# Patient Record
Sex: FEMALE | Race: WHITE | NOT HISPANIC OR LATINO | Employment: OTHER | URBAN - METROPOLITAN AREA
[De-identification: names, ages, dates, MRNs, and addresses within clinical notes are randomized per-mention and may not be internally consistent; named-entity substitution may affect disease eponyms.]

---

## 2017-01-17 DIAGNOSIS — G43.909 MIGRAINE WITHOUT STATUS MIGRAINOSUS, NOT INTRACTABLE, UNSPECIFIED MIGRAINE TYPE: ICD-10-CM

## 2017-01-17 RX ORDER — RIZATRIPTAN BENZOATE 10 MG/1
TABLET, ORALLY DISINTEGRATING ORAL
Qty: 9 TABLET | Refills: 2 | Status: SHIPPED | OUTPATIENT
Start: 2017-01-17 | End: 2017-01-23

## 2017-01-23 ENCOUNTER — OFFICE VISIT (OUTPATIENT)
Dept: FAMILY MEDICINE CLINIC | Facility: CLINIC | Age: 52
End: 2017-01-23

## 2017-01-23 VITALS
DIASTOLIC BLOOD PRESSURE: 84 MMHG | BODY MASS INDEX: 30.63 KG/M2 | TEMPERATURE: 97.4 F | RESPIRATION RATE: 16 BRPM | SYSTOLIC BLOOD PRESSURE: 122 MMHG | HEART RATE: 74 BPM | HEIGHT: 60 IN | WEIGHT: 156 LBS

## 2017-01-23 DIAGNOSIS — J06.9 ACUTE URI: Primary | ICD-10-CM

## 2017-01-23 DIAGNOSIS — G43.909 MIGRAINE WITHOUT STATUS MIGRAINOSUS, NOT INTRACTABLE, UNSPECIFIED MIGRAINE TYPE: ICD-10-CM

## 2017-01-23 PROCEDURE — 99213 OFFICE O/P EST LOW 20 MIN: CPT | Performed by: FAMILY MEDICINE

## 2017-01-23 RX ORDER — RIZATRIPTAN BENZOATE 10 MG/1
TABLET, ORALLY DISINTEGRATING ORAL
Qty: 9 TABLET | Refills: 5 | Status: SHIPPED | OUTPATIENT
Start: 2017-01-23 | End: 2018-03-30 | Stop reason: SDUPTHER

## 2017-01-23 RX ORDER — IBUPROFEN 800 MG/1
TABLET ORAL
Refills: 4 | COMMUNITY
Start: 2016-12-05 | End: 2017-03-02

## 2017-01-23 NOTE — MR AVS SNAPSHOT
Astrid VEGA Flaco   1/23/2017 4:00 PM   Office Visit    Dept Phone:  [phone]   Encounter #:  [id number]    Provider:  Anson Castellanos MD   Department:  [facility] FAMILY MEDICINE                Your Full Care Plan              Today's Medication Changes          These changes are accurate as of: 1/23/17  4:24 PM.  If you have any questions, ask your nurse or doctor.               New Medication(s)Ordered:     Chlorcyclizine-Pseudoephed 25-60 MG tablet   1/2-1 po q 8 hours PRN   Started by:  Anson Castellanos MD         Medication(s)that have changed:     rizatriptan MLT 10 MG disintegrating tablet   Commonly known as:  MAXALT-MLT   DISSOLVE ONE TABLET IN MOUTH AT ONSET OF HEADACHE, MAY REPEAT EVERY 2 HOURS AS NEEDED *MAXIMUM OF 2 TABLETS IN 24 HOURS*   What changed:  See the new instructions.   Changed by:  Anson Castellanos MD            Where to Get Your Medications      These medications were sent to [facility]/pharmacy #[facility] [place], [place] - [address] [place] [address] AT [place] [address] - [phone]  - [phone] [place] [address]    Hours:  24-hours Phone:  [phone]     Chlorcyclizine-Pseudoephed 25-60 MG tablet    rizatriptan MLT 10 MG disintegrating tablet                  Your Updated Medication List          This list is accurate as of: 1/23/17  4:24 PM.  Always use your most recent med list.                aspirin 81 MG EC tablet       Chlorcyclizine-Pseudoephed 25-60 MG tablet   1/2-1 po q 8 hours PRN       ibuprofen 800 MG tablet   Commonly known as:  ADVIL,MOTRIN       naproxen 500 MG tablet   Commonly known as:  NAPROSYN   Take 1 tablet by mouth 2 (two) times a day with meals.       rizatriptan MLT 10 MG disintegrating tablet   Commonly known as:  MAXALT-MLT   DISSOLVE ONE TABLET IN MOUTH AT ONSET OF HEADACHE, MAY REPEAT EVERY 2 HOURS AS NEEDED *MAXIMUM OF 2 TABLETS IN 24 HOURS*       valACYclovir 1000 MG tablet   Commonly known  "as:  MAURILIO               You Were Diagnosed With        Codes Comments    Acute URI    -  Primary ICD-10-CM: J06.9  ICD-9-CM: 465.9     Migraine without status migrainosus, not intractable, unspecified migraine type     ICD-10-CM: G43.909  ICD-9-CM: 346.90       Instructions     None    Patient Instructions History      Upcoming Appointments     Visit Type Date Time Department    OFFICE VISIT 1/23/2017  4:00 PM [facility]      [employer] Signup     Our records indicate that you have an active [employer] account.    You can view your After Visit Summary by going to [email or web address] and logging in with your [employer] username and password.  If you don't have a [employer] username and password but a parent or guardian has access to your record, the parent or guardian should login with their own [employer] username and password and access your record to view the After Visit Summary.    If you have questions, you can email [email or web address] or call [phone] to talk to our [email or web address] staff.  Remember, [employer] is NOT to be used for urgent needs.  For medical emergencies, dial 911.               Other Info from Your Visit           Allergies     No Known Drug Allergy        Reason for Visit     URI           Vital Signs     Blood Pressure Pulse Temperature Respirations Height Weight    122/84 74 97.4 °F (36.3 °C) 16 60\" (152.4 cm) 156 lb (70.8 kg)    Body Mass Index Smoking Status                30.47 kg/m2 Never Smoker          Problems and Diagnoses Noted     Migraines    Acute upper respiratory infection    -  Primary        "

## 2017-01-23 NOTE — PROGRESS NOTES
Subjective   Astrid Sam is a 51 y.o. female.     URI    Associated symptoms include congestion and coughing.    Ill for the last 24 hour  Cough, congestion, aches, diarrhea  No fever  tried some OTC without help      Review of Systems   HENT: Positive for congestion.    Respiratory: Positive for cough.        Objective   Physical Exam   Constitutional: She appears well-developed and well-nourished. No distress.   HENT:   Head: Normocephalic and atraumatic.   Right Ear: Tympanic membrane, external ear and ear canal normal.   Left Ear: Tympanic membrane, external ear and ear canal normal.   Nose: Nose normal.   Mouth/Throat: Uvula is midline, oropharynx is clear and moist and mucous membranes are normal.   Cardiovascular: Normal rate, regular rhythm and normal heart sounds.    Pulmonary/Chest: Effort normal and breath sounds normal.   Lymphadenopathy:     She has no cervical adenopathy.   Psychiatric: She has a normal mood and affect. Her behavior is normal.   Nursing note and vitals reviewed.      Assessment/Plan   Astrid was seen today for uri.    Diagnoses and all orders for this visit:    Acute URI  -     Chlorcyclizine-Pseudoephed 25-60 MG tablet; 1/2-1 po q 8 hours PRN    Migraine without status migrainosus, not intractable, unspecified migraine type  -     rizatriptan MLT (MAXALT-MLT) 10 MG disintegrating tablet; DISSOLVE ONE TABLET IN MOUTH AT ONSET OF HEADACHE, MAY REPEAT EVERY 2 HOURS AS NEEDED *MAXIMUM OF 2 TABLETS IN 24 HOURS*      Stahist, call back INB, consider Abx INB in 48 hours, pt agrees

## 2017-03-02 ENCOUNTER — OFFICE VISIT (OUTPATIENT)
Dept: FAMILY MEDICINE CLINIC | Facility: CLINIC | Age: 52
End: 2017-03-02

## 2017-03-02 VITALS
SYSTOLIC BLOOD PRESSURE: 120 MMHG | RESPIRATION RATE: 16 BRPM | BODY MASS INDEX: 31.73 KG/M2 | WEIGHT: 161.6 LBS | HEIGHT: 60 IN | HEART RATE: 72 BPM | TEMPERATURE: 97.8 F | DIASTOLIC BLOOD PRESSURE: 85 MMHG

## 2017-03-02 DIAGNOSIS — R17 ELEVATED BILIRUBIN: ICD-10-CM

## 2017-03-02 DIAGNOSIS — R79.89 ELEVATED TSH: ICD-10-CM

## 2017-03-02 DIAGNOSIS — J20.9 ACUTE BRONCHITIS, UNSPECIFIED ORGANISM: Primary | ICD-10-CM

## 2017-03-02 PROCEDURE — 99213 OFFICE O/P EST LOW 20 MIN: CPT | Performed by: FAMILY MEDICINE

## 2017-03-02 RX ORDER — AZITHROMYCIN 250 MG/1
TABLET, FILM COATED ORAL
Qty: 6 TABLET | Refills: 0 | Status: SHIPPED | OUTPATIENT
Start: 2017-03-02 | End: 2017-03-08

## 2017-03-02 RX ORDER — FOLIC ACID 1 MG/1
1 TABLET ORAL DAILY
COMMUNITY
End: 2017-06-29

## 2017-03-02 NOTE — PROGRESS NOTES
Subjective   Astrid Sam is a 51 y.o. female.     History of Present Illness   Upper Respiratory Infection  Patient complains of symptoms of a URI. Symptoms include congestion, no  fever and non productive cough. Onset of symptoms was 2 weeks ago, and has been unchanged since that time. Treatment to date: Elis Washington, old rx of cough syrup. Also, completed prednisone rx today.     Labs completed by her rheumatologist, scanned into her chart already.   Mildly elevated TSH, no history of hypothyroidism  Also, very slightly elevated total bilirubin. No history of liver dysfunction, jaundice.     The following portions of the patient's history were reviewed and updated as appropriate: allergies, current medications, past family history, past medical history, past social history, past surgical history and problem list.    Review of Systems   Constitutional: Negative for chills and fever.   HENT: Negative for congestion, ear pain, postnasal drip, rhinorrhea and sinus pressure.    Respiratory: Positive for cough. Negative for shortness of breath and wheezing.    Cardiovascular: Negative for chest pain.   Gastrointestinal: Negative for nausea and vomiting.   Neurological: Negative for dizziness and headaches.       Objective   Physical Exam   Constitutional: She is oriented to person, place, and time. She appears well-developed and well-nourished.   HENT:   Head: Normocephalic and atraumatic.   Right Ear: Hearing, tympanic membrane, external ear and ear canal normal.   Left Ear: Hearing, tympanic membrane, external ear and ear canal normal.   Nose: Nose normal.   Mouth/Throat: Oropharynx is clear and moist and mucous membranes are normal.   Eyes: Conjunctivae and EOM are normal.   Neck: Normal range of motion. Neck supple.   Cardiovascular: Normal rate, regular rhythm and normal heart sounds.    Pulmonary/Chest: Effort normal and breath sounds normal. No respiratory distress. She has no wheezes.   Musculoskeletal:  She exhibits no deformity.   Lymphadenopathy:     She has no cervical adenopathy.   Neurological: She is alert and oriented to person, place, and time. No cranial nerve deficit.   Skin: Skin is warm and dry.   Psychiatric: She has a normal mood and affect. Her behavior is normal.   Nursing note and vitals reviewed.      Assessment/Plan   Astrid was seen today for chest congestion & cough and review labs from rheum.    Diagnoses and all orders for this visit:    Acute bronchitis, unspecified organism  -     HYDROcodone-homatropine (HYCODAN) 5-1.5 MG/5ML syrup; Take 5 mL by mouth Every 6 (Six) Hours As Needed for cough.  -     azithromycin (ZITHROMAX) 250 MG tablet; Take 2 tablets the first day, then 1 tablet daily for 4 days.    Elevated TSH  -     Thyroid Panel With TSH    Elevated bilirubin  -     Hepatic Function Panel      Treatment bronchitis with Z-Sammy and prn cough syrup  Repeat labs once illness has resolved.    Beckie Murphy, DO

## 2017-03-02 NOTE — PATIENT INSTRUCTIONS
"Go to the nearest ER or return to clinic if symptoms worsen, fever/chill develop      Upper Respiratory Infection, Adult  Most upper respiratory infections (URIs) are caused by a virus. A URI affects the nose, throat, and upper air passages. The most common type of URI is often called \"the common cold.\"  HOME CARE   · Take medicines only as told by your doctor.  · Gargle warm saltwater or take cough drops to comfort your throat as told by your doctor.  · Use a warm mist humidifier or inhale steam from a shower to increase air moisture. This may make it easier to breathe.  · Drink enough fluid to keep your pee (urine) clear or pale yellow.  · Eat soups and other clear broths.  · Have a healthy diet.  · Rest as needed.  · Go back to work when your fever is gone or your doctor says it is okay.  ¨ You may need to stay home longer to avoid giving your URI to others.  ¨ You can also wear a face mask and wash your hands often to prevent spread of the virus.  · Use your inhaler more if you have asthma.  · Do not use any tobacco products, including cigarettes, chewing tobacco, or electronic cigarettes. If you need help quitting, ask your doctor.  GET HELP IF:  · You are getting worse, not better.  · Your symptoms are not helped by medicine.  · You have chills.  · You are getting more short of breath.  · You have brown or red mucus.  · You have yellow or brown discharge from your nose.  · You have pain in your face, especially when you bend forward.  · You have a fever.  · You have puffy (swollen) neck glands.  · You have pain while swallowing.  · You have white areas in the back of your throat.  GET HELP RIGHT AWAY IF:   · You have very bad or constant:    Headache.    Ear pain.    Pain in your forehead, behind your eyes, and over your cheekbones (sinus pain).    Chest pain.  · You have long-lasting (chronic) lung disease and any of the following:    Wheezing.    Long-lasting cough.    Coughing up blood.    A change in your " usual mucus.  · You have a stiff neck.  · You have changes in your:    Vision.    Hearing.    Thinking.    Mood.  MAKE SURE YOU:   · Understand these instructions.  · Will watch your condition.  · Will get help right away if you are not doing well or get worse.     This information is not intended to replace advice given to you by your health care provider. Make sure you discuss any questions you have with your health care provider.     Document Released: 06/05/2009 Document Revised: 05/03/2016 Document Reviewed: 03/25/2015  [employer] Interactive Patient Education ©2016 [employer]

## 2017-03-08 ENCOUNTER — TELEPHONE (OUTPATIENT)
Dept: FAMILY MEDICINE CLINIC | Facility: CLINIC | Age: 52
End: 2017-03-08

## 2017-03-08 RX ORDER — LEVOFLOXACIN 500 MG/1
500 TABLET, FILM COATED ORAL DAILY
Qty: 7 TABLET | Refills: 0 | Status: SHIPPED | OUTPATIENT
Start: 2017-03-08 | End: 2017-03-15

## 2017-03-08 NOTE — TELEPHONE ENCOUNTER
Spoke with patient, states still coughing like crazy and feels like the cough medicine and zpack haven't done anything. Pt is wondering if something else can be called in or does she need to come back in and be seen. [facility] if calling something different in.

## 2017-03-08 NOTE — TELEPHONE ENCOUNTER
----- Message from Sarah Ray sent at 3/8/2017  9:14 AM EST -----  Contact: Katherine  Patient stated she is still coughing a lot even after taking the Zpack. Please call patient back at [phone].

## 2017-03-15 ENCOUNTER — OFFICE VISIT (OUTPATIENT)
Dept: FAMILY MEDICINE CLINIC | Facility: CLINIC | Age: 52
End: 2017-03-15

## 2017-03-15 VITALS
OXYGEN SATURATION: 99 % | DIASTOLIC BLOOD PRESSURE: 68 MMHG | BODY MASS INDEX: 31.8 KG/M2 | WEIGHT: 162 LBS | HEART RATE: 87 BPM | RESPIRATION RATE: 16 BRPM | HEIGHT: 60 IN | TEMPERATURE: 97.6 F | SYSTOLIC BLOOD PRESSURE: 108 MMHG

## 2017-03-15 DIAGNOSIS — R11.2 NON-INTRACTABLE VOMITING WITH NAUSEA, UNSPECIFIED VOMITING TYPE: ICD-10-CM

## 2017-03-15 DIAGNOSIS — R10.11 RUQ ABDOMINAL PAIN: Primary | ICD-10-CM

## 2017-03-15 DIAGNOSIS — R14.2 BELCHING SYMPTOM: ICD-10-CM

## 2017-03-15 LAB
BILIRUB BLD-MCNC: NEGATIVE MG/DL
CLARITY, POC: ABNORMAL
COLOR UR: YELLOW
GLUCOSE UR STRIP-MCNC: NEGATIVE MG/DL
KETONES UR QL: NEGATIVE
LEUKOCYTE EST, POC: ABNORMAL
NITRITE UR-MCNC: NEGATIVE MG/ML
PH UR: 6.5 [PH] (ref 5–8)
PROT UR STRIP-MCNC: NEGATIVE MG/DL
RBC # UR STRIP: NEGATIVE /UL
SP GR UR: 1.02 (ref 1–1.03)
UROBILINOGEN UR QL: NORMAL

## 2017-03-15 PROCEDURE — 99214 OFFICE O/P EST MOD 30 MIN: CPT | Performed by: NURSE PRACTITIONER

## 2017-03-15 PROCEDURE — 81003 URINALYSIS AUTO W/O SCOPE: CPT | Performed by: NURSE PRACTITIONER

## 2017-03-15 RX ORDER — BENZONATATE 100 MG/1
100 CAPSULE ORAL 2 TIMES DAILY PRN
COMMUNITY
End: 2017-06-29

## 2017-03-15 RX ORDER — BROMPHENIRAMINE MALEATE, PSEUDOEPHEDRINE HYDROCHLORIDE, AND DEXTROMETHORPHAN HYDROBROMIDE 2; 30; 10 MG/5ML; MG/5ML; MG/5ML
5-10 SYRUP ORAL 4 TIMES DAILY PRN
Qty: 473 ML | Refills: 0 | Status: SHIPPED | OUTPATIENT
Start: 2017-03-15 | End: 2017-06-29

## 2017-03-15 RX ORDER — OMEPRAZOLE 40 MG/1
40 CAPSULE, DELAYED RELEASE ORAL DAILY
Qty: 30 CAPSULE | Refills: 0 | Status: SHIPPED | OUTPATIENT
Start: 2017-03-15 | End: 2017-10-18

## 2017-03-15 RX ORDER — ONDANSETRON HYDROCHLORIDE 8 MG/1
8 TABLET, FILM COATED ORAL EVERY 8 HOURS PRN
Qty: 30 TABLET | Refills: 0 | Status: SHIPPED | OUTPATIENT
Start: 2017-03-15 | End: 2017-11-07 | Stop reason: SDUPTHER

## 2017-03-15 NOTE — PROGRESS NOTES
"Subjective   Astrid Sam is a 51 y.o. female.     Abdominal Pain   This is a new problem. The current episode started 1 to 4 weeks ago. The problem occurs intermittently. The problem has been waxing and waning. The pain is located in the RUQ and epigastric region. The pain is severe. The quality of the pain is sharp. The abdominal pain does not radiate. Associated symptoms include belching, flatus and nausea (belching and burping). Pertinent negatives include no anorexia, constipation, diarrhea, dysuria, fever, frequency, headaches, hematuria, melena, myalgias, vomiting or weight loss. The pain is aggravated by coughing and eating. The pain is relieved by nothing. She has tried nothing for the symptoms. There is no history of Crohn's disease, gallstones, GERD, irritable bowel syndrome, pancreatitis, PUD or ulcerative colitis.      Right sided abdominal pains described as \"sharp pains\" that come and go    Was seen in ER on Monday for bronchitis and pleurisy and started on steroids  Coughing for the past 31/2 weeks due to Bronchitis; Hycodan cough syrup makes her too sleepy, would like to have different cough syrup so she doesn't get so sleepy  Zpak, Hycodan, Prednisone and Tessalon Perles, went to ER and told she had Pleurisy    As driving to work this morning had sharp pains in left side that was a stabbing sensation  Pain in side hurts worse when she coughs  Associated sx:  nausea with vomiting this morning and at the ER;  belching and burping over the past several weeks that seems to have gotten worse the past several days  Still has gall bladder, no hx of kidney stones, no blood in urine, no change in BM,  No constipation, no hx of GERD  Ate breakfast this morning  No OTC meds to make this feel better, nothing makes it worse       The following portions of the patient's history were reviewed and updated as appropriate: allergies, current medications, past family history, past medical history, past social " history, past surgical history and problem list.    Review of Systems   Constitutional: Negative for chills, fever and weight loss.   HENT: Negative.    Eyes: Negative.    Respiratory: Positive for cough, chest tightness and wheezing.    Cardiovascular: Negative.    Gastrointestinal: Positive for abdominal pain (right upper quadrant and epigastric), flatus and nausea (belching and burping). Negative for anorexia, blood in stool, constipation, diarrhea, melena and vomiting.   Endocrine: Negative.    Genitourinary: Negative.  Negative for difficulty urinating, dysuria, flank pain, frequency and hematuria.   Musculoskeletal: Negative.  Negative for myalgias.   Skin: Negative.    Allergic/Immunologic: Negative.    Neurological: Negative.  Negative for dizziness, light-headedness and headaches.   Hematological: Negative.    Psychiatric/Behavioral: Negative.        Objective   Physical Exam   Constitutional: She is oriented to person, place, and time. Vital signs are normal. She appears well-developed and well-nourished. No distress.   HENT:   Head: Normocephalic.   Right Ear: Tympanic membrane, external ear and ear canal normal.   Left Ear: Tympanic membrane, external ear and ear canal normal.   Nose: Nose normal. No mucosal edema or rhinorrhea. Right sinus exhibits no maxillary sinus tenderness and no frontal sinus tenderness. Left sinus exhibits no maxillary sinus tenderness and no frontal sinus tenderness.   Mouth/Throat: Uvula is midline, oropharynx is clear and moist and mucous membranes are normal.   Eyes: Conjunctivae and lids are normal. Pupils are equal, round, and reactive to light.   Neck: Trachea normal and normal range of motion. Neck supple. Carotid bruit is not present. No thyroid mass present.   Cardiovascular: Normal rate, regular rhythm, S1 normal, S2 normal and normal heart sounds.    Pulmonary/Chest: Breath sounds normal. No respiratory distress. She has no wheezes. She has no rales.   Cough with deep  breaths and through out visit, but no abnormal breath sounds on ascultation of lungs   Abdominal: Soft. Normal appearance and bowel sounds are normal. There is tenderness (epigastric tenderness and RUQ tenderness).   Lymphadenopathy:        Head (right side): No tonsillar, no preauricular, no posterior auricular and no occipital adenopathy present.        Head (left side): No tonsillar, no preauricular, no posterior auricular and no occipital adenopathy present.     She has no cervical adenopathy.   Neurological: She is alert and oriented to person, place, and time.   Skin: Skin is warm, dry and intact. She is not diaphoretic. No cyanosis. Nails show no clubbing.   Psychiatric: She has a normal mood and affect. Her speech is normal and behavior is normal. Judgment and thought content normal. Cognition and memory are normal.   Nursing note and vitals reviewed.      Assessment/Plan   Astrid was seen today for flank pain.    Diagnoses and all orders for this visit:    RUQ abdominal pain  -     US abdomen limited  -     POCT urinalysis dipstick, automated    Non-intractable vomiting with nausea, unspecified vomiting type  -     US abdomen limited    Belching symptom  -     US abdomen limited    Other orders  -     omeprazole (PRILOSEC) 40 MG capsule; Take 1 capsule by mouth Daily.  -     brompheniramine-pseudoephedrine-DM 30-2-10 MG/5ML syrup; Take 5-10 mL by mouth 4 (Four) Times a Day As Needed for Allergies.  -     ondansetron (ZOFRAN) 8 MG tablet; Take 1 tablet by mouth Every 8 (Eight) Hours As Needed for Nausea or Vomiting.      I am not sure if the Prednisone is causing some gastritis or if the pt is having symptoms from gall bladder. Will start pt on Omeprazole since she has epigastric tenderness and belching/burping, and will send pt for US of abdomen to evaluate gallbladder.   She has asked for some cough medication that is not as sedating, will send in some Bromfed DM.   Take Zofran as needed for nausea.   F/U  if abdominal pain worsens or go to ER, pt agrees.

## 2017-03-16 ENCOUNTER — HOSPITAL ENCOUNTER (OUTPATIENT)
Dept: ULTRASOUND IMAGING | Facility: HOSPITAL | Age: 52
Discharge: HOME | End: 2017-03-16
Admitting: NURSE PRACTITIONER

## 2017-03-16 PROCEDURE — 76705 ECHO EXAM OF ABDOMEN: CPT

## 2017-03-17 ENCOUNTER — TELEPHONE (OUTPATIENT)
Dept: FAMILY MEDICINE CLINIC | Facility: CLINIC | Age: 52
End: 2017-03-17

## 2017-03-17 DIAGNOSIS — K80.20 GALL STONES: Primary | ICD-10-CM

## 2017-03-17 DIAGNOSIS — R10.11 RIGHT UPPER QUADRANT ABDOMINAL PAIN: ICD-10-CM

## 2017-03-17 NOTE — TELEPHONE ENCOUNTER
I do not recommend that she have a HIDA scan because she has gall stones or polyps in her GB that showed up on the US. It is not recommended because the contrast could push the stones into the duct causing a blockage, so the referral to surgery would be the next step. Referral placed, does pt have a preference? ajc

## 2017-03-17 NOTE — TELEPHONE ENCOUNTER
----- Message from Stefanie Mendoza sent at 3/17/2017 10:12 AM EDT -----  Contact: NAHEED ROWELL; THANIA   PT WENT BACK TO THE ER LAST NIGHT BECAUSE OF HER LEFT SIDE PAIN. THEY TOLD HER TO CONTACT THE OFFICE AND REQUEST AN ORDER FOR A HIDA SCAN. SHE WOULD ALSO LIKE THE RESULT FROM HER ULTRASOUND FROM THE OTHER DAY WHEN THEY COME IN.     CALL BACK   [phone]

## 2017-03-22 DIAGNOSIS — E03.9 HYPOTHYROIDISM, UNSPECIFIED TYPE: Primary | ICD-10-CM

## 2017-03-22 RX ORDER — LEVOTHYROXINE SODIUM 0.03 MG/1
25 TABLET ORAL DAILY
Qty: 30 TABLET | Refills: 2 | Status: SHIPPED | OUTPATIENT
Start: 2017-03-22 | End: 2017-07-01 | Stop reason: SDUPTHER

## 2017-05-01 DIAGNOSIS — E03.9 HYPOTHYROIDISM, UNSPECIFIED: Primary | ICD-10-CM

## 2017-05-05 ENCOUNTER — TELEPHONE (OUTPATIENT)
Dept: FAMILY MEDICINE CLINIC | Facility: CLINIC | Age: 52
End: 2017-05-05

## 2017-06-29 ENCOUNTER — OFFICE VISIT (OUTPATIENT)
Dept: FAMILY MEDICINE CLINIC | Facility: CLINIC | Age: 52
End: 2017-06-29

## 2017-06-29 VITALS
BODY MASS INDEX: 31.37 KG/M2 | HEIGHT: 60 IN | SYSTOLIC BLOOD PRESSURE: 118 MMHG | DIASTOLIC BLOOD PRESSURE: 78 MMHG | TEMPERATURE: 97.9 F | HEART RATE: 84 BPM | WEIGHT: 159.8 LBS | RESPIRATION RATE: 16 BRPM

## 2017-06-29 DIAGNOSIS — M62.838 MUSCLE SPASM OF LEFT SHOULDER: Primary | ICD-10-CM

## 2017-06-29 PROCEDURE — 96372 THER/PROPH/DIAG INJ SC/IM: CPT | Performed by: FAMILY MEDICINE

## 2017-06-29 PROCEDURE — 99213 OFFICE O/P EST LOW 20 MIN: CPT | Performed by: FAMILY MEDICINE

## 2017-06-29 RX ORDER — LEFLUNOMIDE 20 MG/1
20 TABLET ORAL DAILY
COMMUNITY
End: 2018-07-23

## 2017-06-29 RX ORDER — TIZANIDINE 4 MG/1
4 TABLET ORAL EVERY 8 HOURS PRN
Qty: 60 TABLET | Refills: 0 | Status: SHIPPED | OUTPATIENT
Start: 2017-06-29 | End: 2017-10-18

## 2017-06-29 RX ORDER — KETOROLAC TROMETHAMINE 30 MG/ML
60 INJECTION, SOLUTION INTRAMUSCULAR; INTRAVENOUS ONCE
Status: COMPLETED | OUTPATIENT
Start: 2017-06-29 | End: 2017-06-29

## 2017-06-29 RX ORDER — PREDNISONE 1 MG/1
5 TABLET ORAL DAILY
COMMUNITY
End: 2017-08-23

## 2017-06-29 RX ADMIN — KETOROLAC TROMETHAMINE 60 MG: 30 INJECTION, SOLUTION INTRAMUSCULAR; INTRAVENOUS at 09:26

## 2017-06-29 NOTE — PROGRESS NOTES
Subjective   Astrid Sam is a 51 y.o. female.     History of Present Illness   CC left shoulder pain  Woke up 2 days ago with pain in the posterior left shoulder. No history of prior injury.   She was seen at [facility] ER last night for this condition. She was treated with Flexeril, Norco, and lidocaine patches. She states that nothing has worked so far. She is already treated with prednisone chronically for RA, no additional steroids were given.   She has applied both heat and ice without improvement.   Shoulder feels tight and tender. No numbness, tingling, weakness of upper extremities.     The following portions of the patient's history were reviewed and updated as appropriate: allergies, current medications, past family history, past medical history, past social history, past surgical history and problem list.    Review of Systems   Constitutional: Negative.    Respiratory: Negative.    Cardiovascular: Negative.    Musculoskeletal: Positive for arthralgias and myalgias. Negative for neck pain and neck stiffness.   Neurological: Negative for weakness, numbness and headaches.       Objective   Physical Exam   Constitutional: She is oriented to person, place, and time. She appears well-developed and well-nourished.   HENT:   Head: Normocephalic and atraumatic.   Right Ear: External ear normal.   Left Ear: External ear normal.   Nose: Nose normal.   Eyes: Conjunctivae are normal.   Neck: Normal range of motion. Neck supple.   Cardiovascular: Normal rate and regular rhythm.    Pulmonary/Chest: Effort normal.   Musculoskeletal: She exhibits no edema or deformity.        Arms:  Muscle spasm superior posterior left shoulder    Neurological: She is alert and oriented to person, place, and time. No cranial nerve deficit.   Skin: Skin is warm and dry.   Psychiatric: She has a normal mood and affect. Her behavior is normal.   Nursing note and vitals reviewed.      Assessment/Plan   Astrid was seen today for left shoulder  pain.    Diagnoses and all orders for this visit:    Muscle spasm of left shoulder  -     tiZANidine (ZANAFLEX) 4 MG tablet; Take 1 tablet by mouth Every 8 (Eight) Hours As Needed for Muscle Spasms.  -     ketorolac (TORADOL) injection 60 mg; Inject 60 mg into the shoulder, thigh, or buttocks 1 (One) Time.  -     Capsaicin 0.035 % cream; apply thin layer to the affected area 3 to 4 times per day as needed for pain relief      Stop flexeril, start Zanaflex prn for muscle spasms  Toradol IM provided today  Continue lidocaine patches or topical capsaicin cream. Ok for prn pain medication provided by ER.   Advised at home stretches  If symptoms don't resolve, consider PT.

## 2017-06-29 NOTE — PATIENT INSTRUCTIONS
Go to the nearest ER or return to clinic if symptoms worsen, fever/chill develop    Muscle Cramps and Spasms  Muscle cramps and spasms occur when a muscle or muscles tighten and you have no control over this tightening (involuntary muscle contraction). They are a common problem and can develop in any muscle. The most common place is in the calf muscles of the leg. Both muscle cramps and muscle spasms are involuntary muscle contractions, but they also have differences:   · Muscle cramps are sporadic and painful. They may last a few seconds to a quarter of an hour. Muscle cramps are often more forceful and last longer than muscle spasms.  · Muscle spasms may or may not be painful. They may also last just a few seconds or much longer.  CAUSES   It is uncommon for cramps or spasms to be due to a serious underlying problem. In many cases, the cause of cramps or spasms is unknown. Some common causes are:   · Overexertion.    · Overuse from repetitive motions (doing the same thing over and over).    · Remaining in a certain position for a long period of time.    · Improper preparation, form, or technique while performing a sport or activity.    · Dehydration.    · Injury.    · Side effects of some medicines.    · Abnormally low levels of the salts and ions in your blood (electrolytes), especially potassium and calcium. This could happen if you are taking water pills (diuretics) or you are pregnant.    Some underlying medical problems can make it more likely to develop cramps or spasms. These include, but are not limited to:   · Diabetes.    · Parkinson disease.    · Hormone disorders, such as thyroid problems.    · Alcohol abuse.    · Diseases specific to muscles, joints, and bones.    · Blood vessel disease where not enough blood is getting to the muscles.    HOME CARE INSTRUCTIONS   · Stay well hydrated. Drink enough water and fluids to keep your urine clear or pale yellow.  · It may be helpful to massage, stretch, and  relax the affected muscle.  · For tight or tense muscles, use a warm towel, heating pad, or hot shower water directed to the affected area.  · If you are sore or have pain after a cramp or spasm, applying ice to the affected area may relieve discomfort.    Put ice in a plastic bag.    Place a towel between your skin and the bag.    Leave the ice on for 15-20 minutes, 03-04 times a day.  · Medicines used to treat a known cause of cramps or spasms may help reduce their frequency or severity. Only take over-the-counter or prescription medicines as directed by your caregiver.  SEEK MEDICAL CARE IF:   Your cramps or spasms get more severe, more frequent, or do not improve over time.   MAKE SURE YOU:   · Understand these instructions.  · Will watch your condition.  · Will get help right away if you are not doing well or get worse.     This information is not intended to replace advice given to you by your health care provider. Make sure you discuss any questions you have with your health care provider.     Document Released: 06/09/2003 Document Revised: 04/14/2014 Document Reviewed: 09/20/2016  [employer] Interactive Patient Education ©2017 [employer]

## 2017-07-01 DIAGNOSIS — E03.9 HYPOTHYROIDISM, UNSPECIFIED TYPE: ICD-10-CM

## 2017-07-03 RX ORDER — LEVOTHYROXINE SODIUM 0.03 MG/1
TABLET ORAL
Qty: 30 TABLET | Refills: 2 | Status: SHIPPED | OUTPATIENT
Start: 2017-07-03 | End: 2017-08-24 | Stop reason: SDUPTHER

## 2017-07-05 ENCOUNTER — OFFICE VISIT (OUTPATIENT)
Dept: FAMILY MEDICINE CLINIC | Facility: CLINIC | Age: 52
End: 2017-07-05

## 2017-07-05 VITALS
WEIGHT: 161.6 LBS | DIASTOLIC BLOOD PRESSURE: 78 MMHG | HEART RATE: 88 BPM | RESPIRATION RATE: 20 BRPM | HEIGHT: 60 IN | SYSTOLIC BLOOD PRESSURE: 120 MMHG | TEMPERATURE: 97.9 F | BODY MASS INDEX: 31.73 KG/M2

## 2017-07-05 DIAGNOSIS — M54.12 CERVICAL RADICULAR PAIN: Primary | ICD-10-CM

## 2017-07-05 PROCEDURE — 99213 OFFICE O/P EST LOW 20 MIN: CPT | Performed by: FAMILY MEDICINE

## 2017-07-05 NOTE — PATIENT INSTRUCTIONS
Go to the nearest ER or return to clinic if symptoms worsen, fever/chill develop      Cervical Radiculopathy  Cervical radiculopathy means that a nerve in the neck is pinched or bruised. This can cause pain or loss of feeling (numbness) that runs from your neck to your arm and fingers.  HOME CARE  Managing Pain  · Take over-the-counter and prescription medicines only as told by your doctor.  · If directed, put ice on the injured or painful area.    Put ice in a plastic bag.    Place a towel between your skin and the bag.    Leave the ice on for 20 minutes, 2-3 times per day.  · If ice does not help, you can try using heat. Take a warm shower or warm bath, or use a heat pack as told by your doctor.  · You may try a gentle neck and shoulder massage.  Activity  · Rest as needed. Follow instructions from your doctor about any activities to avoid.  · Do exercises as told by your doctor or physical therapist.  General Instructions    · If you were given a soft collar, wear it as told by your doctor.  · Use a flat pillow when you sleep.  · Keep all follow-up visits as told by your doctor. This is important.  GET HELP IF:  · Your condition does not improve with treatment.  GET HELP RIGHT AWAY IF:   · Your pain gets worse and is not controlled with medicine.  · You lose feeling or feel weak in your hand, arm, face, or leg.  · You have a fever.  · You have a stiff neck.  · You cannot control when you poop or pee (have incontinence).  · You have trouble with walking, balance, or talking.     This information is not intended to replace advice given to you by your health care provider. Make sure you discuss any questions you have with your health care provider.     Document Released: 12/06/2012 Document Revised: 09/07/2016 Document Reviewed: 02/11/2016  [employer] Interactive Patient Education ©2017 [employer]
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
## 2017-07-09 ENCOUNTER — TELEPHONE (OUTPATIENT)
Dept: FAMILY MEDICINE CLINIC | Facility: CLINIC | Age: 52
End: 2017-07-09

## 2017-07-09 NOTE — TELEPHONE ENCOUNTER
Xray of neck reveals loss of normal curvature, suggesting muscle spasm. Degenerative changes in mid and lower cervical spine. I'll send topical diclofenac to pharmacy for her to use for additional pain relief. ELIZABETH

## 2017-07-10 NOTE — TELEPHONE ENCOUNTER
Spoke with pt aware of results. Can she have a work excuse for today her neck was bothering her when she woke up this morning.

## 2017-07-13 ENCOUNTER — OFFICE VISIT (OUTPATIENT)
Dept: FAMILY MEDICINE CLINIC | Facility: CLINIC | Age: 52
End: 2017-07-13

## 2017-07-13 VITALS
DIASTOLIC BLOOD PRESSURE: 80 MMHG | HEIGHT: 60 IN | TEMPERATURE: 97.7 F | WEIGHT: 159 LBS | BODY MASS INDEX: 31.22 KG/M2 | SYSTOLIC BLOOD PRESSURE: 120 MMHG | HEART RATE: 88 BPM | RESPIRATION RATE: 20 BRPM

## 2017-07-13 DIAGNOSIS — M54.2 CERVICALGIA: ICD-10-CM

## 2017-07-13 DIAGNOSIS — M54.12 CERVICAL RADICULAR PAIN: ICD-10-CM

## 2017-07-13 DIAGNOSIS — M79.602 LEFT ARM PAIN: Primary | ICD-10-CM

## 2017-07-13 PROCEDURE — 99213 OFFICE O/P EST LOW 20 MIN: CPT | Performed by: FAMILY MEDICINE

## 2017-07-13 RX ORDER — GABAPENTIN 100 MG/1
100 CAPSULE ORAL 3 TIMES DAILY
Qty: 90 CAPSULE | Refills: 1 | Status: SHIPPED | OUTPATIENT
Start: 2017-07-13 | End: 2017-07-26 | Stop reason: SDUPTHER

## 2017-07-13 NOTE — PATIENT INSTRUCTIONS
Go to the nearest ER or return to clinic if symptoms worsen, fever/chill develop      Cervical Radiculopathy  Cervical radiculopathy means that a nerve in the neck is pinched or bruised. This can cause pain or loss of feeling (numbness) that runs from your neck to your arm and fingers.  HOME CARE  Managing Pain  · Take over-the-counter and prescription medicines only as told by your doctor.  · If directed, put ice on the injured or painful area.    Put ice in a plastic bag.    Place a towel between your skin and the bag.    Leave the ice on for 20 minutes, 2-3 times per day.  · If ice does not help, you can try using heat. Take a warm shower or warm bath, or use a heat pack as told by your doctor.  · You may try a gentle neck and shoulder massage.  Activity  · Rest as needed. Follow instructions from your doctor about any activities to avoid.  · Do exercises as told by your doctor or physical therapist.  General Instructions    · If you were given a soft collar, wear it as told by your doctor.  · Use a flat pillow when you sleep.  · Keep all follow-up visits as told by your doctor. This is important.  GET HELP IF:  · Your condition does not improve with treatment.  GET HELP RIGHT AWAY IF:   · Your pain gets worse and is not controlled with medicine.  · You lose feeling or feel weak in your hand, arm, face, or leg.  · You have a fever.  · You have a stiff neck.  · You cannot control when you poop or pee (have incontinence).  · You have trouble with walking, balance, or talking.     This information is not intended to replace advice given to you by your health care provider. Make sure you discuss any questions you have with your health care provider.     Document Released: 12/06/2012 Document Revised: 09/07/2016 Document Reviewed: 02/11/2016  [employer] Interactive Patient Education ©2017 [employer]
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
## 2017-07-17 ENCOUNTER — TELEPHONE (OUTPATIENT)
Dept: FAMILY MEDICINE CLINIC | Facility: CLINIC | Age: 52
End: 2017-07-17

## 2017-07-17 NOTE — TELEPHONE ENCOUNTER
Increase the current dose to 300mg TID. Continue taking the 100mg tabs until that prescription is complete. ELIZABETH

## 2017-07-17 NOTE — TELEPHONE ENCOUNTER
----- Message from Maribel Calloway sent at 7/17/2017  8:23 AM EDT -----  Contact: MYRNA;PT CALLED  PT WAS TO CALL TODAY TO REPORT ON GABAPENTIN 100MG TID-SHE FEELS IT DOES NEED TO BE INCREASED BECAUSE SHE HAS HAD NO RELIEF     PHARMACY [facility]    [phone]

## 2017-07-20 ENCOUNTER — HOSPITAL ENCOUNTER (OUTPATIENT)
Dept: MRI IMAGING | Facility: HOSPITAL | Age: 52
Discharge: HOME | End: 2017-07-20
Attending: FAMILY MEDICINE | Admitting: FAMILY MEDICINE

## 2017-07-20 PROCEDURE — 72141 MRI NECK SPINE W/O DYE: CPT

## 2017-07-26 ENCOUNTER — TELEPHONE (OUTPATIENT)
Dept: FAMILY MEDICINE CLINIC | Facility: CLINIC | Age: 52
End: 2017-07-26

## 2017-07-26 ENCOUNTER — OFFICE VISIT (OUTPATIENT)
Dept: FAMILY MEDICINE CLINIC | Facility: CLINIC | Age: 52
End: 2017-07-26

## 2017-07-26 VITALS
TEMPERATURE: 97.8 F | DIASTOLIC BLOOD PRESSURE: 94 MMHG | RESPIRATION RATE: 20 BRPM | HEART RATE: 100 BPM | BODY MASS INDEX: 31.1 KG/M2 | SYSTOLIC BLOOD PRESSURE: 132 MMHG | HEIGHT: 60 IN | WEIGHT: 158.4 LBS

## 2017-07-26 DIAGNOSIS — M54.12 CERVICAL RADICULAR PAIN: ICD-10-CM

## 2017-07-26 DIAGNOSIS — M54.2 CERVICALGIA: Primary | ICD-10-CM

## 2017-07-26 DIAGNOSIS — M79.602 LEFT ARM PAIN: ICD-10-CM

## 2017-07-26 PROCEDURE — 99214 OFFICE O/P EST MOD 30 MIN: CPT | Performed by: FAMILY MEDICINE

## 2017-07-26 RX ORDER — GABAPENTIN 400 MG/1
400 CAPSULE ORAL 3 TIMES DAILY
Qty: 90 CAPSULE | Refills: 1 | Status: SHIPPED | OUTPATIENT
Start: 2017-07-26 | End: 2017-10-18 | Stop reason: SDUPTHER

## 2017-07-26 RX ORDER — VALACYCLOVIR HYDROCHLORIDE 1 G/1
2000 TABLET, FILM COATED ORAL 2 TIMES DAILY
Qty: 4 TABLET | Refills: 3 | Status: SHIPPED | OUTPATIENT
Start: 2017-07-26 | End: 2018-01-31 | Stop reason: SDUPTHER

## 2017-07-26 NOTE — PATIENT INSTRUCTIONS
Go to the nearest ER or return to clinic if symptoms worsen, fever/chill develop    Neuropathic Pain  Neuropathic pain is pain caused by damage to the nerves that are responsible for certain sensations in your body (sensory nerves). The pain can be caused by damage to:   · The sensory nerves that send signals to your spinal cord and brain (peripheral nervous system).  · The sensory nerves in your brain or spinal cord (central nervous system).  Neuropathic pain can make you more sensitive to pain. What would be a minor sensation for most people may feel very painful if you have neuropathic pain. This is usually a long-term condition that can be difficult to treat. The type of pain can differ from person to person. It may start suddenly (acute), or it may develop slowly and last for a long time (chronic). Neuropathic pain may come and go as damaged nerves heal or may stay at the same level for years. It often causes emotional distress, loss of sleep, and a lower quality of life.  CAUSES   The most common cause of damage to a sensory nerve is diabetes. Many other diseases and conditions can also cause neuropathic pain. Causes of neuropathic pain can be classified as:  · Toxic. Many drugs and chemicals can cause toxic damage. The most common cause of toxic neuropathic pain is damage from drug treatment for cancer (chemotherapy).  · Metabolic. This type of pain can happen when a disease causes imbalances that damage nerves. Diabetes is the most common of these diseases. Vitamin B deficiency caused by long-term alcohol abuse is another common cause.  · Traumatic. Any injury that cuts, crushes, or stretches a nerve can cause damage and pain. A common example is feeling pain after losing an arm or leg (phantom limb pain).  · Compression-related. If a sensory nerve gets trapped or compressed for a long period of time, the blood supply to the nerve can be cut off.  · Vascular. Many blood vessel diseases can cause neuropathic  pain by decreasing blood supply and oxygen to nerves.  · Autoimmune. This type of pain results from diseases in which the body's defense system mistakenly attacks sensory nerves. Examples of autoimmune diseases that can cause neuropathic pain include lupus and multiple sclerosis.  · Infectious. Many types of viral infections can damage sensory nerves and cause pain. Shingles infection is a common cause of this type of pain.  · Inherited. Neuropathic pain can be a symptom of many diseases that are passed down through families (genetic).  SIGNS AND SYMPTOMS   The main symptom is pain. Neuropathic pain is often described as:  · Burning.  · Shock-like.  · Stinging.  · Hot or cold.  · Itching.  DIAGNOSIS   No single test can diagnose neuropathic pain. Your health care provider will do a physical exam and ask you about your pain. You may use a pain scale to describe how bad your pain is. You may also have tests to see if you have a high sensitivity to pain and to help find the cause and location of any sensory nerve damage. These tests may include:  · Imaging studies, such as:    X-rays.    CT scan.    MRI.  · Nerve conduction studies to test how well nerve signals travel through your sensory nerves (electrodiagnostic testing).  · Stimulating your sensory nerves through electrodes on your skin and measuring the response in your spinal cord and brain (somatosensory evoked potentials).  TREATMENT   Treatment for neuropathic pain may change over time. You may need to try different treatment options or a combination of treatments. Some options include:  · Over-the-counter pain relievers.  · Prescription medicines. Some medicines used to treat other conditions may also help neuropathic pain. These include medicines to:  ¨ Control seizures (anticonvulsants).  ¨ Relieve depression (antidepressants).  · Prescription-strength pain relievers (narcotics). These are usually used when other pain relievers do not  help.  · Transcutaneous nerve stimulation (TENS). This uses electrical currents to block painful nerve signals. The treatment is painless.  · Topical and local anesthetics. These are medicines that numb the nerves. They can be injected as a nerve block or applied to the skin.  · Alternative treatments, such as:  ¨ Acupuncture.  ¨ Meditation.  ¨ Massage.  ¨ Physical therapy.  ¨ Pain management programs.  ¨ Counseling.  HOME CARE INSTRUCTIONS  · Learn as much as you can about your condition.  · Take medicines only as directed by your health care provider.  · Work closely with all your health care providers to find what works best for you.  · Have a good support system at home.  · Consider joining a chronic pain support group.  SEEK MEDICAL CARE IF:  · Your pain treatments are not helping.  · You are having side effects from your medicines.  · You are struggling with fatigue, mood changes, depression, or anxiety.     This information is not intended to replace advice given to you by your health care provider. Make sure you discuss any questions you have with your health care provider.     Document Released: 09/14/2005 Document Revised: 01/08/2016 Document Reviewed: 05/28/2015  [employer] Interactive Patient Education ©2017 [employer]
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
## 2017-08-23 ENCOUNTER — OFFICE VISIT (OUTPATIENT)
Dept: FAMILY MEDICINE CLINIC | Facility: CLINIC | Age: 52
End: 2017-08-23

## 2017-08-23 VITALS
DIASTOLIC BLOOD PRESSURE: 80 MMHG | TEMPERATURE: 97.3 F | SYSTOLIC BLOOD PRESSURE: 116 MMHG | HEART RATE: 88 BPM | BODY MASS INDEX: 31.22 KG/M2 | RESPIRATION RATE: 20 BRPM | HEIGHT: 60 IN | WEIGHT: 159 LBS

## 2017-08-23 DIAGNOSIS — M54.2 CERVICALGIA: Primary | ICD-10-CM
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
## 2017-08-23 DIAGNOSIS — M54.12 CERVICAL RADICULAR PAIN: ICD-10-CM
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
## 2017-08-23 PROCEDURE — 99214 OFFICE O/P EST MOD 30 MIN: CPT | Performed by: FAMILY MEDICINE
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
## 2017-08-23 NOTE — PATIENT INSTRUCTIONS
Go to the nearest ER or return to clinic if symptoms worsen, fever/chill develop      Cervical Radiculopathy  Cervical radiculopathy happens when a nerve in the neck (cervical nerve) is pinched or bruised. This condition can develop because of an injury or as part of the normal aging process. Pressure on the cervical nerves can cause pain or numbness that runs from the neck all the way down into the arm and fingers. Usually, this condition gets better with rest. Treatment may be needed if the condition does not improve.   CAUSES  This condition may be caused by:  · Injury.  · Slipped (herniated) disk.  · Muscle tightness in the neck because of overuse.  · Arthritis.  · Breakdown or degeneration in the bones and joints of the spine (spondylosis) due to aging.  · Bone spurs that may develop near the cervical nerves.  SYMPTOMS  Symptoms of this condition include:  · Pain that runs from the neck to the arm and hand. The pain can be severe or irritating. It may be worse when the neck is moved.  · Numbness or weakness in the affected arm and hand.  DIAGNOSIS  This condition may be diagnosed based on symptoms, medical history, and a physical exam. You may also have tests, including:  · X-rays.  · CT scan.  · MRI.  · Electromyogram (EMG).  · Nerve conduction tests.  TREATMENT  In many cases, treatment is not needed for this condition. With rest, the condition usually gets better over time. If treatment is needed, options may include:  · Wearing a soft neck collar for short periods of time.  · Physical therapy to strengthen your neck muscles.  · Medicines, such as NSAIDs, oral corticosteroids, or spinal injections.  · Surgery. This may be needed if other treatments do not help. Various types of surgery may be done depending on the cause of your problems.  HOME CARE INSTRUCTIONS  Managing Pain  · Take over-the-counter and prescription medicines only as told by your health care provider.  · If directed, apply ice to the  affected area.    Put ice in a plastic bag.    Place a towel between your skin and the bag.    Leave the ice on for 20 minutes, 2-3 times per day.  · If ice does not help, you can try using heat. Take a warm shower or warm bath, or use a heat pack as told by your health care provider.  · Try a gentle neck and shoulder massage to help relieve symptoms.  Activity  · Rest as needed. Follow instructions from your health care provider about any restrictions on activities.  · Do stretching and strengthening exercises as told by your health care provider or physical therapist.  General Instructions  · If you were given a soft collar, wear it as told by your health care provider.  · Use a flat pillow when you sleep.  · Keep all follow-up visits as told by your health care provider. This is important.  SEEK MEDICAL CARE IF:  · Your condition does not improve with treatment.  SEEK IMMEDIATE MEDICAL CARE IF:  · Your pain gets much worse and cannot be controlled with medicines.  · You have weakness or numbness in your hand, arm, face, or leg.  · You have a high fever.  · You have a stiff, rigid neck.  · You lose control of your bowels or your bladder (have incontinence).  · You have trouble with walking, balance, or speaking.     This information is not intended to replace advice given to you by your health care provider. Make sure you discuss any questions you have with your health care provider.     Document Released: 09/12/2002 Document Revised: 09/07/2016 Document Reviewed: 02/11/2016  [employer] Interactive Patient Education ©2017 [employer]

## 2017-08-24 DIAGNOSIS — E03.9 HYPOTHYROIDISM, UNSPECIFIED TYPE: ICD-10-CM

## 2017-08-24 RX ORDER — LEVOTHYROXINE SODIUM 0.03 MG/1
25 TABLET ORAL DAILY
Qty: 30 TABLET | Refills: 2 | Status: SHIPPED | OUTPATIENT
Start: 2017-08-24 | End: 2018-02-14 | Stop reason: SDUPTHER

## 2017-08-30 ENCOUNTER — TELEPHONE (OUTPATIENT)
Dept: FAMILY MEDICINE CLINIC | Facility: CLINIC | Age: 52
End: 2017-08-30

## 2017-08-30 NOTE — TELEPHONE ENCOUNTER
----- Message from Sarah Ray sent at 8/30/2017  9:06 AM EDT -----  Contact: Katherine FRANCO is calling to check on a PA for Diclofenac gel. Please call [phone].

## 2017-08-30 NOTE — TELEPHONE ENCOUNTER
LM for [facility] that this will not be approved, she had no hx of GI bleed or kidney disease. If patient say's, she still needs an anti-inflammatory have her call us and we will call in an oral.

## 2017-09-20 ENCOUNTER — TELEPHONE (OUTPATIENT)
Dept: FAMILY MEDICINE CLINIC | Facility: CLINIC | Age: 52
End: 2017-09-20

## 2017-09-20 RX ORDER — AMOXICILLIN 500 MG/1
2000 CAPSULE ORAL ONCE
Qty: 4 CAPSULE | Refills: 0 | Status: SHIPPED | OUTPATIENT
Start: 2017-09-20 | End: 2017-09-20
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
## 2017-09-25 ENCOUNTER — OFFICE VISIT (OUTPATIENT)
Dept: FAMILY MEDICINE CLINIC | Facility: CLINIC | Age: 52
End: 2017-09-25

## 2017-09-25 VITALS
RESPIRATION RATE: 16 BRPM | WEIGHT: 157 LBS | HEART RATE: 92 BPM | SYSTOLIC BLOOD PRESSURE: 122 MMHG | HEIGHT: 60 IN | TEMPERATURE: 97.6 F | BODY MASS INDEX: 30.82 KG/M2 | DIASTOLIC BLOOD PRESSURE: 82 MMHG

## 2017-09-25 DIAGNOSIS — K52.9 GASTROENTERITIS: Primary | ICD-10-CM

## 2017-09-25 DIAGNOSIS — R19.7 DIARRHEA, UNSPECIFIED TYPE: ICD-10-CM

## 2017-09-25 PROCEDURE — 99213 OFFICE O/P EST LOW 20 MIN: CPT | Performed by: FAMILY MEDICINE

## 2017-09-25 RX ORDER — DIPHENOXYLATE HYDROCHLORIDE AND ATROPINE SULFATE 2.5; .025 MG/1; MG/1
1 TABLET ORAL 4 TIMES DAILY PRN
Qty: 30 TABLET | Refills: 0 | Status: SHIPPED | OUTPATIENT
Start: 2017-09-25 | End: 2017-10-18

## 2017-09-25 NOTE — PROGRESS NOTES
Mily Sam is a 51 y.o. female.     History of Present Illness   She has had diarrhea x 1 week, more than 10 BM per day.   Diarrhea is described as watery, no blood present.   She has tried to treat with Imodium and Pepto-bismol.  Recently took Amoxicillin for a dental procedure, however diarrhea started prior to that.   +chills and fatigue that started yesterday.   She is able to tolerate fluids, however eating results in diarrhea.     The following portions of the patient's history were reviewed and updated as appropriate: allergies, current medications, past family history, past medical history, past social history, past surgical history and problem list.    Review of Systems   Constitutional: Positive for chills and fatigue. Negative for fever.   HENT: Negative for congestion, postnasal drip, sinus pressure and sore throat.    Respiratory: Negative for cough, shortness of breath and wheezing.    Cardiovascular: Negative for chest pain and palpitations.   Gastrointestinal: Positive for abdominal pain, diarrhea and vomiting. Negative for constipation and nausea.   Genitourinary: Negative for dysuria, frequency and hematuria.   Neurological: Positive for headaches. Negative for dizziness.   Psychiatric/Behavioral: Negative for agitation and confusion.       Objective   Physical Exam   Constitutional: She is oriented to person, place, and time. She appears well-developed and well-nourished.   HENT:   Head: Normocephalic and atraumatic.   Right Ear: External ear normal.   Left Ear: External ear normal.   Nose: Nose normal.   Eyes: Conjunctivae and EOM are normal.   Neck: Normal range of motion. Neck supple.   Cardiovascular: Normal rate, regular rhythm and normal heart sounds.    Pulmonary/Chest: Effort normal and breath sounds normal. She has no wheezes.   Abdominal: Soft. She exhibits no distension. Bowel sounds are increased. There is no tenderness.   Musculoskeletal: She exhibits no deformity.    Neurological: She is alert and oriented to person, place, and time. No cranial nerve deficit.   Skin: Skin is warm and dry.   Psychiatric: She has a normal mood and affect. Her behavior is normal.   Nursing note and vitals reviewed.      Assessment/Plan   Doris was seen today for diarrhea and chills & malaise.    Diagnoses and all orders for this visit:    Gastroenteritis  -     diphenoxylate-atropine (LOMOTIL) 2.5-0.025 MG per tablet; Take 1 tablet by mouth 4 (Four) Times a Day As Needed for Diarrhea.  -     CBC & Differential; Future  -     Comprehensive Metabolic Panel; Future  -     Fecal Leukocytes; Future  -     Ova & Parasite Examination; Future  -     Stool Culture With Yersinia; Future  -     Rotavirus Antigen, Stool; Future  -     Clostridium Difficile EIA; Future    Diarrhea, unspecified type  -     CBC & Differential; Future  -     Comprehensive Metabolic Panel; Future  -     Fecal Leukocytes; Future  -     Ova & Parasite Examination; Future  -     Stool Culture With Yersinia; Future  -     Rotavirus Antigen, Stool; Future  -     Clostridium Difficile EIA; Future      Symptomatic relief with Lomotil provided. She has Zofran at home prn nausea.   If symptoms don't resolved within the next 48-72 hours, she will return and complete stool studies and labs.  Increase fluid intake over the next 48-72 hours.

## 2017-09-25 NOTE — PATIENT INSTRUCTIONS
Go to the nearest ER or return to clinic if symptoms worsen, fever/chill develop    Diarrhea, Adult  Diarrhea is when you have loose and water poop (stool) often. Diarrhea can make you feel weak and cause you to get dehydrated. Dehydration can make you tired and thirsty, make you have a dry mouth, and make it so you pee (urinate) less often. Diarrhea often lasts 2-3 days. However, it can last longer if it is a sign of something more serious. It is important to treat your diarrhea as told by your doctor.  HOME CARE  Eating and Drinking  Follow these recommendations as told by your doctor:  · Take an oral rehydration solution (ORS). This is a drink that is sold at pharmacies and stores.  · Drink clear fluids, such as:    Water.    Ice chips.    Diluted fruit juice.    Low-calorie sports drinks.  · Eat bland, easy-to-digest foods in small amounts as you are able. These foods include:    Bananas.    Applesauce.    Rice.    Low-fat (lean) meats.    Toast.    Crackers.  · Avoid drinking fluids that contain a lot of sugar or caffeine in them, such as:    Energy drinks.    Sports drinks.    Soda.  · Avoid alcohol.  · Avoid spicy or fatty foods.  General Instructions  · Drink enough fluid to keep your pee (urine) clear or pale yellow.  · Wash your hands often. If you cannot use soap and water, use hand .  · Make sure that all people in your home wash their hands well and often.  · Take over-the-counter and prescription medicines only as told by your doctor.  · Rest at home while you get better.  · Watch your condition for any changes.  · Take a warm bath to help with any burning or pain from having diarrhea.  · Keep all follow-up visits as told by your doctor. This is important.  GET HELP IF:  · You have a fever.  · Your diarrhea gets worse.  · You have new symptoms.  · You cannot keep fluids down.  · You feel light-headed or dizzy.  · You have a headache.  · You have muscle cramps.  GET HELP RIGHT AWAY IF:  · You  have chest pain.  · You feel very weak or you pass out (faint).  · You have bloody or black poop or poop that look like tar.  · You have very bad pain, cramping, or bloating in your belly (abdomen).  · You have trouble breathing or you are breathing very quickly.  · Your heart is beating very quickly.  · Your skin feels cold and clammy.  · You feel confused.  · You have signs of dehydration, such as:    Dark pee, hardly any pee, or no pee.    Cracked lips.    Dry mouth.    Sunken eyes.    Sleepiness.    Weakness.     This information is not intended to replace advice given to you by your health care provider. Make sure you discuss any questions you have with your health care provider.     Document Released: 06/05/2009 Document Revised: 04/10/2017 Document Reviewed: 08/23/2016  [employer] Interactive Patient Education ©2017 [employer]

## 2017-10-16 DIAGNOSIS — E03.9 HYPOTHYROIDISM, UNSPECIFIED TYPE: ICD-10-CM

## 2017-10-17 RX ORDER — LEVOTHYROXINE SODIUM 0.03 MG/1
TABLET ORAL
Qty: 30 TABLET | Refills: 2 | Status: SHIPPED | OUTPATIENT
Start: 2017-10-17 | End: 2017-11-07

## 2017-10-18 ENCOUNTER — OFFICE VISIT (OUTPATIENT)
Dept: FAMILY MEDICINE CLINIC | Facility: CLINIC | Age: 52
End: 2017-10-18

## 2017-10-18 VITALS
TEMPERATURE: 98.2 F | HEART RATE: 64 BPM | SYSTOLIC BLOOD PRESSURE: 118 MMHG | RESPIRATION RATE: 20 BRPM | DIASTOLIC BLOOD PRESSURE: 84 MMHG

## 2017-10-18 DIAGNOSIS — M54.12 CERVICAL RADICULAR PAIN: Primary | ICD-10-CM

## 2017-10-18 DIAGNOSIS — M54.2 CERVICALGIA: ICD-10-CM
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
## 2017-10-18 PROCEDURE — 99203 OFFICE O/P NEW LOW 30 MIN: CPT | Performed by: FAMILY MEDICINE

## 2017-10-18 RX ORDER — FLUCONAZOLE 150 MG/1
150 TABLET ORAL ONCE
Qty: 2 TABLET | Refills: 0 | Status: SHIPPED | OUTPATIENT
Start: 2017-10-18 | End: 2017-10-18

## 2017-10-18 RX ORDER — GABAPENTIN 400 MG/1
400 CAPSULE ORAL 3 TIMES DAILY
Qty: 90 CAPSULE | Refills: 2 | Status: SHIPPED | OUTPATIENT
Start: 2017-10-18 | End: 2018-02-24 | Stop reason: SDUPTHER
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
## 2017-10-18 NOTE — PATIENT INSTRUCTIONS
Go to the nearest ER or return to clinic if symptoms worsen, fever/chill develop      Cervical Radiculopathy  Cervical radiculopathy means that a nerve in the neck is pinched or bruised. This can cause pain or loss of feeling (numbness) that runs from your neck to your arm and fingers.  HOME CARE  Managing Pain  · Take over-the-counter and prescription medicines only as told by your doctor.  · If directed, put ice on the injured or painful area.    Put ice in a plastic bag.    Place a towel between your skin and the bag.    Leave the ice on for 20 minutes, 2-3 times per day.  · If ice does not help, you can try using heat. Take a warm shower or warm bath, or use a heat pack as told by your doctor.  · You may try a gentle neck and shoulder massage.  Activity  · Rest as needed. Follow instructions from your doctor about any activities to avoid.  · Do exercises as told by your doctor or physical therapist.  General Instructions    · If you were given a soft collar, wear it as told by your doctor.  · Use a flat pillow when you sleep.  · Keep all follow-up visits as told by your doctor. This is important.  GET HELP IF:  · Your condition does not improve with treatment.  GET HELP RIGHT AWAY IF:   · Your pain gets worse and is not controlled with medicine.  · You lose feeling or feel weak in your hand, arm, face, or leg.  · You have a fever.  · You have a stiff neck.  · You cannot control when you poop or pee (have incontinence).  · You have trouble with walking, balance, or talking.     This information is not intended to replace advice given to you by your health care provider. Make sure you discuss any questions you have with your health care provider.     Document Released: 12/06/2012 Document Revised: 09/07/2016 Document Reviewed: 02/11/2016  [employer] Interactive Patient Education ©2017 [employer]

## 2017-11-07 ENCOUNTER — OFFICE VISIT (OUTPATIENT)
Dept: FAMILY MEDICINE CLINIC | Facility: CLINIC | Age: 52
End: 2017-11-07

## 2017-11-07 VITALS
RESPIRATION RATE: 18 BRPM | SYSTOLIC BLOOD PRESSURE: 116 MMHG | WEIGHT: 149.5 LBS | HEIGHT: 60 IN | HEART RATE: 76 BPM | BODY MASS INDEX: 29.35 KG/M2 | DIASTOLIC BLOOD PRESSURE: 72 MMHG | TEMPERATURE: 97.3 F

## 2017-11-07 DIAGNOSIS — R19.7 NAUSEA VOMITING AND DIARRHEA: Primary | ICD-10-CM

## 2017-11-07 DIAGNOSIS — Z12.11 SCREENING FOR COLON CANCER: ICD-10-CM

## 2017-11-07 DIAGNOSIS — R11.2 NAUSEA VOMITING AND DIARRHEA: Primary | ICD-10-CM

## 2017-11-07 DIAGNOSIS — R63.4 WEIGHT LOSS: ICD-10-CM

## 2017-11-07 DIAGNOSIS — R19.8 CHANGE IN BOWEL MOVEMENT: ICD-10-CM

## 2017-11-07 LAB
ALBUMIN SERPL-MCNC: 4.6 G/DL (ref 3.2–4.8)
ALBUMIN/GLOB SERPL: 1.8 G/DL (ref 1.5–2.5)
ALP SERPL-CCNC: 96 U/L (ref 25–100)
ALT SERPL-CCNC: 39 U/L (ref 7–40)
AST SERPL-CCNC: 36 U/L (ref 0–33)
BASOPHILS # BLD AUTO: 0.06 10*3/MM3 (ref 0–0.2)
BASOPHILS NFR BLD AUTO: 1 % (ref 0–1)
BILIRUB SERPL-MCNC: 1 MG/DL (ref 0.3–1.2)
BUN SERPL-MCNC: 15 MG/DL (ref 9–23)
BUN/CREAT SERPL: 16.7 (ref 7–25)
CALCIUM SERPL-MCNC: 10 MG/DL (ref 8.7–10.4)
CHLORIDE SERPL-SCNC: 105 MMOL/L (ref 99–109)
CO2 SERPL-SCNC: 28 MMOL/L (ref 20–31)
CREAT SERPL-MCNC: 0.9 MG/DL (ref 0.6–1.3)
EOSINOPHIL # BLD AUTO: 0.42 10*3/MM3 (ref 0–0.3)
EOSINOPHIL NFR BLD AUTO: 7 % (ref 0–3)
ERYTHROCYTE [DISTWIDTH] IN BLOOD BY AUTOMATED COUNT: 14 % (ref 11.3–14.5)
ERYTHROCYTE [SEDIMENTATION RATE] IN BLOOD BY WESTERGREN METHOD: 14 MM/HR (ref 0–30)
GFR SERPLBLD CREATININE-BSD FMLA CKD-EPI: 65 ML/MIN/1.73
GFR SERPLBLD CREATININE-BSD FMLA CKD-EPI: 79 ML/MIN/1.73
GLOBULIN SER CALC-MCNC: 2.6 GM/DL
GLUCOSE SERPL-MCNC: 103 MG/DL (ref 70–100)
HCT VFR BLD AUTO: 45.6 % (ref 34.5–44)
HGB BLD-MCNC: 14.4 G/DL (ref 11.5–15.5)
IMM GRANULOCYTES # BLD: 0.01 10*3/MM3 (ref 0–0.03)
IMM GRANULOCYTES NFR BLD: 0.2 % (ref 0–0.6)
LYMPHOCYTES # BLD AUTO: 1.73 10*3/MM3 (ref 0.6–4.8)
LYMPHOCYTES NFR BLD AUTO: 28.9 % (ref 24–44)
MCH RBC QN AUTO: 29 PG (ref 27–31)
MCHC RBC AUTO-ENTMCNC: 31.6 G/DL (ref 32–36)
MCV RBC AUTO: 91.8 FL (ref 80–99)
MONOCYTES # BLD AUTO: 0.79 10*3/MM3 (ref 0–1)
MONOCYTES NFR BLD AUTO: 13.2 % (ref 0–12)
NEUTROPHILS # BLD AUTO: 2.97 10*3/MM3 (ref 1.5–8.3)
NEUTROPHILS NFR BLD AUTO: 49.7 % (ref 41–71)
PLATELET # BLD AUTO: 281 10*3/MM3 (ref 150–450)
POTASSIUM SERPL-SCNC: 4.1 MMOL/L (ref 3.5–5.5)
PROT SERPL-MCNC: 7.2 G/DL (ref 5.7–8.2)
RBC # BLD AUTO: 4.97 10*6/MM3 (ref 3.89–5.14)
SODIUM SERPL-SCNC: 143 MMOL/L (ref 132–146)
TSH SERPL DL<=0.005 MIU/L-ACNC: 2.74 MIU/ML (ref 0.35–5.35)
WBC # BLD AUTO: 5.98 10*3/MM3 (ref 3.5–10.8)

## 2017-11-07 PROCEDURE — 99214 OFFICE O/P EST MOD 30 MIN: CPT | Performed by: FAMILY MEDICINE

## 2017-11-07 RX ORDER — ONDANSETRON HYDROCHLORIDE 8 MG/1
8 TABLET, FILM COATED ORAL EVERY 8 HOURS PRN
Qty: 30 TABLET | Refills: 0 | Status: SHIPPED | OUTPATIENT
Start: 2017-11-07 | End: 2019-01-08

## 2017-11-07 NOTE — PROGRESS NOTES
Subjective   Doris Sam is a 52 y.o. female.     History of Present Illness     For the last couple days has not been feeling well  Achey, N/V/D for the last couple days  Using zofran which helped her  No fever, felt hot  Feels warm and then cold  No medicine this AM  Stomach just gurgling constantly    She has noted that she is losing weight and that she has had diarrhea much more than she used to  She saw Dr. Murphy who ordered stool studies but pt never brought this back in  She used to weigh 162 and is now 149 pounds without really trying to lose weight  Has not been dieting nor exercising  No night sweats but the change in her bowel movements is the major issue    Review of Systems   Constitutional: Positive for fatigue and unexpected weight change. Negative for fever.   HENT: Negative.    Eyes: Negative.    Respiratory: Negative.    Cardiovascular: Negative.    Gastrointestinal: Positive for diarrhea, nausea and vomiting. Negative for abdominal pain.   Musculoskeletal: Negative.    Skin: Negative.    Neurological: Negative.    Psychiatric/Behavioral: Negative.    All other systems reviewed and are negative.      Objective   Physical Exam   Constitutional: She is oriented to person, place, and time. She appears well-developed and well-nourished. No distress.   HENT:   Head: Normocephalic and atraumatic.   Right Ear: Tympanic membrane, external ear and ear canal normal.   Left Ear: Tympanic membrane, external ear and ear canal normal.   Nose: Nose normal.   Mouth/Throat: Uvula is midline and oropharynx is clear and moist.   Eyes: Conjunctivae and EOM are normal.   Neck: Normal range of motion. Neck supple. No thyromegaly present.   Cardiovascular: Normal rate, regular rhythm and normal heart sounds.    No murmur heard.  Pulmonary/Chest: Effort normal and breath sounds normal. No respiratory distress.   Abdominal: Soft. Bowel sounds are normal. She exhibits no distension and no mass. There is no tenderness.  There is no rebound and no guarding.   Lymphadenopathy:     She has no cervical adenopathy.   Neurological: She is alert and oriented to person, place, and time.   Skin: Skin is warm and dry.   Psychiatric: She has a normal mood and affect. Her behavior is normal. Judgment and thought content normal.   Nursing note and vitals reviewed.      Assessment/Plan   Doris was seen today for illness.    Diagnoses and all orders for this visit:    Nausea vomiting and diarrhea  -     CBC & Differential  -     Comprehensive Metabolic Panel  -     Sedimentation Rate  -     TSH  -     ondansetron (ZOFRAN) 8 MG tablet; Take 1 tablet by mouth Every 8 (Eight) Hours As Needed for Nausea or Vomiting.    Weight loss  -     CBC & Differential  -     Comprehensive Metabolic Panel  -     Sedimentation Rate  -     TSH    Change in bowel movement    Screening for colon cancer  -     Ambulatory Referral For Screening Colonoscopy    N/V more of an acute thing but the diarrhea and loose bowel movement have been chronic.  Agree with stool studies and pt will consider.  Will order screening colonoscopy as she has never had this.  Plan to check labs.  If normal would want to recheck weight in the next month or two.  Unsure why she is losing weight but no explained reason at this time.  Will plan close follow up

## 2017-11-08 NOTE — PROGRESS NOTES
Spoke with pt and went over results/instructions. Verbalized understanding. Call transferred to the front to schedule f/u appt.

## 2017-11-16 ENCOUNTER — TELEPHONE (OUTPATIENT)
Dept: FAMILY MEDICINE CLINIC | Facility: CLINIC | Age: 52
End: 2017-11-16

## 2017-11-16 NOTE — TELEPHONE ENCOUNTER
----- Message from Eloisa Ram sent at 11/16/2017 11:38 AM EST -----  Contact: dr westfall [employer]  Patient wants to know if she can get a work note for 11/2/17 and 11/9/17? She was seen by you on 11/7/17. Patient needs it faxed to [phone].   [phone]

## 2017-11-25 ENCOUNTER — RESULTS ENCOUNTER (OUTPATIENT)
Dept: FAMILY MEDICINE CLINIC | Facility: CLINIC | Age: 52
End: 2017-11-25

## 2017-11-25 DIAGNOSIS — K52.9 GASTROENTERITIS: ICD-10-CM

## 2017-11-25 DIAGNOSIS — R19.7 DIARRHEA, UNSPECIFIED TYPE: ICD-10-CM

## 2017-11-27 ENCOUNTER — OFFICE VISIT (OUTPATIENT)
Dept: FAMILY MEDICINE CLINIC | Facility: CLINIC | Age: 52
End: 2017-11-27

## 2017-11-27 VITALS
HEART RATE: 76 BPM | RESPIRATION RATE: 18 BRPM | BODY MASS INDEX: 28.27 KG/M2 | HEIGHT: 60 IN | DIASTOLIC BLOOD PRESSURE: 76 MMHG | WEIGHT: 144 LBS | TEMPERATURE: 97.4 F | SYSTOLIC BLOOD PRESSURE: 120 MMHG

## 2017-11-27 DIAGNOSIS — R19.7 DIARRHEA, UNSPECIFIED TYPE: ICD-10-CM

## 2017-11-27 DIAGNOSIS — J06.9 ACUTE URI: Primary | ICD-10-CM

## 2017-11-27 PROCEDURE — 99213 OFFICE O/P EST LOW 20 MIN: CPT | Performed by: FAMILY MEDICINE

## 2017-11-27 RX ORDER — AZITHROMYCIN 250 MG/1
TABLET, FILM COATED ORAL
Qty: 6 TABLET | Refills: 0 | Status: SHIPPED | OUTPATIENT
Start: 2017-11-27 | End: 2018-01-02

## 2017-11-27 RX ORDER — BROMPHENIRAMINE MALEATE, PSEUDOEPHEDRINE HYDROCHLORIDE, AND DEXTROMETHORPHAN HYDROBROMIDE 2; 30; 10 MG/5ML; MG/5ML; MG/5ML
SYRUP ORAL
Qty: 180 ML | Refills: 0 | Status: SHIPPED | OUTPATIENT
Start: 2017-11-27 | End: 2018-01-02

## 2017-11-27 NOTE — PROGRESS NOTES
Subjective   Doris Sam is a 52 y.o. female.     History of Present Illness     She has had cough and congestion for the last 24 hours  HA and pressure   no fevers at home  Had surgery last week and was intubated    She continues to have diarrhea and has lost a few more pounds    Review of Systems   Constitutional: Negative for fever.   HENT: Positive for congestion and sinus pressure.    Respiratory: Positive for cough.    Gastrointestinal: Positive for diarrhea.       Objective   Physical Exam   Constitutional: She appears well-developed and well-nourished.   HENT:   Head: Normocephalic and atraumatic.   Right Ear: Hearing, tympanic membrane, external ear and ear canal normal.   Left Ear: Hearing, tympanic membrane, external ear and ear canal normal.   Nose: Nose normal.   Mouth/Throat: Uvula is midline, oropharynx is clear and moist and mucous membranes are normal.   Eyes: Conjunctivae and EOM are normal.   Neck: Normal range of motion.   Cardiovascular: Normal rate, regular rhythm and normal heart sounds.    Pulmonary/Chest: Effort normal and breath sounds normal.   Lymphadenopathy:     She has no cervical adenopathy.   Psychiatric: She has a normal mood and affect. Her behavior is normal.   Nursing note and vitals reviewed.      Assessment/Plan   Doris was seen today for uri.    Diagnoses and all orders for this visit:    Acute URI    Diarrhea, unspecified type    with her recent intubation I am going to put her on antibiotics with her cough. Pt agrees  She has lost more weight since last time and continues to have diarrhea, colonscopy set up for 12/22

## 2017-11-30 ENCOUNTER — RESULTS ENCOUNTER (OUTPATIENT)
Dept: FAMILY MEDICINE CLINIC | Facility: CLINIC | Age: 52
End: 2017-11-30

## 2017-11-30 DIAGNOSIS — K52.9 GASTROENTERITIS: ICD-10-CM

## 2017-11-30 DIAGNOSIS — R19.7 DIARRHEA, UNSPECIFIED TYPE: ICD-10-CM

## 2018-01-02 ENCOUNTER — OFFICE VISIT (OUTPATIENT)
Dept: FAMILY MEDICINE CLINIC | Facility: CLINIC | Age: 53
End: 2018-01-02

## 2018-01-02 VITALS
BODY MASS INDEX: 28.47 KG/M2 | HEART RATE: 88 BPM | TEMPERATURE: 97.7 F | SYSTOLIC BLOOD PRESSURE: 110 MMHG | RESPIRATION RATE: 20 BRPM | HEIGHT: 60 IN | DIASTOLIC BLOOD PRESSURE: 76 MMHG | WEIGHT: 145 LBS

## 2018-01-02 DIAGNOSIS — L30.9 FACIAL DERMATITIS: Primary | ICD-10-CM

## 2018-01-02 PROCEDURE — 99213 OFFICE O/P EST LOW 20 MIN: CPT | Performed by: PHYSICIAN ASSISTANT

## 2018-01-02 RX ORDER — PREDNISONE 10 MG/1
TABLET ORAL
Qty: 21 TABLET | Refills: 0 | Status: SHIPPED | OUTPATIENT
Start: 2018-01-02 | End: 2018-01-10

## 2018-01-02 NOTE — PROGRESS NOTES
Subjective   Doris Sam is a 52 y.o. female.     History of Present Illness   Pt presents with CC of facial erythema, dry skin, and itchiness. Started yesterday. Has had this in the past. Changed hairspray and took benadryl and resolved after 1-2 weeks. No new changes. Does not wear makeup. No contact with anything that she is aware of. No soap use. No new medications, otc products or foods. No swelling of lips, tongue or throat. Eyes not involved. No blisters or drainage.  Rash no other place on body. No recent illness. N/V/D/F/C, sore throat, ear pain, HA, chest congestion/pain, cough, or dental problems.   States she used hydrocortisone on her face last time this occurred (several months ago) but it did not see to help   No recent stress or anxiety   Warm compresses make feel better but itchiness returns worse     The following portions of the patient's history were reviewed and updated as appropriate: allergies, current medications, past family history, past medical history, past social history, past surgical history and problem list.    Review of Systems   Constitutional: Negative.  Negative for chills, diaphoresis, fatigue and fever.   HENT: Negative.  Negative for congestion, ear discharge, ear pain, hearing loss, nosebleeds, postnasal drip, sinus pressure, sneezing and sore throat.    Eyes: Negative.    Respiratory: Negative.  Negative for cough, chest tightness, shortness of breath and wheezing.    Cardiovascular: Negative.  Negative for chest pain, palpitations and leg swelling.   Gastrointestinal: Negative for abdominal distention, abdominal pain, anal bleeding, blood in stool, constipation, diarrhea, nausea, rectal pain and vomiting.   Endocrine: Negative.  Negative for cold intolerance, heat intolerance, polydipsia, polyphagia and polyuria.   Genitourinary: Negative.  Negative for difficulty urinating, dysuria, flank pain, frequency, hematuria and urgency.   Musculoskeletal: Negative.  Negative for  "arthralgias, back pain, gait problem, joint swelling, myalgias, neck pain and neck stiffness.   Skin: Positive for rash. Negative for color change, pallor and wound.   Allergic/Immunologic: Negative.  Negative for immunocompromised state.   Neurological: Negative for dizziness, syncope, weakness, light-headedness, numbness and headaches.   Hematological: Negative.  Negative for adenopathy. Does not bruise/bleed easily.   Psychiatric/Behavioral: Negative.  Negative for behavioral problems, confusion, self-injury, sleep disturbance and suicidal ideas. The patient is not nervous/anxious.        Objective    Blood pressure 110/76, pulse 88, temperature 97.7 °F (36.5 °C), resp. rate 20, height 152.4 cm (60\"), weight 65.8 kg (145 lb).     Physical Exam   Constitutional: She is oriented to person, place, and time. She appears well-developed and well-nourished.   HENT:   Head: Normocephalic and atraumatic.   Right Ear: External ear normal.   Left Ear: External ear normal.   Nose: Nose normal.   Mouth/Throat: Oropharynx is clear and moist. No oropharyngeal exudate.   Eyes: Conjunctivae and EOM are normal. Pupils are equal, round, and reactive to light.   Neck: Normal range of motion. Neck supple. No tracheal deviation present. No thyromegaly present.   Cardiovascular: Normal rate, regular rhythm, normal heart sounds and intact distal pulses.  Exam reveals no gallop and no friction rub.    No murmur heard.  Pulmonary/Chest: Effort normal and breath sounds normal. No respiratory distress. She has no wheezes. She has no rales. She exhibits no tenderness.   Abdominal: Soft. Bowel sounds are normal. She exhibits no distension and no mass. There is no tenderness. There is no rebound and no guarding. No hernia.   Lymphadenopathy:     She has no cervical adenopathy.   Neurological: She is alert and oriented to person, place, and time.   Skin: Skin is warm and dry.   Diffuse dry, erythematous skin on cheeks, forehead and chin  "   Psychiatric: She has a normal mood and affect. Her behavior is normal. Judgment and thought content normal.   Nursing note and vitals reviewed.      Assessment/Plan   Doris was seen today for rash.    Diagnoses and all orders for this visit:    Facial dermatitis  -     predniSONE (DELTASONE) 10 MG tablet; Take 60 mg po day 1, 50 mg po day 2, 40 mg po day 3, 30 mg po day 4, 20 mg po day 5, 10 po day 6    Steroid and emollient therapy as discussed. Daily antihistamine. F/U if no improvement

## 2018-01-04 ENCOUNTER — TELEPHONE (OUTPATIENT)
Dept: FAMILY MEDICINE CLINIC | Facility: CLINIC | Age: 53
End: 2018-01-04

## 2018-01-04 NOTE — TELEPHONE ENCOUNTER
----- Message from Eloisa Ram sent at 1/4/2018  9:11 AM EST -----  Contact: DR NORRIS MED QUESTION  DR BECKER OFFICE (DENTAL OFFICE) IS CALLING TO SEE IF IT IS OK FOR PATIENT TO TAKE VALIUM AND IF SO WHAT DOSAGE. [phone]

## 2018-01-05 NOTE — TELEPHONE ENCOUNTER
Called and LVM for Dr. Vickers office and patient letting them know okay to take and to call back if it needs to be called in.

## 2018-01-10 ENCOUNTER — OFFICE VISIT (OUTPATIENT)
Dept: FAMILY MEDICINE CLINIC | Facility: CLINIC | Age: 53
End: 2018-01-10

## 2018-01-10 VITALS
HEIGHT: 60 IN | DIASTOLIC BLOOD PRESSURE: 84 MMHG | RESPIRATION RATE: 20 BRPM | WEIGHT: 140.8 LBS | HEART RATE: 88 BPM | SYSTOLIC BLOOD PRESSURE: 118 MMHG | BODY MASS INDEX: 27.64 KG/M2 | TEMPERATURE: 99.5 F

## 2018-01-10 DIAGNOSIS — J10.1 INFLUENZA A: Primary | ICD-10-CM

## 2018-01-10 LAB
EXPIRATION DATE: ABNORMAL
FLUAV AG NPH QL: POSITIVE
FLUBV AG NPH QL: NEGATIVE
INTERNAL CONTROL: ABNORMAL
Lab: ABNORMAL

## 2018-01-10 PROCEDURE — 99213 OFFICE O/P EST LOW 20 MIN: CPT | Performed by: FAMILY MEDICINE

## 2018-01-10 PROCEDURE — 87804 INFLUENZA ASSAY W/OPTIC: CPT | Performed by: FAMILY MEDICINE

## 2018-01-10 RX ORDER — OSELTAMIVIR PHOSPHATE 75 MG/1
75 CAPSULE ORAL 2 TIMES DAILY
Qty: 10 CAPSULE | Refills: 0 | Status: SHIPPED | OUTPATIENT
Start: 2018-01-10 | End: 2018-01-15

## 2018-01-10 NOTE — PATIENT INSTRUCTIONS
"Go to the nearest ER or return to clinic if symptoms worsen, fever/chill develop    Influenza, Adult  Influenza, more commonly known as \"the flu,\" is a viral infection that primarily affects the respiratory tract. The respiratory tract includes organs that help you breathe, such as the lungs, nose, and throat. The flu causes many common cold symptoms, as well as a high fever and body aches.  The flu spreads easily from person to person (is contagious). Getting a flu shot (influenza vaccination) every year is the best way to prevent influenza.  CAUSES  Influenza is caused by a virus. You can catch the virus by:  · Breathing in droplets from an infected person's cough or sneeze.  · Touching something that was recently contaminated with the virus and then touching your mouth, nose, or eyes.  RISK FACTORS  The following factors may make you more likely to get the flu:  · Not cleaning your hands frequently with soap and water or alcohol-based hand .  · Having close contact with many people during cold and flu season.  · Touching your mouth, eyes, or nose without washing or sanitizing your hands first.  · Not drinking enough fluids or not eating a healthy diet.  · Not getting enough sleep or exercise.  · Being under a high amount of stress.  · Not getting a yearly (annual) flu shot.  You may be at a higher risk of complications from the flu, such as a severe lung infection (pneumonia), if you:  · Are over the age of 65.  · Are pregnant.  · Have a weakened disease-fighting system (immune system). You may have a weakened immune system if you:    Have HIV or AIDS.    Are undergoing chemotherapy.    Are taking medicines that reduce the activity of (suppress) the immune system.  · Have a long-term (chronic) illness, such as heart disease, kidney disease, diabetes, or lung disease.  · Have a liver disorder.  · Are obese.  · Have anemia.  SYMPTOMS  Symptoms of this condition typically last 4-10 days and may " include:  · Fever.  · Chills.  · Headache, body aches, or muscle aches.  · Sore throat.  · Cough.  · Runny or congested nose.  · Chest discomfort and cough.  · Poor appetite.  · Weakness or tiredness (fatigue).  · Dizziness.  · Nausea or vomiting.  DIAGNOSIS  This condition may be diagnosed based on your medical history and a physical exam. Your health care provider may do a nose or throat swab test to confirm the diagnosis.  TREATMENT  If influenza is detected early, you can be treated with antiviral medicine that can reduce the length of your illness and the severity of your symptoms. This medicine may be given by mouth (orally) or through an IV tube that is inserted in one of your veins.  The goal of treatment is to relieve symptoms by taking care of yourself at home. This may include taking over-the-counter medicines, drinking plenty of fluids, and adding humidity to the air in your home.  In some cases, influenza goes away on its own. Severe influenza or complications from influenza may be treated in a hospital.  HOME CARE INSTRUCTIONS  · Take over-the-counter and prescription medicines only as told by your health care provider.  · Use a cool mist humidifier to add humidity to the air in your home. This can make breathing easier.  · Rest as needed.  · Drink enough fluid to keep your urine clear or pale yellow.  · Cover your mouth and nose when you cough or sneeze.  · Wash your hands with soap and water often, especially after you cough or sneeze. If soap and water are not available, use hand .  · Stay home from work or school as told by your health care provider. Unless you are visiting your health care provider, try to avoid leaving home until your fever has been gone for 24 hours without the use of medicine.  · Keep all follow-up visits as told by your health care provider. This is important.  PREVENTION  · Getting an annual flu shot is the best way to avoid getting the flu. You may get the flu shot  in late summer, fall, or winter. Ask your health care provider when you should get your flu shot.  · Wash your hands often or use hand  often.  · Avoid contact with people who are sick during cold and flu season.  · Eat a healthy diet, drink plenty of fluids, get enough sleep, and exercise regularly.  SEEK MEDICAL CARE IF:  · You develop new symptoms.  · You have:    Chest pain.    Diarrhea.    A fever.  · Your cough gets worse.  · You produce more mucus.  · You feel nauseous or you vomit.  SEEK IMMEDIATE MEDICAL CARE IF:  · You develop shortness of breath or difficulty breathing.  · Your skin or nails turn a bluish color.  · You have severe pain or stiffness in your neck.  · You develop a sudden headache or sudden pain in your face or ear.  · You cannot stop vomiting.     This information is not intended to replace advice given to you by your health care provider. Make sure you discuss any questions you have with your health care provider.     Document Released: 12/15/2001 Document Revised: 04/10/2017 Document Reviewed: 10/11/2016  [email or web address] Interactive Patient Education ©2017 [email or web address]

## 2018-01-10 NOTE — PROGRESS NOTES
Subjective   Doris Sam is a 52 y.o. female.     Influenza   This is a new problem. The current episode started yesterday. The problem occurs constantly. The problem has been gradually worsening. Associated symptoms include congestion, fatigue, a fever, headaches, myalgias, nausea and vomiting. Pertinent negatives include no abdominal pain, sore throat or vertigo. The symptoms are aggravated by coughing. She has tried NSAIDs for the symptoms. The treatment provided no relief.        The following portions of the patient's history were reviewed and updated as appropriate: allergies, current medications, past family history, past medical history, past social history, past surgical history and problem list.    Review of Systems   Constitutional: Positive for fatigue and fever.   HENT: Positive for congestion. Negative for sore throat.    Gastrointestinal: Positive for nausea and vomiting. Negative for abdominal pain.   Musculoskeletal: Positive for myalgias.   Neurological: Positive for headaches. Negative for vertigo.       Objective   Physical Exam   Constitutional: She is oriented to person, place, and time. She appears well-developed and well-nourished.   Appears ill   HENT:   Head: Normocephalic and atraumatic.   Right Ear: External ear normal.   Left Ear: External ear normal.   Nose: Nose normal.   Eyes: Conjunctivae and EOM are normal.   Neck: Normal range of motion. Neck supple.   Cardiovascular: Normal rate, regular rhythm and normal heart sounds.    No murmur heard.  Pulmonary/Chest: Effort normal and breath sounds normal. She has no wheezes.   Dry cough   Musculoskeletal: She exhibits no edema or deformity.   Neurological: She is alert and oriented to person, place, and time. No cranial nerve deficit.   Skin: Skin is warm and dry.   Psychiatric: She has a normal mood and affect. Her behavior is normal.   Nursing note and vitals reviewed.      Assessment/Plan   Doris was seen today for fever, headache, back  pain, diarrhea, vomiting, generalized body aches and cough.    Diagnoses and all orders for this visit:    Influenza A  -     POCT Influenza A/B  -     oseltamivir (TAMIFLU) 75 MG capsule; Take 1 capsule by mouth 2 (Two) Times a Day for 5 days.      Influenza A positive  Will treat with Tamiflu   She states that she has Zofran at home to relieve nausea. Ok for OTC Imodium if diarrhea persists.  Increase fluids over the next 48-72 hours.

## 2018-01-15 ENCOUNTER — OFFICE VISIT (OUTPATIENT)
Dept: FAMILY MEDICINE CLINIC | Facility: CLINIC | Age: 53
End: 2018-01-15

## 2018-01-15 ENCOUNTER — HOSPITAL ENCOUNTER (OUTPATIENT)
Dept: GENERAL RADIOLOGY | Facility: HOSPITAL | Age: 53
Discharge: HOME | End: 2018-01-15
Admitting: FAMILY MEDICINE

## 2018-01-15 VITALS
SYSTOLIC BLOOD PRESSURE: 120 MMHG | RESPIRATION RATE: 18 BRPM | HEART RATE: 72 BPM | BODY MASS INDEX: 27.29 KG/M2 | DIASTOLIC BLOOD PRESSURE: 82 MMHG | HEIGHT: 60 IN | TEMPERATURE: 97.4 F | WEIGHT: 139 LBS

## 2018-01-15 DIAGNOSIS — R63.4 WEIGHT LOSS: Primary | ICD-10-CM

## 2018-01-15 DIAGNOSIS — N39.0 URINARY TRACT INFECTION WITHOUT HEMATURIA, SITE UNSPECIFIED: Primary | ICD-10-CM
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
## 2018-01-15 LAB
BILIRUB BLD-MCNC: NEGATIVE MG/DL
CLARITY, POC: CLEAR
COLOR UR: YELLOW
GLUCOSE UR STRIP-MCNC: NEGATIVE MG/DL
KETONES UR QL: NEGATIVE
LEUKOCYTE EST, POC: ABNORMAL
NITRITE UR-MCNC: POSITIVE MG/ML
PH UR: 6 [PH] (ref 5–8)
PROT UR STRIP-MCNC: ABNORMAL MG/DL
RBC # UR STRIP: NEGATIVE /UL
SP GR UR: 1.02 (ref 1–1.03)
UROBILINOGEN UR QL: NORMAL

## 2018-01-15 PROCEDURE — 99214 OFFICE O/P EST MOD 30 MIN: CPT | Performed by: FAMILY MEDICINE

## 2018-01-15 PROCEDURE — 81003 URINALYSIS AUTO W/O SCOPE: CPT | Performed by: FAMILY MEDICINE

## 2018-01-15 PROCEDURE — 71046 X-RAY EXAM CHEST 2 VIEWS: CPT

## 2018-01-15 NOTE — PROGRESS NOTES
Subjective   Doris Sam is a 52 y.o. female.     History of Present Illness     She had been losing weight.  Saw her on 11/2017 and her weight was 149  Weight August 2017 was 159  She is not trying to lose weight  No night sweats  No change in BMs  Pt feels like she has been eating great, no change in her appetite but she thinks her weight has gone down still, which it has.    She has had some intermittent diarrhea and saw GI with scope as well as biopsies done.    The following portions of the patient's history were reviewed and updated as appropriate: allergies, current medications, past family history, past medical history, past social history, past surgical history and problem list.    Review of Systems   Constitutional: Positive for unexpected weight change. Negative for appetite change, diaphoresis, fatigue and fever.   HENT: Negative.  Negative for congestion.    Eyes: Positive for visual disturbance.   Respiratory: Negative.  Negative for shortness of breath.    Cardiovascular: Negative.  Negative for chest pain and palpitations.   Gastrointestinal: Negative.  Negative for anal bleeding, blood in stool, constipation and diarrhea.   Endocrine: Negative.  Negative for polydipsia and polyuria.   Genitourinary: Negative.  Negative for dysuria, frequency and urgency.   Musculoskeletal: Negative.  Negative for myalgias.   Skin: Negative.  Negative for rash.   Neurological: Negative.  Negative for weakness.   Hematological: Negative.  Does not bruise/bleed easily.   Psychiatric/Behavioral: Negative.  Negative for sleep disturbance. The patient is not nervous/anxious.    All other systems reviewed and are negative.      Objective   Physical Exam   Constitutional: She is oriented to person, place, and time. She appears well-developed and well-nourished. No distress.   HENT:   Head: Normocephalic and atraumatic.   Right Ear: Tympanic membrane, external ear and ear canal normal.   Left Ear: Tympanic membrane,  external ear and ear canal normal.   Nose: Nose normal.   Mouth/Throat: Uvula is midline and oropharynx is clear and moist.   Eyes: Conjunctivae and EOM are normal.   Neck: Normal range of motion. Neck supple. No thyromegaly present.   Cardiovascular: Normal rate, regular rhythm and normal heart sounds.    No murmur heard.  Pulmonary/Chest: Effort normal and breath sounds normal. No respiratory distress.   Abdominal: Soft. Bowel sounds are normal. She exhibits no distension and no mass. There is no tenderness.   Lymphadenopathy:     She has no cervical adenopathy.   Neurological: She is alert and oriented to person, place, and time.   Skin: Skin is warm and dry.   Psychiatric: She has a normal mood and affect. Her behavior is normal. Judgment and thought content normal.   Nursing note and vitals reviewed.      Assessment/Plan   Doris was seen today for weight check.    Diagnoses and all orders for this visit:    Weight loss  -     CBC & Differential  -     Comprehensive Metabolic Panel  -     HIV-1 / O / 2 Ag / Antibody 4th Generation  -     TSH  -     Vitamin D 25 Hydroxy  -     Vitamin B12 & Folate  -     Iron Profile  -     XR Chest PA & Lateral  -     Sedimentation Rate  -     POCT urinalysis dipstick, automated    I do not know why she is losing weight.  Reviewed labs from last appointment that were normal.  Will check CXR and repeat labs with addition of vitamin levels and HIV.  Plan to recheck weight in 2 months and further decision making depends on results of labs.  Pt agrees.  She did have the flu last week leading to additional weight loss but has had steady decrease that is worrisome.

## 2018-01-16 DIAGNOSIS — Z12.39 SCREENING FOR BREAST CANCER: Primary | ICD-10-CM

## 2018-01-16 LAB
25(OH)D3+25(OH)D2 SERPL-MCNC: 42.2 NG/ML
ALBUMIN SERPL-MCNC: 4.6 G/DL (ref 3.2–4.8)
ALBUMIN/GLOB SERPL: 1.6 G/DL (ref 1.5–2.5)
ALP SERPL-CCNC: 86 U/L (ref 25–100)
ALT SERPL-CCNC: 27 U/L (ref 7–40)
AST SERPL-CCNC: 22 U/L (ref 0–33)
BASOPHILS # BLD AUTO: 0.05 10*3/MM3 (ref 0–0.2)
BASOPHILS NFR BLD AUTO: 0.7 % (ref 0–1)
BILIRUB SERPL-MCNC: 0.8 MG/DL (ref 0.3–1.2)
BUN SERPL-MCNC: 13 MG/DL (ref 9–23)
BUN/CREAT SERPL: 18.6 (ref 7–25)
CALCIUM SERPL-MCNC: 9.4 MG/DL (ref 8.7–10.4)
CHLORIDE SERPL-SCNC: 105 MMOL/L (ref 99–109)
CO2 SERPL-SCNC: 28 MMOL/L (ref 20–31)
CREAT SERPL-MCNC: 0.7 MG/DL (ref 0.6–1.3)
EOSINOPHIL # BLD AUTO: 0.24 10*3/MM3 (ref 0–0.3)
EOSINOPHIL NFR BLD AUTO: 3.2 % (ref 0–3)
ERYTHROCYTE [DISTWIDTH] IN BLOOD BY AUTOMATED COUNT: 13.9 % (ref 11.3–14.5)
ERYTHROCYTE [SEDIMENTATION RATE] IN BLOOD BY WESTERGREN METHOD: 2 MM/HR (ref 0–30)
FOLATE SERPL-MCNC: 13.4 NG/ML (ref 3.2–20)
GLOBULIN SER CALC-MCNC: 2.8 GM/DL
GLUCOSE SERPL-MCNC: 87 MG/DL (ref 70–100)
HCT VFR BLD AUTO: 43.7 % (ref 34.5–44)
HGB BLD-MCNC: 14 G/DL (ref 11.5–15.5)
HIV 1+2 AB+HIV1 P24 AG SERPL QL IA: NON REACTIVE
IMM GRANULOCYTES # BLD: 0.03 10*3/MM3 (ref 0–0.03)
IMM GRANULOCYTES NFR BLD: 0.4 % (ref 0–0.6)
IRON SATN MFR SERPL: 17 % (ref 15–50)
IRON SERPL-MCNC: 58 MCG/DL (ref 50–175)
LYMPHOCYTES # BLD AUTO: 2.68 10*3/MM3 (ref 0.6–4.8)
LYMPHOCYTES NFR BLD AUTO: 35.8 % (ref 24–44)
MCH RBC QN AUTO: 28.7 PG (ref 27–31)
MCHC RBC AUTO-ENTMCNC: 32 G/DL (ref 32–36)
MCV RBC AUTO: 89.7 FL (ref 80–99)
MONOCYTES # BLD AUTO: 0.61 10*3/MM3 (ref 0–1)
MONOCYTES NFR BLD AUTO: 8.1 % (ref 0–12)
NEUTROPHILS # BLD AUTO: 3.88 10*3/MM3 (ref 1.5–8.3)
NEUTROPHILS NFR BLD AUTO: 51.8 % (ref 41–71)
PLATELET # BLD AUTO: 230 10*3/MM3 (ref 150–450)
POTASSIUM SERPL-SCNC: 4 MMOL/L (ref 3.5–5.5)
PROT SERPL-MCNC: 7.4 G/DL (ref 5.7–8.2)
RBC # BLD AUTO: 4.87 10*6/MM3 (ref 3.89–5.14)
SODIUM SERPL-SCNC: 143 MMOL/L (ref 132–146)
TIBC SERPL-MCNC: 346 MCG/DL (ref 250–450)
TSH SERPL DL<=0.005 MIU/L-ACNC: 2.41 MIU/ML (ref 0.35–5.35)
UIBC SERPL-MCNC: 288 MCG/DL
VIT B12 SERPL-MCNC: 929 PG/ML (ref 211–911)
WBC # BLD AUTO: 7.49 10*3/MM3 (ref 3.5–10.8)

## 2018-01-19 ENCOUNTER — OFFICE VISIT (OUTPATIENT)
Dept: FAMILY MEDICINE CLINIC | Facility: CLINIC | Age: 53
End: 2018-01-19

## 2018-01-19 VITALS
WEIGHT: 142 LBS | HEIGHT: 60 IN | SYSTOLIC BLOOD PRESSURE: 112 MMHG | TEMPERATURE: 97.2 F | HEART RATE: 68 BPM | RESPIRATION RATE: 16 BRPM | DIASTOLIC BLOOD PRESSURE: 80 MMHG | BODY MASS INDEX: 27.88 KG/M2
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
## 2018-01-19 DIAGNOSIS — M54.12 CERVICAL RADICULAR PAIN: ICD-10-CM

## 2018-01-19 DIAGNOSIS — N30.00 ACUTE CYSTITIS WITHOUT HEMATURIA: Primary | ICD-10-CM

## 2018-01-19 DIAGNOSIS — M54.2 CERVICALGIA: Primary | ICD-10-CM

## 2018-01-19 LAB
BACTERIA UR CULT: ABNORMAL
BACTERIA UR CULT: ABNORMAL
OTHER ANTIBIOTIC SUSC ISLT: ABNORMAL

## 2018-01-19 PROCEDURE — 99213 OFFICE O/P EST LOW 20 MIN: CPT | Performed by: FAMILY MEDICINE

## 2018-01-19 RX ORDER — VALACYCLOVIR HYDROCHLORIDE 1 G/1
TABLET, FILM COATED ORAL
COMMUNITY
Start: 2018-01-17 | End: 2018-01-31 | Stop reason: SDUPTHER

## 2018-01-19 RX ORDER — SULFAMETHOXAZOLE AND TRIMETHOPRIM 800; 160 MG/1; MG/1
1 TABLET ORAL 2 TIMES DAILY
Qty: 14 TABLET | Refills: 0 | Status: SHIPPED | OUTPATIENT
Start: 2018-01-19 | End: 2018-02-14

## 2018-01-19 NOTE — PATIENT INSTRUCTIONS
Go to the nearest ER or return to clinic if symptoms worsen, fever/chill develop    Neck Exercises  Neck exercises can be important for many reasons:  · They can help you to improve and maintain flexibility in your neck. This can be especially important as you age.  · They can help to make your neck stronger. This can make movement easier.  · They can reduce or prevent neck pain.  · They may help your upper back.  Ask your health care provider which neck exercises would be best for you.  Exercises  Neck Press   Repeat this exercise 10 times. Do it first thing in the morning and right before bed or as told by your health care provider.  1. Lie on your back on a firm bed or on the floor with a pillow under your head.  2. Use your neck muscles to push your head down on the pillow and straighten your spine.  3. Hold the position as well as you can. Keep your head facing up and your chin tucked.  4. Slowly count to 5 while holding this position.  5. Relax for a few seconds. Then repeat.  Isometric Strengthening   Do a full set of these exercises 2 times a day or as told by your health care provider.  1. Sit in a supportive chair and place your hand on your forehead.  2. Push forward with your head and neck while pushing back with your hand. Hold for 10 seconds.  3. Relax. Then repeat the exercise 3 times.  4. Next, do the sequence again, this time putting your hand against the back of your head. Use your head and neck to push backward against the hand pressure.  5. Finally, do the same exercise on either side of your head, pushing sideways against the pressure of your hand.  Prone Head Lifts   Repeat this exercise 5 times. Do this 2 times a day or as told by your health care provider.  1. Lie face-down, resting on your elbows so that your chest and upper back are raised.  2. Start with your head facing downward, near your chest. Position your chin either on or near your chest.  3. Slowly lift your head upward. Lift until  you are looking straight ahead. Then continue lifting your head as far back as you can stretch.  4. Hold your head up for 5 seconds. Then slowly lower it to your starting position.  Supine Head Lifts   Repeat this exercise 8-10 times. Do this 2 times a day or as told by your health care provider.  1. Lie on your back, bending your knees to point to the ceiling and keeping your feet flat on the floor.  2. Lift your head slowly off the floor, raising your chin toward your chest.  3. Hold for 5 seconds.  4. Relax and repeat.  Scapular Retraction   Repeat this exercise 5 times. Do this 2 times a day or as told by your health care provider.  1. Stand with your arms at your sides. Look straight ahead.  2. Slowly pull both shoulders backward and downward until you feel a stretch between your shoulder blades in your upper back.  3. Hold for 10-30 seconds.  4. Relax and repeat.  Contact a health care provider if:  · Your neck pain or discomfort gets much worse when you do an exercise.  · Your neck pain or discomfort does not improve within 2 hours after you exercise.  If you have any of these problems, stop exercising right away. Do not do the exercises again unless your health care provider says that you can.  Get help right away if:  · You develop sudden, severe neck pain. If this happens, stop exercising right away. Do not do the exercises again unless your health care provider says that you can.  Exercises  Neck Stretch   Repeat this exercise 3-5 times.  1. Do this exercise while standing or while sitting in a chair.  2. Place your feet flat on the floor, shoulder-width apart.  3. Slowly turn your head to the right. Turn it all the way to the right so you can look over your right shoulder. Do not tilt or tip your head.  4. Hold this position for 10-30 seconds.  5. Slowly turn your head to the left, to look over your left shoulder.  6. Hold this position for 10-30 seconds.  Neck Retraction   Repeat this exercise 8-10  times. Do this 3-4 times a day or as told by your health care provider.  1. Do this exercise while standing or while sitting in a sturdy chair.  2. Look straight ahead. Do not bend your neck.  3. Use your fingers to push your chin backward. Do not bend your neck for this movement. Continue to face straight ahead. If you are doing the exercise properly, you will feel a slight sensation in your throat and a stretch at the back of your neck.  4. Hold the stretch for 1-2 seconds. Relax and repeat.  This information is not intended to replace advice given to you by your health care provider. Make sure you discuss any questions you have with your health care provider.  Document Released: 11/28/2016 Document Revised: 05/25/2017 Document Reviewed: 06/27/2016  [employer] Interactive Patient Education © 2017 [employer]
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
## 2018-01-31 RX ORDER — VALACYCLOVIR HYDROCHLORIDE 1 G/1
TABLET, FILM COATED ORAL
Qty: 12 TABLET | Refills: 3 | Status: SHIPPED | OUTPATIENT
Start: 2018-01-31 | End: 2019-01-08 | Stop reason: SDUPTHER

## 2018-02-14 ENCOUNTER — OFFICE VISIT (OUTPATIENT)
Dept: FAMILY MEDICINE CLINIC | Facility: CLINIC | Age: 53
End: 2018-02-14

## 2018-02-14 ENCOUNTER — TELEPHONE (OUTPATIENT)
Dept: FAMILY MEDICINE CLINIC | Facility: CLINIC | Age: 53
End: 2018-02-14

## 2018-02-14 VITALS
WEIGHT: 140 LBS | BODY MASS INDEX: 26.43 KG/M2 | HEIGHT: 61 IN | DIASTOLIC BLOOD PRESSURE: 76 MMHG | HEART RATE: 81 BPM | SYSTOLIC BLOOD PRESSURE: 110 MMHG | TEMPERATURE: 97.2 F | OXYGEN SATURATION: 99 %

## 2018-02-14 DIAGNOSIS — J06.9 ACUTE URI: Primary | ICD-10-CM

## 2018-02-14 DIAGNOSIS — E03.9 HYPOTHYROIDISM, UNSPECIFIED TYPE: ICD-10-CM

## 2018-02-14 PROCEDURE — 99213 OFFICE O/P EST LOW 20 MIN: CPT | Performed by: FAMILY MEDICINE

## 2018-02-14 RX ORDER — LEVOTHYROXINE SODIUM 0.03 MG/1
25 TABLET ORAL DAILY
Qty: 30 TABLET | Refills: 5 | Status: SHIPPED | OUTPATIENT
Start: 2018-02-14 | End: 2018-07-23 | Stop reason: SDUPTHER

## 2018-02-14 RX ORDER — RANITIDINE 150 MG/1
150 TABLET ORAL 2 TIMES DAILY
Qty: 60 TABLET | Refills: 5 | Status: SHIPPED | OUTPATIENT
Start: 2018-02-14 | End: 2019-01-08

## 2018-02-14 RX ORDER — AMOXICILLIN AND CLAVULANATE POTASSIUM 875; 125 MG/1; MG/1
1 TABLET, FILM COATED ORAL 2 TIMES DAILY
Qty: 20 TABLET | Refills: 0 | Status: SHIPPED | OUTPATIENT
Start: 2018-02-14 | End: 2018-03-30

## 2018-02-14 RX ORDER — FLUCONAZOLE 150 MG/1
150 TABLET ORAL ONCE
Qty: 1 TABLET | Refills: 0 | Status: SHIPPED | OUTPATIENT
Start: 2018-02-14 | End: 2018-02-14

## 2018-02-14 NOTE — PATIENT INSTRUCTIONS
"Go to the nearest ER or return to clinic if symptoms worsen, fever/chill develop    Upper Respiratory Infection, Adult  Most upper respiratory infections (URIs) are caused by a virus. A URI affects the nose, throat, and upper air passages. The most common type of URI is often called \"the common cold.\"  Follow these instructions at home:  · Take medicines only as told by your doctor.  · Gargle warm saltwater or take cough drops to comfort your throat as told by your doctor.  · Use a warm mist humidifier or inhale steam from a shower to increase air moisture. This may make it easier to breathe.  · Drink enough fluid to keep your pee (urine) clear or pale yellow.  · Eat soups and other clear broths.  · Have a healthy diet.  · Rest as needed.  · Go back to work when your fever is gone or your doctor says it is okay.  ¨ You may need to stay home longer to avoid giving your URI to others.  ¨ You can also wear a face mask and wash your hands often to prevent spread of the virus.  · Use your inhaler more if you have asthma.  · Do not use any tobacco products, including cigarettes, chewing tobacco, or electronic cigarettes. If you need help quitting, ask your doctor.  Contact a doctor if:  · You are getting worse, not better.  · Your symptoms are not helped by medicine.  · You have chills.  · You are getting more short of breath.  · You have brown or red mucus.  · You have yellow or brown discharge from your nose.  · You have pain in your face, especially when you bend forward.  · You have a fever.  · You have puffy (swollen) neck glands.  · You have pain while swallowing.  · You have white areas in the back of your throat.  Get help right away if:  · You have very bad or constant:  ¨ Headache.  ¨ Ear pain.  ¨ Pain in your forehead, behind your eyes, and over your cheekbones (sinus pain).  ¨ Chest pain.  · You have long-lasting (chronic) lung disease and any of the following:  ¨ Wheezing.  ¨ Long-lasting cough.  ¨ Coughing " up blood.  ¨ A change in your usual mucus.  · You have a stiff neck.  · You have changes in your:  ¨ Vision.  ¨ Hearing.  ¨ Thinking.  ¨ Mood.  This information is not intended to replace advice given to you by your health care provider. Make sure you discuss any questions you have with your health care provider.  Document Released: 06/05/2009 Document Revised: 08/20/2017 Document Reviewed: 03/25/2015  Elsevier Interactive Patient Education © 2017 [employer]

## 2018-02-14 NOTE — TELEPHONE ENCOUNTER
Zantac sent to pharmacy to improve GERD. Avoid caffeine, acidic food, spicy food. Stop eating 4 hours prior to bed time to prevent reflux symptoms.

## 2018-02-14 NOTE — TELEPHONE ENCOUNTER
----- Message from Sarah Ray sent at 2/14/2018  8:40 AM EST -----  Contact: Katherine  Patient states she feels like she has indigestion all the time and belches a lot. She would like to speak with Dr. Murphy about this.

## 2018-02-14 NOTE — PROGRESS NOTES
Subjective   Doris Sam is a 52 y.o. female.     URI    This is a new problem. The current episode started in the past 7 days (5 days). The problem has been unchanged. There has been no fever. Associated symptoms include congestion, coughing (productive), headaches, a plugged ear sensation, rhinorrhea and sinus pain. Pertinent negatives include no abdominal pain, chest pain, diarrhea, ear pain, nausea, sore throat, vomiting or wheezing. Treatments tried: Tylenol sinus, Mucinex. The treatment provided mild relief.      She is also requesting a refill of levothyroxine. TSH was last checked Jan 2018, WNL.     The following portions of the patient's history were reviewed and updated as appropriate: allergies, current medications, past family history, past medical history, past social history, past surgical history and problem list.    Review of Systems   Constitutional: Negative for chills and fever.   HENT: Positive for congestion, rhinorrhea, sinus pain and voice change. Negative for ear pain and sore throat.    Respiratory: Positive for cough (productive). Negative for shortness of breath and wheezing.    Cardiovascular: Negative for chest pain.   Gastrointestinal: Negative for abdominal pain, diarrhea, nausea and vomiting.   Musculoskeletal: Negative for myalgias.   Neurological: Positive for headaches.       Objective   Physical Exam   Constitutional: She is oriented to person, place, and time. She appears well-developed and well-nourished.   HENT:   Head: Normocephalic and atraumatic.   Right Ear: Hearing and external ear normal.   Left Ear: Hearing and external ear normal.   Nose: Mucosal edema present. Right sinus exhibits maxillary sinus tenderness and frontal sinus tenderness. Left sinus exhibits maxillary sinus tenderness and frontal sinus tenderness.   Mouth/Throat: Uvula is midline, oropharynx is clear and moist and mucous membranes are normal.   Excessive cerumen b/l   Eyes: Conjunctivae and EOM are  normal.   Neck: Normal range of motion. Neck supple.   Cardiovascular: Normal rate, regular rhythm and normal heart sounds.    Pulmonary/Chest: Effort normal and breath sounds normal. She has no wheezes.   Dry cough   Musculoskeletal: She exhibits no deformity.   Lymphadenopathy:     She has cervical adenopathy.   Neurological: She is alert and oriented to person, place, and time.   Skin: Skin is warm and dry.   Psychiatric: She has a normal mood and affect. Her behavior is normal.   Nursing note and vitals reviewed.      Assessment/Plan   Doris was seen today for nasal congestion.    Diagnoses and all orders for this visit:    Acute URI  -     amoxicillin-clavulanate (AUGMENTIN) 875-125 MG per tablet; Take 1 tablet by mouth 2 (Two) Times a Day.  -     Chlorcyclizine-Pseudoephed 25-60 MG tablet; Take 1/2 - 1 tab po every 8 hours prn congestion    Hypothyroidism, unspecified type  -     levothyroxine (SYNTHROID, LEVOTHROID) 25 MCG tablet; Take 1 tablet by mouth Daily.    Other orders  -     fluconazole (DIFLUCAN) 150 MG tablet; Take 1 tablet by mouth 1 (One) Time for 1 dose.      Augmentin and stahist to treat current URI. Follow up if no improvement.   Ok for diflucan if yeast infection develops with antibiotic use.   Refilled levothyroxine per patient request.

## 2018-02-24 DIAGNOSIS — M54.2 CERVICALGIA: ICD-10-CM

## 2018-02-24 DIAGNOSIS — M54.12 CERVICAL RADICULAR PAIN: ICD-10-CM

## 2018-02-26 RX ORDER — GABAPENTIN 400 MG/1
CAPSULE ORAL
Qty: 90 CAPSULE | Refills: 5 | OUTPATIENT
Start: 2018-02-26 | End: 2018-02-27 | Stop reason: DRUGHIGH

## 2018-02-27 ENCOUNTER — TELEPHONE (OUTPATIENT)
Dept: FAMILY MEDICINE CLINIC | Facility: CLINIC | Age: 53
End: 2018-02-27

## 2018-02-27 DIAGNOSIS — M54.12 CERVICAL RADICULAR PAIN: ICD-10-CM

## 2018-02-27 DIAGNOSIS — M54.2 CERVICALGIA: ICD-10-CM

## 2018-02-27 RX ORDER — GABAPENTIN 600 MG/1
600 TABLET ORAL 3 TIMES DAILY
Qty: 90 TABLET | Refills: 5 | OUTPATIENT
Start: 2018-02-27 | End: 2018-09-06 | Stop reason: SDUPTHER

## 2018-02-27 NOTE — TELEPHONE ENCOUNTER
----- Message from Matheus Theodore sent at 2/27/2018  9:24 AM EST -----  Contact: [facility] / PT CALL  PT CALLED AND STATING THAT SHE IS STILL IN A LOT OF PAIN WITH HER LEFT ARM NERVE PAIN.  SHE IS ASKING FOR AN INCREASE ON THE FOLLOWING    gabapentin (NEURONTIN) 400 MG capsule [[id number]]   Order Details   Dose, Route, Frequency: As Directed   Note to Pharmacy:  Not to exceed 3 additional fills before 04/19/2018    Dispense Quantity:  90 capsule Refills:  5 Fills Remaining:  --         Sig: TAKE ONE CAPSULE BY MOUTH 3 TIMES A DAY    PT CALL [phone]

## 2018-02-28 NOTE — TELEPHONE ENCOUNTER
This was just approved for refill on 2/26/18. Please cancel the 400mg TID. Will change to 600mg TID, please call in.

## 2018-03-12 DIAGNOSIS — G43.909 MIGRAINE WITHOUT STATUS MIGRAINOSUS, NOT INTRACTABLE, UNSPECIFIED MIGRAINE TYPE: ICD-10-CM

## 2018-03-12 RX ORDER — RIZATRIPTAN BENZOATE 10 MG/1
TABLET, ORALLY DISINTEGRATING ORAL
Qty: 9 TABLET | Refills: 0 | Status: SHIPPED | OUTPATIENT
Start: 2018-03-12 | End: 2018-03-30

## 2018-03-30 ENCOUNTER — OFFICE VISIT (OUTPATIENT)
Dept: FAMILY MEDICINE CLINIC | Facility: CLINIC | Age: 53
End: 2018-03-30

## 2018-03-30 VITALS
BODY MASS INDEX: 28.13 KG/M2 | SYSTOLIC BLOOD PRESSURE: 132 MMHG | TEMPERATURE: 97.6 F | RESPIRATION RATE: 18 BRPM | DIASTOLIC BLOOD PRESSURE: 80 MMHG | WEIGHT: 149 LBS | HEART RATE: 74 BPM | HEIGHT: 61 IN

## 2018-03-30 DIAGNOSIS — R19.7 DIARRHEA, UNSPECIFIED TYPE: Primary | ICD-10-CM

## 2018-03-30 DIAGNOSIS — G43.909 MIGRAINE WITHOUT STATUS MIGRAINOSUS, NOT INTRACTABLE, UNSPECIFIED MIGRAINE TYPE: ICD-10-CM

## 2018-03-30 DIAGNOSIS — R45.4 IRRITABILITY: ICD-10-CM

## 2018-03-30 DIAGNOSIS — R63.4 WEIGHT LOSS: ICD-10-CM

## 2018-03-30 PROCEDURE — 99214 OFFICE O/P EST MOD 30 MIN: CPT | Performed by: FAMILY MEDICINE

## 2018-03-30 RX ORDER — ESCITALOPRAM OXALATE 10 MG/1
10 TABLET ORAL DAILY
Qty: 30 TABLET | Refills: 2 | Status: SHIPPED | OUTPATIENT
Start: 2018-03-30 | End: 2018-06-27 | Stop reason: SDUPTHER

## 2018-03-30 RX ORDER — RIZATRIPTAN BENZOATE 10 MG/1
TABLET, ORALLY DISINTEGRATING ORAL
Qty: 9 TABLET | Refills: 5 | Status: SHIPPED | OUTPATIENT
Start: 2018-03-30 | End: 2018-08-10 | Stop reason: ALTCHOICE

## 2018-03-30 NOTE — PROGRESS NOTES
Subjective   Doris Sam is a 52 y.o. female.     History of Present Illness     She has still had some intermittent diarrhea  No longer losing weight, up to 149  she Is unsure what triggers this or what makes it go away  Lasts about 1 week when it returns and then will be gone for 1-3 weeks and then returns  Here to recheck her weight and the diarrhea  Had colonoscopy done 12/2017 which we reviewed together    Right lateral ankle has been swollen  No injury  Does not hurt to walk  Gets more sore at the end of the day    She also asks about refills of her maxalt  This works really well for her migraines    She gets frustrated from time to time  Just more irritable al the time  Does not feel depressed nor worried but always on edge    The following portions of the patient's history were reviewed and updated as appropriate: allergies, current medications, past family history, past medical history, past social history, past surgical history and problem list.    Review of Systems   Constitutional: Negative.  Negative for unexpected weight change.   HENT: Negative.    Eyes: Negative.    Respiratory: Negative.    Cardiovascular: Negative.  Negative for chest pain.   Gastrointestinal: Positive for diarrhea.   Musculoskeletal: Positive for joint swelling (right ankle).   Skin: Negative.    Neurological: Negative.    Psychiatric/Behavioral: Negative.    All other systems reviewed and are negative.      Objective   Physical Exam   Constitutional: She is oriented to person, place, and time. She appears well-developed and well-nourished. No distress.   HENT:   Head: Normocephalic and atraumatic.   Right Ear: Tympanic membrane, external ear and ear canal normal.   Left Ear: Tympanic membrane, external ear and ear canal normal.   Nose: Nose normal.   Mouth/Throat: Uvula is midline and oropharynx is clear and moist.   Eyes: Conjunctivae and EOM are normal.   Neck: Normal range of motion. Neck supple. No thyromegaly present.    Cardiovascular: Normal rate, regular rhythm and normal heart sounds.    No murmur heard.  Pulmonary/Chest: Effort normal and breath sounds normal. No respiratory distress.   Abdominal: Soft. Bowel sounds are normal. She exhibits no distension and no mass. There is no tenderness.        Lymphadenopathy:     She has no cervical adenopathy.   Neurological: She is alert and oriented to person, place, and time.   Skin: Skin is warm and dry.   Psychiatric: She has a normal mood and affect. Her behavior is normal. Judgment and thought content normal.   Nursing note and vitals reviewed.      Assessment/Plan   Doris was seen today for follow-up.    Diagnoses and all orders for this visit:    Diarrhea, unspecified type    Weight loss    Migraine without status migrainosus, not intractable, unspecified migraine type  -     rizatriptan MLT (MAXALT-MLT) 10 MG disintegrating tablet; DISSOLVE ONE TABLET IN MOUTH AT ONSET OF HEADACHE, MAY REPEAT EVERY 2 HOURS AS NEEDED *MAXIMUM OF 2 TABLETS IN 24 HOURS*    Irritability  -     escitalopram (LEXAPRO) 10 MG tablet; Take 1 tablet by mouth Daily.    will use OTC lomotil for diarrhea.  Reviewed colonoscopy report with her.  Consider formal GI eval but this is better and she wants to try OTC meds  Weight actually went up., no longer losing weight  Ok maxalt for HA  Home PT for the right ankle soreness and swelling.  No indication for XR  lexapro will be tried for her mood and irritability, recheck in 3 months

## 2018-04-03 ENCOUNTER — TELEPHONE (OUTPATIENT)
Dept: FAMILY MEDICINE CLINIC | Facility: CLINIC | Age: 53
End: 2018-04-03

## 2018-04-03 DIAGNOSIS — R92.8 ABNORMAL MAMMOGRAM OF LEFT BREAST: Primary | ICD-10-CM

## 2018-04-03 NOTE — TELEPHONE ENCOUNTER
----- Message from Stefanie Mendoza sent at 4/3/2018 12:27 PM EDT -----  Contact: SUSANNAH; ORDER NEEDED   NEW ORDER NEEDED FOR DIAGNOSTIC SCREENING FOR MAMMOGRAM LEFT BREAST.       FAX ORDER TO   [phone] [facility] .

## 2018-06-27 ENCOUNTER — OFFICE VISIT (OUTPATIENT)
Dept: FAMILY MEDICINE CLINIC | Facility: CLINIC | Age: 53
End: 2018-06-27

## 2018-06-27 VITALS
HEIGHT: 61 IN | RESPIRATION RATE: 18 BRPM | DIASTOLIC BLOOD PRESSURE: 72 MMHG | SYSTOLIC BLOOD PRESSURE: 122 MMHG | HEART RATE: 70 BPM | TEMPERATURE: 98.2 F | WEIGHT: 151 LBS | BODY MASS INDEX: 28.51 KG/M2

## 2018-06-27 DIAGNOSIS — R19.7 DIARRHEA, UNSPECIFIED TYPE: ICD-10-CM

## 2018-06-27 DIAGNOSIS — M25.552 LEFT HIP PAIN: ICD-10-CM

## 2018-06-27 DIAGNOSIS — E03.9 ACQUIRED HYPOTHYROIDISM: ICD-10-CM

## 2018-06-27 DIAGNOSIS — R63.4 WEIGHT LOSS: ICD-10-CM

## 2018-06-27 DIAGNOSIS — R45.4 IRRITABILITY: Primary | ICD-10-CM

## 2018-06-27 PROCEDURE — 99214 OFFICE O/P EST MOD 30 MIN: CPT | Performed by: FAMILY MEDICINE

## 2018-06-27 RX ORDER — ESCITALOPRAM OXALATE 20 MG/1
20 TABLET ORAL DAILY
Qty: 30 TABLET | Refills: 5 | Status: SHIPPED | OUTPATIENT
Start: 2018-06-27 | End: 2018-12-20 | Stop reason: SDUPTHER

## 2018-06-27 NOTE — PROGRESS NOTES
Subjective   Doris Sam is a 52 y.o. female.     History of Present Illness     Her mood is better on the lexapro and she feels it has helped  She would like the dose increased though as she feels there is still room for improvement    Still taking her synthroid 25 mcg  No complaints and no issues with this but she is due for labs    She has not lost any more weight since last visit and her weight has actually gone up!  This is good news    Still with some diarrhea that comes and goes  Lomotil does work when used  Can still have normal bowel movements    On occasion her left hip can cause pain.  It was replaced in 2000  This pain can keep her up at night some times it is so bad  Better with pressure on the hip    The following portions of the patient's history were reviewed and updated as appropriate: allergies, current medications, past family history, past medical history, past social history, past surgical history and problem list.    Review of Systems   Constitutional: Negative.  Negative for unexpected weight change.   HENT: Negative.    Eyes: Negative.    Respiratory: Negative.  Negative for shortness of breath.    Cardiovascular: Negative.  Negative for chest pain.   Gastrointestinal: Positive for diarrhea. Negative for abdominal pain and blood in stool.   Musculoskeletal:        Left hip pain   Skin: Negative.  Negative for rash.   Neurological: Negative.    Psychiatric/Behavioral: Negative.    All other systems reviewed and are negative.      Objective   Physical Exam   Constitutional: She is oriented to person, place, and time. She appears well-developed and well-nourished. No distress.   HENT:   Head: Normocephalic and atraumatic.   Cardiovascular: Normal rate, regular rhythm and normal heart sounds.    Pulmonary/Chest: Effort normal and breath sounds normal.   Abdominal: Soft. Bowel sounds are normal. She exhibits no distension. There is no tenderness.   Neurological: She is alert and oriented to  person, place, and time.   Psychiatric: She has a normal mood and affect. Her behavior is normal. Judgment and thought content normal.   Nursing note and vitals reviewed.      Assessment/Plan   Doris was seen today for follow-up.    Diagnoses and all orders for this visit:    Irritability  -     escitalopram (LEXAPRO) 20 MG tablet; Take 1 tablet by mouth Daily.    Diarrhea, unspecified type    Weight loss    Acquired hypothyroidism  -     TSH+Free T4    Left hip pain    her mood is doing better but will increase the lexapro to 20 mg and she will call with any issues  Offered GI eval for her diarrhea but she declines at this time  Weight is stable  Recheck her thyroid today  Left hip pain after surgery is concerning to me.  I strongly recommended she see ortho for evaluation.  Ortho did give her tramadol.

## 2018-06-28 DIAGNOSIS — R45.4 IRRITABILITY: ICD-10-CM

## 2018-06-28 RX ORDER — ESCITALOPRAM OXALATE 10 MG/1
TABLET ORAL
Qty: 30 TABLET | Refills: 5 | Status: SHIPPED | OUTPATIENT
Start: 2018-06-28 | End: 2018-07-23

## 2018-07-13 ENCOUNTER — TELEPHONE (OUTPATIENT)
Dept: FAMILY MEDICINE CLINIC | Facility: CLINIC | Age: 53
End: 2018-07-13

## 2018-07-23 ENCOUNTER — OFFICE VISIT (OUTPATIENT)
Dept: FAMILY MEDICINE CLINIC | Facility: CLINIC | Age: 53
End: 2018-07-23

## 2018-07-23 VITALS
BODY MASS INDEX: 28.89 KG/M2 | OXYGEN SATURATION: 96 % | HEIGHT: 61 IN | SYSTOLIC BLOOD PRESSURE: 108 MMHG | WEIGHT: 153 LBS | TEMPERATURE: 98.6 F | RESPIRATION RATE: 14 BRPM | DIASTOLIC BLOOD PRESSURE: 74 MMHG | HEART RATE: 81 BPM

## 2018-07-23 DIAGNOSIS — E03.9 HYPOTHYROIDISM, UNSPECIFIED TYPE: ICD-10-CM

## 2018-07-23 DIAGNOSIS — Z01.818 PREOP EXAMINATION: ICD-10-CM

## 2018-07-23 DIAGNOSIS — M17.11 PRIMARY OSTEOARTHRITIS OF RIGHT KNEE: Primary | ICD-10-CM

## 2018-07-23 PROCEDURE — 99214 OFFICE O/P EST MOD 30 MIN: CPT | Performed by: FAMILY MEDICINE

## 2018-07-23 RX ORDER — LEVOTHYROXINE SODIUM 0.03 MG/1
25 TABLET ORAL DAILY
Qty: 30 TABLET | Refills: 5 | Status: SHIPPED | OUTPATIENT
Start: 2018-07-23 | End: 2019-01-08 | Stop reason: SDUPTHER

## 2018-07-23 NOTE — PATIENT INSTRUCTIONS
"Go to the nearest ER or return to clinic if symptoms worsen, fever/chill develop      Osteoarthritis  Osteoarthritis is a type of arthritis that affects tissue that covers the ends of bones in joints (cartilage). Cartilage acts as a cushion between the bones and helps them move smoothly. Osteoarthritis results when cartilage in the joints gets worn down. Osteoarthritis is sometimes called \"wear and tear\" arthritis.  Osteoarthritis is the most common form of arthritis. It often occurs in older people. It is a condition that gets worse over time (a progressive condition). Joints that are most often affected by this condition are in:  · Fingers.  · Toes.  · Hips.  · Knees.  · Spine, including neck and lower back.    What are the causes?  This condition is caused by age-related wearing down of cartilage that covers the ends of bones.  What increases the risk?  The following factors may make you more likely to develop this condition:  · Older age.  · Being overweight or obese.  · Overuse of joints, such as in athletes.  · Past injury of a joint.  · Past surgery on a joint.  · Family history of osteoarthritis.    What are the signs or symptoms?  The main symptoms of this condition are pain, swelling, and stiffness in the joint. The joint may lose its shape over time. Small pieces of bone or cartilage may break off and float inside of the joint, which may cause more pain and damage to the joint. Small deposits of bone (osteophytes) may grow on the edges of the joint. Other symptoms may include:  · A grating or scraping feeling inside the joint when you move it.  · Popping or creaking sounds when you move.    Symptoms may affect one or more joints. Osteoarthritis in a major joint, such as your knee or hip, can make it painful to walk or exercise. If you have osteoarthritis in your hands, you might not be able to  items, twist your hand, or control small movements of your hands and fingers (fine motor skills).  How is " this diagnosed?  This condition may be diagnosed based on:  · Your medical history.  · A physical exam.  · Your symptoms.  · X-rays of the affected joint(s).  · Blood tests to rule out other types of arthritis.    How is this treated?  There is no cure for this condition, but treatment can help to control pain and improve joint function. Treatment plans may include:  · A prescribed exercise program that allows for rest and joint relief. You may work with a physical therapist.  · A weight control plan.  · Pain relief techniques, such as:  ? Applying heat and cold to the joint.  ? Electric pulses delivered to nerve endings under the skin (transcutaneous electrical nerve stimulation, or TENS).  ? Massage.  ? Certain nutritional supplements.  · NSAIDs or prescription medicines to help relieve pain.  · Medicine to help relieve pain and inflammation (corticosteroids). This can be given by mouth (orally) or as an injection.  · Assistive devices, such as a brace, wrap, splint, specialized glove, or cane.  · Surgery, such as:  ? An osteotomy. This is done to reposition the bones and relieve pain or to remove loose pieces of bone and cartilage.  ? Joint replacement surgery. You may need this surgery if you have very bad (advanced) osteoarthritis.    Follow these instructions at home:  Activity  · Rest your affected joints as directed by your health care provider.  · Do not drive or use heavy machinery while taking prescription pain medicine.  · Exercise as directed. Your health care provider or physical therapist may recommend specific types of exercise, such as:  ? Strengthening exercises. These are done to strengthen the muscles that support joints that are affected by arthritis. They can be performed with weights or with exercise bands to add resistance.  ? Aerobic activities. These are exercises, such as brisk walking or water aerobics, that get your heart pumping.  ? Range-of-motion activities. These keep your joints  easy to move.  ? Balance and agility exercises.  Managing pain, stiffness, and swelling  · If directed, apply heat to the affected area as often as told by your health care provider. Use the heat source that your health care provider recommends, such as a moist heat pack or a heating pad.  ? If you have a removable assistive device, remove it as told by your health care provider.  ? Place a towel between your skin and the heat source. If your health care provider tells you to keep the assistive device on while you apply heat, place a towel between the assistive device and the heat source.  ? Leave the heat on for 20-30 minutes.  ? Remove the heat if your skin turns bright red. This is especially important if you are unable to feel pain, heat, or cold. You may have a greater risk of getting burned.  · If directed, put ice on the affected joint:  ? If you have a removable assistive device, remove it as told by your health care provider.  ? Put ice in a plastic bag.  ? Place a towel between your skin and the bag. If your health care provider tells you to keep the assistive device on during icing, place a towel between the assistive device and the bag.  ? Leave the ice on for 20 minutes, 2-3 times a day.  General instructions  · Take over-the-counter and prescription medicines only as told by your health care provider.  · Maintain a healthy weight. Follow instructions from your health care provider for weight control. These may include dietary restrictions.  · Do not use any products that contain nicotine or tobacco, such as cigarettes and e-cigarettes. These can delay bone healing. If you need help quitting, ask your health care provider.  · Use assistive devices as directed by your health care provider.  · Keep all follow-up visits as told by your health care provider. This is important.  Where to find more information:  · National [place] of Arthritis and Musculoskeletal and Skin Diseases:  www.niams.nih.gov  · National [place] on Aging: www.juvenal.nih.gov  · American College of Rheumatology: www.rheumatology.org  Contact a health care provider if:  · Your skin turns red.  · You develop a rash.  · You have pain that gets worse.  · You have a fever along with joint or muscle aches.  Get help right away if:  · You lose a lot of weight.  · You suddenly lose your appetite.  · You have night sweats.  Summary  · Osteoarthritis is a type of arthritis that affects tissue covering the ends of bones in joints (cartilage).  · This condition is caused by age-related wearing down of cartilage that covers the ends of bones.  · The main symptom of this condition is pain, swelling, and stiffness in the joint.  · There is no cure for this condition, but treatment can help to control pain and improve joint function.  This information is not intended to replace advice given to you by your health care provider. Make sure you discuss any questions you have with your health care provider.  Document Released: 12/18/2006 Document Revised: 08/21/2017 Document Reviewed: 08/21/2017  [employer] Interactive Patient Education © 2018 [employer]

## 2018-07-23 NOTE — PROGRESS NOTES
Subjective   Doris Sam is a 52 y.o. female.   Chief Complaint   Patient presents with   • Pre-op Exam     total right knee replacement     History of Present Illness   Pre-Op Evaluation  Doris Sam is a 52 y.o. female who presents to the office today for a preoperative consultation at the request of surgeon Dr. Florian Miller MD who plans on performing total right knee replacement on August 16. This consultation is requested for the specific conditions prompting preoperative evaluation (i.e. because of potential affect on operative risk): Hypothyroidism, migraines, depression, cervicalgia, cervical neuropathy . Planned anesthesia is general. The patient has the following known anesthesia issues: Nausea.. Patient has a bleeding risk of: no recent abnormal bleeding. Patient does not have objections to receiving blood products if needed.    The following portions of the patient's history were reviewed and updated as appropriate: allergies, current medications, past family history, past medical history, past social history, past surgical history and problem list.    Review of Systems   Constitutional: Negative for chills, fatigue and fever.   HENT: Negative for congestion, hearing loss and trouble swallowing.    Eyes: Negative for pain and visual disturbance.   Respiratory: Negative for cough, chest tightness, shortness of breath and wheezing.    Cardiovascular: Negative for chest pain, palpitations and leg swelling.   Gastrointestinal: Negative for abdominal pain, blood in stool, nausea and vomiting.   Endocrine: Negative for cold intolerance and heat intolerance.   Genitourinary: Negative for dysuria and hematuria.   Musculoskeletal: Negative for gait problem.   Skin: Negative for rash.   Neurological: Negative for weakness and confusion.   Hematological: Negative for adenopathy. Does not bruise/bleed easily.   Psychiatric/Behavioral: Negative for agitation, self-injury, suicidal ideas and depressed mood. The  patient is not nervous/anxious.        Objective   Physical Exam   Constitutional: She is oriented to person, place, and time. She appears well-developed and well-nourished.   HENT:   Head: Normocephalic and atraumatic.   Right Ear: External ear normal.   Left Ear: External ear normal.   Nose: Nose normal.   Eyes: Conjunctivae are normal.   Neck: Normal range of motion. Neck supple.   Cardiovascular: Normal rate, regular rhythm and normal heart sounds.    No murmur heard.  Pulmonary/Chest: Effort normal and breath sounds normal. She has no wheezes.   Musculoskeletal: She exhibits no edema or deformity.   Neurological: She is alert and oriented to person, place, and time.   Skin: Skin is warm and dry.   Psychiatric: She has a normal mood and affect. Her behavior is normal.   Nursing note and vitals reviewed.        Assessment/Plan   Doris was seen today for pre-op exam.    Diagnoses and all orders for this visit:    Primary osteoarthritis of right knee    Preop examination      Pre-Op Evaluation Assessment  52 y.o. female with planned surgery total right knee replacement.    Known risk factors for perioperative complications: None.    Difficulty with intubation is not anticipated.    Cardiac Risk Estimation: per the Revised Cardiac Risk Index (Circ. 100:1043, 1999), the patient's risk factors for cardiac complications include none, putting her in: RCI RISK CLASS I (0 risk factors, risk of major cardiac compl. appr. 0.5%).    Current medications which may produce withdrawal symptoms if withheld perioperatively: none.    Pre-Op Evaluation Plan  1. Preoperative workup as follows chest x-ray, ECG, hemoglobin, hematocrit, electrolytes, creatinine, glucose, liver function studies, urinalysis.  2. Change in medication regimen before surgery: discontinue ASA 14 days before surgery.  3. Prophylaxis for cardiac events with perioperative beta-blockers: not indicated.  4. Invasive hemodynamic monitoring perioperatively: at the  discretion of anesthesiologist.  5. Deep vein thrombosis prophylaxis postoperatively:regimen to be chosen by surgical team.  6. Surveillance for postoperative MI with ECG immediately postoperatively and on postoperative days 1 and 2 AND troponin levels 24 hours postoperatively and on day 4 or hospital discharge (whichever comes first): at the discretion of anesthesiologist.    Labs, CXR, EKG, UA reviewed, she completed prior to appointment. Potassium is mildly low, will send replacement to pharmacy. Also, INR is elevated by 0.05, likely not significant.

## 2018-07-29 ENCOUNTER — TELEPHONE (OUTPATIENT)
Dept: FAMILY MEDICINE CLINIC | Facility: CLINIC | Age: 53
End: 2018-07-29

## 2018-07-29 RX ORDER — POTASSIUM CHLORIDE 20 MEQ/1
20 TABLET, EXTENDED RELEASE ORAL DAILY
Qty: 7 TABLET | Refills: 0 | Status: SHIPPED | OUTPATIENT
Start: 2018-07-29 | End: 2018-08-05

## 2018-08-09 ENCOUNTER — OFFICE VISIT (OUTPATIENT)
Dept: FAMILY MEDICINE CLINIC | Facility: CLINIC | Age: 53
End: 2018-08-09

## 2018-08-09 VITALS
HEART RATE: 100 BPM | WEIGHT: 152.4 LBS | TEMPERATURE: 97.8 F | DIASTOLIC BLOOD PRESSURE: 80 MMHG | RESPIRATION RATE: 20 BRPM | BODY MASS INDEX: 28.77 KG/M2 | SYSTOLIC BLOOD PRESSURE: 134 MMHG | HEIGHT: 61 IN

## 2018-08-09 DIAGNOSIS — G43.119 INTRACTABLE MIGRAINE WITH AURA WITHOUT STATUS MIGRAINOSUS: Primary | ICD-10-CM

## 2018-08-09 PROCEDURE — 99213 OFFICE O/P EST LOW 20 MIN: CPT | Performed by: FAMILY MEDICINE

## 2018-08-09 RX ORDER — BUTALBITAL, ACETAMINOPHEN AND CAFFEINE 50; 325; 40 MG/1; MG/1; MG/1
1 TABLET ORAL EVERY 6 HOURS PRN
Qty: 10 TABLET | Refills: 0 | Status: SHIPPED | OUTPATIENT
Start: 2018-08-09 | End: 2019-05-22

## 2018-08-09 NOTE — PATIENT INSTRUCTIONS
Go to the nearest ER or return to clinic if symptoms worsen, fever/chill develop      Migraine Headache  A migraine headache is a very strong throbbing pain on one side or both sides of your head. Migraines can also cause other symptoms. Talk with your doctor about what things may bring on (trigger) your migraine headaches.  Follow these instructions at home:  Medicines  · Take over-the-counter and prescription medicines only as told by your doctor.  · Do not drive or use heavy machinery while taking prescription pain medicine.  · To prevent or treat constipation while you are taking prescription pain medicine, your doctor may recommend that you:  ? Drink enough fluid to keep your pee (urine) clear or pale yellow.  ? Take over-the-counter or prescription medicines.  ? Eat foods that are high in fiber. These include fresh fruits and vegetables, whole grains, and beans.  ? Limit foods that are high in fat and processed sugars. These include fried and sweet foods.  Lifestyle  · Avoid alcohol.  · Do not use any products that contain nicotine or tobacco, such as cigarettes and e-cigarettes. If you need help quitting, ask your doctor.  · Get at least 8 hours of sleep every night.  · Limit your stress.  General instructions    · Keep a journal to find out what may bring on your migraines. For example, write down:  ? What you eat and drink.  ? How much sleep you get.  ? Any change in what you eat or drink.  ? Any change in your medicines.  · If you have a migraine:  ? Avoid things that make your symptoms worse, such as bright lights.  ? It may help to lie down in a dark, quiet room.  ? Do not drive or use heavy machinery.  ? Ask your doctor what activities are safe for you.  · Keep all follow-up visits as told by your doctor. This is important.  Contact a doctor if:  · You get a migraine that is different or worse than your usual migraines.  Get help right away if:  · Your migraine gets very bad.  · You have a fever.  · You  have a stiff neck.  · You have trouble seeing.  · Your muscles feel weak or like you cannot control them.  · You start to lose your balance a lot.  · You start to have trouble walking.  · You pass out (faint).  This information is not intended to replace advice given to you by your health care provider. Make sure you discuss any questions you have with your health care provider.  Document Released: 09/26/2009 Document Revised: 07/07/2017 Document Reviewed: 06/05/2017  Elsevier Interactive Patient Education © 2018 [employer] Inc.

## 2018-08-09 NOTE — PROGRESS NOTES
Subjective   Doris Sam is a 52 y.o. female.   Chief Complaint   Patient presents with   • Headache     since Thursday     History of Present Illness   She has had a constant headache x 1 week.   She has a history of migraines, treating with Maxalt but no improvement.   Headache located in frontal and occipital region.   +nausea, vomiting, dizziness   She is schedule for knee surgery next week.     The following portions of the patient's history were reviewed and updated as appropriate: allergies, current medications, past family history, past medical history, past social history, past surgical history and problem list.    Review of Systems   Constitutional: Negative for chills and fever.   HENT: Negative.    Respiratory: Negative.    Cardiovascular: Negative.    Gastrointestinal: Positive for nausea and vomiting.   Neurological: Positive for dizziness and headache. Negative for confusion.       Objective   Physical Exam   Constitutional: She is oriented to person, place, and time. She appears well-developed and well-nourished.   HENT:   Head: Normocephalic and atraumatic.   Nose: Nose normal.   Eyes: Conjunctivae are normal.   Cardiovascular: Normal rate, regular rhythm and normal heart sounds.    Pulmonary/Chest: Effort normal and breath sounds normal.   Neurological: She is alert and oriented to person, place, and time.   Skin: Skin is dry.   Psychiatric: Her behavior is normal.   Nursing note and vitals reviewed.        Assessment/Plan   Doris was seen today for headache.    Diagnoses and all orders for this visit:    Intractable migraine with aura without status migrainosus  -     butalbital-acetaminophen-caffeine (ESGIC) -40 MG per tablet; Take 1 tablet by mouth Every 6 (Six) Hours As Needed for Migraine.      Temporary supply of Esgic provided to improve migraine.   She has zofran at home to resolve nausea.

## 2018-08-10 ENCOUNTER — TELEPHONE (OUTPATIENT)
Dept: FAMILY MEDICINE CLINIC | Facility: CLINIC | Age: 53
End: 2018-08-10

## 2018-08-10 RX ORDER — PROMETHAZINE HYDROCHLORIDE 25 MG/1
25 TABLET ORAL EVERY 6 HOURS PRN
Qty: 20 TABLET | Refills: 0 | Status: SHIPPED | OUTPATIENT
Start: 2018-08-10 | End: 2019-01-08

## 2018-08-10 RX ORDER — SUMATRIPTAN 100 MG/1
100 TABLET, FILM COATED ORAL ONCE AS NEEDED
Qty: 9 TABLET | Refills: 1 | Status: SHIPPED | OUTPATIENT
Start: 2018-08-10 | End: 2019-01-08

## 2018-08-10 NOTE — TELEPHONE ENCOUNTER
Sumatriptan and phenergan sent to pharmacy, sometimes the phenergan with it helps break a migraine. Don't combine sumatriptan with Maxalt.

## 2018-08-10 NOTE — TELEPHONE ENCOUNTER
----- Message from Eloisa Ram sent at 8/10/2018 10:55 AM EDT -----  Contact: DR NORRIS  PATIENT HAS TAKEN THE MIGRAINE MEDICATION THAT YOU PRESCRIBED YESTERDAY AND IT IS NOT HELPING. PATIENT HAS TAKEN 4 PILLS AND THE MIGRAINE GOES AWAY FOR LIKE 45 MINUTES AND THEN COMES RIGHT BACK JUST AS BAD. IS THERE SOMETHING ELSE SHE CAN TRY?  [phone]

## 2018-08-13 ENCOUNTER — TELEPHONE (OUTPATIENT)
Dept: FAMILY MEDICINE CLINIC | Facility: CLINIC | Age: 53
End: 2018-08-13

## 2018-08-13 RX ORDER — CYCLOBENZAPRINE HCL 10 MG
10 TABLET ORAL 3 TIMES DAILY PRN
Qty: 30 TABLET | Refills: 0 | Status: SHIPPED | OUTPATIENT
Start: 2018-08-13 | End: 2019-01-08

## 2018-08-13 RX ORDER — HYDROCODONE BITARTRATE AND ACETAMINOPHEN 5; 325 MG/1; MG/1
1 TABLET ORAL EVERY 6 HOURS PRN
Qty: 10 TABLET | Refills: 0 | Status: SHIPPED | OUTPATIENT
Start: 2018-08-13 | End: 2019-01-08

## 2018-08-13 NOTE — TELEPHONE ENCOUNTER
If she is still having headache without any relief from what has already been provided, I would recommend ER evaluation. They might do CT of her head, but could at least provide different treatment options that can help resolve it.

## 2018-08-13 NOTE — TELEPHONE ENCOUNTER
I have printed off prescriptions for muscle relaxer, which would help if muscle tension is contributing to headache. Also, temporary supply of pain medication to relieve symptoms. If this doesn't help, yes, advise that she follow up in office.     Please request ER record.

## 2018-08-13 NOTE — TELEPHONE ENCOUNTER
LM informing pt she could stop by and  the printed Rx's.  Cannot call for ER report this evening, they are already gone for the day.    Did you see the message at the bottom too? About pre op clearance?

## 2018-08-13 NOTE — TELEPHONE ENCOUNTER
That should be all that was requested for the pre-op. I think that's the 2nd time the stuff has been faxed.

## 2018-08-13 NOTE — TELEPHONE ENCOUNTER
----- Message from Maribel Campos sent at 8/13/2018 12:26 PM EDT -----  Contact: MYRNA  PATIENT ASKING IF THERE IS SOMETHING ELSE YOU CAN  IN FOR HER MIGRAINE.   IS THERE ANYTHING ELSE SHE CAN TAKE, HEADACHE IS STILL HURTING, THE TOP OF HER HEAD, AND THE BACK STILL THROBBING.     [facility] [place]    IF YOU WANT HER TO COME IN, WHATEVER YOU WANT.

## 2018-08-13 NOTE — TELEPHONE ENCOUNTER
----- Message from Maribel Calloway sent at 8/13/2018 10:26 AM EDT -----  Contact: ROSCOE FROM [facility] CALLING FOR PRE OP IN July-SURGERY SCHEDULED FOR  THURSDAY  I HAVE FAXED OV AND LAB/EKG/CHEST FROM [facility] ON 7/20/18    PLEASE REVIEW IF ANYTHING ELSE NEEDS TO BE FAXED    ECJCT-[phone]      PFA-[phone]

## 2018-08-13 NOTE — TELEPHONE ENCOUNTER
When I informed pt what you recommended she said, she did go to [facility] ER on Friday night and they gave her two meds while she was there and that was it. No CT etc. . .  Does she need to make an appt with you now?

## 2018-09-06 ENCOUNTER — TELEPHONE (OUTPATIENT)
Dept: FAMILY MEDICINE CLINIC | Facility: CLINIC | Age: 53
End: 2018-09-06

## 2018-09-06 RX ORDER — GABAPENTIN 600 MG/1
TABLET ORAL
Qty: 90 TABLET | Refills: 2 | Status: SHIPPED | OUTPATIENT
Start: 2018-09-06 | End: 2018-12-13 | Stop reason: SDUPTHER

## 2018-09-06 NOTE — TELEPHONE ENCOUNTER
Spoke with Abdulaziz at [facility] and let him know Dr. Odonnell recommendations. He stated that he would monitior and report if any changes.     Abdulaziz #  [phone]

## 2018-09-06 NOTE — TELEPHONE ENCOUNTER
Abdulaziz from [facility] called and stated that her resting heart rate is at 106. No other symptoms of SOB or chest pain. She has had a TKA on August 16th and he just wanted you to be aware of this as per their policy.

## 2018-09-06 NOTE — TELEPHONE ENCOUNTER
----- Message from Maribel Campos sent at 9/6/2018  1:37 PM EDT -----  Contact: NAV LOZANO FROM [facility] RETURNED YOUR CALL.  HE SAID THE PATIENT WAS ON A 325 MG ASPRIN A DAY FOR A BLOOD THINNER AND THAT'S ALL. AND SHE IS A SYMPTOMATIC.

## 2018-09-06 NOTE — TELEPHONE ENCOUNTER
If she is asymptomatic, just monitor for now.   There is concern for tachycardia, if dyspnea, cough, chest pain develop, could possibly be a PE. If they develop, advise to go to ER for treatment.

## 2018-09-06 NOTE — TELEPHONE ENCOUNTER
Did her orthopedic surgeon place her on any anticoagulation during her recovery period?   If shortness of breath, chest pain, or fever develop, recommend that she seek medical attention.

## 2018-12-12 ENCOUNTER — TELEPHONE (OUTPATIENT)
Dept: FAMILY MEDICINE CLINIC | Facility: CLINIC | Age: 53
End: 2018-12-12

## 2018-12-12 DIAGNOSIS — R92.8 ABNORMAL MAMMOGRAM OF BOTH BREASTS: Primary | ICD-10-CM

## 2018-12-13 ENCOUNTER — OFFICE VISIT (OUTPATIENT)
Dept: FAMILY MEDICINE CLINIC | Facility: CLINIC | Age: 53
End: 2018-12-13

## 2018-12-13 VITALS
SYSTOLIC BLOOD PRESSURE: 112 MMHG | TEMPERATURE: 97.1 F | WEIGHT: 156 LBS | HEART RATE: 84 BPM | DIASTOLIC BLOOD PRESSURE: 82 MMHG | RESPIRATION RATE: 18 BRPM | BODY MASS INDEX: 29.76 KG/M2

## 2018-12-13 DIAGNOSIS — N30.00 ACUTE CYSTITIS WITHOUT HEMATURIA: Primary | ICD-10-CM

## 2018-12-13 DIAGNOSIS — M54.12 CERVICAL RADICULAR PAIN: ICD-10-CM

## 2018-12-13 DIAGNOSIS — R35.0 URINARY FREQUENCY: ICD-10-CM

## 2018-12-13 LAB
BILIRUB BLD-MCNC: NEGATIVE MG/DL
CLARITY, POC: ABNORMAL
COLOR UR: YELLOW
GLUCOSE UR STRIP-MCNC: NEGATIVE MG/DL
KETONES UR QL: NEGATIVE
LEUKOCYTE EST, POC: ABNORMAL
NITRITE UR-MCNC: NEGATIVE MG/ML
PH UR: 5 [PH] (ref 5–8)
PROT UR STRIP-MCNC: NEGATIVE MG/DL
RBC # UR STRIP: NEGATIVE /UL
SP GR UR: 1.02 (ref 1–1.03)
UROBILINOGEN UR QL: NORMAL

## 2018-12-13 PROCEDURE — 99213 OFFICE O/P EST LOW 20 MIN: CPT | Performed by: FAMILY MEDICINE

## 2018-12-13 PROCEDURE — 81002 URINALYSIS NONAUTO W/O SCOPE: CPT | Performed by: FAMILY MEDICINE

## 2018-12-13 RX ORDER — SULFAMETHOXAZOLE AND TRIMETHOPRIM 800; 160 MG/1; MG/1
1 TABLET ORAL 2 TIMES DAILY
Qty: 14 TABLET | Refills: 0 | Status: SHIPPED | OUTPATIENT
Start: 2018-12-13 | End: 2019-01-08

## 2018-12-13 RX ORDER — GABAPENTIN 600 MG/1
TABLET ORAL
Qty: 90 TABLET | Refills: 2 | OUTPATIENT
Start: 2018-12-13

## 2018-12-13 RX ORDER — GABAPENTIN 600 MG/1
600 TABLET ORAL 3 TIMES DAILY
Qty: 90 TABLET | Refills: 5 | Status: SHIPPED | OUTPATIENT
Start: 2018-12-13 | End: 2019-06-20 | Stop reason: SDUPTHER

## 2018-12-13 NOTE — PATIENT INSTRUCTIONS
Go to the nearest ER or return to clinic if symptoms worsen, fever/chill develop      Urinary Tract Infection, Adult  A urinary tract infection (UTI) is an infection of any part of the urinary tract. The urinary tract includes the:  · Kidneys.  · Ureters.  · Bladder.  · Urethra.    These organs make, store, and get rid of pee (urine) in the body.  Follow these instructions at home:  · Take over-the-counter and prescription medicines only as told by your doctor.  · If you were prescribed an antibiotic medicine, take it as told by your doctor. Do not stop taking the antibiotic even if you start to feel better.  · Avoid the following drinks:  ? Alcohol.  ? Caffeine.  ? Tea.  ? Carbonated drinks.  · Drink enough fluid to keep your pee clear or pale yellow.  · Keep all follow-up visits as told by your doctor. This is important.  · Make sure to:  ? Empty your bladder often and completely. Do not to hold pee for long periods of time.  ? Empty your bladder before and after sex.  ? Wipe from front to back after a bowel movement if you are female. Use each tissue one time when you wipe.  Contact a doctor if:  · You have back pain.  · You have a fever.  · You feel sick to your stomach (nauseous).  · You throw up (vomit).  · Your symptoms do not get better after 3 days.  · Your symptoms go away and then come back.  Get help right away if:  · You have very bad back pain.  · You have very bad lower belly (abdominal) pain.  · You are throwing up and cannot keep down any medicines or water.  This information is not intended to replace advice given to you by your health care provider. Make sure you discuss any questions you have with your health care provider.  Document Released: 06/05/2009 Document Revised: 05/25/2017 Document Reviewed: 11/07/2016  [employer] Interactive Patient Education © 2018 [employer]vier Inc.

## 2018-12-13 NOTE — PROGRESS NOTES
Subjective   Doris Sam is a 53 y.o. female.   Chief Complaint   Patient presents with   • Urinary Frequency     x weeks     Urinary Tract Infection    This is a new problem. The current episode started 1 to 4 weeks ago. The problem occurs every urination. The problem has been unchanged. The quality of the pain is described as burning. There has been no fever. Associated symptoms include frequency and urgency. Pertinent negatives include no chills, nausea or vomiting. She has tried nothing for the symptoms. The treatment provided no relief.      She is also requesting a refill of gabapentin.     The following portions of the patient's history were reviewed and updated as appropriate: allergies, current medications, past family history, past medical history, past social history, past surgical history and problem list.    Review of Systems   Constitutional: Negative for chills, fatigue and fever.   Respiratory: Negative for cough.    Cardiovascular: Negative for chest pain.   Gastrointestinal: Negative for nausea and vomiting.   Genitourinary: Positive for dysuria, frequency and urgency.   Musculoskeletal: Positive for neck stiffness.       Objective   Physical Exam   Constitutional: She appears well-developed and well-nourished.   HENT:   Head: Normocephalic and atraumatic.   Right Ear: External ear normal.   Left Ear: External ear normal.   Nose: Nose normal.   Eyes: Conjunctivae are normal.   Cardiovascular: Normal rate, regular rhythm and normal heart sounds.   Pulmonary/Chest: Effort normal and breath sounds normal.   Abdominal: Soft. There is tenderness in the suprapubic area. There is no CVA tenderness.   Neurological: She is alert.   Skin: Skin is warm and dry.   Psychiatric: Her behavior is normal.   Nursing note and vitals reviewed.        Assessment/Plan   Doris was seen today for urinary frequency.    Diagnoses and all orders for this visit:    Acute cystitis without hematuria  -     Urine Culture -  Urine, Urine, Clean Catch  -     sulfamethoxazole-trimethoprim (BACTRIM DS,SEPTRA DS) 800-160 MG per tablet; Take 1 tablet by mouth 2 (Two) Times a Day.    Urinary frequency  -     POC Urinalysis Dipstick  -     Urine Culture - Urine, Urine, Clean Catch    Cervical radicular pain  -     gabapentin (NEURONTIN) 600 MG tablet; Take 1 tablet by mouth 3 (Three) Times a Day.    UA and urine culture ordered.  Treat acute cystitis with Bactrim, adjust based on urine culture.   Gabapentin refilled. Symptoms stable with treatment.

## 2018-12-15 LAB
BACTERIA UR CULT: NORMAL
BACTERIA UR CULT: NORMAL

## 2018-12-20 DIAGNOSIS — R45.4 IRRITABILITY: ICD-10-CM

## 2018-12-20 RX ORDER — ESCITALOPRAM OXALATE 20 MG/1
TABLET ORAL
Qty: 30 TABLET | Refills: 2 | Status: SHIPPED | OUTPATIENT
Start: 2018-12-20 | End: 2019-01-08 | Stop reason: SDUPTHER

## 2019-01-08 ENCOUNTER — OFFICE VISIT (OUTPATIENT)
Dept: FAMILY MEDICINE CLINIC | Facility: CLINIC | Age: 54
End: 2019-01-08

## 2019-01-08 VITALS
DIASTOLIC BLOOD PRESSURE: 82 MMHG | HEIGHT: 61 IN | SYSTOLIC BLOOD PRESSURE: 122 MMHG | BODY MASS INDEX: 29.64 KG/M2 | TEMPERATURE: 97.4 F | WEIGHT: 157 LBS | HEART RATE: 76 BPM | RESPIRATION RATE: 16 BRPM

## 2019-01-08 DIAGNOSIS — R45.4 IRRITABILITY: ICD-10-CM

## 2019-01-08 DIAGNOSIS — E03.9 ACQUIRED HYPOTHYROIDISM: Primary | ICD-10-CM

## 2019-01-08 DIAGNOSIS — B00.1 FEVER BLISTER: ICD-10-CM

## 2019-01-08 PROBLEM — R63.4 WEIGHT LOSS: Status: RESOLVED | Noted: 2018-01-15 | Resolved: 2019-01-08

## 2019-01-08 LAB
ALBUMIN SERPL-MCNC: 4.65 G/DL (ref 3.2–4.8)
ALBUMIN/GLOB SERPL: 2.2 G/DL (ref 1.5–2.5)
ALP SERPL-CCNC: 92 U/L (ref 25–100)
ALT SERPL-CCNC: 23 U/L (ref 7–40)
AST SERPL-CCNC: 22 U/L (ref 0–33)
BASOPHILS # BLD AUTO: 0.03 10*3/MM3 (ref 0–0.2)
BASOPHILS NFR BLD AUTO: 0.4 % (ref 0–1)
BILIRUB SERPL-MCNC: 0.7 MG/DL (ref 0.3–1.2)
BUN SERPL-MCNC: 17 MG/DL (ref 9–23)
BUN/CREAT SERPL: 21.3 (ref 7–25)
CALCIUM SERPL-MCNC: 9.7 MG/DL (ref 8.7–10.4)
CHLORIDE SERPL-SCNC: 106 MMOL/L (ref 99–109)
CO2 SERPL-SCNC: 26 MMOL/L (ref 20–31)
CREAT SERPL-MCNC: 0.8 MG/DL (ref 0.6–1.3)
EOSINOPHIL # BLD AUTO: 0.33 10*3/MM3 (ref 0–0.3)
EOSINOPHIL NFR BLD AUTO: 4.8 % (ref 0–3)
ERYTHROCYTE [DISTWIDTH] IN BLOOD BY AUTOMATED COUNT: 15.7 % (ref 11.3–14.5)
GLOBULIN SER CALC-MCNC: 2.2 GM/DL
GLUCOSE SERPL-MCNC: 80 MG/DL (ref 70–100)
HCT VFR BLD AUTO: 41.3 % (ref 34.5–44)
HGB BLD-MCNC: 13.1 G/DL (ref 11.5–15.5)
IMM GRANULOCYTES # BLD AUTO: 0.03 10*3/MM3 (ref 0–0.03)
IMM GRANULOCYTES NFR BLD AUTO: 0.4 % (ref 0–0.6)
LYMPHOCYTES # BLD AUTO: 2.16 10*3/MM3 (ref 0.6–4.8)
LYMPHOCYTES NFR BLD AUTO: 31.2 % (ref 24–44)
MCH RBC QN AUTO: 27.9 PG (ref 27–31)
MCHC RBC AUTO-ENTMCNC: 31.7 G/DL (ref 32–36)
MCV RBC AUTO: 88.1 FL (ref 80–99)
MONOCYTES # BLD AUTO: 0.43 10*3/MM3 (ref 0–1)
MONOCYTES NFR BLD AUTO: 6.2 % (ref 0–12)
NEUTROPHILS # BLD AUTO: 3.95 10*3/MM3 (ref 1.5–8.3)
NEUTROPHILS NFR BLD AUTO: 57 % (ref 41–71)
PLATELET # BLD AUTO: 310 10*3/MM3 (ref 150–450)
POTASSIUM SERPL-SCNC: 4.3 MMOL/L (ref 3.5–5.5)
PROT SERPL-MCNC: 6.8 G/DL (ref 5.7–8.2)
RBC # BLD AUTO: 4.69 10*6/MM3 (ref 3.89–5.14)
SODIUM SERPL-SCNC: 141 MMOL/L (ref 132–146)
T4 FREE SERPL-MCNC: 0.86 NG/DL (ref 0.89–1.76)
TSH SERPL DL<=0.005 MIU/L-ACNC: 5.52 MIU/ML (ref 0.35–5.35)
WBC # BLD AUTO: 6.93 10*3/MM3 (ref 3.5–10.8)

## 2019-01-08 PROCEDURE — 99214 OFFICE O/P EST MOD 30 MIN: CPT | Performed by: FAMILY MEDICINE

## 2019-01-08 RX ORDER — VALACYCLOVIR HYDROCHLORIDE 1 G/1
2000 TABLET, FILM COATED ORAL 2 TIMES DAILY
Qty: 12 TABLET | Refills: 3 | Status: ON HOLD | OUTPATIENT
Start: 2019-01-08 | End: 2020-08-25

## 2019-01-08 RX ORDER — CYCLOBENZAPRINE HCL 10 MG
10 TABLET ORAL 3 TIMES DAILY PRN
Qty: 30 TABLET | Refills: 0 | Status: SHIPPED | OUTPATIENT
Start: 2019-01-08 | End: 2019-05-22

## 2019-01-08 RX ORDER — PREDNISONE 1 MG/1
TABLET ORAL
COMMUNITY
Start: 2018-12-18 | End: 2019-05-22 | Stop reason: SDUPTHER

## 2019-01-08 RX ORDER — LEVOTHYROXINE SODIUM 0.03 MG/1
25 TABLET ORAL DAILY
Qty: 30 TABLET | Refills: 5 | Status: SHIPPED | OUTPATIENT
Start: 2019-01-08 | End: 2019-07-17 | Stop reason: SDUPTHER

## 2019-01-08 RX ORDER — ESCITALOPRAM OXALATE 20 MG/1
20 TABLET ORAL DAILY
Qty: 30 TABLET | Refills: 5 | Status: SHIPPED | OUTPATIENT
Start: 2019-01-08 | End: 2019-07-16 | Stop reason: SDUPTHER

## 2019-01-08 RX ORDER — TRAMADOL HYDROCHLORIDE 50 MG/1
TABLET ORAL
COMMUNITY
Start: 2018-12-11 | End: 2019-05-22

## 2019-01-08 NOTE — PROGRESS NOTES
Subjective   Doris Sam is a 53 y.o. female.     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 female who presents for evaluation of thyroid function.  Pt is doing well, and is compliant with medications and does not have any issues with the medication.   The problem has been unchanged.  The hypothyroidism is due to hypothyroidism.  Pt is due for labs.    She still occasionally gets fever blisters  Would like to have medicine available in case she needs it      The following portions of the patient's history were reviewed and updated as appropriate: allergies, current medications, past family history, past medical history, past social history, past surgical history and problem list.    Review of Systems   Constitutional: Negative.    HENT: Negative.    Eyes: Negative.    Respiratory: Negative.  Negative for shortness of breath.    Cardiovascular: Negative.  Negative for chest pain.   Gastrointestinal: Negative.    Musculoskeletal:        Right knee in splint   Skin: Negative.    Neurological: Negative.    Psychiatric/Behavioral: Negative.    All other systems reviewed and are negative.      Objective   Physical Exam   Constitutional: She is oriented to person, place, and time. She appears well-developed and well-nourished. No distress.   Cardiovascular: Normal rate, regular rhythm and normal heart sounds.   Pulmonary/Chest: Effort normal and breath sounds normal.   Musculoskeletal:   Right knee in velcro straight splint   Neurological: She is alert and oriented to person, place, and time.   Psychiatric: She has a normal mood and affect. Her behavior is normal. Judgment and thought content normal.   Nursing note and  vitals reviewed.      Assessment/Plan   Doris was seen today for follow-up.    Diagnoses and all orders for this visit:    Acquired hypothyroidism  -     levothyroxine (SYNTHROID, LEVOTHROID) 25 MCG tablet; Take 1 tablet by mouth Daily.  -     CBC & Differential  -     Comprehensive Metabolic Panel  -     TSH+Free T4    Irritability  -     escitalopram (LEXAPRO) 20 MG tablet; Take 1 tablet by mouth Daily.    Fever blister  -     valACYclovir (VALTREX) 1000 MG tablet; Take 2 tablets by mouth 2 (Two) Times a Day.    will recheck her thyroid and continue medicine.  only on 25 mcg daily  Lexapro continues to work well for her mood, will refill this  Ok PRN valtrex for cold sores  Ok PRN flexeril for her pain in her right trap.  This is likely related to need of her cane

## 2019-05-22 ENCOUNTER — OFFICE VISIT (OUTPATIENT)
Dept: FAMILY MEDICINE CLINIC | Facility: CLINIC | Age: 54
End: 2019-05-22

## 2019-05-22 ENCOUNTER — HOSPITAL ENCOUNTER (OUTPATIENT)
Dept: GENERAL RADIOLOGY | Facility: HOSPITAL | Age: 54
Discharge: HOME | End: 2019-05-22
Admitting: FAMILY MEDICINE

## 2019-05-22 VITALS
SYSTOLIC BLOOD PRESSURE: 106 MMHG | HEART RATE: 84 BPM | BODY MASS INDEX: 30.96 KG/M2 | DIASTOLIC BLOOD PRESSURE: 76 MMHG | OXYGEN SATURATION: 99 % | HEIGHT: 61 IN | RESPIRATION RATE: 18 BRPM | WEIGHT: 164 LBS | TEMPERATURE: 97.9 F

## 2019-05-22 DIAGNOSIS — M89.319 CLAVICLE ENLARGEMENT: Primary | ICD-10-CM

## 2019-05-22 PROCEDURE — 73030 X-RAY EXAM OF SHOULDER: CPT

## 2019-05-22 PROCEDURE — 99213 OFFICE O/P EST LOW 20 MIN: CPT | Performed by: FAMILY MEDICINE

## 2019-05-22 RX ORDER — FOLIC ACID 1 MG/1
TABLET ORAL
COMMUNITY
Start: 2019-03-14 | End: 2019-12-10 | Stop reason: SDDI

## 2019-05-22 RX ORDER — RIZATRIPTAN BENZOATE 10 MG/1
TABLET, ORALLY DISINTEGRATING ORAL
COMMUNITY
Start: 2019-03-26 | End: 2019-10-28 | Stop reason: SDUPTHER

## 2019-05-22 RX ORDER — IBUPROFEN 800 MG/1
TABLET ORAL
COMMUNITY
Start: 2019-05-20 | End: 2019-06-24

## 2019-05-22 RX ORDER — PREDNISONE 10 MG/1
TABLET ORAL
Qty: 21 TABLET | Refills: 0 | Status: SHIPPED | OUTPATIENT
Start: 2019-05-22 | End: 2019-07-16

## 2019-05-22 NOTE — PATIENT INSTRUCTIONS
Go to the nearest ER or return to clinic if symptoms worsen, fever/chill develop    Shoulder Exercises  Ask your health care provider which exercises are safe for you. Do exercises exactly as told by your health care provider and adjust them as directed. It is normal to feel mild stretching, pulling, tightness, or discomfort as you do these exercises, but you should stop right away if you feel sudden pain or your pain gets worse. Do not begin these exercises until told by your health care provider.  RANGE OF MOTION EXERCISES  These exercises warm up your muscles and joints and improve the movement and flexibility of your shoulder. These exercises also help to relieve pain, numbness, and tingling. These exercises involve stretching your injured shoulder directly.  Exercise A: Pendulum    1. Stand near a wall or a surface that you can hold onto for balance.  2. Bend at the waist and let your left / right arm hang straight down. Use your other arm to support you. Keep your back straight and do not lock your knees.  3. Relax your left / right arm and shoulder muscles, and move your hips and your trunk so your left / right arm swings freely. Your arm should swing because of the motion of your body, not because you are using your arm or shoulder muscles.  4. Keep moving your body so your arm swings in the following directions, as told by your health care provider:  ? Side to side.  ? Forward and backward.  ? In clockwise and counterclockwise circles.  5. Continue each motion for __________ seconds, or for as long as told by your health care provider.  6. Slowly return to the starting position.  Repeat __________ times. Complete this exercise __________ times a day.  Exercise B: Flexion, Standing    1. Stand and hold a broomstick, a cane, or a similar object. Place your hands a little more than shoulder-width apart on the object. Your left / right hand should be palm-up, and your other hand should be palm-down.  2. Keep  your elbow straight and keep your shoulder muscles relaxed. Push the stick down with your healthy arm to raise your left / right arm in front of your body, and then over your head until you feel a stretch in your shoulder.  ? Avoid shrugging your shoulder while you raise your arm. Keep your shoulder blade tucked down toward the middle of your back.  3. Hold for __________ seconds.  4. Slowly return to the starting position.  Repeat __________ times. Complete this exercise __________ times a day.  Exercise C: Abduction, Standing  1. Stand and hold a broomstick, a cane, or a similar object. Place your hands a little more than shoulder-width apart on the object. Your left / right hand should be palm-up, and your other hand should be palm-down.  2. While keeping your elbow straight and your shoulder muscles relaxed, push the stick across your body toward your left / right side. Raise your left / right arm to the side of your body and then over your head until you feel a stretch in your shoulder.  ? Do not raise your arm above shoulder height, unless your health care provider tells you to do that.  ? Avoid shrugging your shoulder while you raise your arm. Keep your shoulder blade tucked down toward the middle of your back.  3. Hold for __________ seconds.  4. Slowly return to the starting position.  Repeat __________ times. Complete this exercise __________ times a day.  Exercise D: Internal Rotation    1. Place your left / right hand behind your back, palm-up.  2. Use your other hand to dangle an exercise band, a towel, or a similar object over your shoulder. Grasp the band with your left / right hand so you are holding onto both ends.  3. Gently pull up on the band until you feel a stretch in the front of your left / right shoulder.  ? Avoid shrugging your shoulder while you raise your arm. Keep your shoulder blade tucked down toward the middle of your back.  4. Hold for __________ seconds.  5. Release the stretch by  letting go of the band and lowering your hands.  Repeat __________ times. Complete this exercise __________ times a day.  STRETCHING EXERCISES  These exercises warm up your muscles and joints and improve the movement and flexibility of your shoulder. These exercises also help to relieve pain, numbness, and tingling. These exercises are done using your healthy shoulder to help stretch the muscles of your injured shoulder.  Exercise E: Corner Stretch (External Rotation and Abduction)    1.  a doorway with one of your feet slightly in front of the other. This is called a staggered stance. If you cannot reach your forearms to the door frame, stand facing a corner of a room.  2. Choose one of the following positions as told by your health care provider:  ? Place your hands and forearms on the door frame above your head.  ? Place your hands and forearms on the door frame at the height of your head.  ? Place your hands on the door frame at the height of your elbows.  3. Slowly move your weight onto your front foot until you feel a stretch across your chest and in the front of your shoulders. Keep your head and chest upright and keep your abdominal muscles tight.  4. Hold for __________ seconds.  5. To release the stretch, shift your weight to your back foot.  Repeat __________ times. Complete this stretch __________ times a day.  Exercise F: Extension, Standing  1. Stand and hold a broomstick, a cane, or a similar object behind your back.  ? Your hands should be a little wider than shoulder-width apart.  ? Your palms should face away from your back.  2. Keeping your elbows straight and keeping your shoulder muscles relaxed, move the stick away from your body until you feel a stretch in your shoulder.  ? Avoid shrugging your shoulders while you move the stick. Keep your shoulder blade tucked down toward the middle of your back.  3. Hold for __________ seconds.  4. Slowly return to the starting position.  Repeat  __________ times. Complete this exercise __________ times a day.  STRENGTHENING EXERCISES  These exercises build strength and endurance in your shoulder. Endurance is the ability to use your muscles for a long time, even after they get tired.  Exercise G: External Rotation    1. Sit in a stable chair without armrests.  2. Secure an exercise band at elbow height on your left / right side.  3. Place a soft object, such as a folded towel or a small pillow, between your left / right upper arm and your body to move your elbow a few inches away (about 10 cm) from your side.  4. Hold the end of the band so it is tight and there is no slack.  5. Keeping your elbow pressed against the soft object, move your left / right forearm out, away from your abdomen. Keep your body steady so only your forearm moves.  6. Hold for __________ seconds.  7. Slowly return to the starting position.  Repeat __________ times. Complete this exercise __________ times a day.  Exercise H: Shoulder Abduction    1. Sit in a stable chair without armrests, or stand.  2. Hold a __________ weight in your left / right hand, or hold an exercise band with both hands.  3. Start with your arms straight down and your left / right palm facing in, toward your body.  4. Slowly lift your left / right hand out to your side. Do not lift your hand above shoulder height unless your health care provider tells you that this is safe.  ? Keep your arms straight.  ? Avoid shrugging your shoulder while you do this movement. Keep your shoulder blade tucked down toward the middle of your back.  5. Hold for __________ seconds.  6. Slowly lower your arm, and return to the starting position.  Repeat __________ times. Complete this exercise __________ times a day.  Exercise I: Shoulder Extension  1. Sit in a stable chair without armrests, or stand.  2. Secure an exercise band to a stable object in front of you where it is at shoulder height.  3. Hold one end of the exercise band  "in each hand. Your palms should face each other.  4. Straighten your elbows and lift your hands up to shoulder height.  5. Step back, away from the secured end of the exercise band, until the band is tight and there is no slack.  6. Squeeze your shoulder blades together as you pull your hands down to the sides of your thighs. Stop when your hands are straight down by your sides. Do not let your hands go behind your body.  7. Hold for __________ seconds.  8. Slowly return to the starting position.  Repeat __________ times. Complete this exercise __________ times a day.  Exercise J: Standing Shoulder Row  1. Sit in a stable chair without armrests, or stand.  2. Secure an exercise band to a stable object in front of you so it is at waist height.  3. Hold one end of the exercise band in each hand. Your palms should be in a thumbs-up position.  4. Bend each of your elbows to an \"L\" shape (about 90 degrees) and keep your upper arms at your sides.  5. Step back until the band is tight and there is no slack.  6. Slowly pull your elbows back behind you.  7. Hold for __________ seconds.  8. Slowly return to the starting position.  Repeat __________ times. Complete this exercise __________ times a day.  Exercise K: Shoulder Press-Ups    1. Sit in a stable chair that has armrests. Sit upright, with your feet flat on the floor.  2. Put your hands on the armrests so your elbows are bent and your fingers are pointing forward. Your hands should be about even with the sides of your body.  3. Push down on the armrests and use your arms to lift yourself off of the chair. Straighten your elbows and lift yourself up as much as you comfortably can.  ? Move your shoulder blades down, and avoid letting your shoulders move up toward your ears.  ? Keep your feet on the ground. As you get stronger, your feet should support less of your body weight as you lift yourself up.  4. Hold for __________ seconds.  5. Slowly lower yourself back into " the chair.  Repeat __________ times. Complete this exercise __________ times a day.  Exercise L: Wall Push-Ups    1. Stand so you are facing a stable wall. Your feet should be about one arm-length away from the wall.  2. Lean forward and place your palms on the wall at shoulder height.  3. Keep your feet flat on the floor as you bend your elbows and lean forward toward the wall.  4. Hold for __________ seconds.  5. Straighten your elbows to push yourself back to the starting position.  Repeat __________ times. Complete this exercise __________ times a day.  This information is not intended to replace advice given to you by your health care provider. Make sure you discuss any questions you have with your health care provider.  Document Released: 11/01/2006 Document Revised: 09/11/2017 Document Reviewed: 08/28/2016  Elsevier Interactive Patient Education © 2019 [employer]

## 2019-05-22 NOTE — PROGRESS NOTES
Subjective   Doris Sam is a 53 y.o. female.   Chief Complaint   Patient presents with   • Mass     R side of clavical first noticed x2 months ago.     History of Present Illness   She noticed a lump at proximal right clavicle 2 months ago.   The area is tender, enlarging.   She has to use her upper extremities to help her get up from seated position, due to right knee pain. Her right leg is in an immobilizer. Ambulating with a cane.   To treat, she has applied OTC muscle rubs. Taking ibuprofen 800 mg and gabapentin, no relief of symptoms.     The following portions of the patient's history were reviewed and updated as appropriate: allergies, current medications, past family history, past medical history, past social history, past surgical history and problem list.    Review of Systems   Constitutional: Negative for fever.   Respiratory: Negative.    Cardiovascular: Negative.    Musculoskeletal: Positive for arthralgias and gait problem.       Objective   Physical Exam   Constitutional: She appears well-developed and well-nourished.   HENT:   Head: Normocephalic and atraumatic.   Right Ear: External ear normal.   Left Ear: External ear normal.   Nose: Nose normal.   Eyes: Conjunctivae are normal.   Neck: Neck supple.   Cardiovascular: Normal rate, regular rhythm and normal heart sounds.   Pulmonary/Chest: Effort normal and breath sounds normal. She exhibits bony tenderness (SC joint) and edema (SC joint).       Musculoskeletal:   RLE is in immobilizer, she ambulates with cane   Neurological: She is alert.   Skin: Skin is warm.   Psychiatric: Her behavior is normal.   Nursing note and vitals reviewed.        Assessment/Plan   Doris was seen today for mass.    Diagnoses and all orders for this visit:    Clavicle enlargement  -     predniSONE (DELTASONE) 10 MG tablet; Take 60 mg po day 1, 50 mg day 2, 40 mg day 3, 30 mg day 4, 20 mg day 5, 10 mg day 6  -     XR Shoulder 2+ View Right      X-ray to evaluate the edema  and tenderness of the clavicle.  She does have a history of rheumatoid arthritis, could be affecting the SC joint.  Steroid taper to improve symptoms.  Depending on response to the steroid taper and x-ray, consider either ultrasound or a sooner appointment with her rheumatologist.

## 2019-05-23 ENCOUNTER — TELEPHONE (OUTPATIENT)
Dept: FAMILY MEDICINE CLINIC | Facility: CLINIC | Age: 54
End: 2019-05-23

## 2019-05-23 NOTE — TELEPHONE ENCOUNTER
Degenerative changes in the right shoulder. No fracture. No mention of abnormality in the area where she is seeing the enlargement. Complete the steroid taper. If still present, will proceed with ultrasound of that area.

## 2019-05-23 NOTE — TELEPHONE ENCOUNTER
----- Message from Matheus Theodore sent at 5/23/2019  2:09 PM EDT -----  Contact: MYRNA / PT CALL   PT CALLED CHECKING ON XRAY RESULTS      PT CALL BACK  [phone]

## 2019-05-28 ENCOUNTER — TELEPHONE (OUTPATIENT)
Dept: FAMILY MEDICINE CLINIC | Facility: CLINIC | Age: 54
End: 2019-05-28

## 2019-05-28 DIAGNOSIS — M89.319 CLAVICLE ENLARGEMENT: Primary | ICD-10-CM

## 2019-05-28 NOTE — TELEPHONE ENCOUNTER
----- Message from Matheus Malhotra Rep sent at 5/28/2019 12:43 PM EDT -----  Contact: PT; MYRNA  PATIENT IS CALLING TO LET DR NORRIS KNOW THE LEANNE ON HER COLLARBONE IS NOT ANY BETTER.  THE PREDNISONE HAS NOT HELPED IT AT ALL.  SHE WANTS TO KNOW CAN WE PROCEED WITH AN ULTRASOUND?    PHONE: [phone]

## 2019-05-30 ENCOUNTER — APPOINTMENT (OUTPATIENT)
Dept: ULTRASOUND IMAGING | Facility: HOSPITAL | Age: 54
End: 2019-05-30

## 2019-06-04 ENCOUNTER — HOSPITAL ENCOUNTER (OUTPATIENT)
Dept: ULTRASOUND IMAGING | Facility: HOSPITAL | Age: 54
Discharge: HOME | End: 2019-06-04
Admitting: FAMILY MEDICINE

## 2019-06-04 PROCEDURE — 76536 US EXAM OF HEAD AND NECK: CPT

## 2019-06-09 DIAGNOSIS — M25.811 ENLARGEMENT OF RIGHT STERNOCLAVICULAR JOINT: Primary | ICD-10-CM

## 2019-06-12 ENCOUNTER — HOSPITAL ENCOUNTER (OUTPATIENT)
Dept: CT IMAGING | Facility: HOSPITAL | Age: 54
Discharge: HOME | End: 2019-06-12
Admitting: FAMILY MEDICINE

## 2019-06-12 PROCEDURE — 73200 CT UPPER EXTREMITY W/O DYE: CPT

## 2019-06-14 ENCOUNTER — TELEPHONE (OUTPATIENT)
Dept: FAMILY MEDICINE CLINIC | Facility: CLINIC | Age: 54
End: 2019-06-14

## 2019-06-17 DIAGNOSIS — E04.1 THYROID NODULE: Primary | ICD-10-CM

## 2019-06-17 DIAGNOSIS — M25.811 ENLARGEMENT OF RIGHT STERNOCLAVICULAR JOINT: ICD-10-CM

## 2019-06-20 DIAGNOSIS — M54.12 CERVICAL RADICULAR PAIN: ICD-10-CM

## 2019-06-21 ENCOUNTER — HOSPITAL ENCOUNTER (OUTPATIENT)
Dept: ULTRASOUND IMAGING | Facility: HOSPITAL | Age: 54
Discharge: HOME | End: 2019-06-21
Admitting: FAMILY MEDICINE

## 2019-06-21 PROCEDURE — 76536 US EXAM OF HEAD AND NECK: CPT

## 2019-06-21 RX ORDER — GABAPENTIN 600 MG/1
TABLET ORAL
Qty: 90 TABLET | Refills: 3 | Status: SHIPPED | OUTPATIENT
Start: 2019-06-21 | End: 2019-10-29 | Stop reason: SDUPTHER

## 2019-06-23 DIAGNOSIS — E03.9 HYPOTHYROIDISM, UNSPECIFIED TYPE: Primary | ICD-10-CM

## 2019-06-24 ENCOUNTER — OFFICE VISIT (OUTPATIENT)
Dept: ORTHOPEDIC SURGERY | Facility: CLINIC | Age: 54
End: 2019-06-24

## 2019-06-24 VITALS — HEART RATE: 92 BPM | OXYGEN SATURATION: 97 % | WEIGHT: 164.02 LBS | HEIGHT: 61 IN | BODY MASS INDEX: 30.97 KG/M2

## 2019-06-24 DIAGNOSIS — M25.511 PAIN IN STERNOCLAVICULAR JOINT, RIGHT: Primary | ICD-10-CM

## 2019-06-24 DIAGNOSIS — M19.011 OSTEOARTHRITIS OF RIGHT STERNOCLAVICULAR JOINT: ICD-10-CM

## 2019-06-24 PROCEDURE — 99203 OFFICE O/P NEW LOW 30 MIN: CPT | Performed by: PHYSICIAN ASSISTANT

## 2019-06-24 RX ORDER — MELOXICAM 15 MG/1
TABLET ORAL
Qty: 90 TABLET | Refills: 0 | Status: SHIPPED | OUTPATIENT
Start: 2019-06-24 | End: 2019-09-16 | Stop reason: SDUPTHER

## 2019-06-24 NOTE — PROGRESS NOTES
[facility] Note    Subjective     Pain of the Right Clavicle (Enlargement of Right Sternoclavicular Joint)      HPI    Astrid Sam is a 53 y.o. female.  Right-hand-dominant.  Patient presents orthopedic clinic for evaluation of her right sternoclavicular joint.  She denies any history of injury or trauma.  She reports pain x4 months.  States it started at the right SCJ and radiates to the lateral aspect of the clavicle and into the neck.  She has been treated with anti-inflammatories and oral steroids without any significant improvement.  At this time she endorses a pain scale 6/10.  Severity the pain moderate.  Quality pain aching, throbbing.  Associated symptoms swelling, popping, grinding, stiffness.      Patient is currently using a walker to assist with ambulation following right TKA and subsequent procedures performed by by Dr. Miller.      Past Medical History:   Diagnosis Date   • Allergic       Past Surgical History:   Procedure Laterality Date   •  SECTION     • FRACTURE SURGERY      left leg   • KNEE ARTHROSCOPY Right     done twice   • PATELLA SURGERY Left     removed left knee cap   • REPLACEMENT TOTAL KNEE Right 2018   • TOTAL HIP ARTHROPLASTY Left       Family History   Problem Relation Age of Onset   • Hypertension Mother    • Migraines Mother    • Hypertension Father    • COPD Father      Social History     Socioeconomic History   • Marital status:      Spouse name: Not on file   • Number of children: Not on file   • Years of education: Not on file   • Highest education level: Not on file   Occupational History   • 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     • Calcium-Magnesium-Vitamin D (CALCIUM 500 PO) Take  by mouth.     • escitalopram (LEXAPRO) 20 MG tablet Take 1 tablet by mouth Daily. 30 tablet 5   • folic acid (FOLVITE) 1 MG tablet      • gabapentin (NEURONTIN) 600 MG tablet TAKE 1 TABLET BY MOUTH THREE TIMES A DAY 90 tablet 3   • levothyroxine (SYNTHROID, LEVOTHROID) 25 MCG tablet Take 1 tablet by mouth Daily. 30 tablet 5   • methotrexate 2.5 MG tablet      • predniSONE (DELTASONE) 10 MG tablet Take 60 mg po day 1, 50 mg day 2, 40 mg day 3, 30 mg day 4, 20 mg day 5, 10 mg day 6 21 tablet 0   • rizatriptan MLT (MAXALT-MLT) 10 MG disintegrating tablet      • valACYclovir (VALTREX) 1000 MG tablet Take 2 tablets by mouth 2 (Two) Times a Day. 12 tablet 3   • [DISCONTINUED] ibuprofen (ADVIL,MOTRIN) 800 MG tablet        No current facility-administered medications on file prior to visit.       Allergies   Allergen Reactions   • No Known Drug Allergy         The following portions of the patient's history were reviewed and updated as appropriate: allergies, current medications, past family history, past medical history, past social history, past surgical history and problem list.    Review of Systems   Constitutional: Negative.    HENT: Negative.    Eyes: Negative.    Respiratory: Negative.    Cardiovascular: Negative.    Gastrointestinal: Negative.    Endocrine: Negative.    Genitourinary: Negative.    Musculoskeletal: Positive for arthralgias, joint swelling, neck pain and neck stiffness.        Enlargement of right sternoclavicular joint   Skin: Negative.    Allergic/Immunologic: Negative.    Neurological: Negative.    Hematological: Negative.    Psychiatric/Behavioral: Negative.         Objective      Physical Exam  Pulse 92   Ht 154.2 cm (60.71\")   Wt 74.4 kg (164 lb 0.4 oz)   SpO2 97%   Breastfeeding? No   BMI 31.29 kg/m²     Body mass index is 31.29 " kg/m².    General  Mental Status - alert  General Appearance - cooperative, well groomed, not in acute distress  Orientation - Oriented X3  Build & Nutrition - well developed and well nourished  Posture - normal posture  Gait -currently using a walker and a knee immobilizer on the right side     Integumentary  Global Assessment  Examination of related systems reveals - no lymphadenopathy  Ears:  No abnormality  Nose:  No mucous drainage  General Characteristics  Overall examination of the patient's skin reveals - no rashes, no evidence of scars, no suspicious lesions and no bruises.  Color - normal coloration of skin.  Vascular: Brisk capillary refill in all extremities    Ortho Exam  Musculoskeletal   Upper Extremity   Right Shoulder     Inspection and Palpation:     Tenderness - positive tenderness and soft tissue swelling/prominence noted to the SCJ.  No palpable tenderness to the shoulder complex itself.    Crepitus - none    Sensation is normal    Examination reveals no ecchymosis.        Strength and Tone:    Supraspinatus - 5/5    External Rotators- 5/5    Infraspinatus - 5/5    Subscapularis - 5/5    Deltoid - 5/5     Range of Motion   Left shoulder:    Internal Rotation: ROM - T12    External Rotation: AROM - 80 degrees    Elevation through flexion: AROM - 180 degrees    Right Shoulder:    Internal Rotation: ROM - T12    External Rotation: AROM - 80 degrees    Elevation through flexion: AROM - 180 degrees      Instability   Right shoulder    Sulcus sign negative    Apprehension test negative    Matt relocation test negative    Jerk test negative     Impingement   Right shoulder    Park-Marcell impingement test negative    Neer impingement test negative     Functional Testing   Right shoulder    AC crossover adduction test negative    Abdominal compression test negative    Lift-off sign negative    Speed's test negative    Heath's test negative    Hornblower's sign negative     Patient able to achieve  full range of motion of the shoulder however with forward flexion and abduction there is increased pain noted to the SCJ.      Imaging/Studies    Imaging Results (last 24 hours)     ** No results found for the last 24 hours. **      Dr. Pikcett and I reviewed plain film imaging of the right shoulder as well as CT scan.  EXAMINATION: XR SHOULDER 2+ VW, RIGHT-05/22/2019:      INDICATION: Medial clavicle edema and tenderness at SC joint;  M89.319-Hypertrophy of bone, unspecified shoulder.      COMPARISON: NONE.     FINDINGS: Two views of the right shoulder reveal minimal degenerative  changes seen of the acromioclavicular joint. The cortex is intact. Joint  spaces are preserved. There is calcification seen at the supraspinatus  tendon in which calcific tendinitis cannot be excluded.         IMPRESSION:  Degenerative changes seen within the right shoulder with no  evidence of fracture or dislocation. Findings to suggest possible  calcific tendinitis.     D:  05/22/2019  E:  05/22/2019     This report was finalized on 5/23/2019 11:58 AM by Dr. Monica Mcdermott MD.    EXAMINATION: CT SCAN OF THE RIGHT AC JOINT WITHOUT CONTRAST WITH 2-D  RECONSTRUCTIONS-     HISTORY: Prominence over the SC joint. Slight elevation of the clavicle,  but no obvious edema or collection.     TECHNICAL: Exam consists of spiral acquisition 2 mm axial images through  the lower neck and upper chest, with particular attention to the right  AC joint.     Axial bone window images show moderately advanced AC joint DJD, with  prominent anterior and dorsal marginal osteophytes. A few small  subarticular degenerative bone cysts are seen but no acute appearing  erosion is identified. Skin marker placed by the patient in the area of  palpable abnormality overlies the superior margin of the right  clavicular head. No AC joint separation is seen. Soft tissue window  images show no obvious inflammatory change or mass.     Coronal reconstructions show narrowing  of the right AC joint space, and  relative medial migration of the right clavicular head along the  manubrium compared to the contralateral side. Joint space is well  maintained. Again, no bony erosive change is seen.     Elsewhere at the level of this scan, remaining bony structures appear to  be intact. No soft tissue mass or inflammatory focus is identified.  Incidental note is made of a rounded low-attenuation area in the  inferior left thyroid lobe, 2 cm in diameter which may represent a  thyroid nodule or cyst. No adenopathy is seen. Included upper lungs  appear grossly clear.     IMPRESSION:  Area indicated by the patient corresponds to the right  clavicular head which shows moderately advanced DJD as described above.  No acute bony or soft tissue abnormality is identified.     D:  06/13/2019  E:  06/13/2019     This report was finalized on 6/13/2019 9:58 PM by DR. Bryce Herring MD.    Assessment:  1. Pain in sternoclavicular joint, right    2. Osteoarthritis of right sternoclavicular joint        Plan:  1. Pain to the right sternoclavicular joint with osteoarthritis noted.  2. Unfortunately for arthritis noted to the SCJ there are not many treatment options other than use of anti-inflammatories and PT.  3. Her pain could be attributed to the fact that she is been using a walker secondary to her knee issues.  Hopefully once she can transition off the walker that will help improve her right SCJ symptoms.  4. Patient was prescribed meloxicam to take daily for the next 3 or 4 weeks to help assist with inflammation/pain control.  At that time she has had improvement of her symptoms she can transition to as needed.  5. Offered to send the patient to formal PT but she declined stating that she believes she will be starting formal PT with her knee and she wants to make sure that she goes to the same place for both issues.  She will contact me when she is cleared to begin PT with her knee so we can a referral for  it.  6. Follow-up as needed.  7. Questions and concerns answered.    Patient was examined by Dr. Pickett and he agrees with the above assessment and plan.      Medical Decision Making  Management Options : prescription/IM medicine  Data/Risk: radiology tests       Davina Carpenter PA-C  06/24/19  3:43 PM

## 2019-07-16 ENCOUNTER — OFFICE VISIT (OUTPATIENT)
Dept: FAMILY MEDICINE CLINIC | Facility: CLINIC | Age: 54
End: 2019-07-16

## 2019-07-16 VITALS
RESPIRATION RATE: 18 BRPM | SYSTOLIC BLOOD PRESSURE: 122 MMHG | BODY MASS INDEX: 30.58 KG/M2 | HEART RATE: 80 BPM | TEMPERATURE: 98 F | DIASTOLIC BLOOD PRESSURE: 82 MMHG | HEIGHT: 61 IN | WEIGHT: 162 LBS

## 2019-07-16 DIAGNOSIS — R45.4 IRRITABILITY: ICD-10-CM

## 2019-07-16 DIAGNOSIS — E03.9 ACQUIRED HYPOTHYROIDISM: Primary | ICD-10-CM

## 2019-07-16 DIAGNOSIS — M25.511 STERNOCLAVICULAR JOINT PAIN, RIGHT: ICD-10-CM

## 2019-07-16 PROCEDURE — G8417 CALC BMI ABV UP PARAM F/U: HCPCS | Performed by: FAMILY MEDICINE

## 2019-07-16 PROCEDURE — 99214 OFFICE O/P EST MOD 30 MIN: CPT | Performed by: FAMILY MEDICINE

## 2019-07-16 PROCEDURE — 3008F BODY MASS INDEX DOCD: CPT | Performed by: FAMILY MEDICINE

## 2019-07-16 RX ORDER — ESCITALOPRAM OXALATE 20 MG/1
20 TABLET ORAL DAILY
Qty: 30 TABLET | Refills: 5 | Status: SHIPPED | OUTPATIENT
Start: 2019-07-16 | End: 2020-01-16 | Stop reason: SDUPTHER

## 2019-07-16 NOTE — PROGRESS NOTES
Subjective   Astrid Sam is a 53 y.o. female.     History of Present Illness     Hypothyroidism  Astrid Sam is a 53 y.o. female who presents for evaluation of thyroid function.  Pt is doing well, and is compliant with medications and does not have any issues with the medication.  The problem has been unchanged.  The hypothyroidism is due to hypothyroidism.  Pt is due for labs.    Mood doing well with the lexapro  Overall feels like this medicine has really helped her  Needs to stay on it and is due for efills    She had another surgery with [facility] on her right knee in Feb  recovery is slow    She still has pain at right sternocalvicular joint  The pain is slowly getting better, mobic has been helping  She saw ortho in late June about this for follow up  Pain is slowly improvement    The following portions of the patient's history were reviewed and updated as appropriate: allergies, current medications, past family history, past medical history, past social history, past surgical history and problem list.    Review of Systems   Constitutional: Negative.    HENT: Negative.    Eyes: Negative.    Respiratory: Negative.  Negative for shortness of breath.    Cardiovascular: Negative.  Negative for chest pain.   Gastrointestinal: Negative.    Musculoskeletal: Positive for gait problem.   Skin: Negative.    Psychiatric/Behavioral: Negative.    All other systems reviewed and are negative.      Objective   Physical Exam   Constitutional: She appears well-developed and well-nourished. No distress.   Cardiovascular: Normal rate, regular rhythm and normal heart sounds.   Pulmonary/Chest: Effort normal and breath sounds normal.   Psychiatric: She has a normal mood and affect. Her behavior is normal.   Nursing note and vitals reviewed.      Assessment/Plan   Astrid was seen today for hypothyroidism.    Diagnoses and all orders for this visit:    Acquired hypothyroidism  -     TSH+Free T4  -     Thyroid Peroxidase  Antibody    Irritability  -     escitalopram (LEXAPRO) 20 MG tablet; Take 1 tablet by mouth Daily.    Sternoclavicular joint pain, right    recheck thyroid labs and will refill medicine pending outcome of labs, pt has been on synthroid 25  Refilled lexapro for mood  Sternoclavicular pain is slowly getting better, no change in regimen

## 2019-07-17 DIAGNOSIS — E03.9 ACQUIRED HYPOTHYROIDISM: ICD-10-CM

## 2019-07-17 LAB
T4 FREE SERPL-MCNC: 0.92 NG/DL (ref 0.82–1.77)
THYROPEROXIDASE AB SERPL-ACNC: 360 IU/ML (ref 0–34)
TSH SERPL DL<=0.005 MIU/L-ACNC: 2.91 UIU/ML (ref 0.45–4.5)

## 2019-07-17 RX ORDER — LEVOTHYROXINE SODIUM 0.03 MG/1
25 TABLET ORAL DAILY
Qty: 30 TABLET | Refills: 5 | Status: SHIPPED | OUTPATIENT
Start: 2019-07-17 | End: 2020-01-16

## 2019-09-16 RX ORDER — MELOXICAM 15 MG/1
TABLET ORAL
Qty: 90 TABLET | Refills: 0 | Status: SHIPPED | OUTPATIENT
Start: 2019-09-16 | End: 2019-11-01 | Stop reason: ALTCHOICE

## 2019-10-01 RX ORDER — TRAZODONE HYDROCHLORIDE 100 MG/1
50-100 TABLET ORAL NIGHTLY
Qty: 30 TABLET | Refills: 1 | Status: SHIPPED | OUTPATIENT
Start: 2019-10-01 | End: 2019-11-22 | Stop reason: SDUPTHER

## 2019-10-28 RX ORDER — RIZATRIPTAN BENZOATE 10 MG/1
10 TABLET, ORALLY DISINTEGRATING ORAL ONCE AS NEEDED
Qty: 9 TABLET | Refills: 5 | Status: SHIPPED | OUTPATIENT
Start: 2019-10-28 | End: 2020-05-13 | Stop reason: SDUPTHER

## 2019-10-29 DIAGNOSIS — M54.12 CERVICAL RADICULAR PAIN: ICD-10-CM

## 2019-10-29 RX ORDER — GABAPENTIN 600 MG/1
600 TABLET ORAL 3 TIMES DAILY
Qty: 90 TABLET | Refills: 2 | Status: SHIPPED | OUTPATIENT
Start: 2019-10-29 | End: 2020-01-16 | Stop reason: SDUPTHER

## 2019-11-01 DIAGNOSIS — M54.12 CERVICAL RADICULAR PAIN: ICD-10-CM

## 2019-11-01 RX ORDER — GABAPENTIN 600 MG/1
600 TABLET ORAL 3 TIMES DAILY
Qty: 90 TABLET | Refills: 2 | OUTPATIENT
Start: 2019-11-01

## 2019-11-01 NOTE — TELEPHONE ENCOUNTER
Regarding: Prescription Question  Contact: [phone]  ----- Message from Lidia Berkowitz MA sent at 11/1/2019  9:40 AM EDT -----       ----- Message from Astrid Sam to Beckie Murphy DO sent at 11/1/2019  9:07 AM -----   Can I get my Gabapentin refilled please. Also can you give  me something good for arthritis . I have  been trying over the counter meds and nothing seems to be helping me at this time. It is my right hip. Thank you and hope you have a blessed day.    Doris Sam  -[phone]

## 2019-11-22 RX ORDER — TRAZODONE HYDROCHLORIDE 100 MG/1
50-100 TABLET ORAL NIGHTLY
Qty: 30 TABLET | Refills: 5 | Status: SHIPPED | OUTPATIENT
Start: 2019-11-22 | End: 2020-05-29 | Stop reason: SDUPTHER

## 2019-12-10 ENCOUNTER — HOSPITAL ENCOUNTER (OUTPATIENT)
Dept: GENERAL RADIOLOGY | Facility: HOSPITAL | Age: 54
Discharge: HOME | End: 2019-12-10
Admitting: ORTHOPAEDIC SURGERY

## 2019-12-10 ENCOUNTER — APPOINTMENT (OUTPATIENT)
Dept: PREADMISSION TESTING | Facility: HOSPITAL | Age: 54
End: 2019-12-10

## 2019-12-10 VITALS — BODY MASS INDEX: 32.68 KG/M2 | WEIGHT: 166.45 LBS | HEIGHT: 60 IN

## 2019-12-10 LAB
25(OH)D3 SERPL-MCNC: 25 NG/ML (ref 30–100)
ALBUMIN SERPL-MCNC: 4.6 G/DL (ref 3.5–5.2)
ALBUMIN/GLOB SERPL: 1.7 G/DL
ALP SERPL-CCNC: 85 U/L (ref 39–117)
ALT SERPL W P-5'-P-CCNC: 16 U/L (ref 1–33)
ANION GAP SERPL CALCULATED.3IONS-SCNC: 12 MMOL/L (ref 5–15)
APTT PPP: 36.3 SECONDS (ref 24–37)
AST SERPL-CCNC: 19 U/L (ref 1–32)
BASOPHILS # BLD AUTO: 0.06 10*3/MM3 (ref 0–0.2)
BASOPHILS NFR BLD AUTO: 0.9 % (ref 0–1.5)
BILIRUB SERPL-MCNC: 0.4 MG/DL (ref 0.2–1.2)
BUN BLD-MCNC: 21 MG/DL (ref 6–20)
BUN/CREAT SERPL: 28 (ref 7–25)
CALCIUM SPEC-SCNC: 9.7 MG/DL (ref 8.6–10.5)
CHLORIDE SERPL-SCNC: 103 MMOL/L (ref 98–107)
CO2 SERPL-SCNC: 26 MMOL/L (ref 22–29)
CREAT BLD-MCNC: 0.75 MG/DL (ref 0.57–1)
DEPRECATED RDW RBC AUTO: 47.1 FL (ref 37–54)
EOSINOPHIL # BLD AUTO: 0.37 10*3/MM3 (ref 0–0.4)
EOSINOPHIL NFR BLD AUTO: 5.4 % (ref 0.3–6.2)
ERYTHROCYTE [DISTWIDTH] IN BLOOD BY AUTOMATED COUNT: 13.9 % (ref 12.3–15.4)
GFR SERPL CREATININE-BSD FRML MDRD: 80 ML/MIN/1.73
GLOBULIN UR ELPH-MCNC: 2.7 GM/DL
GLUCOSE BLD-MCNC: 86 MG/DL (ref 65–99)
HBA1C MFR BLD: 5.1 % (ref 4.8–5.6)
HCT VFR BLD AUTO: 39.1 % (ref 34–46.6)
HGB BLD-MCNC: 12.4 G/DL (ref 12–15.9)
IMM GRANULOCYTES # BLD AUTO: 0.04 10*3/MM3 (ref 0–0.05)
IMM GRANULOCYTES NFR BLD AUTO: 0.6 % (ref 0–0.5)
INR PPP: 1.01 (ref 0.85–1.16)
LYMPHOCYTES # BLD AUTO: 1.97 10*3/MM3 (ref 0.7–3.1)
LYMPHOCYTES NFR BLD AUTO: 29 % (ref 19.6–45.3)
MCH RBC QN AUTO: 29.5 PG (ref 26.6–33)
MCHC RBC AUTO-ENTMCNC: 31.7 G/DL (ref 31.5–35.7)
MCV RBC AUTO: 93.1 FL (ref 79–97)
MONOCYTES # BLD AUTO: 0.46 10*3/MM3 (ref 0.1–0.9)
MONOCYTES NFR BLD AUTO: 6.8 % (ref 5–12)
MRSA DNA SPEC QL NAA+PROBE: NEGATIVE
NEUTROPHILS # BLD AUTO: 3.89 10*3/MM3 (ref 1.7–7)
NEUTROPHILS NFR BLD AUTO: 57.3 % (ref 42.7–76)
NRBC BLD AUTO-RTO: 0 /100 WBC (ref 0–0.2)
PLATELET # BLD AUTO: 269 10*3/MM3 (ref 140–450)
PMV BLD AUTO: 9.5 FL (ref 6–12)
POTASSIUM BLD-SCNC: 4.5 MMOL/L (ref 3.5–5.2)
PROT SERPL-MCNC: 7.3 G/DL (ref 6–8.5)
PROTHROMBIN TIME: 12.8 SECONDS (ref 11.2–14.3)
RBC # BLD AUTO: 4.2 10*6/MM3 (ref 3.77–5.28)
SODIUM BLD-SCNC: 141 MMOL/L (ref 136–145)
WBC NRBC COR # BLD: 6.79 10*3/MM3 (ref 3.4–10.8)

## 2019-12-10 PROCEDURE — 93010 ELECTROCARDIOGRAM REPORT: CPT | Performed by: INTERNAL MEDICINE

## 2019-12-10 PROCEDURE — 85730 THROMBOPLASTIN TIME PARTIAL: CPT | Performed by: ORTHOPAEDIC SURGERY

## 2019-12-10 PROCEDURE — 80053 COMPREHEN METABOLIC PANEL: CPT | Performed by: ORTHOPAEDIC SURGERY

## 2019-12-10 PROCEDURE — 71046 X-RAY EXAM CHEST 2 VIEWS: CPT

## 2019-12-10 PROCEDURE — 85025 COMPLETE CBC W/AUTO DIFF WBC: CPT | Performed by: ORTHOPAEDIC SURGERY

## 2019-12-10 PROCEDURE — G0480 DRUG TEST DEF 1-7 CLASSES: HCPCS | Performed by: ORTHOPAEDIC SURGERY

## 2019-12-10 PROCEDURE — 83036 HEMOGLOBIN GLYCOSYLATED A1C: CPT | Performed by: ORTHOPAEDIC SURGERY

## 2019-12-10 PROCEDURE — 82306 VITAMIN D 25 HYDROXY: CPT | Performed by: ORTHOPAEDIC SURGERY

## 2019-12-10 PROCEDURE — 87641 MR-STAPH DNA AMP PROBE: CPT | Performed by: ORTHOPAEDIC SURGERY

## 2019-12-10 PROCEDURE — 36415 COLL VENOUS BLD VENIPUNCTURE: CPT

## 2019-12-10 PROCEDURE — 93005 ELECTROCARDIOGRAM TRACING: CPT

## 2019-12-10 PROCEDURE — 85610 PROTHROMBIN TIME: CPT | Performed by: ORTHOPAEDIC SURGERY

## 2019-12-10 ASSESSMENT — HOOS JR
HOOS JR SCORE: 0
HOOS JR SCORE: 24

## 2019-12-10 NOTE — PAT
Patient instructed to drink 20 ounces (or until full) of Gatorade and it needs to be completed 1 hour before given arrival time for procedure (NO RED Gatorade)    Patient verbalized understanding.      Patient did not attend joint class during PAT visit for the following reason: went before with last hip replacement- and was told didn't have to go again.

## 2019-12-12 ENCOUNTER — ANESTHESIA EVENT (OUTPATIENT)
Dept: PERIOP | Facility: HOSPITAL | Age: 54
End: 2019-12-12

## 2019-12-12 ENCOUNTER — ANESTHESIA (OUTPATIENT)
Dept: PERIOP | Facility: HOSPITAL | Age: 54
End: 2019-12-12

## 2019-12-12 ENCOUNTER — HOSPITAL ENCOUNTER (INPATIENT)
Facility: HOSPITAL | Age: 54
LOS: 3 days | Discharge: HOME HEALTH | End: 2019-12-15
Attending: ORTHOPAEDIC SURGERY | Admitting: ORTHOPAEDIC SURGERY

## 2019-12-12 ENCOUNTER — APPOINTMENT (OUTPATIENT)
Dept: GENERAL RADIOLOGY | Facility: HOSPITAL | Age: 54
End: 2019-12-12

## 2019-12-12 ENCOUNTER — HOSPITAL ENCOUNTER (OUTPATIENT)
Dept: GENERAL RADIOLOGY | Facility: HOSPITAL | Age: 54
Discharge: HOME | End: 2019-12-12

## 2019-12-12 ENCOUNTER — APPOINTMENT (OUTPATIENT)
Dept: CT IMAGING | Facility: HOSPITAL | Age: 54
End: 2019-12-12

## 2019-12-12 DIAGNOSIS — Z96.641 STATUS POST TOTAL REPLACEMENT OF RIGHT HIP: Primary | ICD-10-CM

## 2019-12-12 DIAGNOSIS — Z00.6 EXAMINATION FOR NORMAL COMPARISON FOR CLINICAL RESEARCH: ICD-10-CM

## 2019-12-12 PROBLEM — M25.551 RIGHT HIP PAIN: Status: ACTIVE | Noted: 2019-12-12

## 2019-12-12 PROBLEM — M16.11 ARTHRITIS OF RIGHT HIP: Status: ACTIVE | Noted: 2019-12-12

## 2019-12-12 LAB
ANION GAP SERPL CALCULATED.3IONS-SCNC: 15 MMOL/L (ref 5–15)
B-HCG UR QL: NEGATIVE
BASOPHILS # BLD AUTO: 0.03 10*3/MM3 (ref 0–0.2)
BASOPHILS NFR BLD AUTO: 0.3 % (ref 0–1.5)
BILIRUB UR QL STRIP: NEGATIVE
BUN BLD-MCNC: 20 MG/DL (ref 6–20)
BUN/CREAT SERPL: 21.5 (ref 7–25)
CALCIUM SPEC-SCNC: 9 MG/DL (ref 8.6–10.5)
CHLORIDE SERPL-SCNC: 105 MMOL/L (ref 98–107)
CLARITY UR: CLEAR
CO2 SERPL-SCNC: 20 MMOL/L (ref 22–29)
COLOR UR: YELLOW
CREAT BLD-MCNC: 0.93 MG/DL (ref 0.57–1)
DEPRECATED RDW RBC AUTO: 48.2 FL (ref 37–54)
EOSINOPHIL # BLD AUTO: 0.01 10*3/MM3 (ref 0–0.4)
EOSINOPHIL NFR BLD AUTO: 0.1 % (ref 0.3–6.2)
ERYTHROCYTE [DISTWIDTH] IN BLOOD BY AUTOMATED COUNT: 13.8 % (ref 12.3–15.4)
GFR SERPL CREATININE-BSD FRML MDRD: 63 ML/MIN/1.73
GLUCOSE BLD-MCNC: 204 MG/DL (ref 65–99)
GLUCOSE BLDC GLUCOMTR-MCNC: 226 MG/DL (ref 70–130)
GLUCOSE UR STRIP-MCNC: NEGATIVE MG/DL
HCT VFR BLD AUTO: 30.3 % (ref 34–46.6)
HGB BLD-MCNC: 9.6 G/DL (ref 12–15.9)
HGB UR QL STRIP.AUTO: NEGATIVE
IMM GRANULOCYTES # BLD AUTO: 0.11 10*3/MM3 (ref 0–0.05)
IMM GRANULOCYTES NFR BLD AUTO: 0.9 % (ref 0–0.5)
INTERNAL NEGATIVE CONTROL: NEGATIVE
INTERNAL POSITIVE CONTROL: POSITIVE
KETONES UR QL STRIP: NEGATIVE
LEUKOCYTE ESTERASE UR QL STRIP.AUTO: NEGATIVE
LYMPHOCYTES # BLD AUTO: 1.06 10*3/MM3 (ref 0.7–3.1)
LYMPHOCYTES NFR BLD AUTO: 9.1 % (ref 19.6–45.3)
Lab: NORMAL
MCH RBC QN AUTO: 30.3 PG (ref 26.6–33)
MCHC RBC AUTO-ENTMCNC: 31.7 G/DL (ref 31.5–35.7)
MCV RBC AUTO: 95.6 FL (ref 79–97)
MONOCYTES # BLD AUTO: 0.34 10*3/MM3 (ref 0.1–0.9)
MONOCYTES NFR BLD AUTO: 2.9 % (ref 5–12)
NEUTROPHILS # BLD AUTO: 10.13 10*3/MM3 (ref 1.7–7)
NEUTROPHILS NFR BLD AUTO: 86.7 % (ref 42.7–76)
NITRITE UR QL STRIP: NEGATIVE
NRBC BLD AUTO-RTO: 0 /100 WBC (ref 0–0.2)
PH UR STRIP.AUTO: 5.5 [PH] (ref 5–8)
PLATELET # BLD AUTO: 262 10*3/MM3 (ref 140–450)
PMV BLD AUTO: 9.1 FL (ref 6–12)
POTASSIUM BLD-SCNC: 4.4 MMOL/L (ref 3.5–5.2)
PROT UR QL STRIP: NEGATIVE
RBC # BLD AUTO: 3.17 10*6/MM3 (ref 3.77–5.28)
SODIUM BLD-SCNC: 140 MMOL/L (ref 136–145)
SP GR UR STRIP: 1.03 (ref 1–1.03)
TROPONIN T SERPL-MCNC: <0.01 NG/ML (ref 0–0.03)
UROBILINOGEN UR QL STRIP: NORMAL
WBC NRBC COR # BLD: 11.68 10*3/MM3 (ref 3.4–10.8)

## 2019-12-12 PROCEDURE — 82962 GLUCOSE BLOOD TEST: CPT

## 2019-12-12 PROCEDURE — 85025 COMPLETE CBC W/AUTO DIFF WBC: CPT | Performed by: INTERNAL MEDICINE

## 2019-12-12 PROCEDURE — 93010 ELECTROCARDIOGRAM REPORT: CPT | Performed by: INTERNAL MEDICINE

## 2019-12-12 PROCEDURE — C1776 JOINT DEVICE (IMPLANTABLE): HCPCS | Performed by: ORTHOPAEDIC SURGERY

## 2019-12-12 PROCEDURE — 84484 ASSAY OF TROPONIN QUANT: CPT | Performed by: INTERNAL MEDICINE

## 2019-12-12 PROCEDURE — 80048 BASIC METABOLIC PNL TOTAL CA: CPT | Performed by: INTERNAL MEDICINE

## 2019-12-12 PROCEDURE — 25010000002 ONDANSETRON PER 1 MG: Performed by: NURSE PRACTITIONER

## 2019-12-12 PROCEDURE — 25010000002 KETOROLAC TROMETHAMINE PER 15 MG: Performed by: ORTHOPAEDIC SURGERY

## 2019-12-12 PROCEDURE — 25010000002 CLONIDINE PER 1 MG: Performed by: ORTHOPAEDIC SURGERY

## 2019-12-12 PROCEDURE — 25010000002 HYDROMORPHONE PER 4 MG: Performed by: NURSE ANESTHETIST, CERTIFIED REGISTERED

## 2019-12-12 PROCEDURE — 76000 FLUOROSCOPY <1 HR PHYS/QHP: CPT

## 2019-12-12 PROCEDURE — 73502 X-RAY EXAM HIP UNI 2-3 VIEWS: CPT

## 2019-12-12 PROCEDURE — 81025 URINE PREGNANCY TEST: CPT | Performed by: ORTHOPAEDIC SURGERY

## 2019-12-12 PROCEDURE — 25010000002 PROPOFOL 10 MG/ML EMULSION: Performed by: NURSE ANESTHETIST, CERTIFIED REGISTERED

## 2019-12-12 PROCEDURE — 0SR904A REPLACEMENT OF RIGHT HIP JOINT WITH CERAMIC ON POLYETHYLENE SYNTHETIC SUBSTITUTE, UNCEMENTED, OPEN APPROACH: ICD-10-PCS | Performed by: ORTHOPAEDIC SURGERY

## 2019-12-12 PROCEDURE — 74176 CT ABD & PELVIS W/O CONTRAST: CPT

## 2019-12-12 PROCEDURE — 93005 ELECTROCARDIOGRAM TRACING: CPT | Performed by: INTERNAL MEDICINE

## 2019-12-12 PROCEDURE — 25010000003 CEFAZOLIN IN DEXTROSE 2-4 GM/100ML-% SOLUTION: Performed by: ORTHOPAEDIC SURGERY

## 2019-12-12 PROCEDURE — 25010000002 FENTANYL CITRATE (PF) 100 MCG/2ML SOLUTION: Performed by: NURSE ANESTHETIST, CERTIFIED REGISTERED

## 2019-12-12 PROCEDURE — 81003 URINALYSIS AUTO W/O SCOPE: CPT | Performed by: INTERNAL MEDICINE

## 2019-12-12 PROCEDURE — C1713 ANCHOR/SCREW BN/BN,TIS/BN: HCPCS | Performed by: ORTHOPAEDIC SURGERY

## 2019-12-12 DEVICE — TOTAL HIP PRIMARY: Type: IMPLANTABLE DEVICE | Site: HIP | Status: FUNCTIONAL

## 2019-12-12 DEVICE — STEM FEM/HIP TAPERLOC COMPL F/P STD OFFST SZ5: Type: IMPLANTABLE DEVICE | Site: HIP | Status: FUNCTIONAL

## 2019-12-12 DEVICE — CAP HIP VE UPCHRG: Type: IMPLANTABLE DEVICE | Site: HIP | Status: FUNCTIONAL

## 2019-12-12 DEVICE — CAP HIP TM UPCHRG: Type: IMPLANTABLE DEVICE | Site: HIP | Status: FUNCTIONAL

## 2019-12-12 DEVICE — SCRW ACET CORT TRILOGY S/TAP 6.5X20: Type: IMPLANTABLE DEVICE | Site: HIP | Status: FUNCTIONAL

## 2019-12-12 DEVICE — IMPLANTABLE DEVICE: Type: IMPLANTABLE DEVICE | Site: HIP | Status: FUNCTIONAL

## 2019-12-12 DEVICE — CAP CERAM HD HIP UPCHRG: Type: IMPLANTABLE DEVICE | Site: HIP | Status: FUNCTIONAL

## 2019-12-12 DEVICE — SUT NONABS MAXBRAID/PE NMBR2 HC5 38IN WHT 900333: Type: IMPLANTABLE DEVICE | Site: HIP | Status: FUNCTIONAL

## 2019-12-12 RX ORDER — SODIUM CHLORIDE 0.9 % (FLUSH) 0.9 %
10 SYRINGE (ML) INJECTION AS NEEDED
Status: DISCONTINUED | OUTPATIENT
Start: 2019-12-12 | End: 2019-12-12 | Stop reason: HOSPADM

## 2019-12-12 RX ORDER — ACETAMINOPHEN 500 MG
1000 TABLET ORAL EVERY 8 HOURS SCHEDULED
Status: DISCONTINUED | OUTPATIENT
Start: 2019-12-12 | End: 2019-12-15 | Stop reason: HOSPADM

## 2019-12-12 RX ORDER — KETOROLAC TROMETHAMINE 15 MG/ML
15 INJECTION, SOLUTION INTRAMUSCULAR; INTRAVENOUS EVERY 6 HOURS PRN
Status: DISPENSED | OUTPATIENT
Start: 2019-12-12 | End: 2019-12-14

## 2019-12-12 RX ORDER — PROMETHAZINE HYDROCHLORIDE 25 MG/1
25 SUPPOSITORY RECTAL ONCE AS NEEDED
Status: DISCONTINUED | OUTPATIENT
Start: 2019-12-12 | End: 2019-12-12 | Stop reason: HOSPADM

## 2019-12-12 RX ORDER — NALOXONE HCL 0.4 MG/ML
0.1 VIAL (ML) INJECTION
Status: DISCONTINUED | OUTPATIENT
Start: 2019-12-12 | End: 2019-12-15 | Stop reason: HOSPADM

## 2019-12-12 RX ORDER — OXYBUTYNIN CHLORIDE 5 MG/1
5 TABLET ORAL 3 TIMES DAILY
Status: DISCONTINUED | OUTPATIENT
Start: 2019-12-12 | End: 2019-12-14

## 2019-12-12 RX ORDER — CEFAZOLIN SODIUM 2 G/100ML
2 INJECTION, SOLUTION INTRAVENOUS ONCE
Status: COMPLETED | OUTPATIENT
Start: 2019-12-12 | End: 2019-12-12

## 2019-12-12 RX ORDER — MAGNESIUM HYDROXIDE 1200 MG/15ML
LIQUID ORAL AS NEEDED
Status: DISCONTINUED | OUTPATIENT
Start: 2019-12-12 | End: 2019-12-12 | Stop reason: HOSPADM

## 2019-12-12 RX ORDER — KETAMINE HCL IN NACL, ISO-OSM 100MG/10ML
15 SYRINGE (ML) INJECTION ONCE
Status: COMPLETED | OUTPATIENT
Start: 2019-12-12 | End: 2019-12-12

## 2019-12-12 RX ORDER — SODIUM CHLORIDE 0.9 % (FLUSH) 0.9 %
3-10 SYRINGE (ML) INJECTION AS NEEDED
Status: DISCONTINUED | OUTPATIENT
Start: 2019-12-12 | End: 2019-12-12 | Stop reason: HOSPADM

## 2019-12-12 RX ORDER — MIDAZOLAM HYDROCHLORIDE 1 MG/ML
1 INJECTION INTRAMUSCULAR; INTRAVENOUS ONCE
Status: DISCONTINUED | OUTPATIENT
Start: 2019-12-12 | End: 2019-12-12 | Stop reason: HOSPADM

## 2019-12-12 RX ORDER — HYDROCODONE BITARTRATE AND ACETAMINOPHEN 5; 325 MG/1; MG/1
1 TABLET ORAL ONCE AS NEEDED
Status: DISCONTINUED | OUTPATIENT
Start: 2019-12-12 | End: 2019-12-12 | Stop reason: HOSPADM

## 2019-12-12 RX ORDER — ASPIRIN 81 MG/1
81 TABLET ORAL EVERY 12 HOURS SCHEDULED
Status: DISCONTINUED | OUTPATIENT
Start: 2019-12-13 | End: 2019-12-15 | Stop reason: HOSPADM

## 2019-12-12 RX ORDER — ATROPA BELLADONNA AND OPIUM 16.2; 6 MG/1; MG/1
60 SUPPOSITORY RECTAL ONCE
Status: COMPLETED | OUTPATIENT
Start: 2019-12-12 | End: 2019-12-12

## 2019-12-12 RX ORDER — SODIUM CHLORIDE, SODIUM LACTATE, POTASSIUM CHLORIDE, CALCIUM CHLORIDE 600; 310; 30; 20 MG/100ML; MG/100ML; MG/100ML; MG/100ML
100 INJECTION, SOLUTION INTRAVENOUS CONTINUOUS
Status: DISCONTINUED | OUTPATIENT
Start: 2019-12-12 | End: 2019-12-15 | Stop reason: HOSPADM

## 2019-12-12 RX ORDER — SODIUM CHLORIDE, SODIUM LACTATE, POTASSIUM CHLORIDE, CALCIUM CHLORIDE 600; 310; 30; 20 MG/100ML; MG/100ML; MG/100ML; MG/100ML
9 INJECTION, SOLUTION INTRAVENOUS CONTINUOUS
Status: DISCONTINUED | OUTPATIENT
Start: 2019-12-12 | End: 2019-12-15 | Stop reason: HOSPADM

## 2019-12-12 RX ORDER — HYDROMORPHONE HYDROCHLORIDE 1 MG/ML
0.5 INJECTION, SOLUTION INTRAMUSCULAR; INTRAVENOUS; SUBCUTANEOUS
Status: DISCONTINUED | OUTPATIENT
Start: 2019-12-12 | End: 2019-12-15 | Stop reason: HOSPADM

## 2019-12-12 RX ORDER — OXYCODONE HYDROCHLORIDE 5 MG/1
5 TABLET ORAL EVERY 4 HOURS PRN
Status: DISCONTINUED | OUTPATIENT
Start: 2019-12-12 | End: 2019-12-15 | Stop reason: HOSPADM

## 2019-12-12 RX ORDER — LEVOTHYROXINE SODIUM 0.03 MG/1
25 TABLET ORAL
Status: DISCONTINUED | OUTPATIENT
Start: 2019-12-13 | End: 2019-12-15 | Stop reason: HOSPADM

## 2019-12-12 RX ORDER — PROMETHAZINE HYDROCHLORIDE 25 MG/1
25 TABLET ORAL ONCE AS NEEDED
Status: DISCONTINUED | OUTPATIENT
Start: 2019-12-12 | End: 2019-12-12 | Stop reason: HOSPADM

## 2019-12-12 RX ORDER — AMOXICILLIN 250 MG
2 CAPSULE ORAL 2 TIMES DAILY PRN
Status: DISCONTINUED | OUTPATIENT
Start: 2019-12-12 | End: 2019-12-15 | Stop reason: HOSPADM

## 2019-12-12 RX ORDER — TRAZODONE HYDROCHLORIDE 50 MG/1
50 TABLET ORAL NIGHTLY
Status: DISCONTINUED | OUTPATIENT
Start: 2019-12-12 | End: 2019-12-15 | Stop reason: HOSPADM

## 2019-12-12 RX ORDER — BISACODYL 10 MG
10 SUPPOSITORY, RECTAL RECTAL DAILY PRN
Status: DISCONTINUED | OUTPATIENT
Start: 2019-12-12 | End: 2019-12-15 | Stop reason: HOSPADM

## 2019-12-12 RX ORDER — IPRATROPIUM BROMIDE AND ALBUTEROL SULFATE 2.5; .5 MG/3ML; MG/3ML
3 SOLUTION RESPIRATORY (INHALATION) ONCE AS NEEDED
Status: DISCONTINUED | OUTPATIENT
Start: 2019-12-12 | End: 2019-12-12 | Stop reason: HOSPADM

## 2019-12-12 RX ORDER — HYDRALAZINE HYDROCHLORIDE 20 MG/ML
5 INJECTION INTRAMUSCULAR; INTRAVENOUS
Status: DISCONTINUED | OUTPATIENT
Start: 2019-12-12 | End: 2019-12-12 | Stop reason: HOSPADM

## 2019-12-12 RX ORDER — MEPERIDINE HYDROCHLORIDE 25 MG/ML
12.5 INJECTION INTRAMUSCULAR; INTRAVENOUS; SUBCUTANEOUS
Status: DISCONTINUED | OUTPATIENT
Start: 2019-12-12 | End: 2019-12-12 | Stop reason: HOSPADM

## 2019-12-12 RX ORDER — BUPIVACAINE HYDROCHLORIDE 5 MG/ML
INJECTION, SOLUTION PERINEURAL
Status: COMPLETED | OUTPATIENT
Start: 2019-12-12 | End: 2019-12-12

## 2019-12-12 RX ORDER — MELOXICAM 7.5 MG/1
15 TABLET ORAL DAILY
Status: DISCONTINUED | OUTPATIENT
Start: 2019-12-13 | End: 2019-12-15 | Stop reason: HOSPADM

## 2019-12-12 RX ORDER — NALOXONE HCL 0.4 MG/ML
0.4 VIAL (ML) INJECTION AS NEEDED
Status: DISCONTINUED | OUTPATIENT
Start: 2019-12-12 | End: 2019-12-12 | Stop reason: HOSPADM

## 2019-12-12 RX ORDER — PROMETHAZINE HYDROCHLORIDE 25 MG/ML
6.25 INJECTION, SOLUTION INTRAMUSCULAR; INTRAVENOUS ONCE AS NEEDED
Status: DISCONTINUED | OUTPATIENT
Start: 2019-12-12 | End: 2019-12-12 | Stop reason: HOSPADM

## 2019-12-12 RX ORDER — ONDANSETRON 2 MG/ML
4 INJECTION INTRAMUSCULAR; INTRAVENOUS EVERY 6 HOURS PRN
Status: DISCONTINUED | OUTPATIENT
Start: 2019-12-12 | End: 2019-12-15 | Stop reason: HOSPADM

## 2019-12-12 RX ORDER — SODIUM CHLORIDE 0.9 % (FLUSH) 0.9 %
3 SYRINGE (ML) INJECTION EVERY 12 HOURS SCHEDULED
Status: DISCONTINUED | OUTPATIENT
Start: 2019-12-12 | End: 2019-12-12 | Stop reason: HOSPADM

## 2019-12-12 RX ORDER — MELOXICAM 15 MG/1
15 TABLET ORAL ONCE
Status: COMPLETED | OUTPATIENT
Start: 2019-12-12 | End: 2019-12-12

## 2019-12-12 RX ORDER — HYDROMORPHONE HYDROCHLORIDE 1 MG/ML
0.5 INJECTION, SOLUTION INTRAMUSCULAR; INTRAVENOUS; SUBCUTANEOUS
Status: DISCONTINUED | OUTPATIENT
Start: 2019-12-12 | End: 2019-12-12 | Stop reason: HOSPADM

## 2019-12-12 RX ORDER — FAMOTIDINE 20 MG/1
20 TABLET, FILM COATED ORAL ONCE
Status: COMPLETED | OUTPATIENT
Start: 2019-12-12 | End: 2019-12-12

## 2019-12-12 RX ORDER — FENTANYL CITRATE 50 UG/ML
50 INJECTION, SOLUTION INTRAMUSCULAR; INTRAVENOUS
Status: DISCONTINUED | OUTPATIENT
Start: 2019-12-12 | End: 2019-12-12 | Stop reason: HOSPADM

## 2019-12-12 RX ORDER — ACETAMINOPHEN 500 MG
1000 TABLET ORAL ONCE
Status: COMPLETED | OUTPATIENT
Start: 2019-12-12 | End: 2019-12-12

## 2019-12-12 RX ORDER — DOCUSATE SODIUM 100 MG/1
100 CAPSULE, LIQUID FILLED ORAL 2 TIMES DAILY PRN
Status: DISCONTINUED | OUTPATIENT
Start: 2019-12-12 | End: 2019-12-15 | Stop reason: HOSPADM

## 2019-12-12 RX ORDER — GABAPENTIN 300 MG/1
600 CAPSULE ORAL 3 TIMES DAILY
Status: DISCONTINUED | OUTPATIENT
Start: 2019-12-12 | End: 2019-12-15 | Stop reason: HOSPADM

## 2019-12-12 RX ORDER — FAMOTIDINE 10 MG/ML
20 INJECTION, SOLUTION INTRAVENOUS ONCE
Status: CANCELLED | OUTPATIENT
Start: 2019-12-12 | End: 2019-12-12

## 2019-12-12 RX ORDER — CEFAZOLIN SODIUM 2 G/100ML
2 INJECTION, SOLUTION INTRAVENOUS EVERY 8 HOURS
Status: COMPLETED | OUTPATIENT
Start: 2019-12-12 | End: 2019-12-13

## 2019-12-12 RX ORDER — BISACODYL 5 MG/1
10 TABLET, DELAYED RELEASE ORAL DAILY PRN
Status: DISCONTINUED | OUTPATIENT
Start: 2019-12-12 | End: 2019-12-15 | Stop reason: HOSPADM

## 2019-12-12 RX ORDER — LIDOCAINE HYDROCHLORIDE 10 MG/ML
0.5 INJECTION, SOLUTION EPIDURAL; INFILTRATION; INTRACAUDAL; PERINEURAL ONCE AS NEEDED
Status: COMPLETED | OUTPATIENT
Start: 2019-12-12 | End: 2019-12-12

## 2019-12-12 RX ORDER — PREGABALIN 75 MG/1
75 CAPSULE ORAL ONCE
Status: COMPLETED | OUTPATIENT
Start: 2019-12-12 | End: 2019-12-12

## 2019-12-12 RX ORDER — ONDANSETRON 2 MG/ML
4 INJECTION INTRAMUSCULAR; INTRAVENOUS ONCE AS NEEDED
Status: DISCONTINUED | OUTPATIENT
Start: 2019-12-12 | End: 2019-12-12 | Stop reason: HOSPADM

## 2019-12-12 RX ORDER — LABETALOL HYDROCHLORIDE 5 MG/ML
5 INJECTION, SOLUTION INTRAVENOUS
Status: DISCONTINUED | OUTPATIENT
Start: 2019-12-12 | End: 2019-12-12 | Stop reason: HOSPADM

## 2019-12-12 RX ORDER — OXYCODONE HYDROCHLORIDE 5 MG/1
10 TABLET ORAL EVERY 4 HOURS PRN
Status: DISCONTINUED | OUTPATIENT
Start: 2019-12-12 | End: 2019-12-15 | Stop reason: HOSPADM

## 2019-12-12 RX ORDER — FENTANYL CITRATE 50 UG/ML
INJECTION, SOLUTION INTRAMUSCULAR; INTRAVENOUS AS NEEDED
Status: DISCONTINUED | OUTPATIENT
Start: 2019-12-12 | End: 2019-12-12 | Stop reason: SURG

## 2019-12-12 RX ORDER — ESCITALOPRAM OXALATE 20 MG/1
20 TABLET ORAL DAILY
Status: DISCONTINUED | OUTPATIENT
Start: 2019-12-13 | End: 2019-12-15 | Stop reason: HOSPADM

## 2019-12-12 RX ORDER — SODIUM CHLORIDE 0.9 % (FLUSH) 0.9 %
10 SYRINGE (ML) INJECTION EVERY 12 HOURS SCHEDULED
Status: DISCONTINUED | OUTPATIENT
Start: 2019-12-12 | End: 2019-12-12 | Stop reason: HOSPADM

## 2019-12-12 RX ORDER — LABETALOL HYDROCHLORIDE 5 MG/ML
10 INJECTION, SOLUTION INTRAVENOUS EVERY 4 HOURS PRN
Status: DISCONTINUED | OUTPATIENT
Start: 2019-12-12 | End: 2019-12-15 | Stop reason: HOSPADM

## 2019-12-12 RX ADMIN — SODIUM CHLORIDE, POTASSIUM CHLORIDE, SODIUM LACTATE AND CALCIUM CHLORIDE 9 ML/HR: 600; 310; 30; 20 INJECTION, SOLUTION INTRAVENOUS at 10:28

## 2019-12-12 RX ADMIN — KETOROLAC TROMETHAMINE 15 MG: 15 INJECTION, SOLUTION INTRAMUSCULAR; INTRAVENOUS at 20:12

## 2019-12-12 RX ADMIN — HYDROMORPHONE HYDROCHLORIDE 0.5 MG: 1 INJECTION, SOLUTION INTRAMUSCULAR; INTRAVENOUS; SUBCUTANEOUS at 15:45

## 2019-12-12 RX ADMIN — Medication 15 MG: at 16:35

## 2019-12-12 RX ADMIN — TRAZODONE HYDROCHLORIDE 50 MG: 50 TABLET ORAL at 20:12

## 2019-12-12 RX ADMIN — ACETAMINOPHEN 1000 MG: 500 TABLET ORAL at 10:14

## 2019-12-12 RX ADMIN — CEFAZOLIN SODIUM 2 G: 2 INJECTION, SOLUTION INTRAVENOUS at 13:00

## 2019-12-12 RX ADMIN — PREGABALIN 75 MG: 75 CAPSULE ORAL at 10:14

## 2019-12-12 RX ADMIN — SODIUM CHLORIDE, POTASSIUM CHLORIDE, SODIUM LACTATE AND CALCIUM CHLORIDE 100 ML/HR: 600; 310; 30; 20 INJECTION, SOLUTION INTRAVENOUS at 15:11

## 2019-12-12 RX ADMIN — OXYBUTYNIN CHLORIDE 5 MG: 5 TABLET ORAL at 20:12

## 2019-12-12 RX ADMIN — ONDANSETRON 4 MG: 2 INJECTION INTRAMUSCULAR; INTRAVENOUS at 20:50

## 2019-12-12 RX ADMIN — FENTANYL CITRATE 25 MCG: 50 INJECTION, SOLUTION INTRAMUSCULAR; INTRAVENOUS at 14:11

## 2019-12-12 RX ADMIN — MUPIROCIN 1 APPLICATION: 20 OINTMENT TOPICAL at 10:13

## 2019-12-12 RX ADMIN — GABAPENTIN 600 MG: 300 CAPSULE ORAL at 20:12

## 2019-12-12 RX ADMIN — TRANEXAMIC ACID 1000 MG: 100 INJECTION, SOLUTION INTRAVENOUS at 13:13

## 2019-12-12 RX ADMIN — HYDROMORPHONE HYDROCHLORIDE 0.5 MG: 1 INJECTION, SOLUTION INTRAMUSCULAR; INTRAVENOUS; SUBCUTANEOUS at 16:10

## 2019-12-12 RX ADMIN — ATROPA BELLADONNA AND OPIUM 60 MG: 16.2; 6 SUPPOSITORY RECTAL at 16:50

## 2019-12-12 RX ADMIN — LIDOCAINE HYDROCHLORIDE 0.3 ML: 10 INJECTION, SOLUTION EPIDURAL; INFILTRATION; INTRACAUDAL; PERINEURAL at 10:14

## 2019-12-12 RX ADMIN — DOCUSATE SODIUM 100 MG: 100 CAPSULE, LIQUID FILLED ORAL at 20:12

## 2019-12-12 RX ADMIN — BUPIVACAINE HYDROCHLORIDE 1.6 ML: 5 INJECTION, SOLUTION PERINEURAL at 13:38

## 2019-12-12 RX ADMIN — MELOXICAM 15 MG: 15 TABLET ORAL at 10:14

## 2019-12-12 RX ADMIN — FAMOTIDINE 20 MG: 20 TABLET ORAL at 10:14

## 2019-12-12 RX ADMIN — OXYCODONE HYDROCHLORIDE 5 MG: 5 TABLET ORAL at 23:45

## 2019-12-12 RX ADMIN — ACETAMINOPHEN 1000 MG: 500 TABLET, FILM COATED ORAL at 20:12

## 2019-12-12 RX ADMIN — CEFAZOLIN SODIUM 2 G: 2 INJECTION, SOLUTION INTRAVENOUS at 20:12

## 2019-12-12 RX ADMIN — PROPOFOL 100 MCG/KG/MIN: 10 INJECTION, EMULSION INTRAVENOUS at 13:03

## 2019-12-12 RX ADMIN — HYDROMORPHONE HYDROCHLORIDE 0.5 MG: 1 INJECTION, SOLUTION INTRAMUSCULAR; INTRAVENOUS; SUBCUTANEOUS at 15:55

## 2019-12-12 NOTE — PLAN OF CARE
"  Problem: Patient Care Overview  Goal: Plan of Care Review  Outcome: Ongoing (interventions implemented as appropriate)  Flowsheets (Taken 12/12/2019 3949)  Progress: no change  Plan of Care Reviewed With: patient; spouse  Note:   Pt. Drowsy but oriented when answering questions but   Eyes closed most of the time but is rocking, restless, and thrashing in the bed stating \"she has to pee\" when she arrived to floor.  Immediately after arriving to floor bladder scan completed with 124ml reading.   Intermittent cath performed in PACU with 75ml out.   Dr. RAMÍREZ present on floor and at bedside with new orders received.  Ram cath placed per order witn 100ml out. Urine specimen sent to lab for UA. Pt. More calm after cath placed but continues to c/o pressure and very restless.   VSS.  No c/o pain to right hip.   Opti-foam dsg to hip CDI.   Bruising to left eye and left hip and knee noted;  spouse states that \"she fell\".   Fall precautions in place.  Pt. Unable to use IS or work with PT.   Will continue to monitor and will notify MD HOLLAND.  Baldev Guerrero RN   Goal: Individualization and Mutuality  Outcome: Ongoing (interventions implemented as appropriate)  Goal: Discharge Needs Assessment  Outcome: Ongoing (interventions implemented as appropriate)     "

## 2019-12-12 NOTE — OP NOTE
TOTAL HIP ARTHROPLASTY ANTERIOR  Procedure Report    Patient Name:  Astrid Sam  YOB: 1965    Date of Surgery:  12/12/2019     Indications:    54 y.o. female who was admitted to [facility] on a progressive management of nonoperative treatment of their hip osteoarthritis. Unfortunately patient progressed with significant pain and difficulty with ambulation and daily function.  The patient was indicated for a total hip arthroplasty. Likely risk benefits of the procedure including but not limited to infection, DVT, pulmonary embolism, leg length discrepancy, recurrent dislocation, possibility of injury to nerves and vessels, and periprosthetic fractures have been discussed with the patient and her daughter in detail. Despite the risks involved the patient elected to proceed with an informed consent was obtained.     Patient Identification:  Patient was seen in the prep, consent was reviewed, operative procedure was identified, surgical site and thigh marked.          Pre-op Diagnosis:   Arthritis of right hip [3241186]       Post-Op Diagnosis Codes:     * Arthritis of right hip [M16.11]    Procedure/CPT® Codes:      Procedure(s):  RIGHT DIRECT TOTAL HIP ARTHROPLASTY ANTERIOR    Staff:  Surgeon(s):  Mele Chatterjee MD    Assistant: Eva Aguiar PA-C    Anesthesia: Spinal    Estimated Blood Loss: 500    Implants:    Implant Name Type Inv. Item Serial No.  Lot No. LRB No. Used   SUT MAXBRAID 2 HC5 38IN T [id number] - [id number] Implant SUT MAXBRAID 2 HC5 38IN T [id number]  [employer]  Right 2   SHLL ACET OSSEOTI G7 3H SZA 44MM - [id number] Implant SHLL ACET OSSEOTI G7 3H SZA 44MM  [employer] [id number] Right 1   SCRW ACET LETTY TRILOGY S/TAP 6.5X20 - [id number] Implant SCRW ACET LETTY TRILOGY S/TAP 6.5X20  [employer] [id number] Right 1   g7 acetabular liner neutral e1...    [employer] [id number] Right 1   HD FEM/HIP BIOLOX DELTA CERAM NK 28MM MIN3 - [id number]  Implant HD FEM/HIP BIOLOX DELTA CERAM NK 28MM MIN3  [employer] [id number] Right 1   STEM FEM/HIP TAPERLOC COMPL F/P STD OFFST SZ5 - [id number] Implant STEM FEM/HIP TAPERLOC COMPL F/P STD OFFST SZ5  [employer] [id number] Right 1       Specimen:          None      Findings: see operative dictation    Complications: none    Description of Procedure: The patient was transferred to [facility] operating room preoperative antibiotics in form of Kefzol 2gm Given IV Prior to Skin Incision As Well As 1 G of Tranexemic Acid. Patient Received Spinalanesthesia and was transferred to the Rombauer Table. All Bony Prominences Were Padded Adequately. The Operative Hip Was Then Prepped and Draped in the Usual Sterile Fashion. Multiple timeouts Were Done Identifying the Correct Patient Surgical Site and Planned Procedure.  A Skin Incision Was Made Overlying the Anterior Lateral Aspect of the Hip for about 10 Cm Just below the Lateral to the Anterior Superior Iliac Spine. Skin and Subcutaneous Tissue and Fascia Was Incised in Line with the Skin Incision Overlying the Tensor Fascia Halle Muscle. The Tensor Muscle Was Then Retracted Laterally and the Interval between Sartorius and Rectus Femoris Medially and the Tensor Fascia Muscle Were Developed. The Lateral Femoral Circumflex Vessels Were Identified They Were Ligated without Difficulty. The deep Fascia Was Incised and the Capsule of the Hip Joint Where Was Identified. We then placed a soft tissue protecting device deep to the rectus and the tensor. Retractors were then Placed Extracapsular the Capsule Was Then Incised in a T-Shaped Manner and the Anterior Posterior Capsules Were Then Tied with maxbraid suture . Following This Retractors Were Placed Intracapsularly. We Did Identify the Obvious signs of severe osteoarthritis upon Incising the Capsule. We Then Made Appropriate Releases Done on the Inferior Medial Neck and along the Saddle of the Femoral Neck Femoral Neck  Remaining Was Identified and Osteotomy Was Done and According to the Preoperative Templating Is an Oscillating Saw. The Femoral Head and Cut Neck with Extracted Using a Corkscrew without Difficulty the Femoral Head Did Have Major Signs of Articular Cartilage Loss Loss or Superior Wear.  The Acetabular Cavity Was Exposed by Placing Retractors and taking Care to Protect the Anterior vascular Structures the Labrum Was Excised the pulvinar was Excised from the Cotyloid Fossa Acetabular Cavity Was Then Progressively Reamed Maintaining the Correct Inclination and Version under Image Intensifier Control. Adequate Medialization Was Obtained. Adequate Fit Was Found to Be Obtained with the Reamer Size of 43. The Biomet G7 ossioiti Cup Size 44 Was Then Impacted into Position. This Found to Seat Satisfactorily with Adequate Inclination and Anteversion. It Was Stable but we did Insert 1 6.5/20 Millimeter Lag Screw This Was Checked under Fluoroscopic Fluoroscopy and Found to Be in Good Position. Following this the cup was cleaned and then a 28 neutralE-Poly impacted Following This the Periarticular Tissues Were Then Infiltrated with Local Anesthetic.  Attention Was Then Directed to the Proximal Femur the Proximal Femur Was Delivered Using a Trochanteric Hook Placed Just Distal to the Vastus Tubercle and Proximal to the gluet Paul Tendon. The leg was then Externally Rotated Gross Traction Released and Hyperextension and Adduction Was Done Using the Tiesha Table. Mechanical Lift on the Table Was Utilized to Support the Femur Appropriate Capsular Releases Were Made to Expose the Medial Linn of the Greater Trochanter and Proximal Femur Was Delivered the Femoral Canal Was Then Entered by Removing Lateral Femoral Neck and with a Box Chisel by Serial Broaching Adequate Fit Was Found to Be Obtained with the Size 5 broach. We then trialed a -3 neck was reduced fluoroscopic imaging was used to assess leg length and offset was found to  be symmetric. The trials were then removed a #5 Taperloc complete press-fit stem was implanted in appropriate anteversion. It sat very similar to our broach trial we chose the -3 neck ceramic. This was then reduced again fluoroscopy remaining images both lateral and AP of the femur showed the stem to be in good position AP pelvis showed symmetric leg length and offset. The hip was then taken through a range of motion and found to be stable. There were no acute fractures there and wound was then thoroughly irrigated saline we did use of more of the injection cocktail. Betadine irrigation was used followed by 3L of saline irrigation. Soft tissue hemostasis was secured and topical TXA was used. Sponge and needle instrument counts were correct maxibraid sutures directly anterior and posterior capsular flaps were tied to each other then closed the fascial layer with a running #2 strata fix followed by 2-0 Vicryl and then Monocryl for the skin and Dermabond with perneo mesh over the top. Once the Dermabond had dried sterile dressing placed over the top. The patient was then transferred to the recovery room in stable condition patient tolerated the procedure well there were no Medications the patient adequate distal pulses and good cap refill.  The patient will be mobilized by physical therapy received antibiotic prophylaxis postoperatively for 2 more doses given the first 24 hours. The patient was started on DVT prophylaxis with 81 mg aspirin twice daily on starting postoperative day #1.  I discussed the satisfactory performance of the procedure with the patient's family and discussed the postoperative management.      Assistant Participation:  Surgeon(s):  Mele Chatterjee MD    Assistant: Eva Aguiar PA-C assisted with proper preoperative positioning, preoperative templating, determingin availability of proper implants, prepping and draping of patient, manipulation placement of instruments, protection of  ligaments and vital soft tissue structures, assistance in maintaining hemostasis and assistance with closure of the wound. Their skills and knowledge of the steps of operation and the desired outcome of each surgical step was crucial, allowing for efficient choreography of surgical procedure, and closure of the wound which lead to reduced surgical time, less blood loss, and less risk of complications for the patient.       Mele Chatterjee MD     Date: 12/12/2019  Time: 2:44 PM

## 2019-12-12 NOTE — ANESTHESIA POSTPROCEDURE EVALUATION
Patient: Astrid Sam    Procedure Summary     Date:  12/12/19 Room / Location:   [facility]    Anesthesia Start:  1300 Anesthesia Stop:  1509    Procedure:  RIGHT DIRECT TOTAL HIP ARTHROPLASTY ANTERIOR (Right Hip) Diagnosis:       Arthritis of right hip      (Arthritis of right hip [[id number]])    Surgeon:  Mele Chatterjee MD Provider:  Vikas Fuentes MD    Anesthesia Type:  spinal ASA Status:  2          Anesthesia Type: spinal    Vitals  Vitals Value Taken Time   BP     Temp     Pulse     Resp     SpO2 91 % 12/12/2019  3:07 PM   Vitals shown include unvalidated device data.        Post Anesthesia Care and Evaluation    Patient location during evaluation: PACU  Patient participation: complete - patient participated  Level of consciousness: awake and alert  Pain score: 0  Pain management: adequate  Airway patency: patent  Anesthetic complications: No anesthetic complications  PONV Status: none  Cardiovascular status: hemodynamically stable and acceptable  Respiratory status: nonlabored ventilation, acceptable and nasal cannula  Hydration status: acceptable  Post Neuraxial Block status: No signs or symptoms of PDPH

## 2019-12-12 NOTE — ANESTHESIA PROCEDURE NOTES
Spinal Block      Patient reassessed immediately prior to procedure    Patient location during procedure: OR  Start Time: 12/12/2019 1:05 PM  Indication:at surgeon's request  Performed By  CRNA: Toro Bonilla CRNA  Preanesthetic Checklist  Completed: patient identified, site marked, surgical consent, pre-op evaluation, timeout performed, IV checked, risks and benefits discussed and monitors and equipment checked  Spinal Block Prep:  Patient Position:sitting  Sterile Tech:cap, gloves, sterile barriers and mask  Prep:Chloraprep  Patient Monitoring:blood pressure monitoring, continuous pulse oximetry and EKG  Spinal Block Procedure  Approach:midline  Guidance:landmark technique and palpation technique  Location:L4-L5  Needle Type:Crissy (Introducer:   no)  Needle Gauge:25 G  Placement of Spinal needle event:cerebrospinal fluid aspirated  Paresthesia: no  Fluid Appearance:clear  Medications: bupivacaine (MARCAINE) 0.5 % injection, 1.6 mL   Post Assessment  Patient Tolerance:patient tolerated the procedure well with no apparent complications  Complications no  Additional Notes  Procedure:  Pt assisted to sitting position, with legs in position of comfort over side of bed.  Pt. instructed in optimal spine presentation, the spine was prepped/ Draped and the skin at insertion site was anesthetized with 1% Lidocaine 2 ml.  The spinal needle was then advanced until CSF flow was obtained and LA was injected:

## 2019-12-12 NOTE — H&P
Pre-Op H&P  Astrid Sam  [id number]  1965    Chief complaint: Right hip pain    HPI:    Patient is a 54 y.o.female who presents with right hip pain and found to have right hip osteoarthritis.  Here today to undergo right anterior total hip arthroplasty.       Review of Systems:  General ROS: negative for chills, fever or skin lesions;  No changes since last office visit  Cardiovascular ROS: no chest pain or dyspnea on exertion.  History of PFO s/p repair with no new CV symptoms  Respiratory ROS: no cough, shortness of breath, or wheezing  Neuro:  History of TIA  prior to PFO with no residual    Allergies:   Allergies   Allergen Reactions   • Morphine Nausea And Vomiting       Home Meds:    No current facility-administered medications on file prior to encounter.      Current Outpatient Medications on File Prior to Encounter   Medication Sig Dispense Refill   • escitalopram (LEXAPRO) 20 MG tablet Take 1 tablet by mouth Daily. 30 tablet 5   • gabapentin (NEURONTIN) 600 MG tablet Take 1 tablet by mouth 3 (Three) Times a Day. 90 tablet 2   • levothyroxine (SYNTHROID, LEVOTHROID) 25 MCG tablet Take 1 tablet by mouth Daily. 30 tablet 5   • traZODone (DESYREL) 100 MG tablet TAKE 0.5-1 TABLETS BY MOUTH EVERY NIGHT. (Patient taking differently: Take  mg by mouth At Night As Needed.) 30 tablet 5   • aspirin 81 MG EC tablet Take 81 mg by mouth Daily. Will continue after surgery     • rizatriptan MLT (MAXALT-MLT) 10 MG disintegrating tablet Take 1 tablet by mouth 1 (One) Time As Needed for Migraine for up to 1 dose. 9 tablet 5   • valACYclovir (VALTREX) 1000 MG tablet Take 2 tablets by mouth 2 (Two) Times a Day. (Patient taking differently: Take 2,000 mg by mouth 2 (Two) Times a Day As Needed.) 12 tablet 3       PMH:   Past Medical History:   Diagnosis Date   • Allergic    • Anemia     after hip replacement    • Arthritis    • Migraine     h/o   • PFO (patent foramen ovale)    • PONV (postoperative nausea and  "vomiting)     preprocedural meds help and pt will need them    • Right shoulder pain    • Wears glasses      PSH:    Past Surgical History:   Procedure Laterality Date   •  SECTION     • COLONOSCOPY     • ENDOSCOPY     • FRACTURE SURGERY  2015    left leg- hardware in place    • KNEE ARTHROSCOPY Right     done twice   • PATELLA SURGERY Left     removed left knee cap   • REPLACEMENT TOTAL KNEE Right 2018    revision 2019   • TOTAL HIP ARTHROPLASTY Left    • TUBAL ABDOMINAL LIGATION       Social History:   Tobacco:   Social History     Tobacco Use   Smoking Status Never Smoker   Smokeless Tobacco Never Used      Alcohol:     Social History     Substance and Sexual Activity   Alcohol Use Yes    Comment: rare       Vitals:           /65 (BP Location: Right arm, Patient Position: Lying)   Pulse 74   Temp 98.6 °F (37 °C) (Temporal)   Resp 18   Ht 152.4 cm (60\")   Wt 75.3 kg (166 lb)   SpO2 97%   BMI 32.42 kg/m²     Physical Exam:  General Appearance:    Alert, cooperative, no distress, appears stated age   Head:    Normocephalic, without obvious abnormality, atraumatic   Lungs:     Clear to auscultation bilaterally, respirations unlabored    Heart:   Regular rate and rhythm, S1 and S2 normal, no murmur, rub    or gallop    Abdomen:    Soft, non-tender.  +bowel sounds   Breast Exam:    deferred   Genitalia:    deferred   Extremities:   Extremities normal, atraumatic, no cyanosis or edema   Skin:   Skin color, texture, turgor normal, no rashes or lesions   Neurologic:   Grossly intact   Results Review  LABS:  Lab Results   Component Value Date    WBC 6.79 12/10/2019    HGB 12.4 12/10/2019    HCT 39.1 12/10/2019    MCV 93.1 12/10/2019     12/10/2019    NEUTROABS 3.89 12/10/2019    GLUCOSE 86 12/10/2019    BUN 21 (H) 12/10/2019    CREATININE 0.75 12/10/2019    EGFRIFNONA 80 12/10/2019    EGFRIFAFRI 91 2019     12/10/2019    K 4.5 12/10/2019     12/10/2019    CO2 26.0 " 12/10/2019    CALCIUM 9.7 12/10/2019    ALBUMIN 4.60 12/10/2019    AST 19 12/10/2019    ALT 16 12/10/2019    BILITOT 0.4 12/10/2019       RADIOLOGY:  Imaging Results (Last 72 Hours)     ** No results found for the last 72 hours. **          I reviewed the patient's new clinical results.    Cancer Staging (if applicable)  Cancer Patient: __ yes _x_no __unknown; If yes, clinical stage T:__ N:__M:__, stage group or __N/A    Impression: Right hip osteoarthritis    Plan: Right anterior total hip arthroplasty     Lisa Gerber, APRN   12/12/2019   10:22 AM

## 2019-12-12 NOTE — H&P
Patient Name: Astrid Sam  MRN: [id number]  : 1965  DOS: 2019    Attending: Mele Chatterjee MD    Primary Care Provider: Beckie Murphy DO      Chief complaint: Right hip pain    Subjective   Patient is a 54 y.o. female presented for scheduled surgery by Dr. Chatterjee.  She anticipates a right total hip replacement today.  She has had severe right hip pain for the last 2 or 3 months.  She uses a cane for ambulation.  She has had recent falls.    She is doing well.  She denies pain or other complaints.  She denies nausea, shortness of breath or chest pain.  No history of DVT or PE.       Allergies:  Allergies   Allergen Reactions   • Morphine Nausea And Vomiting       Meds:  Medications Prior to Admission   Medication Sig Dispense Refill Last Dose   • escitalopram (LEXAPRO) 20 MG tablet Take 1 tablet by mouth Daily. 30 tablet 5 2019 at 0730   • gabapentin (NEURONTIN) 600 MG tablet Take 1 tablet by mouth 3 (Three) Times a Day. 90 tablet 2 2019 at 0730   • levothyroxine (SYNTHROID, LEVOTHROID) 25 MCG tablet Take 1 tablet by mouth Daily. 30 tablet 5 2019 at 0730   • traZODone (DESYREL) 100 MG tablet TAKE 0.5-1 TABLETS BY MOUTH EVERY NIGHT. (Patient taking differently: Take  mg by mouth At Night As Needed.) 30 tablet 5 2019 at 2100   • aspirin 81 MG EC tablet Take 81 mg by mouth Daily. Will continue after surgery   Taking   • rizatriptan MLT (MAXALT-MLT) 10 MG disintegrating tablet Take 1 tablet by mouth 1 (One) Time As Needed for Migraine for up to 1 dose. 9 tablet 5 2019   • valACYclovir (VALTREX) 1000 MG tablet Take 2 tablets by mouth 2 (Two) Times a Day. (Patient taking differently: Take 2,000 mg by mouth 2 (Two) Times a Day As Needed.) 12 tablet 3 More than a month at Unknown time       History:   Past Medical History:   Diagnosis Date   • Allergic    • Anemia     after hip replacement    • Arthritis    • Migraine     h/o   • PFO (patent foramen ovale)   "  • PONV (postoperative nausea and vomiting)     preprocedural meds help and pt will need them    • Right shoulder pain    • Wears glasses      Past Surgical History:   Procedure Laterality Date   •  SECTION     • COLONOSCOPY     • ENDOSCOPY     • FRACTURE SURGERY      left leg- hardware in place    • KNEE ARTHROSCOPY Right     done twice   • PATELLA SURGERY Left     removed left knee cap   • REPLACEMENT TOTAL KNEE Right 2018    revision 2019   • TOTAL HIP ARTHROPLASTY Left    • TUBAL ABDOMINAL LIGATION       Family History   Problem Relation Age of Onset   • Hypertension Mother    • Migraines Mother    • Hypertension Father    • COPD Father      Social History     Tobacco Use   • Smoking status: Never Smoker   • Smokeless tobacco: Never Used   Substance Use Topics   • Alcohol use: Yes     Comment: rare   • Drug use: No   She is  with 2 children.  She is disabled.    Review of Systems  All systems were reviewed and negative except for:  Gastrointestinal: positive for  constipation    Vital Signs  Visit Vitals  /65 (BP Location: Right arm, Patient Position: Lying)   Pulse 74   Temp 98.6 °F (37 °C) (Temporal)   Resp 18   Ht 152.4 cm (60\")   Wt 75.3 kg (166 lb)   SpO2 97%   BMI 32.42 kg/m²       Physical Exam:    General Appearance:    Alert, cooperative, in no acute distress   Head:    Normocephalic, without obvious abnormality, atraumatic   Eyes:            Lids and lashes normal, conjunctivae and sclerae normal, no   icterus, no pallor, corneas clear   Ears:    Ears appear intact with no abnormalities noted   Neck:   No adenopathy, supple, trachea midline, no thyromegaly   Lungs:     Clear to auscultation, respirations regular, even and                   unlabored    Heart:    Regular rhythm and normal rate, normal S1 and S2, no            murmur, no gallop   Abdomen:     Normal bowel sounds, no masses, no organomegaly, soft        non-tender, non-distended, no guarding, no " rebound                 tenderness   Genitalia:    Deferred   Extremities:    no edema, no cyanosis, no              redness   Pulses:   Pulses palpable and equal bilaterally   Skin:   No bleeding, bruising or rash   Neurologic:   Cranial nerves 2 - 12 grossly intact, sensation intact. Flexion and dorsiflexion intact bilateral feet.        I reviewed the patient's new clinical results.       Results from last 7 days   Lab Units 12/10/19  1004   WBC 10*3/mm3 6.79   HEMOGLOBIN g/dL 12.4   HEMATOCRIT % 39.1   PLATELETS 10*3/mm3 269     Results from last 7 days   Lab Units 12/10/19  1004   SODIUM mmol/L 141   POTASSIUM mmol/L 4.5   CHLORIDE mmol/L 103   CO2 mmol/L 26.0   BUN mg/dL 21*   CREATININE mg/dL 0.75   CALCIUM mg/dL 9.7   BILIRUBIN mg/dL 0.4   ALK PHOS U/L 85   ALT (SGPT) U/L 16   AST (SGOT) U/L 19   GLUCOSE mg/dL 86     Lab Results   Component Value Date    HGBA1C 5.10 12/10/2019       Assessment and Plan:     Right hip pain    Acquired hypothyroidism      Plan  1. PT/OT- early ambulation postop  2. Pain control-prns   3. IS-encourage  4. DVT proph- mechs/ASA  5. Bowel regimen  6. Resume home medications as appropriate  7. Monitor post-op labs  8. Discharge planning for home    Hypothyroid  - Continue home Synthroid      Bettina DavalosJOHNATHAN  12/12/19  1:46 PM     Above is noted, agree.  Ms. Sam underwent right total hip arthroplasty, anterior, by Dr. Chatterjee.  Surgery was performed under spinal anesthesia, and was tolerated well.  Postoperatively patient complains were mostly of severe bladder spasms.  Her bladder scan did not show significant amount of urine however she did have in and out cath with about 100 cc of urine reported at the time.  She continued to have spasms and received a B&O suppository in recovery room.  On arrival to the floor this remains to be her main significant complaint holding onto her lower abdomen with severe urge to urinate and inability to do so.  Patient's history is significant  for urinary incontinence, she does wear depends..  She apparently had a surgery for that several years ago though patient and  are not clear as to the name of surgery.  No complaints of hip pain, nausea vomiting or shortness breath.  Temperature 98.1 heart rate 93 respiratory rate 18 blood pressure 100/72  HEENT: Atraumatic cephalic extraocular movements intact.  Neck supple, no lymphadenopathy thyromegaly.  Chest: Equal expansion, symmetrical lungs were clear with no wheezes or rales.  Heart: Regular, S1-S2, no gallops  Abdomen: Soft, nontender, nondistended.  Extremities: Right hip with clean dry and intact anterior dressing.  Distal pulses and cap refill are intact.  Intact flexion dorsiflexion bilateral feet.    Assessment:  Status post right total hip arthroplasty, anterior.  Postoperative suprapubic pain, hesitancy.    Postoperative management plan as outlined above.  With regard to patient's complaints of hesitancy and inability to void, she does not have any significant urinary retention at this point.  She has symptoms consistent with bladder spasms.  BNO suppository has not provided relief.  Discussed with patient and her  temporarily and inserting a Ram catheter and anchoring that, we will check a urinalysis, will add to her regimen Ditropan to see if it helps with her symptoms. Imaging studies to follow if symptoms persist. Possible urology consultation.

## 2019-12-13 ENCOUNTER — APPOINTMENT (OUTPATIENT)
Dept: ULTRASOUND IMAGING | Facility: HOSPITAL | Age: 54
End: 2019-12-13

## 2019-12-13 PROBLEM — D72.829 LEUKOCYTOSIS: Status: ACTIVE | Noted: 2019-12-13

## 2019-12-13 PROBLEM — R33.8 ACUTE URINARY RETENTION: Status: ACTIVE | Noted: 2019-12-13

## 2019-12-13 PROBLEM — D62 ACUTE BLOOD LOSS ANEMIA: Status: ACTIVE | Noted: 2019-12-13

## 2019-12-13 PROBLEM — Z96.641 STATUS POST TOTAL REPLACEMENT OF RIGHT HIP: Status: ACTIVE | Noted: 2019-12-13

## 2019-12-13 PROBLEM — G89.18 ACUTE POSTOPERATIVE PAIN: Status: ACTIVE | Noted: 2019-12-13

## 2019-12-13 LAB
ANION GAP SERPL CALCULATED.3IONS-SCNC: 10 MMOL/L (ref 5–15)
BUN BLD-MCNC: 15 MG/DL (ref 6–20)
BUN/CREAT SERPL: 19.7 (ref 7–25)
CALCIUM SPEC-SCNC: 8.7 MG/DL (ref 8.6–10.5)
CHLORIDE SERPL-SCNC: 109 MMOL/L (ref 98–107)
CO2 SERPL-SCNC: 24 MMOL/L (ref 22–29)
CREAT BLD-MCNC: 0.76 MG/DL (ref 0.57–1)
DEPRECATED RDW RBC AUTO: 47.9 FL (ref 37–54)
ERYTHROCYTE [DISTWIDTH] IN BLOOD BY AUTOMATED COUNT: 13.9 % (ref 12.3–15.4)
GFR SERPL CREATININE-BSD FRML MDRD: 79 ML/MIN/1.73
GLUCOSE BLD-MCNC: 145 MG/DL (ref 65–99)
HCT VFR BLD AUTO: 27.5 % (ref 34–46.6)
HGB BLD-MCNC: 8.8 G/DL (ref 12–15.9)
MCH RBC QN AUTO: 30.2 PG (ref 26.6–33)
MCHC RBC AUTO-ENTMCNC: 32 G/DL (ref 31.5–35.7)
MCV RBC AUTO: 94.5 FL (ref 79–97)
PLATELET # BLD AUTO: 232 10*3/MM3 (ref 140–450)
PMV BLD AUTO: 9.1 FL (ref 6–12)
POTASSIUM BLD-SCNC: 4.2 MMOL/L (ref 3.5–5.2)
RBC # BLD AUTO: 2.91 10*6/MM3 (ref 3.77–5.28)
SODIUM BLD-SCNC: 143 MMOL/L (ref 136–145)
WBC NRBC COR # BLD: 11.04 10*3/MM3 (ref 3.4–10.8)

## 2019-12-13 PROCEDURE — 80048 BASIC METABOLIC PNL TOTAL CA: CPT | Performed by: ORTHOPAEDIC SURGERY

## 2019-12-13 PROCEDURE — 97116 GAIT TRAINING THERAPY: CPT | Performed by: PHYSICAL THERAPIST

## 2019-12-13 PROCEDURE — 25010000002 KETOROLAC TROMETHAMINE PER 15 MG: Performed by: ORTHOPAEDIC SURGERY

## 2019-12-13 PROCEDURE — 76856 US EXAM PELVIC COMPLETE: CPT

## 2019-12-13 PROCEDURE — 85027 COMPLETE CBC AUTOMATED: CPT | Performed by: NURSE PRACTITIONER

## 2019-12-13 PROCEDURE — 94799 UNLISTED PULMONARY SVC/PX: CPT

## 2019-12-13 PROCEDURE — 25010000003 CEFAZOLIN IN DEXTROSE 2-4 GM/100ML-% SOLUTION: Performed by: ORTHOPAEDIC SURGERY

## 2019-12-13 PROCEDURE — 97162 PT EVAL MOD COMPLEX 30 MIN: CPT | Performed by: PHYSICAL THERAPIST

## 2019-12-13 RX ORDER — TAMSULOSIN HYDROCHLORIDE 0.4 MG/1
0.4 CAPSULE ORAL DAILY
Status: DISCONTINUED | OUTPATIENT
Start: 2019-12-13 | End: 2019-12-15 | Stop reason: HOSPADM

## 2019-12-13 RX ORDER — MELOXICAM 15 MG/1
TABLET ORAL
Qty: 90 TABLET | Refills: 0 | OUTPATIENT
Start: 2019-12-13

## 2019-12-13 RX ADMIN — OXYCODONE HYDROCHLORIDE 10 MG: 5 TABLET ORAL at 22:27

## 2019-12-13 RX ADMIN — ACETAMINOPHEN 1000 MG: 500 TABLET, FILM COATED ORAL at 13:56

## 2019-12-13 RX ADMIN — OXYBUTYNIN CHLORIDE 5 MG: 5 TABLET ORAL at 08:12

## 2019-12-13 RX ADMIN — GABAPENTIN 600 MG: 300 CAPSULE ORAL at 08:12

## 2019-12-13 RX ADMIN — OXYBUTYNIN CHLORIDE 5 MG: 5 TABLET ORAL at 15:15

## 2019-12-13 RX ADMIN — ACETAMINOPHEN 1000 MG: 500 TABLET, FILM COATED ORAL at 06:09

## 2019-12-13 RX ADMIN — GABAPENTIN 600 MG: 300 CAPSULE ORAL at 20:33

## 2019-12-13 RX ADMIN — ASPIRIN 81 MG: 81 TABLET, COATED ORAL at 08:12

## 2019-12-13 RX ADMIN — GABAPENTIN 600 MG: 300 CAPSULE ORAL at 15:15

## 2019-12-13 RX ADMIN — ESCITALOPRAM OXALATE 20 MG: 20 TABLET ORAL at 08:12

## 2019-12-13 RX ADMIN — OXYBUTYNIN CHLORIDE 5 MG: 5 TABLET ORAL at 20:33

## 2019-12-13 RX ADMIN — TAMSULOSIN HYDROCHLORIDE 0.4 MG: 0.4 CAPSULE ORAL at 13:56

## 2019-12-13 RX ADMIN — ACETAMINOPHEN 1000 MG: 500 TABLET, FILM COATED ORAL at 20:33

## 2019-12-13 RX ADMIN — CEFAZOLIN SODIUM 2 G: 2 INJECTION, SOLUTION INTRAVENOUS at 04:40

## 2019-12-13 RX ADMIN — MELOXICAM 15 MG: 7.5 TABLET ORAL at 08:12

## 2019-12-13 RX ADMIN — SODIUM CHLORIDE, POTASSIUM CHLORIDE, SODIUM LACTATE AND CALCIUM CHLORIDE 100 ML/HR: 600; 310; 30; 20 INJECTION, SOLUTION INTRAVENOUS at 04:40

## 2019-12-13 RX ADMIN — LEVOTHYROXINE SODIUM 25 MCG: 25 TABLET ORAL at 06:09

## 2019-12-13 RX ADMIN — OXYCODONE HYDROCHLORIDE 5 MG: 5 TABLET ORAL at 15:15

## 2019-12-13 RX ADMIN — TRAZODONE HYDROCHLORIDE 50 MG: 50 TABLET ORAL at 20:33

## 2019-12-13 RX ADMIN — ASPIRIN 81 MG: 81 TABLET, COATED ORAL at 21:53

## 2019-12-13 RX ADMIN — KETOROLAC TROMETHAMINE 15 MG: 15 INJECTION, SOLUTION INTRAMUSCULAR; INTRAVENOUS at 06:09

## 2019-12-13 NOTE — PROGRESS NOTES
Discharge Planning Assessment  [facility]     Patient Name: Astrid Sam  MRN: [id number]  Today's Date: 12/13/2019    Admit Date: 12/12/2019    Discharge Needs Assessment     Row Name 12/13/19 1222       Living Environment    Lives With  spouse    Name(s) of Who Lives With Patient  :  Tejas    Current Living Arrangements  home/apartment/condo    Family Caregiver if Needed  spouse    Quality of Family Relationships  helpful;involved;supportive    Able to Return to Prior Arrangements  yes    Living Arrangement Comments  Ms. Sam lives with her  in a one story home, 3 steps to enter, in [facility].       Resource/Environmental Concerns    Transportation Concerns  car, none       Transition Planning    Patient/Family Anticipates Transition to  home with family    Patient/Family Anticipated Services at Transition  home health care    Transportation Anticipated  family or friend will provide       Discharge Needs Assessment    Equipment Currently Used at Home  walker, rolling;cane, straight;commode;shower chair    Equipment Needed After Discharge  none    Outpatient/Agency/Support Group Needs  homecare agency    Discharge Facility/Level of Care Needs  home with home health    Provided post acute provider list?  Yes    Post Acute Provider Lists  Home Health    Post Acuite Provider List  Delivered    Delivered To  Patient;Support Person    Method of Delivery  In person    Patient's Choice of Community Agency(s)  [facility]        Discharge Plan     Row Name 12/13/19 1227       Plan    Plan  Home with 's assistance and [facility]    Patient/Family in Agreement with Plan  yes    Plan Comments  Met with Ms. Sam and her , Tejas, at the bedside for discharge planning.  Ms. Sam was being taken to CT scan at time of  visit.  Tejas stated that he will be availble to assist his wife as needed when she returns home.  He denied any DME needs.  [facility] has been  arranged by Dr. Chatterjee's office and the patient is agreeable to agency.  Contacted [facility] and spoke to Bettina who confirmed the referral.  Tejas will be transporting his wife home when discharged.    CM will continue to follow.    Final Discharge Disposition Code  06 - home with home health care        Destination      Coordination has not been started for this encounter.      Durable Medical Equipment      Coordination has not been started for this encounter.      Dialysis/Infusion      Coordination has not been started for this encounter.      Home Medical Care - Selection Complete      Service Provider Request Status Selected Services Address Phone Number Fax Number    [facility] Selected Home Health Services [address] [place] [address] [id number] [phone]      Therapy      Coordination has not been started for this encounter.      Community Resources      Coordination has not been started for this encounter.        Expected Discharge Date and Time     Expected Discharge Date Expected Discharge Time    Dec 14, 2019         Demographic Summary     Row Name 12/13/19 1218       General Information    Admission Type  inpatient    Arrived From  home    Reason for Consult  discharge planning    General Information Comments  PCP:  Beckie Murphy       Contact Information    Permission Granted to Share Info With      Contact Information Comments  :  Tejas, ph [phone]        Functional Status     Row Name 12/13/19 1222       Functional Status    Usual Activity Tolerance  moderate    Current Activity Tolerance  -- Following for PT notes.       Functional Status, IADL    Medications  independent    Meal Preparation  independent    Housekeeping  independent    Laundry  independent    Shopping  independent       Mental Status    General Appearance WDL  WDL        Psychosocial    No documentation.       Abuse/Neglect    No documentation.       Legal    No  documentation.       Substance Abuse    No documentation.       Patient Forms    No documentation.           Eve Black RN

## 2019-12-13 NOTE — PLAN OF CARE
Problem: Patient Care Overview  Goal: Plan of Care Review  Outcome: Ongoing (interventions implemented as appropriate)  Flowsheets  Taken 12/13/2019 1745 by Susan Flores RN  Plan of Care Reviewed With: patient;spouse  Taken 12/13/2019 1310 by Davina Chatman PT  Outcome Summary: Pt able to amb 40' with step to gait mechanics with several standing rest breaks due to fatigue. Pt limited with gait by fatigue. Encouraged patient to ambulate later with nursing. Recommend patient home with assist and home health PT. PADD: 9

## 2019-12-13 NOTE — PROGRESS NOTES
"IM progress note      Astrid Sam  [id number]  1965     LOS: 1 day     Attending: Mele Chatterjee MD    Primary Care Provider: Beckie Murphy DO      Chief Complaint/Reason for visit:  Right hip pain    Subjective   Doing ok. Catheter out this morning. Improved pain control. Denies f/c/n/v/sob/cp.  (Doing well when seen by me this afternoon.  Has already voided.  Walked with PT.  Episodes of hypotension overnight responded well to fluid boluses)wy    Objective     Vital Signs  Visit Vitals  BP 90/56 (BP Location: Left arm, Patient Position: Lying)   Pulse 85   Temp 98 °F (36.7 °C) (Oral)   Resp 16   Ht 152.4 cm (60\")   Wt 75.3 kg (166 lb)   SpO2 94%   BMI 32.42 kg/m²     Temp (24hrs), Av °F (36.7 °C), Min:97.6 °F (36.4 °C), Max:98.3 °F (36.8 °C)      Nutrition: PO    Respiratory: RA    Physical Therapy: Pt able to amb 40' with step to gait mechanics with several standing rest breaks due to fatigue. Pt limited with gait by fatigue. Encouraged patient to ambulate later with nursing. Recommend patient home with assist and home health PT. PADD: 9    Physical Exam:     General Appearance:    Alert, cooperative, in no acute distress   Head:    Normocephalic, without obvious abnormality, atraumatic    Lungs:     Normal effort, symmetric chest rise, no crepitus, clear to      auscultation bilaterally             Heart:    Regular rhythm and normal rate, normal S1 and S2   Abdomen:     Normal bowel sounds, no masses, no organomegaly, soft        non-tender, non-distended, no guarding, no rebound                tenderness   Extremities:   No clubbing, cyanosis or edema.  No deformities. Right hip Optifoam CDI    Pulses:   Pulses palpable and equal bilaterally   Skin:   No bleeding, bruising or rash   Neurologic:   Moves all extremities with no obvious focal motor deficit.  Cranial nerves 2 - 12 grossly intact. Flexion and dorsiflexion intact bilateral feet.       Results Review:     I reviewed the " patient's new clinical results.   Results from last 7 days   Lab Units 12/13/19  0427 12/12/19  2058 12/10/19  1004   WBC 10*3/mm3 11.04* 11.68* 6.79   HEMOGLOBIN g/dL 8.8* 9.6* 12.4   HEMATOCRIT % 27.5* 30.3* 39.1   PLATELETS 10*3/mm3 232 262 269     Results from last 7 days   Lab Units 12/13/19  0427 12/12/19  2058 12/10/19  1004   SODIUM mmol/L 143 140 141   POTASSIUM mmol/L 4.2 4.4 4.5   CHLORIDE mmol/L 109* 105 103   CO2 mmol/L 24.0 20.0* 26.0   BUN mg/dL 15 20 21*   CREATININE mg/dL 0.76 0.93 0.75   CALCIUM mg/dL 8.7 9.0 9.7   BILIRUBIN mg/dL  --   --  0.4   ALK PHOS U/L  --   --  85   ALT (SGPT) U/L  --   --  16   AST (SGOT) U/L  --   --  19   GLUCOSE mg/dL 145* 204* 86     Study Result     INDICATION:   Lower abdominal pain after hip surgery. Patient states she feels like she has to urinate but she cannot.     TECHNIQUE:   CT of the abdomen and pelvis without contrast. Coronal and sagittal reconstructions were obtained.  Radiation dose reduction techniques included automated exposure control or exposure modulation based on body size. Radiation audit for number of CT and  nuclear cardiology exams performed in the last year: 1.       COMPARISON:   None available.     FINDINGS:  Lung bases: There is dependent linear airspace disease right greater the left lung bases most consistent with atelectasis.     Abdomen: Lack of intravenous contrast media somewhat limits the study for pathology other than urinary tract calculus disease. The noncontrast liver, spleen, adrenal glands, are unremarkable. There is a small air-filled structure projects over the  pancreatic head. This could be air within the distal common bile duct if the patient had a sphincterotomy relatively this could be air within a duodenal diverticulum. It measures about a centimeter in diameter. Patient is postcholecystectomy and the  common bile duct is distended to about a centimeter in general, likely postoperative.     There is no intrarenal  calculus or hydronephrosis. 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     There is no abdominal aortic aneurysm. There are mild atherosclerotic vascular calcifications.     There is a small fat-containing periumbilical hernia.     There is no evidence for bowel obstruction. Visualized appendix is unremarkable. There is no free intraperitoneal air or percutaneously drainable fluid collection. There is asymmetric atrophy of the left-sided iliopsoas muscle possibly related to prior  hip surgery. Please correlate with history.     Exaggeration of lumbar lordosis     Pelvis: The patient has had bilateral hip arthroplasties and it results in considerable streak artifact to the pelvis images there is a Ram catheter within the bladder which is largely decompressed with a small amount of air. There is a mildly  hyperdense somewhat ovoid structure seen left side anterolateral to the decompressed bladder that measures about 2.9 x 1.4 x 1.4 cm dimension. It is also adjacent to the left anterior uterine fundus. Unfortunately this is in the area of streak artifact.  It could be associated with the bladder or it could be an exophytic uterine fibroid. It does not appear to be associated with the ovaries. It is probably not associated with the bowel loop. Recommend correlation with a follow-up pelvic ultrasound. No  obvious free fluid is seen in the pelvis. There is a small fat-containing right inguinal hernia. There are degenerative changes in the lumbar spine.     IMPRESSION:     1. There is a Ram catheter in the bladder which is largely decompressed. Left-sided anterolateral to the bladder also adjacent to the anterior left uterine fundus is an ovoid mildly hyperdense structure measuring about 2.9 x 1.4 x 1.4 cm dimension.  This is unfortunately largely obscured by streak artifact from the bilateral hip arthroplasties. Recommend correlation with a pelvic ultrasound. See differential possibilities above.  2. Small  fat-containing right inguinal hernia.  3. No evidence for ureter tract calculus disease.  4. Postcholecystectomy with likely postoperative biliary ductal dilatation. Is probably a small amount of air within the distal common bile duct but alternate consideration is a duodenal diverticulum.  5. No bowel obstruction; radiographically normal appendix.  6. Likely atelectasis lung bases right greater than left.     Study Result     EXAMINATION: US PELVIS COMPLETE-     INDICATION: abd pain, bladder spasm generalized abdominal pain and  bladder spasms     TECHNIQUE: Sonographic imaging is obtained of the pelvis and both the  sagittal and transverse planes.     COMPARISON: NONE     FINDINGS: The uterus measures 8.0 x 2.8 x 5.6 cm. Small hypoechoic  structure identified subserosal appearance extending from the leftward  aspect of the uterus likely correlating to the area seen on CT measuring  2 cm suggesting possible fibroid. The endometrial stripe is somewhat  difficult to visualize. The cervix is grossly unremarkable.     The right ovary measures 1.9 x 1.3 x 1.3 cm. Good color flow present. No  underlying mass or lesion. No adnexal mass.     The left ovary measures 1.9 x 1.1 x 1.6 cm with good color flow present.  No underlying mass or lesion identified. The bladder is grossly  unremarkable.        IMPRESSION:  Findings to suggest subserosal 2 cm fibroid likely  correlating to the area of interest seen on the prior CT scan. The  remainder the ultrasound of the pelvis is unremarkable.       I reviewed the patient's new imaging including images and reports.    All medications reviewed.     acetaminophen 1,000 mg Oral Q8H   aspirin 81 mg Oral Q12H   escitalopram 20 mg Oral Daily   gabapentin 600 mg Oral TID   levothyroxine 25 mcg Oral Q AM   meloxicam 15 mg Oral Daily   oxybutynin 5 mg Oral TID   tamsulosin 0.4 mg Oral Daily   traZODone 50 mg Oral Nightly       Assessment/Plan     Status post total replacement of right hip     Arthritis of right hip    Acquired hypothyroidism    Acute postoperative pain    Acute blood loss anemia    Leukocytosis, likely reactive    Acute urinary retention      Plan  1. PT/OT- WBAT RLE  2. Pain control-prns   3. IS-encouraged  4. DVT proph- mechs/ASA  5. Bowel regimen  6. Monitor post-op labs  7. DC planning for home/ likely tomorrow.wy    Urology consult  Added flomax    Hypothyroid  - Continue home Synthroid      JOHNATHAN Burnett  12/13/19  4:12 PM     I have personally performed the evaluation on this patient. My history is consistent  with HPI obtained. My exam findings are listed above. I have personally reviewed and discussed the above formulated treatment plan with Pam.

## 2019-12-13 NOTE — PROGRESS NOTES
Orthopedic Progress Note      Patient: Astrid Sam  YOB: 1965     Date of Admission: 12/12/2019  9:14 AM Medical Record Number: [id number]     Attending Physician: Mele Chatterjee MD    Status Post:  Procedure(s):  RIGHT DIRECT TOTAL HIP ARTHROPLASTY ANTERIOR Post Operative Day Number: 1    Subjective : No new orthopaedic complaints     Pain Relief: some relief with present medication.     Systemic Complaints: Complaints of feeling of needing to urinate have somewhat improved overnight CT scan unremarkable for any major issues recommending ultrasound to follow-up on pelvic mass  Vitals:    12/13/19 0001 12/13/19 0315 12/13/19 0500 12/13/19 0603   BP: 98/60 112/61 91/62 108/89   BP Location: Right arm Left arm     Patient Position: Lying Lying     Pulse: 91 96 83 61   Resp: 18 16     Temp:  98.3 °F (36.8 °C)     TempSrc: Oral Oral     SpO2: 96% 98% 96% 99%   Weight:       Height:           Physical Exam: 54 y.o. female    General Appearance:       Alert, cooperative, in no acute distress                  Extremities:    Dressing Clean, Dry and Intact               No clinical sign of DVT        Able to do good movements of digits    Pulses:   Pulses palpable and equal bilaterally           Diagnostic Tests:     Results from last 7 days   Lab Units 12/13/19  0427 12/12/19  2058 12/10/19  1004   WBC 10*3/mm3 11.04* 11.68* 6.79   HEMOGLOBIN g/dL 8.8* 9.6* 12.4   HEMATOCRIT % 27.5* 30.3* 39.1   PLATELETS 10*3/mm3 232 262 269     Results from last 7 days   Lab Units 12/13/19  0427 12/12/19  2058 12/10/19  1004   SODIUM mmol/L 143 140 141   POTASSIUM mmol/L 4.2 4.4 4.5   CHLORIDE mmol/L 109* 105 103   CO2 mmol/L 24.0 20.0* 26.0   BUN mg/dL 15 20 21*   CREATININE mg/dL 0.76 0.93 0.75   GLUCOSE mg/dL 145* 204* 86   CALCIUM mg/dL 8.7 9.0 9.7     Results from last 7 days   Lab Units 12/10/19  1004   INR  1.01   APTT seconds 36.3     No results found for: URICACID  No results found for:  CRYSTAL  Microbiology Results (last 10 days)     Procedure Component Value - Date/Time    MRSA Screen, PCR - Swab, Nares [[id number]]  (Normal) Collected:  12/10/19 1004    Lab Status:  Final result Specimen:  Swab from Nares Updated:  12/10/19 1309     MRSA PCR Negative    Narrative:       MRSA Negative        Ct Abdomen Pelvis Without Contrast    Result Date: 12/12/2019  1. There is a Ram catheter in the bladder which is largely decompressed. Left-sided anterolateral to the bladder also adjacent to the anterior left uterine fundus is an ovoid mildly hyperdense structure measuring about 2.9 x 1.4 x 1.4 cm dimension. This is unfortunately largely obscured by streak artifact from the bilateral hip arthroplasties. Recommend correlation with a pelvic ultrasound. See differential possibilities above. 2. Small fat-containing right inguinal hernia. 3. No evidence for ureter tract calculus disease. 4. Postcholecystectomy with likely postoperative biliary ductal dilatation. Is probably a small amount of air within the distal common bile duct but alternate consideration is a duodenal diverticulum. 5. No bowel obstruction; radiographically normal appendix. 6. Likely atelectasis lung bases right greater than left. Signer Name: Aminah Baker MD  Signed: 12/12/2019 9:43 PM  Workstation Name: [id number]  [facility]    Xr Chest Pa & Lateral    Result Date: 12/11/2019  No acute cardiothoracic findings. Radiopaque device over the heart likely indicative of prior patent foramen ovale repair Signer Name: Mónica Penaloza MD  Signed: 12/11/2019 1:14 PM  Workstation Name: [email or web address]  [facility]    Xr Hip With Or Without Pelvis 2 - 3 View Right    Result Date: 12/12/2019  Status post right total hip arthroplasty.  DICTATED:   12/12/2019 EDITED/ls :   12/12/2019          Current Medications:  Scheduled Meds:  acetaminophen 1,000 mg Oral Q8H   aspirin 81 mg Oral Q12H   escitalopram 20 mg  Oral Daily   gabapentin 600 mg Oral TID   levothyroxine 25 mcg Oral Q AM   meloxicam 15 mg Oral Daily   oxybutynin 5 mg Oral TID   traZODone 50 mg Oral Nightly     Continuous Infusions:  lactated ringers 9 mL/hr Last Rate: 9 mL/hr (12/12/19 1028)   lactated ringers 100 mL/hr Last Rate: 100 mL/hr (12/13/19 0440)     PRN Meds:.bisacodyl  •  bisacodyl  •  docusate sodium  •  HYDROmorphone **AND** naloxone  •  ketorolac  •  labetalol  •  ondansetron  •  oxyCODONE  •  oxyCODONE  •  sennosides-docusate  •  sodium chloride    Assessment: Status post  RIGHT DIRECT TOTAL HIP ARTHROPLASTY ANTERIOR    Patient Active Problem List   Diagnosis   • Migraine   • Insomnia   • Iron deficiency anemia   • Left shoulder pain   • Cervicalgia   • Cervical radicular pain   • Diarrhea   • Irritability   • Acquired hypothyroidism   • Fever blister   • Right hip pain   • Arthritis of right hip       PLAN:   Continues current post-op course  Anticoagulation: Aspirin started  Mobilize with PT as tolerated per protocol  Ultrasound this morning of pelvis possible urology consult  We will assess patient's progress but potential that needs to spend another day due to medical issues  BP stable this morning but several drops overnight improved with fluid bolus hemoglobin stable close to 9    Weight Bearing: WBAT      Mele Chatterjee MD    Date: 12/13/2019    Time: 6:45 AM

## 2019-12-13 NOTE — PLAN OF CARE
Problem: Patient Care Overview  Goal: Plan of Care Review  Outcome: Ongoing (interventions implemented as appropriate)  Flowsheets (Taken 12/13/2019 0308)  Progress: no change  Plan of Care Reviewed With: patient; spouse  Note:   Post op  abdominal pelvic pain , pt states she feels like she needs to urinate but cant despite fc in place. Ct scan ordered for belly pain, when getting pt up in wheelchair she became pale, nauseated and passed out momentarily. Rapid Response called.  SBP dropped to 70's, pt put back in bed, NS bolus given, taken to CT scan. Discussed CT findings with DR RAMÍREZ, he ordered urology consult and pelvis us, h&h dropped will monitor, BP and pain has gotten better throughout the night.

## 2019-12-13 NOTE — THERAPY EVALUATION
Patient Name: Astrid Sam  : 1965    MRN: [id number]                              Today's Date: 2019       Admit Date: 2019    Visit Dx: No diagnosis found.  Patient Active Problem List   Diagnosis   • Migraine   • Insomnia   • Iron deficiency anemia   • Left shoulder pain   • Cervicalgia   • Cervical radicular pain   • Diarrhea   • Irritability   • Acquired hypothyroidism   • Fever blister   • Right hip pain   • Arthritis of right hip   • Status post total replacement of right hip     Past Medical History:   Diagnosis Date   • Allergic    • Anemia     after hip replacement    • Arthritis    • Migraine     h/o   • PFO (patent foramen ovale)    • PONV (postoperative nausea and vomiting)     preprocedural meds help and pt will need them    • Right shoulder pain    • Wears glasses      Past Surgical History:   Procedure Laterality Date   •  SECTION     • COLONOSCOPY     • ENDOSCOPY     • FRACTURE SURGERY      left leg- hardware in place    • KNEE ARTHROSCOPY Right     done twice   • PATELLA SURGERY Left     removed left knee cap   • REPLACEMENT TOTAL KNEE Right 2018    revision 2019   • TOTAL HIP ARTHROPLASTY Left    • TUBAL ABDOMINAL LIGATION       General Information     Row Name 19 1310          PT Evaluation Time/Intention    Document Type  evaluation  -CS     Mode of Treatment  physical therapy;individual therapy  -CS     Row Name 19 1310          General Information    Patient Profile Reviewed?  yes  -CS     Prior Level of Function  min assist:;dressing;bathing;all household mobility;community mobility;home management;cooking;cleaning increase in R hip pain  -CS     Existing Precautions/Restrictions  fall;right;hip, anterior  -CS     Barriers to Rehab  previous functional deficit Pt used cane prior to surgery  -CS     Row Name 19 131          Relationship/Environment    Lives With  spouse  is able to assist at all times at home  -CS      Row Name 12/13/19 1310          Resource/Environmental Concerns    Current Living Arrangements  home/apartment/condo  -CS     Row Name 12/13/19 1310          Home Main Entrance    Number of Stairs, Main Entrance  three  -CS     Stair Railings, Main Entrance  railing on right side (ascending)  -CS     Row Name 12/13/19 1310          Stairs Within Home, Primary    Stairs, Within Home, Primary  1 story house  -CS     Number of Stairs, Within Home, Primary  none  -CS     Row Name 12/13/19 1310          Cognitive Assessment/Intervention- PT/OT    Orientation Status (Cognition)  oriented x 4  -CS     Row Name 12/13/19 1310          Safety Issues, Functional Mobility    Safety Issues Affecting Function (Mobility)  safety precautions follow-through/compliance;safety precaution awareness;insight into deficits/self awareness  -CS     Impairments Affecting Function (Mobility)  endurance/activity tolerance;pain;range of motion (ROM);strength  -CS       User Key  (r) = Recorded By, (t) = Taken By, (c) = Cosigned By    Initials Name Provider Type    CS Davina Chatman, KELLY Physical Therapist        Mobility     Row Name 12/13/19 1310          Bed Mobility Assessment/Treatment    Bed Mobility Assessment/Treatment  supine-sit  -CS     Supine-Sit [place] (Bed Mobility)  minimum assist (75% patient effort)  -CS     Assistive Device (Bed Mobility)  head of bed elevated;bed rails  -CS     Comment (Bed Mobility)  VC's to move LE's to EOB. Pt needed min assist to move R LE to EOB.   -     Row Name 12/13/19 1310          Transfer Assessment/Treatment    Comment (Transfers)  VC's to push off of bed to stand and to reach back for chair to sit. VC's to step R LE out with sitting for comfort. Pt denied dizziness when sitting up.   -CS     Row Name 12/13/19 1310          Sit-Stand Transfer    Sit-Stand [place] (Transfers)  contact guard;2 person assist  -CS     Assistive Device (Sit-Stand Transfers)  walker, front-wheeled   -     Row Name 12/13/19 1310          Gait/Stairs Assessment/Training    62478 - Gait Training Minutes   15  -CS     Gait/Stairs Assessment/Training  gait/ambulation independence;gait/ambulation assistive device;distance ambulated;gait pattern  -CS     [place] Level (Gait)  contact guard;2 person assist  -CS     Assistive Device (Gait)  walker, front-wheeled  -CS     Distance in Feet (Gait)  40'  -CS     Pattern (Gait)  step-to  -CS     Deviations/Abnormal Patterns (Gait)  bilateral deviations;mee decreased;gait speed decreased;stride length decreased  -CS     Bilateral Gait Deviations  forward flexed posture  -CS     Right Sided Gait Deviations  heel strike decreased;weight shift ability decreased  -CS     [place] Level (Stairs)  not tested  -CS     Comment (Gait/Stairs)  Pt amb 40' with RW CGA x 2 with very slow speed and step to mechanics. VC's to maintain upright posture, decrease WB through UE's, increase stance time on R LE. Pt needed several rest breaks with ambulation due to fatigue. Pt limited by fatigue.   -     Row Name 12/13/19 1310          Mobility Assessment/Intervention    Extremity Weight-bearing Status  right lower extremity  -CS     Right Lower Extremity (Weight-bearing Status)  weight-bearing as tolerated (WBAT)  -       User Key  (r) = Recorded By, (t) = Taken By, (c) = Cosigned By    Initials Name Provider Type    Davina Cerrato, PT Physical Therapist        Obj/Interventions     Row Name 12/13/19 1310          General ROM    GENERAL ROM COMMENTS  R hip ROM limited by 25%  -     Row Name 12/13/19 1310          MMT (Manual Muscle Testing)    General MMT Comments  Pt unable to perform SLR indep on R LE. R LE MMT 3-/5  -     Row Name 12/13/19 1310          Therapeutic Exercise    Lower Extremity (Therapeutic Exercise)  heel slides, right;gluteal sets;quad sets, right;LAQ (long arc quad), right  -CS     Lower Extremity Range of Motion (Therapeutic Exercise)  ankle  dorsiflexion/plantar flexion, bilateral;hip abduction/adduction, right  -CS     Exercise Type (Therapeutic Exercise)  AAROM (active assistive range of motion);isometric contraction, static;isotonic contraction, concentric;AROM (active range of motion)  -CS     Sets/Reps (Therapeutic Exercise)  10 reps each  -CS     Comment (Therapeutic Exercise)  VC's on technique   -CS     Row Name 12/13/19 1310          Sensory Assessment/Intervention    Sensory General Assessment  no sensation deficits identified  -CS       User Key  (r) = Recorded By, (t) = Taken By, (c) = Cosigned By    Initials Name Provider Type    CS Davina Chatman, PT Physical Therapist        Goals/Plan     Row Name 12/13/19 1310          Bed Mobility Goal 1 (PT)    Activity/Assistive Device (Bed Mobility Goal 1, PT)  bed mobility activities, all  -CS     [place] Level/Cues Needed (Bed Mobility Goal 1, PT)  conditional independence  -CS     Time Frame (Bed Mobility Goal 1, PT)  long term goal (LTG);3 days  -CS     Progress/Outcomes (Bed Mobility Goal 1, PT)  goal ongoing  -CS     Row Name 12/13/19 1310          Transfer Goal 1 (PT)    Activity/Assistive Device (Transfer Goal 1, PT)  sit-to-stand/stand-to-sit;bed-to-chair/chair-to-bed  -CS     [place] Level/Cues Needed (Transfer Goal 1, PT)  conditional independence  -CS     Time Frame (Transfer Goal 1, PT)  long term goal (LTG);3 days  -CS     Progress/Outcome (Transfer Goal 1, PT)  goal ongoing  -CS     Row Name 12/13/19 1310          Gait Training Goal 1 (PT)    Activity/Assistive Device (Gait Training Goal 1, PT)  gait (walking locomotion);assistive device use;walker, rolling  -CS     [place] Level (Gait Training Goal 1, PT)  conditional independence  -CS     Distance (Gait Goal 1, PT)  150'  -CS     Time Frame (Gait Training Goal 1, PT)  long term goal (LTG);3 days  -CS     Progress/Outcome (Gait Training Goal 1, PT)  goal ongoing  -CS     Row Name 12/13/19 1310          Stairs Goal  1 (PT)    Activity/Assistive Device (Stairs Goal 1, PT)  stairs, all skills;using handrail, right;cane, straight  -CS     [place] Level/Cues Needed (Stairs Goal 1, PT)  standby assist  -CS     Number of Stairs (Stairs Goal 1, PT)  3  -CS     Time Frame (Stairs Goal 1, PT)  long term goal (LTG);3 days  -CS     Progress/Outcome (Stairs Goal 1, PT)  goal ongoing  -CS       User Key  (r) = Recorded By, (t) = Taken By, (c) = Cosigned By    Initials Name Provider Type    CS Davina Chatman, PT Physical Therapist        Clinical Impression     Row Name 12/13/19 1310          Pain Assessment    Additional Documentation  Pain Scale: Numbers Pre/Post-Treatment (Group)  -CS     Row Name 12/13/19 1310          Pain Scale: Numbers Pre/Post-Treatment    Pain Scale: Numbers, Pretreatment  8/10  -CS     Pain Scale: Numbers, Post-Treatment  8/10  -CS     Pain Location - Side  Right  -CS     Pain Location - Orientation  incisional  -CS     Pain Location  hip  -CS     Pain Intervention(s)  Repositioned;Ambulation/increased activity  -CS     Row Name 12/13/19 1310          Plan of Care Review    Plan of Care Reviewed With  patient  -CS     Progress  improving  -CS     Outcome Summary  Pt able to amb 40' with step to gait mechanics with several standing rest breaks due to fatigue. Pt limited with gait by fatigue. Encouraged patient to ambulate later with nursing. Recommend patient home with assist and home health PT. PADD: 9  -CS     Row Name 12/13/19 1310          Physical Therapy Clinical Impression    Patient/Family Goals Statement (PT Clinical Impression)  To go home  -CS     Criteria for Skilled Interventions Met (PT Clinical Impression)  yes;treatment indicated  -CS     Rehab Potential (PT Clinical Summary)  good, to achieve stated therapy goals  -CS     Predicted Duration of Therapy (PT)  3 days  -CS     Row Name 12/13/19 1310          Positioning and Restraints    Pre-Treatment Position  in bed  -CS     Post Treatment  Position  chair  -CS     In Chair  reclined;call light within reach;encouraged to call for assist;exit alarm on;with family/caregiver;legs elevated  -CS       User Key  (r) = Recorded By, (t) = Taken By, (c) = Cosigned By    Initials Name Provider Type    Davina Cerrato, KELLY Physical Therapist        Outcome Measures     Row Name 12/13/19 1310          How much help from another person do you currently need...    Turning from your back to your side while in flat bed without using bedrails?  3  -CS     Moving from lying on back to sitting on the side of a flat bed without bedrails?  3  -CS     Moving to and from a bed to a chair (including a wheelchair)?  3  -CS     Standing up from a chair using your arms (e.g., wheelchair, bedside chair)?  3  -CS     Climbing 3-5 steps with a railing?  2  -CS     To walk in hospital room?  3  -CS     AM-PAC 6 Clicks Score (PT)  17  -CS     Row Name 12/13/19 1310          Functional Assessment    Outcome Measure Options  AM-PAC 6 Clicks Basic Mobility (PT)  -CS       User Key  (r) = Recorded By, (t) = Taken By, (c) = Cosigned By    Initials Name Provider Type    Davina Cerrato PT Physical Therapist          PT Recommendation and Plan  Planned Therapy Interventions (PT Eval): balance training, bed mobility training, gait training, home exercise program, neuromuscular re-education, patient/family education, stair training, strengthening, ROM (range of motion), transfer training  Outcome Summary/Treatment Plan (PT)  Anticipated Equipment Needs at Discharge (PT): other (see comments)(none)  Anticipated Discharge Disposition (PT): home with assist, home with home health  Plan of Care Reviewed With: patient  Progress: improving  Outcome Summary: Pt able to amb 40' with step to gait mechanics with several standing rest breaks due to fatigue. Pt limited with gait by fatigue. Encouraged patient to ambulate later with nursing. Recommend patient home with assist and home health  PT. PADD: 9     Time Calculation:   PT Charges     Row Name 12/13/19 1310             Time Calculation    Start Time  1310  -CS      PT Received On  12/13/19  -CS      PT Goal Re-Cert Due Date  12/23/19  -CS         Time Calculation- PT    Total Timed Code Minutes- PT  20 minute(s)  -CS         Timed Charges    44502 - PT Therapeutic Exercise Minutes  5  -CS      88007 - Gait Training Minutes   15  -CS        User Key  (r) = Recorded By, (t) = Taken By, (c) = Cosigned By    Initials Name Provider Type    CS Davina Chatman, PT Physical Therapist        Therapy Charges for Today     Code Description Service Date Service Provider Modifiers Qty    80567602672 HC GAIT TRAINING EA 15 MIN 12/13/2019 Davina Chatman, PT GP 1    27280932349 HC PT EVAL MOD COMPLEXITY 3 12/13/2019 Dvaina Chatman, PT GP 1    90659987847 HC PT THER SUPP EA 15 MIN 12/13/2019 Davina Chatman, PT GP 2          PT G-Codes  Outcome Measure Options: AM-PAC 6 Clicks Basic Mobility (PT)  AM-PAC 6 Clicks Score (PT): 17    Davina Chatman, PT  12/13/2019

## 2019-12-14 ENCOUNTER — APPOINTMENT (OUTPATIENT)
Dept: GENERAL RADIOLOGY | Facility: HOSPITAL | Age: 54
End: 2019-12-14

## 2019-12-14 ENCOUNTER — APPOINTMENT (OUTPATIENT)
Dept: CARDIOLOGY | Facility: HOSPITAL | Age: 54
End: 2019-12-14

## 2019-12-14 LAB
ANION GAP SERPL CALCULATED.3IONS-SCNC: 11 MMOL/L (ref 5–15)
ANION GAP SERPL CALCULATED.3IONS-SCNC: 11 MMOL/L (ref 5–15)
BUN BLD-MCNC: 18 MG/DL (ref 6–20)
BUN BLD-MCNC: 19 MG/DL (ref 6–20)
BUN/CREAT SERPL: 26.4 (ref 7–25)
BUN/CREAT SERPL: 27.3 (ref 7–25)
CALCIUM SPEC-SCNC: 8.4 MG/DL (ref 8.6–10.5)
CALCIUM SPEC-SCNC: 8.7 MG/DL (ref 8.6–10.5)
CHLORIDE SERPL-SCNC: 104 MMOL/L (ref 98–107)
CHLORIDE SERPL-SCNC: 110 MMOL/L (ref 98–107)
CO2 SERPL-SCNC: 24 MMOL/L (ref 22–29)
CO2 SERPL-SCNC: 28 MMOL/L (ref 22–29)
CREAT BLD-MCNC: 0.66 MG/DL (ref 0.57–1)
CREAT BLD-MCNC: 0.72 MG/DL (ref 0.57–1)
GFR SERPL CREATININE-BSD FRML MDRD: 84 ML/MIN/1.73
GFR SERPL CREATININE-BSD FRML MDRD: 93 ML/MIN/1.73
GLUCOSE BLD-MCNC: 123 MG/DL (ref 65–99)
GLUCOSE BLD-MCNC: 84 MG/DL (ref 65–99)
HCT VFR BLD AUTO: 25.9 % (ref 34–46.6)
HGB BLD-MCNC: 8 G/DL (ref 12–15.9)
NT-PROBNP SERPL-MCNC: 808.2 PG/ML (ref 5–900)
POTASSIUM BLD-SCNC: 3.7 MMOL/L (ref 3.5–5.2)
POTASSIUM BLD-SCNC: 3.8 MMOL/L (ref 3.5–5.2)
SODIUM BLD-SCNC: 143 MMOL/L (ref 136–145)
SODIUM BLD-SCNC: 145 MMOL/L (ref 136–145)

## 2019-12-14 PROCEDURE — 97116 GAIT TRAINING THERAPY: CPT | Performed by: PHYSICAL THERAPIST

## 2019-12-14 PROCEDURE — 93971 EXTREMITY STUDY: CPT | Performed by: INTERNAL MEDICINE

## 2019-12-14 PROCEDURE — 25010000002 FUROSEMIDE PER 20 MG: Performed by: INTERNAL MEDICINE

## 2019-12-14 PROCEDURE — 97110 THERAPEUTIC EXERCISES: CPT | Performed by: PHYSICAL THERAPIST

## 2019-12-14 PROCEDURE — 71046 X-RAY EXAM CHEST 2 VIEWS: CPT

## 2019-12-14 PROCEDURE — 83880 ASSAY OF NATRIURETIC PEPTIDE: CPT | Performed by: INTERNAL MEDICINE

## 2019-12-14 PROCEDURE — 93971 EXTREMITY STUDY: CPT

## 2019-12-14 PROCEDURE — 94799 UNLISTED PULMONARY SVC/PX: CPT

## 2019-12-14 PROCEDURE — 85018 HEMOGLOBIN: CPT | Performed by: ORTHOPAEDIC SURGERY

## 2019-12-14 PROCEDURE — 80048 BASIC METABOLIC PNL TOTAL CA: CPT | Performed by: INTERNAL MEDICINE

## 2019-12-14 PROCEDURE — 80048 BASIC METABOLIC PNL TOTAL CA: CPT | Performed by: ORTHOPAEDIC SURGERY

## 2019-12-14 PROCEDURE — 85014 HEMATOCRIT: CPT | Performed by: ORTHOPAEDIC SURGERY

## 2019-12-14 RX ORDER — SUMATRIPTAN 50 MG/1
50 TABLET, FILM COATED ORAL
Status: DISCONTINUED | OUTPATIENT
Start: 2019-12-14 | End: 2019-12-15 | Stop reason: HOSPADM

## 2019-12-14 RX ORDER — FUROSEMIDE 10 MG/ML
20 INJECTION INTRAMUSCULAR; INTRAVENOUS ONCE
Status: COMPLETED | OUTPATIENT
Start: 2019-12-14 | End: 2019-12-14

## 2019-12-14 RX ORDER — ALBUTEROL SULFATE 2.5 MG/3ML
2.5 SOLUTION RESPIRATORY (INHALATION) EVERY 6 HOURS PRN
Status: DISCONTINUED | OUTPATIENT
Start: 2019-12-14 | End: 2019-12-15 | Stop reason: HOSPADM

## 2019-12-14 RX ORDER — OXYBUTYNIN CHLORIDE 5 MG/1
15 TABLET, EXTENDED RELEASE ORAL DAILY
Status: DISCONTINUED | OUTPATIENT
Start: 2019-12-14 | End: 2019-12-15 | Stop reason: HOSPADM

## 2019-12-14 RX ADMIN — ACETAMINOPHEN 1000 MG: 500 TABLET, FILM COATED ORAL at 04:42

## 2019-12-14 RX ADMIN — GABAPENTIN 600 MG: 300 CAPSULE ORAL at 22:23

## 2019-12-14 RX ADMIN — ASPIRIN 81 MG: 81 TABLET, COATED ORAL at 22:22

## 2019-12-14 RX ADMIN — OXYCODONE HYDROCHLORIDE 10 MG: 5 TABLET ORAL at 22:31

## 2019-12-14 RX ADMIN — OXYBUTYNIN CHLORIDE 15 MG: 5 TABLET, EXTENDED RELEASE ORAL at 14:01

## 2019-12-14 RX ADMIN — TRAZODONE HYDROCHLORIDE 50 MG: 50 TABLET ORAL at 22:23

## 2019-12-14 RX ADMIN — FUROSEMIDE 20 MG: 10 INJECTION, SOLUTION INTRAMUSCULAR; INTRAVENOUS at 11:40

## 2019-12-14 RX ADMIN — GABAPENTIN 600 MG: 300 CAPSULE ORAL at 08:21

## 2019-12-14 RX ADMIN — ESCITALOPRAM OXALATE 20 MG: 20 TABLET ORAL at 08:15

## 2019-12-14 RX ADMIN — ACETAMINOPHEN 1000 MG: 500 TABLET, FILM COATED ORAL at 14:01

## 2019-12-14 RX ADMIN — MELOXICAM 15 MG: 7.5 TABLET ORAL at 08:15

## 2019-12-14 RX ADMIN — OXYCODONE HYDROCHLORIDE 10 MG: 5 TABLET ORAL at 04:42

## 2019-12-14 RX ADMIN — ASPIRIN 81 MG: 81 TABLET, COATED ORAL at 08:15

## 2019-12-14 RX ADMIN — SODIUM CHLORIDE 500 ML: 9 INJECTION, SOLUTION INTRAVENOUS at 16:07

## 2019-12-14 RX ADMIN — ACETAMINOPHEN 1000 MG: 500 TABLET, FILM COATED ORAL at 22:23

## 2019-12-14 RX ADMIN — TAMSULOSIN HYDROCHLORIDE 0.4 MG: 0.4 CAPSULE ORAL at 08:15

## 2019-12-14 RX ADMIN — LEVOTHYROXINE SODIUM 25 MCG: 25 TABLET ORAL at 04:43

## 2019-12-14 RX ADMIN — OXYBUTYNIN CHLORIDE 5 MG: 5 TABLET ORAL at 08:15

## 2019-12-14 NOTE — PROGRESS NOTES
Orthopedic Progress Note      Patient: Astrid Sam  YOB: 1965     Date of Admission: 12/12/2019  9:14 AM Medical Record Number: [id number]     Attending Physician: Mele Chatterjee MD    Status Post:  Procedure(s):  RIGHT DIRECT TOTAL HIP ARTHROPLASTY ANTERIOR Post Operative Day Number: 2    Subjective : No new orthopaedic complaints     Pain Relief: some relief with present medication.     Systemic Complaints:urinary symptoms improved some low O2 sats,   Vitals:    12/14/19 0409 12/14/19 0738 12/14/19 1057 12/14/19 1130   BP: 97/65 113/66     BP Location: Left arm Left arm     Patient Position: Lying Lying     Pulse: 66 83     Resp: 16 16     Temp: 98 °F (36.7 °C) 97.6 °F (36.4 °C) 99.7 °F (37.6 °C) 98.7 °F (37.1 °C)   TempSrc: Oral Oral Oral Oral   SpO2: 95% 92%     Weight:       Height:           Physical Exam: 54 y.o. female    General Appearance:       Alert, cooperative, in no acute distress                  Extremities:    Dressing Clean, Dry and Intact               No clinical sign of DVT        Able to do good movements of digits    Pulses:   Pulses palpable and equal bilaterally           Diagnostic Tests:     Results from last 7 days   Lab Units 12/14/19  0513 12/13/19  0427 12/12/19  2058 12/10/19  1004   WBC 10*3/mm3  --  11.04* 11.68* 6.79   HEMOGLOBIN g/dL 8.0* 8.8* 9.6* 12.4   HEMATOCRIT % 25.9* 27.5* 30.3* 39.1   PLATELETS 10*3/mm3  --  232 262 269     Results from last 7 days   Lab Units 12/14/19  1312 12/14/19  0513 12/13/19  0427   SODIUM mmol/L 143 145 143   POTASSIUM mmol/L 3.7 3.8 4.2   CHLORIDE mmol/L 104 110* 109*   CO2 mmol/L 28.0 24.0 24.0   BUN mg/dL 19 18 15   CREATININE mg/dL 0.72 0.66 0.76   GLUCOSE mg/dL 123* 84 145*   CALCIUM mg/dL 8.7 8.4* 8.7     Results from last 7 days   Lab Units 12/10/19  1004   INR  1.01   APTT seconds 36.3     No results found for: URICACID  No results found for: CRYSTAL  Microbiology Results (last 10 days)     Procedure Component  Value - Date/Time    MRSA Screen, PCR - Swab, Nares [[id number]]  (Normal) Collected:  12/10/19 1004    Lab Status:  Final result Specimen:  Swab from Nares Updated:  12/10/19 1309     MRSA PCR Negative    Narrative:       MRSA Negative        Ct Abdomen Pelvis Without Contrast    Result Date: 12/12/2019  1. There is a Ram catheter in the bladder which is largely decompressed. Left-sided anterolateral to the bladder also adjacent to the anterior left uterine fundus is an ovoid mildly hyperdense structure measuring about 2.9 x 1.4 x 1.4 cm dimension. This is unfortunately largely obscured by streak artifact from the bilateral hip arthroplasties. Recommend correlation with a pelvic ultrasound. See differential possibilities above. 2. Small fat-containing right inguinal hernia. 3. No evidence for ureter tract calculus disease. 4. Postcholecystectomy with likely postoperative biliary ductal dilatation. Is probably a small amount of air within the distal common bile duct but alternate consideration is a duodenal diverticulum. 5. No bowel obstruction; radiographically normal appendix. 6. Likely atelectasis lung bases right greater than left. Signer Name: Aminah Baker MD  Signed: 12/12/2019 9:43 PM  Workstation Name: [facility]  [facility] Pelvis Complete    Result Date: 12/13/2019  Findings to suggest subserosal 2 cm fibroid likely correlating to the area of interest seen on the prior CT scan. The remainder of the ultrasound of the pelvis is unremarkable.  D:  12/13/2019 E:  12/13/2019    This report was finalized on 12/13/2019 8:01 PM by Dr. Monica Mcdermott MD.      Fl C Arm During Surgery    Result Date: 12/14/2019  The previous study demonstrates patchy groundglass opacity which has resolved in the interval with stability of the nodule at the right lung base.  DICTATED:   12/12/2019 EDITED/ls :   12/14/2019         Xr Chest Pa & Lateral    Result Date: 12/14/2019  Chronic changes in with  the lung fields with mild increased markings identified at the right lung base. Early infiltrate cannot be excluded. The remainder the lung fields are clear       Xr Chest Pa & Lateral    Result Date: 12/11/2019  No acute cardiothoracic findings. Radiopaque device over the heart likely indicative of prior patent foramen ovale repair Signer Name: Mónica Penaloza MD  Signed: 12/11/2019 1:14 PM  [facility]: [facility]    Xr Hip With Or Without Pelvis 2 - 3 View Right    Result Date: 12/13/2019  Status post right total hip arthroplasty.  DICTATED:   12/12/2019 EDITED/ls :   12/12/2019  This report was finalized on 12/13/2019 6:48 AM by Dr. Aníbal Chen.          Current Medications:  Scheduled Meds:    acetaminophen 1,000 mg Oral Q8H   aspirin 81 mg Oral Q12H   escitalopram 20 mg Oral Daily   gabapentin 600 mg Oral TID   levothyroxine 25 mcg Oral Q AM   meloxicam 15 mg Oral Daily   oxybutynin XL 15 mg Oral Daily   tamsulosin 0.4 mg Oral Daily   traZODone 50 mg Oral Nightly     Continuous Infusions:    lactated ringers 9 mL/hr Last Rate: 9 mL/hr (12/12/19 1028)   lactated ringers 100 mL/hr Last Rate: 100 mL/hr (12/13/19 0440)     PRN Meds:.albuterol  •  bisacodyl  •  bisacodyl  •  docusate sodium  •  HYDROmorphone **AND** naloxone  •  ketorolac  •  labetalol  •  ondansetron  •  oxyCODONE  •  oxyCODONE  •  sennosides-docusate  •  sodium chloride  •  SUMAtriptan    Assessment: Status post  RIGHT DIRECT TOTAL HIP ARTHROPLASTY ANTERIOR    Patient Active Problem List   Diagnosis   • Migraine   • Insomnia   • Iron deficiency anemia   • Left shoulder pain   • Cervicalgia   • Cervical radicular pain   • Diarrhea   • Irritability   • Acquired hypothyroidism   • Fever blister   • Right hip pain   • Arthritis of right hip   • Status post total replacement of right hip   • Acute postoperative pain   • Acute blood loss anemia   • Leukocytosis, likely reactive   • Acute urinary retention        PLAN:   Continues current post-op course  Anticoagulation: Aspirin started  Mobilize with PT as tolerated per protocol  Appreciate uro recs  Will see how afternoon therapy goes and O2 sats  Possible d/c home today     Weight Bearing: WBAT      Mlee Chatterjee MD    Date: 12/14/2019    Time: 3:06 PM

## 2019-12-14 NOTE — THERAPY TREATMENT NOTE
Patient Name: Astrid Sam  : 1965    MRN: [id number]                              Today's Date: 2019       Admit Date: 2019    Visit Dx: No diagnosis found.  Patient Active Problem List   Diagnosis   • Migraine   • Insomnia   • Iron deficiency anemia   • Left shoulder pain   • Cervicalgia   • Cervical radicular pain   • Diarrhea   • Irritability   • Acquired hypothyroidism   • Fever blister   • Right hip pain   • Arthritis of right hip   • Status post total replacement of right hip   • Acute postoperative pain   • Acute blood loss anemia   • Leukocytosis, likely reactive   • Acute urinary retention     Past Medical History:   Diagnosis Date   • Allergic    • Anemia     after hip replacement    • Arthritis    • Migraine     h/o   • PFO (patent foramen ovale)    • PONV (postoperative nausea and vomiting)     preprocedural meds help and pt will need them    • Right shoulder pain    • Wears glasses      Past Surgical History:   Procedure Laterality Date   •  SECTION     • COLONOSCOPY     • ENDOSCOPY     • FRACTURE SURGERY      left leg- hardware in place    • KNEE ARTHROSCOPY Right     done twice   • PATELLA SURGERY Left     removed left knee cap   • REPLACEMENT TOTAL KNEE Right 2018    revision 2019   • TOTAL HIP ARTHROPLASTY Left    • TUBAL ABDOMINAL LIGATION       General Information     Row Name 19 1350 19 0830       PT Evaluation Time/Intention    Document Type  therapy note (daily note)  -CS  therapy note (daily note)  -CS    Mode of Treatment  physical therapy;individual therapy  -CS  individual therapy;physical therapy  -CS    Row Name 19 1350 19 0830       General Information    Patient Profile Reviewed?  yes  -CS  yes  -CS    Existing Precautions/Restrictions  fall;right;hip, anterior  -CS  fall;right;hip, anterior  -CS    Row Name 19 1350 19 0830       Cognitive Assessment/Intervention- PT/OT    Orientation Status  (Cognition)  oriented x 4  -CS  oriented x 4  -CS    Row Name 12/14/19 1350 12/14/19 0830       Safety Issues, Functional Mobility    Safety Issues Affecting Function (Mobility)  safety precautions follow-through/compliance;safety precaution awareness  -CS  safety precautions follow-through/compliance;safety precaution awareness;insight into deficits/self awareness  -CS    Impairments Affecting Function (Mobility)  endurance/activity tolerance;pain;range of motion (ROM);strength;shortness of breath  -CS  balance;endurance/activity tolerance;pain;strength;range of motion (ROM)  -CS      User Key  (r) = Recorded By, (t) = Taken By, (c) = Cosigned By    Initials Name Provider Type    CS Davina Chatman, PT Physical Therapist        Mobility     Row Name 12/14/19 1350 12/14/19 0830       Bed Mobility Assessment/Treatment    Bed Mobility Assessment/Treatment  supine-sit  -CS  supine-sit  -CS    Supine-Sit [place] (Bed Mobility)  minimum assist (75% patient effort)  -CS  minimum assist (75% patient effort)  -CS    Assistive Device (Bed Mobility)  head of bed elevated;bed rails  -CS  head of bed elevated;bed rails  -CS    Comment (Bed Mobility)  Pt needs min assist to move R LE to EOB. Issued leg  at end of treatment session to aide with getting in and out of the bed.  -CS  Pt needs min assist to move R LE to EOB. Pt needs min assist to lift trunk off of bed to sit EOB.  -    Row Name 12/14/19 1350 12/14/19 0830       Transfer Assessment/Treatment    Comment (Transfers)  VC's to push off of bed to stand and to reach back for chair to sit. VC's to step R LE out with sitting for comfort.   -CS  VC's to push off of bed to stand and to reach back for chair to sit. VC's to step R LE out with sitting for comfort.   -    Row Name 12/14/19 1350 12/14/19 0830       Bed-Chair Transfer    Bed-Chair [place] (Transfers)  contact guard  -CS  contact guard  -CS    Assistive Device (Bed-Chair Transfers)  bhakti  4-wheeled  -CS  walker, front-wheeled  -CS    Row Name 12/14/19 1350 12/14/19 0830       Sit-Stand Transfer    Sit-Stand [place] (Transfers)  contact guard  -CS  contact guard  -CS    Assistive Device (Sit-Stand Transfers)  walker, front-wheeled  -CS  walker, front-wheeled  -CS    Row Name 12/14/19 1350 12/14/19 0830       Gait/Stairs Assessment/Training    28729 - Gait Training Minutes   45  -CS  45  -CS    Gait/Stairs Assessment/Training  gait/ambulation independence;gait/ambulation assistive device;distance ambulated;gait pattern  -CS  gait/ambulation independence;gait/ambulation assistive device;gait pattern;distance ambulated  -CS    [place] Level (Gait)  contact guard;other (see comments) 1 person with chair follow  -CS  contact guard;other (see comments) 1 person with chair follow  -CS    Assistive Device (Gait)  walker, front-wheeled  -CS  walker, front-wheeled  -CS    Distance in Feet (Gait)  20' + 18'  -CS  200'  -CS    Pattern (Gait)  step-through  -CS  step-to  -CS    Deviations/Abnormal Patterns (Gait)  bilateral deviations;mee decreased;gait speed decreased;stride length decreased  -CS  bilateral deviations;mee decreased;gait speed decreased;stride length decreased  -CS    Bilateral Gait Deviations  forward flexed posture  -CS  forward flexed posture  -CS    Right Sided Gait Deviations  heel strike decreased;weight shift ability decreased  -CS  heel strike decreased;weight shift ability decreased  -CS    Negotiation (Stairs)  stairs independence;stairs assistive device;handrail location;number of steps  -CS  stairs independence;stairs assistive device;handrail location;number of steps  -CS    [place] Level (Stairs)  contact guard;2 person assist  -CS  moderate assist (50% patient effort);2 person assist  -CS    Assistive Device (Stairs)  cane, straight  -CS  cane, straight  -CS    Handrail Location (Stairs)  right side (ascending)  -CS  right side (ascending)  -CS    Number of  Steps (Stairs)  3  -CS  3  -CS    Ascending Technique (Stairs)  step-to-step  -CS  step-to-step  -CS    Descending Technique (Stairs)  step-to-step  -CS  step-to-step  -CS    Comment (Gait/Stairs)  Brought patient to therapy gym in chair to conserve energy. Pt able to ascend/descend 3 steps with R HR and STC in L UE CGA. Pt needs VC's on clearance of R LE on stepping down and continues to be fearful but had improved safety awareness this PM. Pt's O2 dropped to 74% after stairs. Cued patient on deep breathing and O2 recovered to above 90%. Amb 20' and O2 dropped to 76%, sat pt in chair to recover and cued on deep breathing. Pt amb another 18' with cues on deep breathing with ambulation and O2 dropped to 79%. Sat pt back in chair and brought patient back to room and placed on supplemental O2 in room.  -CS  Pt amb 200' with RW CGA with very slow speed and step to mechanics. Pt needed several standing rest breaks due to fatigue. Wore tennis shoes with insert during ambulation due to leg length discrepancy. Pt able to ascend 3 steps with R handrail and STC in L UE CGA. Pt is mod assist x 2 for descend 3 steps with handrail and UE support. Pt is very fearful for descending stairs due to having fallen before going downstairs. Took patient several minutes to attempt going down the stairs.  -    Row Name 12/14/19 1350 12/14/19 0830       Mobility Assessment/Intervention    Extremity Weight-bearing Status  right lower extremity  -CS  right lower extremity  -CS    Right Lower Extremity (Weight-bearing Status)  weight-bearing as tolerated (WBAT)  -CS  (S) weight-bearing as tolerated (WBAT)  -CS      User Key  (r) = Recorded By, (t) = Taken By, (c) = Cosigned By    Initials Name Provider Type    Davina Cerrato PT Physical Therapist        Obj/Interventions     Row Name 12/14/19 1350 12/14/19 0830       Therapeutic Exercise    Lower Extremity (Therapeutic Exercise)  gluteal sets;quad sets, bilateral;heel slides,  right;LAQ (long arc quad), right;SLR (straight leg raise), right  -CS  gluteal sets;quad sets, bilateral;heel slides, right;LAQ (long arc quad), right;SLR (straight leg raise), right  -CS    Lower Extremity Range of Motion (Therapeutic Exercise)  ankle dorsiflexion/plantar flexion, bilateral;hip abduction/adduction, right  -CS  hip abduction/adduction, right;ankle dorsiflexion/plantar flexion, bilateral  -CS    Exercise Type (Therapeutic Exercise)  isometric contraction, static;AAROM (active assistive range of motion);isotonic contraction, concentric;AROM (active range of motion)  -CS  AAROM (active assistive range of motion);isometric contraction, static;isotonic contraction, concentric;AROM (active range of motion)  -CS    Position (Therapeutic Exercise)  seated;supine  -CS  seated;supine  -CS    Sets/Reps (Therapeutic Exercise)  10 reps each   -CS  10 reps each  -CS    Comment (Therapeutic Exercise)  VC's on technique. Max assist with SLR and mod assist with hip abd/add   -CS  VC's on technique. Max assist with R SLR and mod assist with hip abd/add  -CS    Row Name 12/14/19 7347 12/14/19 0830       Sensory Assessment/Intervention    Sensory General Assessment  no sensation deficits identified  -CS  no sensation deficits identified  -CS      User Key  (r) = Recorded By, (t) = Taken By, (c) = Cosigned By    Initials Name Provider Type    CS Davina Chatman, PT Physical Therapist        Goals/Plan     Row Name 12/14/19 1357 12/14/19 0861       Bed Mobility Goal 1 (PT)    Activity/Assistive Device (Bed Mobility Goal 1, PT)  bed mobility activities, all  -CS  bed mobility activities, all  -CS    [place] Level/Cues Needed (Bed Mobility Goal 1, PT)  conditional independence  -CS  conditional independence  -CS    Time Frame (Bed Mobility Goal 1, PT)  long term goal (LTG);3 days  -CS  long term goal (LTG);3 days  -CS    Progress/Outcomes (Bed Mobility Goal 1, PT)  goal ongoing  -CS  goal ongoing  -CS    Row Name  12/14/19 1350 12/14/19 0830       Transfer Goal 1 (PT)    Activity/Assistive Device (Transfer Goal 1, PT)  sit-to-stand/stand-to-sit;bed-to-chair/chair-to-bed  -CS  sit-to-stand/stand-to-sit;bed-to-chair/chair-to-bed  -CS    [place] Level/Cues Needed (Transfer Goal 1, PT)  conditional independence  -CS  conditional independence  -CS    Time Frame (Transfer Goal 1, PT)  long term goal (LTG);3 days  -CS  long term goal (LTG);3 days  -CS    Progress/Outcome (Transfer Goal 1, PT)  goal ongoing  -CS  goal ongoing  -CS    Row Name 12/14/19 1350 12/14/19 0830       Gait Training Goal 1 (PT)    Activity/Assistive Device (Gait Training Goal 1, PT)  gait (walking locomotion);assistive device use;walker, rolling  -CS  gait (walking locomotion);assistive device use;walker, rolling  -CS    [place] Level (Gait Training Goal 1, PT)  conditional independence  -CS  conditional independence  -CS    Distance (Gait Goal 1, PT)  150'  -CS  150'  -CS    Time Frame (Gait Training Goal 1, PT)  long term goal (LTG);3 days  -CS  long term goal (LTG);3 days  -CS    Progress/Outcome (Gait Training Goal 1, PT)  goal ongoing  -CS  goal ongoing  -CS    Row Name 12/14/19 1350 12/14/19 0830       Stairs Goal 1 (PT)    Activity/Assistive Device (Stairs Goal 1, PT)  stairs, all skills;using handrail, right;cane, straight  -CS  stairs, all skills;using handrail, right;cane, straight  -CS    [place] Level/Cues Needed (Stairs Goal 1, PT)  standby assist  -CS  standby assist  -CS    Number of Stairs (Stairs Goal 1, PT)  3  -CS  3  -CS    Time Frame (Stairs Goal 1, PT)  long term goal (LTG);3 days  -CS  long term goal (LTG);3 days  -CS    Progress/Outcome (Stairs Goal 1, PT)  goal ongoing  -CS  goal ongoing  -CS      User Key  (r) = Recorded By, (t) = Taken By, (c) = Cosigned By    Initials Name Provider Type    Davina Cerrato, PT Physical Therapist        Clinical Impression     Row Name 12/14/19 1350 12/14/19 0830       Pain  Assessment    Additional Documentation  Pain Scale: Numbers Pre/Post-Treatment (Group)  -CS  Pain Scale: Numbers Pre/Post-Treatment (Group)  -CS    Row Name 12/14/19 1350 12/14/19 0830       Pain Scale: Numbers Pre/Post-Treatment    Pain Scale: Numbers, Pretreatment  6/10  -CS  7/10  -CS    Pain Scale: Numbers, Post-Treatment  5/10  -CS  7/10  -CS    Pain Location - Side  Right  -CS  Right  -CS    Pain Location - Orientation  incisional  -CS  incisional  -CS    Pain Location  hip  -CS  hip  -CS    Pain Intervention(s)  Repositioned;Ambulation/increased activity;Cold applied  -CS  Repositioned;Ambulation/increased activity  -CS    Row Name 12/14/19 1350 12/14/19 0830       Plan of Care Review    Plan of Care Reviewed With  patient;spouse  -CS  patient;spouse  -CS    Progress  improving  -CS  improving  -CS    Outcome Summary  --  Pt able to amb 200' with step through gait mechanics with RW and CGA. Pt limited by fatigue. Ambulated in tennis shoes with lift in L LE and patient's gait mechanics improved. Pt ascended 3 steps with handrail and STC CGA but required mod assist x 2 to descend 3 steps with handrail and STC. Pt is very fearful to descend 3 steps due to history of fall and required rest break in between each step. Will attempt stairs this PM to determine safety of patient returning home.  -CS    Row Name 12/14/19 1350 12/14/19 0830       Physical Therapy Clinical Impression    Criteria for Skilled Interventions Met (PT Clinical Impression)  yes;treatment indicated  -CS  yes;treatment indicated  -CS    Rehab Potential (PT Clinical Summary)  good, to achieve stated therapy goals  -CS  good, to achieve stated therapy goals  -CS    Row Name 12/14/19 1350 12/14/19 0830       Positioning and Restraints    Pre-Treatment Position  in bed  -CS  in bed  -CS    Post Treatment Position  chair  -CS  chair  -CS    In Chair  notified nsg;reclined;call light within reach;encouraged to call for assist;exit alarm on;with  family/caregiver;legs elevated  -CS  notified nsg;reclined;call light within reach;encouraged to call for assist;exit alarm on;legs elevated;with family/caregiver  -CS      User Key  (r) = Recorded By, (t) = Taken By, (c) = Cosigned By    Initials Name Provider Type    CS Davina Chatman, KELLY Physical Therapist        Outcome Measures     Row Name 12/14/19 1350 12/14/19 0830       How much help from another person do you currently need...    Turning from your back to your side while in flat bed without using bedrails?  3  -CS  3  -CS    Moving from lying on back to sitting on the side of a flat bed without bedrails?  3  -CS  3  -CS    Moving to and from a bed to a chair (including a wheelchair)?  3  -CS  3  -CS    Standing up from a chair using your arms (e.g., wheelchair, bedside chair)?  3  -CS  3  -CS    Climbing 3-5 steps with a railing?  3  -CS  2  -CS    To walk in hospital room?  3  -CS  3  -CS    AM-PAC 6 Clicks Score (PT)  18  -CS  17  -CS    Row Name 12/14/19 1350 12/14/19 0830       Functional Assessment    Outcome Measure Options  AM-PAC 6 Clicks Basic Mobility (PT)  -CS  AM-PAC 6 Clicks Basic Mobility (PT)  -CS      User Key  (r) = Recorded By, (t) = Taken By, (c) = Cosigned By    Initials Name Provider Type    Davina Cerrato, KELLY Physical Therapist          PT Recommendation and Plan  Planned Therapy Interventions (PT Eval): balance training, bed mobility training, gait training, home exercise program, neuromuscular re-education, patient/family education, ROM (range of motion), stair training, strengthening, transfer training  Outcome Summary/Treatment Plan (PT)  Anticipated Equipment Needs at Discharge (PT): other (see comments)  Anticipated Discharge Disposition (PT): home with assist, home with home health  Plan of Care Reviewed With: patient, spouse  Progress: improving  Outcome Summary: Pt able to ascend/descend 3 steps with R handrail and STC in L UE CGA. Pt still fearful going down  stairs but had improved safety awareness this PM. Pt's O2 dropped to 74% after stairs, rested and cued pt to take deep breaths until pt's O2 recovered. Amb 20' and pt's O2 dropped to 76%, sat patient in chair until O2 recovered. Amb additional 18' and O2 dropped to 79%. Sat patient in chair and took patient back to room. Pt has increase in tenderness in R calf and increase in swelling in R LE compared to L LE. No increase in warmth in R LE compared to L LE. Made nursing aware.     Time Calculation:   PT Charges     Row Name 12/14/19 1350 12/14/19 0830          Time Calculation    Start Time  1350  -CS  0830  -CS     PT Received On  12/14/19  -CS  12/14/19  -CS        Time Calculation- PT    Total Timed Code Minutes- PT  55 minute(s)  -CS  55 minute(s)  -CS        Timed Charges    61870 - PT Therapeutic Exercise Minutes  10  -CS  10  -CS     33749 - Gait Training Minutes   45  -CS  45  -CS       User Key  (r) = Recorded By, (t) = Taken By, (c) = Cosigned By    Initials Name Provider Type    CS Davina Chatman, PT Physical Therapist        Therapy Charges for Today     Code Description Service Date Service Provider Modifiers Qty    90709013601 HC GAIT TRAINING EA 15 MIN 12/13/2019 Davina Chatman, PT GP 1    36772565619 HC PT EVAL MOD COMPLEXITY 3 12/13/2019 Davina Chatman, PT GP 1    63247692369 HC PT THER SUPP EA 15 MIN 12/13/2019 Davina Chatman, PT GP 2    92568668803 HC PT THER PROC EA 15 MIN 12/14/2019 Davina Chatman, PT GP 1    97738679343 HC GAIT TRAINING EA 15 MIN 12/14/2019 Davina Chatman, PT GP 3    88526018690 HC PT THER SUPP EA 15 MIN 12/14/2019 Davina Chatman, PT GP 4    22978751123 HC PT THER PROC EA 15 MIN 12/14/2019 aDvina Chatman, PT GP 1    65914886512 HC GAIT TRAINING EA 15 MIN 12/14/2019 Davina Chatman, PT GP 3    36616937782 HC PT THER SUPP EA 15 MIN 12/14/2019 Sefcovic, Davina, PT GP 4          PT G-Codes  Outcome Measure Options: AM-PAC 6 Clicks  Basic Mobility (PT)  AM-PAC 6 Clicks Score (PT): 18    Davina Chatman, PT  12/14/2019

## 2019-12-14 NOTE — THERAPY TREATMENT NOTE
Patient Name: Astrid Sam  : 1965    MRN: [id number]                              Today's Date: 2019       Admit Date: 2019    Visit Dx: No diagnosis found.  Patient Active Problem List   Diagnosis   • Migraine   • Insomnia   • Iron deficiency anemia   • Left shoulder pain   • Cervicalgia   • Cervical radicular pain   • Diarrhea   • Irritability   • Acquired hypothyroidism   • Fever blister   • Right hip pain   • Arthritis of right hip   • Status post total replacement of right hip   • Acute postoperative pain   • Acute blood loss anemia   • Leukocytosis, likely reactive   • Acute urinary retention     Past Medical History:   Diagnosis Date   • Allergic    • Anemia     after hip replacement    • Arthritis    • Migraine     h/o   • PFO (patent foramen ovale)    • PONV (postoperative nausea and vomiting)     preprocedural meds help and pt will need them    • Right shoulder pain    • Wears glasses      Past Surgical History:   Procedure Laterality Date   •  SECTION     • COLONOSCOPY     • ENDOSCOPY     • FRACTURE SURGERY      left leg- hardware in place    • KNEE ARTHROSCOPY Right     done twice   • PATELLA SURGERY Left     removed left knee cap   • REPLACEMENT TOTAL KNEE Right 2018    revision 2019   • TOTAL HIP ARTHROPLASTY Left    • TUBAL ABDOMINAL LIGATION       General Information     Row Name 19 8082          PT Evaluation Time/Intention    Document Type  therapy note (daily note)  -CS     Mode of Treatment  individual therapy;physical therapy  -CS     Row Name 19 1049          General Information    Patient Profile Reviewed?  yes  -CS     Existing Precautions/Restrictions  fall;right;hip, anterior  -CS     Row Name 19 5468          Cognitive Assessment/Intervention- PT/OT    Orientation Status (Cognition)  oriented x 4  -CS     Row Name 19 3863          Safety Issues, Functional Mobility    Safety Issues Affecting Function (Mobility)   safety precautions follow-through/compliance;safety precaution awareness;insight into deficits/self awareness  -CS     Impairments Affecting Function (Mobility)  balance;endurance/activity tolerance;pain;strength;range of motion (ROM)  -CS       User Key  (r) = Recorded By, (t) = Taken By, (c) = Cosigned By    Initials Name Provider Type    CS Davina Chatman, PT Physical Therapist        Mobility     Row Name 12/14/19 0830          Bed Mobility Assessment/Treatment    Bed Mobility Assessment/Treatment  supine-sit  -CS     Supine-Sit [place] (Bed Mobility)  minimum assist (75% patient effort)  -CS     Assistive Device (Bed Mobility)  head of bed elevated;bed rails  -CS     Comment (Bed Mobility)  Pt needs min assist to move R LE to EOB. Pt needs min assist to lift trunk off of bed to sit EOB.  -CS     Row Name 12/14/19 0830          Transfer Assessment/Treatment    Comment (Transfers)  VC's to push off of bed to stand and to reach back for chair to sit. VC's to step R LE out with sitting for comfort.   -CS     Row Name 12/14/19 0877          Bed-Chair Transfer    Bed-Chair [place] (Transfers)  contact guard  -CS     Assistive Device (Bed-Chair Transfers)  walker, front-wheeled  -CS     Row Name 12/14/19 0830          Sit-Stand Transfer    Sit-Stand [place] (Transfers)  contact guard  -CS     Assistive Device (Sit-Stand Transfers)  walker, front-wheeled  -CS     Row Name 12/14/19 0812          Gait/Stairs Assessment/Training    75324 - Gait Training Minutes   45  -CS     Gait/Stairs Assessment/Training  gait/ambulation independence;gait/ambulation assistive device;gait pattern;distance ambulated  -CS     [place] Level (Gait)  contact guard;other (see comments) 1 person with chair follow  -CS     Assistive Device (Gait)  walker, front-wheeled  -CS     Distance in Feet (Gait)  200'  -CS     Pattern (Gait)  step-to  -CS     Deviations/Abnormal Patterns (Gait)  bilateral deviations;mee  decreased;gait speed decreased;stride length decreased  -CS     Bilateral Gait Deviations  forward flexed posture  -CS     Right Sided Gait Deviations  heel strike decreased;weight shift ability decreased  -CS     Negotiation (Stairs)  stairs independence;stairs assistive device;handrail location;number of steps  -CS     [place] Level (Stairs)  moderate assist (50% patient effort);2 person assist  -CS     Assistive Device (Stairs)  cane, straight  -CS     Handrail Location (Stairs)  right side (ascending)  -CS     Number of Steps (Stairs)  3  -CS     Ascending Technique (Stairs)  step-to-step  -CS     Descending Technique (Stairs)  step-to-step  -CS     Comment (Gait/Stairs)  Pt amb 200' with RW CGA with very slow speed and step to mechanics. Pt needed several standing rest breaks due to fatigue. Wore tennis shoes with insert during ambulation due to leg length discrepancy. Pt able to ascend 3 steps with R handrail and STC in L UE CGA. Pt is mod assist x 2 for descend 3 steps with handrail and UE support. Pt is very fearful for descending stairs due to having fallen before going downstairs. Took patient several minutes to attempt going down the stairs.  -CS     Row Name 12/14/19 6230          Mobility Assessment/Intervention    Extremity Weight-bearing Status  right lower extremity  -CS     Right Lower Extremity (Weight-bearing Status)  (S) weight-bearing as tolerated (WBAT)  -CS       User Key  (r) = Recorded By, (t) = Taken By, (c) = Cosigned By    Initials Name Provider Type    CS Davina Chatman PT Physical Therapist        Obj/Interventions     Row Name 12/14/19 4716          Therapeutic Exercise    Lower Extremity (Therapeutic Exercise)  gluteal sets;quad sets, bilateral;heel slides, right;LAQ (long arc quad), right;SLR (straight leg raise), right  -CS     Lower Extremity Range of Motion (Therapeutic Exercise)  hip abduction/adduction, right;ankle dorsiflexion/plantar flexion, bilateral  -CS      Exercise Type (Therapeutic Exercise)  AAROM (active assistive range of motion);isometric contraction, static;isotonic contraction, concentric;AROM (active range of motion)  -CS     Position (Therapeutic Exercise)  seated;supine  -CS     Sets/Reps (Therapeutic Exercise)  10 reps each  -CS     Comment (Therapeutic Exercise)  VC's on technique. Max assist with R SLR and mod assist with hip abd/add  -CS     Row Name 12/14/19 0830          Sensory Assessment/Intervention    Sensory General Assessment  no sensation deficits identified  -CS       User Key  (r) = Recorded By, (t) = Taken By, (c) = Cosigned By    Initials Name Provider Type    CS Davina Chatman, PT Physical Therapist        Goals/Plan     Row Name 12/14/19 0830          Bed Mobility Goal 1 (PT)    Activity/Assistive Device (Bed Mobility Goal 1, PT)  bed mobility activities, all  -CS     [place] Level/Cues Needed (Bed Mobility Goal 1, PT)  conditional independence  -CS     Time Frame (Bed Mobility Goal 1, PT)  long term goal (LTG);3 days  -CS     Progress/Outcomes (Bed Mobility Goal 1, PT)  goal ongoing  -CS     Row Name 12/14/19 0830          Transfer Goal 1 (PT)    Activity/Assistive Device (Transfer Goal 1, PT)  sit-to-stand/stand-to-sit;bed-to-chair/chair-to-bed  -CS     [place] Level/Cues Needed (Transfer Goal 1, PT)  conditional independence  -CS     Time Frame (Transfer Goal 1, PT)  long term goal (LTG);3 days  -CS     Progress/Outcome (Transfer Goal 1, PT)  goal ongoing  -CS     Row Name 12/14/19 0830          Gait Training Goal 1 (PT)    Activity/Assistive Device (Gait Training Goal 1, PT)  gait (walking locomotion);assistive device use;walker, rolling  -CS     [place] Level (Gait Training Goal 1, PT)  conditional independence  -CS     Distance (Gait Goal 1, PT)  150'  -CS     Time Frame (Gait Training Goal 1, PT)  long term goal (LTG);3 days  -CS     Progress/Outcome (Gait Training Goal 1, PT)  goal ongoing  -CS     Row Name  12/14/19 0830          Stairs Goal 1 (PT)    Activity/Assistive Device (Stairs Goal 1, PT)  stairs, all skills;using handrail, right;cane, straight  -CS     [place] Level/Cues Needed (Stairs Goal 1, PT)  standby assist  -CS     Number of Stairs (Stairs Goal 1, PT)  3  -CS     Time Frame (Stairs Goal 1, PT)  long term goal (LTG);3 days  -CS     Progress/Outcome (Stairs Goal 1, PT)  goal ongoing  -CS       User Key  (r) = Recorded By, (t) = Taken By, (c) = Cosigned By    Initials Name Provider Type    CS Davina Chatman, PT Physical Therapist        Clinical Impression     Row Name 12/14/19 0830          Pain Assessment    Additional Documentation  Pain Scale: Numbers Pre/Post-Treatment (Group)  -CS     Row Name 12/14/19 0830          Pain Scale: Numbers Pre/Post-Treatment    Pain Scale: Numbers, Pretreatment  7/10  -CS     Pain Scale: Numbers, Post-Treatment  7/10  -CS     Pain Location - Side  Right  -CS     Pain Location - Orientation  incisional  -CS     Pain Location  hip  -CS     Pain Intervention(s)  Repositioned;Ambulation/increased activity  -CS     Row Name 12/14/19 0830          Plan of Care Review    Plan of Care Reviewed With  patient;spouse  -CS     Progress  improving  -CS     Outcome Summary  Pt able to amb 200' with step through gait mechanics with RW and CGA. Pt limited by fatigue. Ambulated in tennis shoes with lift in L LE and patient's gait mechanics improved. Pt ascended 3 steps with handrail and STC CGA but required mod assist x 2 to descend 3 steps with handrail and STC. Pt is very fearful to descend 3 steps due to history of fall and required rest break in between each step. Will attempt stairs this PM to determine safety of patient returning home.  -CS     Row Name 12/14/19 0830          Physical Therapy Clinical Impression    Criteria for Skilled Interventions Met (PT Clinical Impression)  yes;treatment indicated  -CS     Rehab Potential (PT Clinical Summary)  good, to achieve  stated therapy goals  -CS     Row Name 12/14/19 0830          Positioning and Restraints    Pre-Treatment Position  in bed  -CS     Post Treatment Position  chair  -CS     In Chair  notified nsg;reclined;call light within reach;encouraged to call for assist;exit alarm on;legs elevated;with family/caregiver  -CS       User Key  (r) = Recorded By, (t) = Taken By, (c) = Cosigned By    Initials Name Provider Type    Davina Cerrato, KELLY Physical Therapist        Outcome Measures     Row Name 12/14/19 0830          How much help from another person do you currently need...    Turning from your back to your side while in flat bed without using bedrails?  3  -CS     Moving from lying on back to sitting on the side of a flat bed without bedrails?  3  -CS     Moving to and from a bed to a chair (including a wheelchair)?  3  -CS     Standing up from a chair using your arms (e.g., wheelchair, bedside chair)?  3  -CS     Climbing 3-5 steps with a railing?  2  -CS     To walk in hospital room?  3  -CS     AM-PAC 6 Clicks Score (PT)  17  -CS     Row Name 12/14/19 0830          Functional Assessment    Outcome Measure Options  AM-PAC 6 Clicks Basic Mobility (PT)  -CS       User Key  (r) = Recorded By, (t) = Taken By, (c) = Cosigned By    Initials Name Provider Type    Davina Cerrato, KELLY Physical Therapist          PT Recommendation and Plan  Planned Therapy Interventions (PT Eval): balance training, bed mobility training, gait training, home exercise program, neuromuscular re-education, patient/family education, ROM (range of motion), stair training, strengthening, transfer training  Outcome Summary/Treatment Plan (PT)  Anticipated Equipment Needs at Discharge (PT): other (see comments)  Anticipated Discharge Disposition (PT): home with assist, home with home health  Plan of Care Reviewed With: patient, spouse  Progress: improving  Outcome Summary: Pt able to amb 200' with step through gait mechanics with RW and CGA.  Pt limited by fatigue. Ambulated in tennis shoes with lift in L LE and patient's gait mechanics improved. Pt ascended 3 steps with handrail and STC CGA but required mod assist x 2 to descend 3 steps with handrail and STC. Pt is very fearful to descend 3 steps due to history of fall and required rest break in between each step. Will attempt stairs this PM to determine safety of patient returning home.     Time Calculation:   PT Charges     Row Name 12/14/19 0830             Time Calculation    Start Time  0830  -CS      PT Received On  12/14/19  -CS         Time Calculation- PT    Total Timed Code Minutes- PT  55 minute(s)  -CS         Timed Charges    70437 - PT Therapeutic Exercise Minutes  10  -CS      41919 - Gait Training Minutes   45  -CS        User Key  (r) = Recorded By, (t) = Taken By, (c) = Cosigned By    Initials Name Provider Type    CS Davina Chatman, PT Physical Therapist        Therapy Charges for Today     Code Description Service Date Service Provider Modifiers Qty    57341089250 HC GAIT TRAINING EA 15 MIN 12/13/2019 Davina Chatman, PT GP 1    38762670364 HC PT EVAL MOD COMPLEXITY 3 12/13/2019 Davina Chatman, PT GP 1    99824784696 HC PT THER SUPP EA 15 MIN 12/13/2019 Davina Chatman, PT GP 2    34974290378 HC PT THER PROC EA 15 MIN 12/14/2019 Davina Chatman, PT GP 1    13665328337 HC GAIT TRAINING EA 15 MIN 12/14/2019 Davina Chatman, PT GP 3    17700308869 HC PT THER SUPP EA 15 MIN 12/14/2019 Davina Chatman, PT GP 4          PT G-Codes  Outcome Measure Options: AM-PAC 6 Clicks Basic Mobility (PT)  AM-PAC 6 Clicks Score (PT): 17    Davina Chatman PT  12/14/2019

## 2019-12-14 NOTE — PLAN OF CARE
Problem: Patient Care Overview  Goal: Plan of Care Review  12/14/2019 7578 by Davina Chatman PT  Outcome: Ongoing (interventions implemented as appropriate)  Flowsheets (Taken 12/14/2019 1350)  Outcome Summary: Pt able to ascend/descend 3 steps with R handrail and STC in L UE CGA. Pt still fearful going down stairs but had improved safety awareness this PM. Pt's O2 dropped to 74% after stairs, rested and cued pt to take deep breaths until pt's O2 recovered. Amb 20' and pt's O2 dropped to 76%, sat patient in chair until O2 recovered. Amb additional 18' and O2 dropped to 79%. Sat patient in chair and took patient back to room. Pt has increase in tenderness in R calf and increase in swelling in R LE compared to L LE. No increase in warmth in R LE compared to L LE. Made nursing aware.

## 2019-12-14 NOTE — CONSULTS
Urology Consult Note    Referring Provider: Dr. Samson  Reason for Consultation: Urinary incontinence, bladder spasms    Patient Care Team:  Beckie Murphy DO as PCP - General (Family Medicine)    Chief complaint No chief complaint on file.        Subjective .     History of present illness:    Astrid Sam is a 54 y.o. female who was admitted for Arthritis of right hip [M16.11]. Patient was referred by Dr. RAMÍREZ for evaluation of Urinary incontinence, bladder spasms. Patient had lower urinary symptoms prior to hospitalization and hip surgery.  She complains of urinary urgency with urge incontinence.  She has sensation of incomplete bladder emptying.  No hematuria or dysuria.  She is unsure if she empties her bladder completely.  She had post void residual on 2019 of 45 mL, 2019 with 258 mL and a void of 53 minutes later with post void residual 25 mL.  She is taking tamsulosin and oxybutynin.  No hematuria or dysuria.  No chest pain or shortness of air.  No fevers or chills.    Review of Systems  Pertinent items are noted in HPI, all other systems reviewed and negative    History  Past Medical History:   Diagnosis Date   • Allergic    • Anemia     after hip replacement    • Arthritis    • Migraine     h/o   • PFO (patent foramen ovale)    • PONV (postoperative nausea and vomiting)     preprocedural meds help and pt will need them    • Right shoulder pain    • Wears glasses    , Past Surgical History:   Procedure Laterality Date   •  SECTION     • COLONOSCOPY     • ENDOSCOPY     • FRACTURE SURGERY      left leg- hardware in place    • KNEE ARTHROSCOPY Right     done twice   • PATELLA SURGERY Left     removed left knee cap   • REPLACEMENT TOTAL KNEE Right 2018    revision 2019   • TOTAL HIP ARTHROPLASTY Left    • TUBAL ABDOMINAL LIGATION     , Family History   Problem Relation Age of Onset   • Hypertension Mother    • Migraines Mother    • Hypertension Father    • COPD  Father    , Social History     Tobacco Use   • Smoking status: Never Smoker   • Smokeless tobacco: Never Used   Substance Use Topics   • Alcohol use: Yes     Comment: rare   • Drug use: No   , Medications Prior to Admission   Medication Sig Dispense Refill Last Dose   • escitalopram (LEXAPRO) 20 MG tablet Take 1 tablet by mouth Daily. 30 tablet 5 12/12/2019 at 0730   • gabapentin (NEURONTIN) 600 MG tablet Take 1 tablet by mouth 3 (Three) Times a Day. 90 tablet 2 12/12/2019 at 0730   • levothyroxine (SYNTHROID, LEVOTHROID) 25 MCG tablet Take 1 tablet by mouth Daily. 30 tablet 5 12/12/2019 at 0730   • traZODone (DESYREL) 100 MG tablet TAKE 0.5-1 TABLETS BY MOUTH EVERY NIGHT. (Patient taking differently: Take  mg by mouth At Night As Needed.) 30 tablet 5 12/11/2019 at 2100   • aspirin 81 MG EC tablet Take 81 mg by mouth Daily. Will continue after surgery   Taking   • rizatriptan MLT (MAXALT-MLT) 10 MG disintegrating tablet Take 1 tablet by mouth 1 (One) Time As Needed for Migraine for up to 1 dose. 9 tablet 5 12/4/2019   • valACYclovir (VALTREX) 1000 MG tablet Take 2 tablets by mouth 2 (Two) Times a Day. (Patient taking differently: Take 2,000 mg by mouth 2 (Two) Times a Day As Needed.) 12 tablet 3 More than a month at Unknown time   , Scheduled Meds:    acetaminophen 1,000 mg Oral Q8H   aspirin 81 mg Oral Q12H   escitalopram 20 mg Oral Daily   gabapentin 600 mg Oral TID   levothyroxine 25 mcg Oral Q AM   meloxicam 15 mg Oral Daily   oxybutynin 5 mg Oral TID   tamsulosin 0.4 mg Oral Daily   traZODone 50 mg Oral Nightly   , Continuous Infusions:    lactated ringers 9 mL/hr Last Rate: 9 mL/hr (12/12/19 1028)   lactated ringers 100 mL/hr Last Rate: 100 mL/hr (12/13/19 2580)   , PRN Meds:  bisacodyl  •  bisacodyl  •  docusate sodium  •  HYDROmorphone **AND** naloxone  •  ketorolac  •  labetalol  •  ondansetron  •  oxyCODONE  •  oxyCODONE  •  sennosides-docusate  •  sodium chloride  •  SUMAtriptan and Allergies:   Morphine    Objective     Vital Signs   Temp:  [97.6 °F (36.4 °C)-99.7 °F (37.6 °C)] 98.7 °F (37.1 °C)  Heart Rate:  [66-93] 83  Resp:  [16] 16  BP: ()/(53-66) 113/66    Physical Exam:   General Appearance: alert, appears stated age and cooperative  Head: normocephalic, without obvious abnormality and atraumatic  Neck: supple and trachea midline  Lungs: respirations regular, respirations even and respirations unlabored  Heart: regular rhythm & normal rate  Abdomen: soft non-tender and no guarding  Extremities: no edema and no cyanosis  Neurologic: Mental Status orientated to person, place, time and situation  Psych: normal        Results Review:   I reviewed the patient's new clinical results.  Lab Results (last 24 hours)     Procedure Component Value Units Date/Time    Basic Metabolic Panel [[id number]]  (Abnormal) Collected:  12/14/19 0513    Specimen:  Blood Updated:  12/14/19 0702     Glucose 84 mg/dL      BUN 18 mg/dL      Creatinine 0.66 mg/dL      Sodium 145 mmol/L      Potassium 3.8 mmol/L      Chloride 110 mmol/L      CO2 24.0 mmol/L      Calcium 8.4 mg/dL      eGFR Non African Amer 93 mL/min/1.73      BUN/Creatinine Ratio 27.3     Anion Gap 11.0 mmol/L     Narrative:       GFR Normal >60  Chronic Kidney Disease <60  Kidney Failure <15      Hemoglobin & Hematocrit, Blood [[id number]]  (Abnormal) Collected:  12/14/19 0513    Specimen:  Blood Updated:  12/14/19 0623     Hemoglobin 8.0 g/dL      Hematocrit 25.9 %          Imaging Results (Last 24 Hours)     Procedure Component Value Units Date/Time    US Pelvis Complete [[id number]] Collected:  12/13/19 1518     Updated:  12/13/19 2004    Narrative:       EXAMINATION: US PELVIS COMPLETE- 12/13/2019     INDICATION: generalized abdominal pain and bladder spasms     TECHNIQUE: Sonographic imaging was obtained of the pelvis in both the  sagittal and transverse planes.     COMPARISON: NONE     FINDINGS: The uterus measures 8.0 x 2.8 x 5.6 cm. Small  hypoechoic  structure identified subserosal in appearance extending from the  leftward aspect of the uterus likely correlating to the area seen on CT  measuring 2 cm suggesting possible fibroid. The endometrial stripe is  somewhat difficult to visualize. The cervix is grossly unremarkable.     The right ovary measures 1.9 x 1.3 x 1.3 cm. Good color flow present. No  underlying mass or lesion. No adnexal mass.     The left ovary measures 1.9 x 1.1 x 1.6 cm with good color flow present.  No underlying mass or lesion identified. The bladder is grossly  unremarkable.       Impression:       Findings to suggest subserosal 2 cm fibroid likely  correlating to the area of interest seen on the prior CT scan. The  remainder of the ultrasound of the pelvis is unremarkable.     D:  12/13/2019  E:  12/13/2019         This report was finalized on 12/13/2019 8:01 PM by Dr. Monica Mcdermott MD.       FL C Arm During Surgery [[id number]] Collected:  12/13/19 1652     Updated:  12/13/19 1653    Narrative:       EXAMINATION: FL C ARM DURING SURGERY-     INDICATION: REAGAN hip pain   COMPARISON: None     FINDINGS: 27 seconds of fluoroscopy and 3 images used for total right  hip arthroplasty. Please to the procedure performed for full details.          Impression:       The previous study demonstrates patchy groundglass opacity  which is all resolved in the interval with stability identified in the  nodule at the right lung base.                    Labs  Urine Microscopy:       Invalid input(s): LABCAST, Urinalysis:   Results from last 7 days   Lab Units 12/12/19  1855   PH, URINE  5.5   PROTEIN UA  Negative   GLUCOSE UA  Negative   KETONES UA  Negative   , Urine Culture:   , BMP:   Results from last 7 days   Lab Units 12/14/19  0513  12/10/19  1004   SODIUM mmol/L 145   < > 141   POTASSIUM mmol/L 3.8   < > 4.5   CALCIUM mg/dL 8.4*   < > 9.7   CHLORIDE mmol/L 110*   < > 103   CO2 mmol/L 24.0   < > 26.0   BUN mg/dL 18   < > 21*    CREATININE mg/dL 0.66   < > 0.75   ALBUMIN g/dL  --   --  4.60   BILIRUBIN mg/dL  --   --  0.4   ALK PHOS U/L  --   --  85    < > = values in this interval not displayed.    and CBC:   Results from last 7 days   Lab Units 12/14/19  0513 12/13/19  0427   WBC 10*3/mm3  --  11.04*   RBC 10*6/mm3  --  2.91*   HEMOGLOBIN g/dL 8.0* 8.8*   HEMATOCRIT % 25.9* 27.5*   PLATELETS 10*3/mm3  --  232       Imaging  none      Assessment   54 y.o. female with Urinary urgency with urge incontinence      Plan   She empties her bladder adequately with post void residual of 45 mL and 25 mL.  I would adjust anticholinergic therapy to see if this helps with her urgency and bladder spasms.  Recommend outpatient follow-up evaluation.      I discussed the patients findings and my recommendations with patient and family    Joshua Wilde MD  12/14/19  11:43 AM

## 2019-12-14 NOTE — PLAN OF CARE
Problem: Activity Intolerance (Adult)  Goal: Identify Related Risk Factors and Signs and Symptoms  Flowsheets (Taken 12/14/2019 1407)  Related Risk Factors (Activity Intolerance): generalized weakness; impaired pulmonary function; O2 supply/demand imbalance; obesity; sedentary lifestyle  Signs and Symptoms (Activity Intolerance): pain/discomfort

## 2019-12-14 NOTE — PLAN OF CARE
A&Ox4, mood/affect appropriate. Ambulates in the room with one person assist and walker. Dressing to the right hip CDI. Pain controlled with PO analgesic. PT is currently resting in bed w/o pain/acute distress. VSS will cont to mx. Call light in reach.

## 2019-12-14 NOTE — PROGRESS NOTES
"IM progress note      Astrid Sam  [id number]  1965     LOS: 2 days     Attending: Mele Chatterjee MD    Primary Care Provider: Beckie Murphy DO      Chief Complaint/Reason for visit:  Right hip pain    Subjective   Doing ok.  Good pain control.  Progressing slowly with PT.  Feels her bladder is full today.  Continues to require 2 L of oxygen to maintain sats in the 90s.  IV fluids have been stopped.    Objective     Visit Vitals  /66 (BP Location: Left arm, Patient Position: Lying)   Pulse 83   Temp 98.7 °F (37.1 °C) (Oral)   Resp 16   Ht 152.4 cm (60\")   Wt 75.3 kg (166 lb)   SpO2 92%   BMI 32.42 kg/m²     Temp (24hrs), Av.3 °F (36.8 °C), Min:97.6 °F (36.4 °C), Max:99.7 °F (37.6 °C)      Nutrition: PO    Respiratory: RA    Physical Therapy: Today's physical therapy is pending    Physical Exam:     General Appearance:    Alert, cooperative, in no acute distress   Head:    Normocephalic, without obvious abnormality, atraumatic    Lungs:     Normal effort, symmetric chest rise,   clear to      auscultation bilaterally    decreased breath sounds         Heart:    Regular rhythm and normal rate, normal S1 and S2   Abdomen:     Normal bowel sounds, no masses, no organomegaly, soft        non-tender, non-distended, no guarding, no rebound                tenderness   Extremities:   No clubbing, cyanosis or edema.  No deformities. Right hip Optifoam CDI    Pulses:   Pulses palpable and equal bilaterally   Skin:   No bleeding, bruising or rash   Neurologic:   Moves all extremities with no obvious focal motor deficit.  Cranial nerves 2 - 12 grossly intact. Flexion and dorsiflexion intact bilateral feet.       Results Review:     I reviewed the patient's new clinical results.   Results from last 7 days   Lab Units 19  0513 19  0427 12/12/19  2058 12/10/19  1004   WBC 10*3/mm3  --  11.04* 11.68* 6.79   HEMOGLOBIN g/dL 8.0* 8.8* 9.6* 12.4   HEMATOCRIT % 25.9* 27.5* 30.3* 39.1 "   PLATELETS 10*3/mm3  --  232 262 269     Results from last 7 days   Lab Units 12/14/19  0513 12/13/19  0427 12/12/19  2058 12/10/19  1004   SODIUM mmol/L 145 143 140 141   POTASSIUM mmol/L 3.8 4.2 4.4 4.5   CHLORIDE mmol/L 110* 109* 105 103   CO2 mmol/L 24.0 24.0 20.0* 26.0   BUN mg/dL 18 15 20 21*   CREATININE mg/dL 0.66 0.76 0.93 0.75   CALCIUM mg/dL 8.4* 8.7 9.0 9.7   BILIRUBIN mg/dL  --   --   --  0.4   ALK PHOS U/L  --   --   --  85   ALT (SGPT) U/L  --   --   --  16   AST (SGOT) U/L  --   --   --  19   GLUCOSE mg/dL 84 145* 204* 86     Study Result     INDICATION:   Lower abdominal pain after hip surgery. Patient states she feels like she has to urinate but she cannot.     TECHNIQUE:   CT of the abdomen and pelvis without contrast. Coronal and sagittal reconstructions were obtained.  Radiation dose reduction techniques included automated exposure control or exposure modulation based on body size. Radiation audit for number of CT and  nuclear cardiology exams performed in the last year: 1.       COMPARISON:   None available.     FINDINGS:  Lung bases: There is dependent linear airspace disease right greater the left lung bases most consistent with atelectasis.     Abdomen: Lack of intravenous contrast media somewhat limits the study for pathology other than urinary tract calculus disease. The noncontrast liver, spleen, adrenal glands, are unremarkable. There is a small air-filled structure projects over the  pancreatic head. This could be air within the distal common bile duct if the patient had a sphincterotomy relatively this could be air within a duodenal diverticulum. It measures about a centimeter in diameter. Patient is postcholecystectomy and the  common bile duct is distended to about a centimeter in general, likely postoperative.     There is no intrarenal calculus or hydronephrosis. 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     There is no abdominal aortic aneurysm. There are mild atherosclerotic  vascular calcifications.     There is a small fat-containing periumbilical hernia.     There is no evidence for bowel obstruction. Visualized appendix is unremarkable. There is no free intraperitoneal air or percutaneously drainable fluid collection. There is asymmetric atrophy of the left-sided iliopsoas muscle possibly related to prior  hip surgery. Please correlate with history.     Exaggeration of lumbar lordosis     Pelvis: The patient has had bilateral hip arthroplasties and it results in considerable streak artifact to the pelvis images there is a Ram catheter within the bladder which is largely decompressed with a small amount of air. There is a mildly  hyperdense somewhat ovoid structure seen left side anterolateral to the decompressed bladder that measures about 2.9 x 1.4 x 1.4 cm dimension. It is also adjacent to the left anterior uterine fundus. Unfortunately this is in the area of streak artifact.  It could be associated with the bladder or it could be an exophytic uterine fibroid. It does not appear to be associated with the ovaries. It is probably not associated with the bowel loop. Recommend correlation with a follow-up pelvic ultrasound. No  obvious free fluid is seen in the pelvis. There is a small fat-containing right inguinal hernia. There are degenerative changes in the lumbar spine.     IMPRESSION:     1. There is a Ram catheter in the bladder which is largely decompressed. Left-sided anterolateral to the bladder also adjacent to the anterior left uterine fundus is an ovoid mildly hyperdense structure measuring about 2.9 x 1.4 x 1.4 cm dimension.  This is unfortunately largely obscured by streak artifact from the bilateral hip arthroplasties. Recommend correlation with a pelvic ultrasound. See differential possibilities above.  2. Small fat-containing right inguinal hernia.  3. No evidence for ureter tract calculus disease.  4. Postcholecystectomy with likely postoperative biliary ductal  dilatation. Is probably a small amount of air within the distal common bile duct but alternate consideration is a duodenal diverticulum.  5. No bowel obstruction; radiographically normal appendix.  6. Likely atelectasis lung bases right greater than left.     Study Result     EXAMINATION: US PELVIS COMPLETE-     INDICATION: abd pain, bladder spasm generalized abdominal pain and  bladder spasms     TECHNIQUE: Sonographic imaging is obtained of the pelvis and both the  sagittal and transverse planes.     COMPARISON: NONE     FINDINGS: The uterus measures 8.0 x 2.8 x 5.6 cm. Small hypoechoic  structure identified subserosal appearance extending from the leftward  aspect of the uterus likely correlating to the area seen on CT measuring  2 cm suggesting possible fibroid. The endometrial stripe is somewhat  difficult to visualize. The cervix is grossly unremarkable.     The right ovary measures 1.9 x 1.3 x 1.3 cm. Good color flow present. No  underlying mass or lesion. No adnexal mass.     The left ovary measures 1.9 x 1.1 x 1.6 cm with good color flow present.  No underlying mass or lesion identified. The bladder is grossly  unremarkable.        IMPRESSION:  Findings to suggest subserosal 2 cm fibroid likely  correlating to the area of interest seen on the prior CT scan. The  remainder the ultrasound of the pelvis is unremarkable.       I reviewed the patient's new imaging including images and reports.    All medications reviewed.     acetaminophen 1,000 mg Oral Q8H   aspirin 81 mg Oral Q12H   escitalopram 20 mg Oral Daily   furosemide 20 mg Intravenous Once   gabapentin 600 mg Oral TID   levothyroxine 25 mcg Oral Q AM   meloxicam 15 mg Oral Daily   oxybutynin 5 mg Oral TID   tamsulosin 0.4 mg Oral Daily   traZODone 50 mg Oral Nightly       Assessment/Plan     Status post total replacement of right hip    Acquired hypothyroidism    Arthritis of right hip    Acute postoperative pain    Acute blood loss anemia     Leukocytosis, likely reactive    Acute urinary retention    Postoperative hypoxia    Plan  1. PT/OT- WBAT RLE  2. Pain control-prns   3. IS-encouraged  4. DVT proph- mechs/ASA  5. Bowel regimen  6. Monitor post-op labs  7. DC planning for home,     Urology consult  Added flomax    Hypothyroid  - Continue home Synthroid    Hypoxia may be related to atelectasis and mild volume overload.  Check a chest x-ray, encourage incentive spirometer, check BNP, a dose of Lasix ordered  Try to wean FIO2 to room air.     Anastasia Mccormack MD  12/14/19  11:35 AM

## 2019-12-14 NOTE — PLAN OF CARE
Problem: Patient Care Overview  Goal: Plan of Care Review  12/14/2019 1206 by Davina Chatman PT  Outcome: Ongoing (interventions implemented as appropriate)  Flowsheets  Taken 12/14/2019 1206  Progress: improving  Taken 12/14/2019 0830  Plan of Care Reviewed With: patient;spouse  Outcome Summary: Pt able to amb 200' with step through gait mechanics with RW and CGA. Pt limited by fatigue. Ambulated in tennis shoes with lift in L LE and patient's gait mechanics improved. Pt ascended 3 steps with handrail and STC CGA but required mod assist x 2 to descend 3 steps with handrail and STC. Pt is very fearful to descend 3 steps due to history of fall and required rest break in between each step. Will attempt stairs this PM to determine safety of patient returning home.

## 2019-12-15 VITALS
DIASTOLIC BLOOD PRESSURE: 57 MMHG | TEMPERATURE: 98 F | BODY MASS INDEX: 32.59 KG/M2 | WEIGHT: 166 LBS | HEART RATE: 76 BPM | HEIGHT: 60 IN | OXYGEN SATURATION: 97 % | SYSTOLIC BLOOD PRESSURE: 100 MMHG | RESPIRATION RATE: 18 BRPM

## 2019-12-15 PROBLEM — N39.41 URGE INCONTINENCE OF URINE: Status: ACTIVE | Noted: 2019-12-15

## 2019-12-15 PROCEDURE — 97110 THERAPEUTIC EXERCISES: CPT | Performed by: PHYSICAL THERAPIST

## 2019-12-15 PROCEDURE — 97116 GAIT TRAINING THERAPY: CPT | Performed by: PHYSICAL THERAPIST

## 2019-12-15 RX ORDER — ASPIRIN 81 MG/1
81 TABLET ORAL EVERY 12 HOURS SCHEDULED
Start: 2019-12-15 | End: 2020-01-14

## 2019-12-15 RX ORDER — TAMSULOSIN HYDROCHLORIDE 0.4 MG/1
0.4 CAPSULE ORAL DAILY
Qty: 30 CAPSULE | Refills: 0 | Status: SHIPPED | OUTPATIENT
Start: 2019-12-15 | End: 2020-01-16

## 2019-12-15 RX ORDER — PSEUDOEPHEDRINE HCL 30 MG
100 TABLET ORAL 2 TIMES DAILY PRN
Start: 2019-12-15 | End: 2019-12-27

## 2019-12-15 RX ORDER — HYDROCODONE BITARTRATE AND ACETAMINOPHEN 7.5; 325 MG/1; MG/1
1 TABLET ORAL EVERY 4 HOURS PRN
Start: 2019-12-15 | End: 2020-03-11

## 2019-12-15 RX ORDER — OXYBUTYNIN CHLORIDE 15 MG/1
15 TABLET, EXTENDED RELEASE ORAL DAILY
Qty: 30 TABLET | Refills: 0 | Status: SHIPPED | OUTPATIENT
Start: 2019-12-15 | End: 2020-01-16

## 2019-12-15 RX ORDER — ASPIRIN 81 MG/1
81 TABLET ORAL DAILY
Start: 2019-12-15 | End: 2020-01-16

## 2019-12-15 RX ADMIN — MELOXICAM 15 MG: 7.5 TABLET ORAL at 08:29

## 2019-12-15 RX ADMIN — OXYCODONE HYDROCHLORIDE 5 MG: 5 TABLET ORAL at 12:14

## 2019-12-15 RX ADMIN — ASPIRIN 81 MG: 81 TABLET, COATED ORAL at 08:30

## 2019-12-15 RX ADMIN — TAMSULOSIN HYDROCHLORIDE 0.4 MG: 0.4 CAPSULE ORAL at 08:30

## 2019-12-15 RX ADMIN — ACETAMINOPHEN 1000 MG: 500 TABLET, FILM COATED ORAL at 06:24

## 2019-12-15 RX ADMIN — LEVOTHYROXINE SODIUM 25 MCG: 25 TABLET ORAL at 06:24

## 2019-12-15 RX ADMIN — GABAPENTIN 600 MG: 300 CAPSULE ORAL at 08:30

## 2019-12-15 RX ADMIN — ACETAMINOPHEN 1000 MG: 500 TABLET, FILM COATED ORAL at 13:55

## 2019-12-15 RX ADMIN — ESCITALOPRAM OXALATE 20 MG: 20 TABLET ORAL at 08:30

## 2019-12-15 RX ADMIN — OXYBUTYNIN CHLORIDE 15 MG: 5 TABLET, EXTENDED RELEASE ORAL at 08:29

## 2019-12-15 NOTE — DISCHARGE SUMMARY
Patient Name: Astrid Sam  MRN: [id number]  : 1965  DOS: 12/15/2019    Attending: Mele Chatterjee MD    Primary Care Provider: Beckie Murphy DO    Date of Admission:.2019  9:14 AM    Date of Discharge:  12/15/2019    Discharge Diagnosis:   Status post total replacement of right hip    Acquired hypothyroidism    Arthritis of right hip    Acute postoperative pain    Acute blood loss anemia, no transfusion required.    Leukocytosis, likely reactive    Acute urinary retention    Urge incontinence of urine    Consults:   Joshua Wilde MD, Urology.    Hospital Course  Patient is a 54 y.o. female presented for right total hip arthroplasty by Dr. Chatterjee.  She tolerated surgery well and was admitted for further medical management. She has had severe right hip pain for the last 2 or 3 months.  She uses a cane for ambulation.  She has had recent falls.    Postoperatively, she complainted of hesitancy and inability to void, she did not have any significant urinary retention at this point.  She had symptoms consistent with bladder spasms.  BNO suppository has not provided relief. Ram catheter was anchored overnight. Urinalysis was negative. Ditropan was added to her regimen. Imaging studies found a likely fibroid.     Urology was consulted with recommendation to adjust anticholinergic therapy and follow-up with urology in 1 month.    The patient has done well postop. The patient has been able to ambulate 350 feet with PT.    She had mild hypoxia, likely related to atelectasis, and mild volume overload, resolved/treated.    The patient has had good pain control with PO pain medications.  Adjustments were made to pain medications to optimize postop pain management. Risks and benefits of opiate medications discussed with patient.    The patient was placed on DVT prophylaxis including aspirin. The patient was encouraged to use IS for atelectasis prophylaxis. ( while here she had a venous duplex of  RLE due to pain, mild swelling, was negative for DVT).    The patient was placed on a bowel regimen to prevent constipation while on pain medication.   The patient's H/H was monitored with a slight decrease that remained asymptomatic.    It is felt by all involved that the patient can discharge home at this time, and the patient has no further questions       Procedures Performed  Pre-op Diagnosis:   Arthritis of right hip [9261104]       Post-Op Diagnosis Codes:     * Arthritis of right hip [M16.11]     Procedure/CPT® Codes:     Procedure(s):  RIGHT DIRECT TOTAL HIP ARTHROPLASTY ANTERIOR     Staff:  Surgeon(s):  Mele Chatterjee MD    Pertinent Test Results:    I reviewed the patient's new clinical results.   Results from last 7 days   Lab Units 12/14/19  0513 12/13/19  0427 12/12/19  2058 12/10/19  1004   WBC 10*3/mm3  --  11.04* 11.68* 6.79   HEMOGLOBIN g/dL 8.0* 8.8* 9.6* 12.4   HEMATOCRIT % 25.9* 27.5* 30.3* 39.1   PLATELETS 10*3/mm3  --  232 262 269     Results from last 7 days   Lab Units 12/14/19  1312 12/14/19  0513 12/13/19  0427  12/10/19  1004   SODIUM mmol/L 143 145 143   < > 141   POTASSIUM mmol/L 3.7 3.8 4.2   < > 4.5   CHLORIDE mmol/L 104 110* 109*   < > 103   CO2 mmol/L 28.0 24.0 24.0   < > 26.0   BUN mg/dL 19 18 15   < > 21*   CREATININE mg/dL 0.72 0.66 0.76   < > 0.75   CALCIUM mg/dL 8.7 8.4* 8.7   < > 9.7   BILIRUBIN mg/dL  --   --   --   --  0.4   ALK PHOS U/L  --   --   --   --  85   ALT (SGPT) U/L  --   --   --   --  16   AST (SGOT) U/L  --   --   --   --  19   GLUCOSE mg/dL 123* 84 145*   < > 86    < > = values in this interval not displayed.     Study Result     INDICATION:   Lower abdominal pain after hip surgery. Patient states she feels like she has to urinate but she cannot.     TECHNIQUE:   CT of the abdomen and pelvis without contrast. Coronal and sagittal reconstructions were obtained.  Radiation dose reduction techniques included automated exposure control or exposure modulation  based on body size. Radiation audit for number of CT and  nuclear cardiology exams performed in the last year: 1.       COMPARISON:   None available.     FINDINGS:  Lung bases: There is dependent linear airspace disease right greater the left lung bases most consistent with atelectasis.     Abdomen: Lack of intravenous contrast media somewhat limits the study for pathology other than urinary tract calculus disease. The noncontrast liver, spleen, adrenal glands, are unremarkable. There is a small air-filled structure projects over the  pancreatic head. This could be air within the distal common bile duct if the patient had a sphincterotomy relatively this could be air within a duodenal diverticulum. It measures about a centimeter in diameter. Patient is postcholecystectomy and the  common bile duct is distended to about a centimeter in general, likely postoperative.     There is no intrarenal calculus or hydronephrosis. 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     There is no abdominal aortic aneurysm. There are mild atherosclerotic vascular calcifications.     There is a small fat-containing periumbilical hernia.     There is no evidence for bowel obstruction. Visualized appendix is unremarkable. There is no free intraperitoneal air or percutaneously drainable fluid collection. There is asymmetric atrophy of the left-sided iliopsoas muscle possibly related to prior  hip surgery. Please correlate with history.     Exaggeration of lumbar lordosis     Pelvis: The patient has had bilateral hip arthroplasties and it results in considerable streak artifact to the pelvis images there is a Ram catheter within the bladder which is largely decompressed with a small amount of air. There is a mildly  hyperdense somewhat ovoid structure seen left side anterolateral to the decompressed bladder that measures about 2.9 x 1.4 x 1.4 cm dimension. It is also adjacent to the left anterior uterine fundus. Unfortunately this is in  the area of streak artifact.  It could be associated with the bladder or it could be an exophytic uterine fibroid. It does not appear to be associated with the ovaries. It is probably not associated with the bowel loop. Recommend correlation with a follow-up pelvic ultrasound. No  obvious free fluid is seen in the pelvis. There is a small fat-containing right inguinal hernia. There are degenerative changes in the lumbar spine.     IMPRESSION:     1. There is a Ram catheter in the bladder which is largely decompressed. Left-sided anterolateral to the bladder also adjacent to the anterior left uterine fundus is an ovoid mildly hyperdense structure measuring about 2.9 x 1.4 x 1.4 cm dimension.  This is unfortunately largely obscured by streak artifact from the bilateral hip arthroplasties. Recommend correlation with a pelvic ultrasound. See differential possibilities above.  2. Small fat-containing right inguinal hernia.  3. No evidence for ureter tract calculus disease.  4. Postcholecystectomy with likely postoperative biliary ductal dilatation. Is probably a small amount of air within the distal common bile duct but alternate consideration is a duodenal diverticulum.  5. No bowel obstruction; radiographically normal appendix.  6. Likely atelectasis lung bases right greater than left.       Study Result     EXAMINATION: US PELVIS COMPLETE- 12/13/2019     INDICATION: generalized abdominal pain and bladder spasms     TECHNIQUE: Sonographic imaging was obtained of the pelvis in both the  sagittal and transverse planes.     COMPARISON: NONE     FINDINGS: The uterus measures 8.0 x 2.8 x 5.6 cm. Small hypoechoic  structure identified subserosal in appearance extending from the  leftward aspect of the uterus likely correlating to the area seen on CT  measuring 2 cm suggesting possible fibroid. The endometrial stripe is  somewhat difficult to visualize. The cervix is grossly unremarkable.     The right ovary measures 1.9 x  1.3 x 1.3 cm. Good color flow present. No  underlying mass or lesion. No adnexal mass.     The left ovary measures 1.9 x 1.1 x 1.6 cm with good color flow present.  No underlying mass or lesion identified. The bladder is grossly  unremarkable.     IMPRESSION:  Findings to suggest subserosal 2 cm fibroid likely  correlating to the area of interest seen on the prior CT scan. The  remainder of the ultrasound of the pelvis is unremarkable.     D:  12/13/2019  E:  12/13/2019     Study Result        EXAMINATION: XR CHEST PA AND LATERAL - 12/14/2019     INDICATION: Evaluate for pneumonia.     COMPARISON: None     FINDINGS: PA and lateral chest reveals the cardiac and mediastinal  silhouettes within normal limits. Mild elevation of the right  hemidiaphragm. The lung fields are clear. No focal parenchymal  opacification is present. No pleural effusion or pneumothorax.  Degenerative change is seen within the spine. The pulmonary vascularity  is within normal limits.     IMPRESSION:  Chronic changes in the lung fields with mild increased  markings identified at the right lung base. Early infiltrate cannot be  excluded. The remainder of the lung fields are clear.     DICTATED:   12/14/2019  EDITED/ls :   12/15/2019      Interpretation Summary     · The right lower extremity venous duplex scan was negative for a DVT.     Results for THANIA VICKERS (MRN [id number]) as of 12/16/2019 18:17   Ref. Range 12/14/2019 13:12   proBNP Latest Ref Range: 5.0 - 900.0 pg/mL 808.2     Results for THANIA VICKERS (MRN [id number]) as of 12/16/2019 18:17   Ref. Range 12/12/2019 20:58   Troponin T Latest Ref Range: 0.000 - 0.030 ng/mL <0.010     Results for THANIA VICKERS (MRN [id number]) as of 12/16/2019 18:17   Ref. Range 12/12/2019 18:55   Color, UA Latest Ref Range: Yellow, Straw  Yellow   Appearance, UA Latest Ref Range: Clear  Clear   Specific [place], UA Latest Ref Range: 1.001 - 1.030  1.027   pH, UA Latest Ref Range:  "5.0 - 8.0  5.5   Glucose Latest Ref Range: Negative  Negative   Ketones, UA Latest Ref Range: Negative  Negative   Blood, UA Latest Ref Range: Negative  Negative   Nitrite, UA Latest Ref Range: Negative  Negative   Leukocytes, UA Latest Ref Range: Negative  Negative   Protein, UA Latest Ref Range: Negative  Negative   Bilirubin, UA Latest Ref Range: Negative  Negative   Urobilinogen, UA Latest Ref Range: 0.2 - 1.0 E.U./dL  0.2 E.U./dL     I reviewed the patient's new imaging including images and reports.          Physical therapy: Pt able to amb 350' with RW CGA with step through gait mechanics. Pt required several standing rest breaks due to fatigue. Pt able to ascend/descend 6 steps with handrail on L and STC in R UE. Pt's O2 stayed above 90% with all mobility. Recommend patient home with assist and home health at discharge.     Discharge Assessment:    Vital Signs  Visit Vitals  /57 (BP Location: Left arm, Patient Position: Lying)   Pulse 76   Temp 98 °F (36.7 °C) (Oral)   Resp 18   Ht 152.4 cm (60\")   Wt 75.3 kg (166 lb)   SpO2 97%   BMI 32.42 kg/m²     Temp (24hrs), Av.8 °F (37.1 °C), Min:98 °F (36.7 °C), Max:100.2 °F (37.9 °C)    General Appearance:    Alert, cooperative, in no acute distress   Lungs:     Clear to auscultation,respirations regular, even and                   unlabored    Heart:    Regular rhythm and normal rate, normal S1 and S2   Abdomen:     Normal bowel sounds, no masses, no organomegaly, soft        non-tender, non-distended, no guarding, no rebound                 tenderness   Extremities:   Moves all extremities well, no edema, no cyanosis, no              Redness. Right hip Optifoam dressing CDI   Pulses:   Pulses palpable and equal bilaterally   Skin:   No bleeding, bruising or rash   Neurologic:   Cranial nerves 2 - 12 grossly intact, sensation intact. Flexion and dorsiflexion intact bilateral feet.         Discharge Disposition: Home    Discharge Medications     Discharge " Medications      New Medications      Instructions Start Date   docusate sodium 100 MG capsule   100 mg, Oral, 2 Times Daily PRN      HYDROcodone-acetaminophen 7.5-325 MG per tablet  Commonly known as:  NORCO   1 tablet, Oral, Every 4 Hours PRN      oxybutynin XL 15 MG 24 hr tablet  Commonly known as:  DITROPAN XL   15 mg, Oral, Daily      tamsulosin 0.4 MG capsule 24 hr capsule  Commonly known as:  FLOMAX   0.4 mg, Oral, Daily         Changes to Medications      Instructions Start Date   aspirin 81 MG EC tablet  What changed:  additional instructions   81 mg, Oral, Daily, Resume at once daily after 30 days.      aspirin 81 MG EC tablet  What changed:  You were already taking a medication with the same name, and this prescription was added. Make sure you understand how and when to take each.   81 mg, Oral, Every 12 Hours Scheduled      traZODone 100 MG tablet  Commonly known as:  DESYREL  What changed:    · when to take this  · reasons to take this    mg, Oral, Nightly      valACYclovir 1000 MG tablet  Commonly known as:  VALTREX  What changed:    · when to take this  · reasons to take this   2,000 mg, Oral, 2 Times Daily         Continue These Medications      Instructions Start Date   escitalopram 20 MG tablet  Commonly known as:  LEXAPRO   20 mg, Oral, Daily      gabapentin 600 MG tablet  Commonly known as:  NEURONTIN   600 mg, Oral, 3 Times Daily      levothyroxine 25 MCG tablet  Commonly known as:  SYNTHROID, LEVOTHROID   25 mcg, Oral, Daily      rizatriptan MLT 10 MG disintegrating tablet  Commonly known as:  MAXALT-MLT   10 mg, Oral, Once As Needed             Discharge Diet: regular diet    Activity at Discharge: WBAT RLE    Follow-up Appointments  Dr. Chatterjee per his orders  Urology in 1 month    Discharge took over 30 min    Scribed for Dr. Mccormack by JOHNATHAN Burnett. 12/16/2019  6:09 PM     Anastasia Mccormack MD  12/15/19  8:18 AM    Anastasia OWENS MD, personally performed the  services described in this documentation as scribed by JOHNATHAN Burnett ,and it is both accurate and complete. wy.

## 2019-12-15 NOTE — PLAN OF CARE
Problem: Patient Care Overview  Goal: Plan of Care Review  12/15/2019 1540 by Davina Chatman PT  Outcome: Outcome(s) achieved  Flowsheets  Taken 12/15/2019 1342 by Davina Chatman PT  Progress: improving  Outcome Summary: Pt able to amb 350' with RW CGA with step through gait mechanics. Pt required several standing rest breaks due to fatigue. Pt able to ascend/descend 6 steps with handrail on L and STC in R UE. Pt's O2 stayed above 90% with all mobility. Recommend patient home with assist and home health at discharge.   Taken 12/15/2019 1355 by Elizabeth Chavis, RN  Plan of Care Reviewed With: patient

## 2019-12-15 NOTE — DISCHARGE INSTRUCTIONS
[facility] Primary Hip and Knee:  1. You have a sterile dressing in place. Only exchange the dressing if it becomes saturated (fluid draining out the sides of dressing) and notify the office. The dressing is water resistant. During the first 7 days after surgery, it is ok to shower. DO NOT scrub on or around the dressing. Do not submerge the dressing.  2. After 7 days, you can remove your dressing. Your sutures are all underneath your skin. There is a layer of glue over the incision. DO NOT pick at this or try to peal it off. If the edges do peel up it is ok to trim them as needed. It is OK to shower with the incision exposed. DO NOT scrub on or around the incision. DO NOT submerge the incision in pools, baths, or hot tubs for 1 month after surgery.  3. No creams or ointments to the incision for 1 month after surgery. After 1 month, it is recommended to massage the scar with vitamin E cream to help decrease scar formation.  4. Check incision every day and notify surgeon immediately if any of the following signs or symptoms are noted:  o Increase in redness  o Increase in swelling around the incision and of the entire extremity  o Increase in pain  o Drainage oozing from the incision  o Pulling apart of the edges of the incision  o Increase in overall body temperature (greater than 100.5 degrees)    Anticoagulants: You will be discharged on an anticoagulant. This is a prophylactic medication that helps prevent blood clots during your post-operative period. Most patients will be on Aspirin 81 mg Enteric coated every 12 hours orally for 30 days. Some patients, due to increased risk factors, will need to be on a stronger anticoagulant. Dr. Chatterjee will discuss this need with you.   ? While taking the anticoagulant, you should avoid taking any additional aspirin, and limit ibuprofen (Advil or Motrin), Aleve (Naprosyn) or other non-steroidal anti-inflammatory medications.   ? Notify your surgeon immediately if  any jaspal bleeding is noted in the urine, stool, vomit, or from the nose or the incision. Blood in the stool will often appear as black rather than red. Blood in urine may appear as pink. Blood in vomit may appear as brown/black like coffee grounds.  ? You will need to apply pressure for longer periods of time to any cuts or abrasions to stop bleeding  ? Avoid alcohol while taking anticoagulants  Sequential Compression Device: You maybe be discharged home with a compression device that helps promote blood flow and prevent clots in your legs. Wear these at all times for the first two weeks.   Mobilization: The best way to avoid a blood clot is to get up and walk. 10 times a day get up and walk for 5 minutes for the first two weeks. Walking for longer periods of time will increase pain and swelling, making therapy more difficult. If taking any long travel (car or plane) in the first 1-2 months, be sure to get up and walk at least every one hour.     Stool Softeners: You will be at greater risk of constipation after surgery because of being less mobile and taking the pain medications.   ? Take stool softeners as instructed by your surgeon while on pain medications. Use over the counter Colace 100 mg 1-2 capsules twice daily.   ? If stools become too loose or too frequent, decreases the dosage or stop the stool softener.  ? If constipation occurs despite use of stool softeners, you are to continue the stool softeners and add a laxative (Milk of Magnesia 1 ounce daily as needed).  ? Dulcolax oral tabs or suppository or a fleets enema can also be utilized for constipation and can be obtained over the counter.   ? If above interventions are unsuccessful in inducing bowel movements, please contact your family physician's office/surgeon's office.  ? Drink plenty of fluids and eat fruits and vegetables during your recovery time    Pain Medications utilized after surgery are narcotics and the law requires that the following  information be given to all patients that are prescribed narcotics:  ? CLASSIFICATION: Pain medications are called Opioids and are narcotics  ? LEGALITIES: It is illegal to share narcotics with others and to drive within 24 hours of taking narcotics  ? POTENTIAL SIDE EFFECTS: Potential side effects of opioids include: nausea, vomiting, itching, dizziness, drowsiness, dry mouth, constipation, and difficulty urinating.  ? POTENTIAL ADVERSE EFFECTS:   o Opioid tolerance can develop with use of pain medications and this simply means that it requires more and more of the medication to control pain; however, this is seen more in patients that use Opioids for longer periods of time.  o Opioid dependence can develop with use of Opioids and this simply means that to stop the medication can cause withdrawal symptoms; however, this is seen with patients that use Opioids for longer periods of time.  o Opioid addiction can develop with use of Opioids and the incidence of this is very unlikely in patients who take the medications as ordered and stop the medications as instructed.  o Opioid overdose can be dangerous, but is unlikely when the medication is taken as ordered and stopped when ordered. It is important not to mix opioids with alcohol or with any type of sedative, such as Benadryl, as this can lead to over sedation and respiratory difficulty.  ? DOSAGE:   o Pain medications may be needed consistently for the first week to decrease pain and promote adequate pain relief and participation in physical therapy.  o After the initial surgical pain begins to resolve, you may begin to decrease the pain medication and only take it as needed. By the end of 6 weeks, you should be off of pain medications.  o You can decrease your pain medication consumption by slowly spacing out the time in between the medication, and using 650mg Tylenol when the pain is not as severe. Do not exceed 3500mg of Tylenol in 24 hours.   o Refills will not  be given by the office during evening hours, on weekends, or after 6 weeks post-op.  o To seek refills on pain medications during the initial 6 week post-operative period, you must call the office 48 hours in advance to request the refill. The office will then notify you when to  the prescription. DO NOT wait until you are out of the medication to request a refill.

## 2019-12-15 NOTE — PLAN OF CARE
Received report from previous shift. Pt AAO, VSS and in no apparent distress.  Was able to ambulate to BR with assist and gaitbelt. Pt pleasant and was able to sleep rest >6hrs this shift.required no PO pain medication this shift.  Will continue to monitor

## 2019-12-15 NOTE — PROGRESS NOTES
Urology Progress Note     LOS: 3 days   Patient Care Team:  Beckie Murphy DO as PCP - General (Family Medicine)    Chief Complaint:  No chief complaint on file.      Subjective     Interval History:     She states she is doing well.  Lower urinary symptoms have improved.      Objective     Vital Signs  Temp:  [98 °F (36.7 °C)-100.2 °F (37.9 °C)] 98 °F (36.7 °C)  Heart Rate:  [] 76  Resp:  [16-18] 18  BP: ()/(51-62) 100/57  I/O last 3 completed shifts:  In: 500 [IV Piggyback:500]  Out: 1250 [Urine:1250]  I/O this shift:  In: -   Out: 200 [Urine:200]    Physical Exam:   General Appearance: alert, appears stated age and cooperative     Results Review:     I reviewed the patient's new clinical results.  Lab Results (last 24 hours)     Procedure Component Value Units Date/Time    Basic Metabolic Panel [[id number]]  (Abnormal) Collected:  12/14/19 1312    Specimen:  Blood Updated:  12/14/19 1357     Glucose 123 mg/dL      BUN 19 mg/dL      Creatinine 0.72 mg/dL      Sodium 143 mmol/L      Potassium 3.7 mmol/L      Chloride 104 mmol/L      CO2 28.0 mmol/L      Calcium 8.7 mg/dL      eGFR Non African Amer 84 mL/min/1.73      BUN/Creatinine Ratio 26.4     Anion Gap 11.0 mmol/L     Narrative:       GFR Normal >60  Chronic Kidney Disease <60  Kidney Failure <15      BNP [[id number]]  (Normal) Collected:  12/14/19 1312    Specimen:  Blood Updated:  12/14/19 1355     proBNP 808.2 pg/mL     Narrative:       Among patients with dyspnea, NT-proBNP is highly sensitive for the detection of acute congestive heart failure. In addition NT-proBNP of <300 pg/ml effectively rules out acute congestive heart failure with 99% negative predictive value.            Medication Review:   Current Facility-Administered Medications   Medication Dose Route Frequency Provider Last Rate Last Dose   • acetaminophen (TYLENOL) tablet 1,000 mg  1,000 mg Oral Q8H Mele Chatterjee MD   1,000 mg at 12/15/19 0624   • albuterol (PROVENTIL)  nebulizer solution 0.083% 2.5 mg/3mL  2.5 mg Nebulization Q6H PRN Anastasia Mccormack MD       • aspirin EC tablet 81 mg  81 mg Oral Q12H Mele Chatterjee MD   81 mg at 12/15/19 0830   • bisacodyl (DULCOLAX) EC tablet 10 mg  10 mg Oral Daily PRN Mele Chatterjee MD       • bisacodyl (DULCOLAX) suppository 10 mg  10 mg Rectal Daily PRN Mele Chatterjee MD       • docusate sodium (COLACE) capsule 100 mg  100 mg Oral BID PRN Mele Chatterjee MD   100 mg at 12/12/19 2012   • escitalopram (LEXAPRO) tablet 20 mg  20 mg Oral Daily Mele Chatterjee MD   20 mg at 12/15/19 0830   • gabapentin (NEURONTIN) capsule 600 mg  600 mg Oral TID Anastasia Mccormack MD   600 mg at 12/15/19 0830   • HYDROmorphone (DILAUDID) injection 0.5 mg  0.5 mg Intravenous Q2H PRN Mele Chatterjee MD        And   • naloxone (NARCAN) injection 0.1 mg  0.1 mg Intravenous Q5 Min PRN Mele Chatterjee MD       • labetalol (NORMODYNE,TRANDATE) injection 10 mg  10 mg Intravenous Q4H PRN Bettina Davalos, APRKEN       • lactated ringers infusion  9 mL/hr Intravenous Continuous Mele Chatterjee MD 9 mL/hr at 12/12/19 1028     • lactated ringers infusion  100 mL/hr Intravenous Continuous Mele Chatterjee  mL/hr at 12/13/19 0440 100 mL/hr at 12/13/19 0440   • levothyroxine (SYNTHROID, LEVOTHROID) tablet 25 mcg  25 mcg Oral Q AM Mele Chatterjee MD   25 mcg at 12/15/19 0624   • meloxicam (MOBIC) tablet 15 mg  15 mg Oral Daily Mele Chatterjee MD   15 mg at 12/15/19 0829   • ondansetron (ZOFRAN) injection 4 mg  4 mg Intravenous Q6H PRN Bettina Davalos APRN   4 mg at 12/12/19 2050   • oxybutynin XL (DITROPAN-XL) 24 hr tablet 15 mg  15 mg Oral Daily Joshua Wilde MD   15 mg at 12/15/19 0829   • oxyCODONE (ROXICODONE) immediate release tablet 10 mg  10 mg Oral Q4H PRN Mele Chatterjee MD   10 mg at 12/14/19 2231   • oxyCODONE (ROXICODONE) immediate release tablet 5 mg  5 mg Oral Q4H PRN Mele Chatterjee MD   5 mg at 12/13/19 1515   •  sennosides-docusate (PERICOLACE) 8.6-50 MG per tablet 2 tablet  2 tablet Oral BID PRN Mele Chatterjee MD       • sodium chloride 0.9 % bolus 500 mL  500 mL Intravenous TID PRN Bettina Davalos APRN 250 mL/hr at 12/14/19 1607 500 mL at 12/14/19 1607   • SUMAtriptan (IMITREX) tablet 50 mg  50 mg Oral Q2H PRN Anastasia Mccormack MD       • tamsulosin (FLOMAX) 24 hr capsule 0.4 mg  0.4 mg Oral Daily Bettina Davalos APRN   0.4 mg at 12/15/19 0830   • traZODone (DESYREL) tablet 50 mg  50 mg Oral Nightly Mele Chatterjee MD   50 mg at 12/14/19 2223         Assessment/Plan       Status post total replacement of right hip    Acquired hypothyroidism    Arthritis of right hip    Acute postoperative pain    Acute blood loss anemia    Leukocytosis, likely reactive    Acute urinary retention    Urge incontinence of urine    Continue current medications  Follow-up with urology as outpatient in 1 month      Joshua Wilde MD  12/15/19  11:12 AM

## 2019-12-15 NOTE — THERAPY TREATMENT NOTE
Patient Name: Astrid Sam  : 1965    MRN: [id number]                              Today's Date: 12/15/2019       Admit Date: 2019    Visit Dx:     ICD-10-CM ICD-9-CM   1. Status post total replacement of right hip Z96.641 V43.64     Patient Active Problem List   Diagnosis   • Migraine   • Insomnia   • Iron deficiency anemia   • Left shoulder pain   • Cervicalgia   • Cervical radicular pain   • Diarrhea   • Irritability   • Acquired hypothyroidism   • Fever blister   • Right hip pain   • Arthritis of right hip   • Status post total replacement of right hip   • Acute postoperative pain   • Acute blood loss anemia   • Leukocytosis, likely reactive   • Acute urinary retention   • Urge incontinence of urine     Past Medical History:   Diagnosis Date   • Allergic    • Anemia     after hip replacement    • Arthritis    • Migraine     h/o   • PFO (patent foramen ovale)    • PONV (postoperative nausea and vomiting)     preprocedural meds help and pt will need them    • Right shoulder pain    • Wears glasses      Past Surgical History:   Procedure Laterality Date   •  SECTION     • COLONOSCOPY     • ENDOSCOPY     • FRACTURE SURGERY      left leg- hardware in place    • KNEE ARTHROSCOPY Right     done twice   • PATELLA SURGERY Left     removed left knee cap   • REPLACEMENT TOTAL KNEE Right 2018    revision 2019   • TOTAL HIP ARTHROPLASTY Left    • TUBAL ABDOMINAL LIGATION       General Information     Row Name 12/15/19 1342          PT Evaluation Time/Intention    Document Type  therapy note (daily note)  -CS     Mode of Treatment  individual therapy;physical therapy  -CS     Row Name 12/15/19 1343          General Information    Patient Profile Reviewed?  yes  -CS     Existing Precautions/Restrictions  fall;right;hip, anterior  -CS     Barriers to Rehab  previous functional deficit  -CS     Row Name 12/15/19 3204          Cognitive Assessment/Intervention- PT/OT    Orientation  Status (Cognition)  oriented x 4  -CS     Row Name 12/15/19 1342          Safety Issues, Functional Mobility    Safety Issues Affecting Function (Mobility)  safety precautions follow-through/compliance;safety precaution awareness  -     Impairments Affecting Function (Mobility)  endurance/activity tolerance;pain;strength;range of motion (ROM)  -       User Key  (r) = Recorded By, (t) = Taken By, (c) = Cosigned By    Initials Name Provider Type    CS Davina Chatman PT Physical Therapist        Mobility     Row Name 12/15/19 1342          Bed Mobility Assessment/Treatment    Bed Mobility Assessment/Treatment  supine-sit  -CS     Supine-Sit Charles (Bed Mobility)  conditional independence;verbal cues  -     Assistive Device (Bed Mobility)  head of bed elevated;leg   -     Comment (Bed Mobility)  VC's to use leg  to get to EOB. VC's to scoot hips to EOB.  -     Row Name 12/15/19 1342          Transfer Assessment/Treatment    Comment (Transfers)  VC's to push off of bed to stand and to reach back for chair to sit. VC's to step R LE out with sitting for comfort.   -     Row Name 12/15/19 1342          Sit-Stand Transfer    Sit-Stand Charles (Transfers)  contact guard  -     Assistive Device (Sit-Stand Transfers)  walker, front-wheeled  -     Row Name 12/15/19 4130          Gait/Stairs Assessment/Training    21536 - Gait Training Minutes   40  -     Gait/Stairs Assessment/Training  gait/ambulation independence;gait/ambulation assistive device;distance ambulated;gait pattern  -     Charles Level (Gait)  contact guard;other (see comments) One with chair follow  -     Assistive Device (Gait)  walker, front-wheeled  -     Distance in Feet (Gait)  350'   -     Pattern (Gait)  step-through  -     Deviations/Abnormal Patterns (Gait)  bilateral deviations;mee decreased;gait speed decreased;stride length decreased  -     Bilateral Gait Deviations  forward flexed  posture  -CS     Right Sided Gait Deviations  heel strike decreased;weight shift ability decreased  -CS     Negotiation (Stairs)  stairs independence;stairs assistive device;handrail location;number of steps  -CS     [place] Level (Stairs)  contact guard;2 person assist  -CS     Assistive Device (Stairs)  cane, straight  -CS     Handrail Location (Stairs)  right side (ascending)  -CS     Number of Steps (Stairs)  6  -CS     Ascending Technique (Stairs)  step-to-step  -CS     Descending Technique (Stairs)  step-to-step  -CS     Comment (Gait/Stairs)  Pt able to amb 350' with RW CGA with chair follow with step through gait mechanics. Pt limited by fatigue. Pt required several standing rest breaks with ambulation due to fatigue. Pt's O2 remained above 90% with ambulation. Pt able to ascend/descend 6 steps with HR on L and STC in R UE CGA. Pt required several rest breaks due to fatigue and fear. Pt's O2 stayed above 90% with stairs.  -     Row Name 12/15/19 1342          Mobility Assessment/Intervention    Extremity Weight-bearing Status  right lower extremity  -CS     Right Lower Extremity (Weight-bearing Status)  weight-bearing as tolerated (WBAT)  -       User Key  (r) = Recorded By, (t) = Taken By, (c) = Cosigned By    Initials Name Provider Type     Davina Chatman PT Physical Therapist        Obj/Interventions     Row Name 12/15/19 1342          Therapeutic Exercise    Lower Extremity (Therapeutic Exercise)  heel slides, right;LAQ (long arc quad), right;SLR (straight leg raise), right;quad sets, bilateral;gluteal sets  -     Lower Extremity Range of Motion (Therapeutic Exercise)  hip abduction/adduction, right;ankle dorsiflexion/plantar flexion, bilateral  -     Exercise Type (Therapeutic Exercise)  isometric contraction, static;AAROM (active assistive range of motion);AROM (active range of motion);isotonic contraction, concentric  -CS     Position (Therapeutic Exercise)  seated;supine  -CS      Sets/Reps (Therapeutic Exercise)  10 reps each  -CS     Comment (Therapeutic Exercise)  VC's on technique. Max assist with SLR and mod assist with hip abd/add  -CS     Row Name 12/15/19 1342          Sensory Assessment/Intervention    Sensory General Assessment  no sensation deficits identified  -CS       User Key  (r) = Recorded By, (t) = Taken By, (c) = Cosigned By    Initials Name Provider Type    CS Davina Chatman, PT Physical Therapist        Goals/Plan     Row Name 12/15/19 1342          Bed Mobility Goal 1 (PT)    Activity/Assistive Device (Bed Mobility Goal 1, PT)  bed mobility activities, all  -CS     [place] Level/Cues Needed (Bed Mobility Goal 1, PT)  conditional independence  -CS     Time Frame (Bed Mobility Goal 1, PT)  long term goal (LTG);3 days  -CS     Progress/Outcomes (Bed Mobility Goal 1, PT)  goal met  -CS     Row Name 12/15/19 1342          Transfer Goal 1 (PT)    Activity/Assistive Device (Transfer Goal 1, PT)  sit-to-stand/stand-to-sit;bed-to-chair/chair-to-bed  -CS     [place] Level/Cues Needed (Transfer Goal 1, PT)  conditional independence  -CS     Time Frame (Transfer Goal 1, PT)  long term goal (LTG);3 days  -CS     Progress/Outcome (Transfer Goal 1, PT)  goal ongoing  -CS     Row Name 12/15/19 1342          Gait Training Goal 1 (PT)    Activity/Assistive Device (Gait Training Goal 1, PT)  gait (walking locomotion);assistive device use;walker, rolling  -CS     [place] Level (Gait Training Goal 1, PT)  conditional independence  -CS     Distance (Gait Goal 1, PT)  150'  -CS     Time Frame (Gait Training Goal 1, PT)  long term goal (LTG);3 days  -CS     Progress/Outcome (Gait Training Goal 1, PT)  goal ongoing  -CS     Row Name 12/15/19 1342          Stairs Goal 1 (PT)    Activity/Assistive Device (Stairs Goal 1, PT)  stairs, all skills;using handrail, right;cane, straight  -CS     [place] Level/Cues Needed (Stairs Goal 1, PT)  standby assist  -CS     Number of  Stairs (Stairs Goal 1, PT)  3  -CS     Time Frame (Stairs Goal 1, PT)  long term goal (LTG);3 days  -CS     Progress/Outcome (Stairs Goal 1, PT)  goal ongoing  -CS       User Key  (r) = Recorded By, (t) = Taken By, (c) = Cosigned By    Initials Name Provider Type    CS Davina Chatman, PT Physical Therapist        Clinical Impression     Row Name 12/15/19 7537          Pain Assessment    Additional Documentation  Pain Scale: Numbers Pre/Post-Treatment (Group)  -CS     Row Name 12/15/19 1342          Pain Scale: Numbers Pre/Post-Treatment    Pain Scale: Numbers, Pretreatment  7/10  -CS     Pain Scale: Numbers, Post-Treatment  6/10  -CS     Pain Location - Side  Right  -CS     Pain Location - Orientation  incisional  -CS     Pain Location  hip  -CS     Pain Intervention(s)  Repositioned;Ambulation/increased activity  -CS     Row Name 12/15/19 8619          Plan of Care Review    Plan of Care Reviewed With  patient;spouse  -CS     Progress  improving  -CS     Outcome Summary  Pt able to amb 350' with RW CGA with step through gait mechanics. Pt required several standing rest breaks due to fatigue. Pt able to ascend/descend 6 steps with handrail on L and STC in R UE. Pt's O2 stayed above 90% with all mobility. Recommend patient home with assist and home health at discharge.   -CS     Row Name 12/15/19 7589          Physical Therapy Clinical Impression    Criteria for Skilled Interventions Met (PT Clinical Impression)  yes;treatment indicated  -CS     Rehab Potential (PT Clinical Summary)  good, to achieve stated therapy goals  -CS     Row Name 12/15/19 4988          Positioning and Restraints    Pre-Treatment Position  in bed  -CS     Post Treatment Position  chair  -CS     In Chair  notified nsg;reclined;encouraged to call for assist;exit alarm on;call light within reach;with family/caregiver;legs elevated  -CS       User Key  (r) = Recorded By, (t) = Taken By, (c) = Cosigned By    Initials Name Provider Type    CS  Davina Chatman, PT Physical Therapist        Outcome Measures     Row Name 12/15/19 1342          How much help from another person do you currently need...    Turning from your back to your side while in flat bed without using bedrails?  3  -CS     Moving from lying on back to sitting on the side of a flat bed without bedrails?  3  -CS     Moving to and from a bed to a chair (including a wheelchair)?  3  -CS     Standing up from a chair using your arms (e.g., wheelchair, bedside chair)?  3  -CS     Climbing 3-5 steps with a railing?  3  -CS     To walk in hospital room?  3  -CS     AM-PAC 6 Clicks Score (PT)  18  -CS     Row Name 12/15/19 1342          Functional Assessment    Outcome Measure Options  AM-PAC 6 Clicks Basic Mobility (PT)  -CS       User Key  (r) = Recorded By, (t) = Taken By, (c) = Cosigned By    Initials Name Provider Type    CS Davina Chatman, PT Physical Therapist          PT Recommendation and Plan  Planned Therapy Interventions (PT Eval): balance training, bed mobility training, gait training, home exercise program, patient/family education, neuromuscular re-education, ROM (range of motion), strengthening, transfer training  Outcome Summary/Treatment Plan (PT)  Anticipated Equipment Needs at Discharge (PT): other (see comments)  Anticipated Discharge Disposition (PT): home with assist, home with home health  Plan of Care Reviewed With: patient, spouse  Progress: improving  Outcome Summary: Pt able to amb 350' with RW CGA with step through gait mechanics. Pt required several standing rest breaks due to fatigue. Pt able to ascend/descend 6 steps with handrail on L and STC in R UE. Pt's O2 stayed above 90% with all mobility. Recommend patient home with assist and home health at discharge.      Time Calculation:   PT Charges     Row Name 12/15/19 1342             Time Calculation    Start Time  1342  -CS      PT Received On  12/15/19  -         Time Calculation- PT    Total Timed Code  Minutes- PT  53 minute(s)  -CS         Timed Charges    80605 - PT Therapeutic Exercise Minutes  13  -CS      08222 - Gait Training Minutes   40  -CS        User Key  (r) = Recorded By, (t) = Taken By, (c) = Cosigned By    Initials Name Provider Type    CS Davina Chatman, PT Physical Therapist        Therapy Charges for Today     Code Description Service Date Service Provider Modifiers Qty    08085244301 HC PT THER PROC EA 15 MIN 12/14/2019 Davina Chatman, PT GP 1    20250956077 HC GAIT TRAINING EA 15 MIN 12/14/2019 Davina Chatman, PT GP 3    91021035512 HC PT THER SUPP EA 15 MIN 12/14/2019 Davina Chatman, PT GP 4    06251474947 HC PT THER PROC EA 15 MIN 12/14/2019 Davina Chatman, PT GP 1    27460511535 HC GAIT TRAINING EA 15 MIN 12/14/2019 Davina Chatman, PT GP 3    37828464448 HC PT THER SUPP EA 15 MIN 12/14/2019 Davina Chatman, PT GP 4    45698634696 HC PT THER PROC EA 15 MIN 12/15/2019 Davina Chatman, PT GP 1    86721005674 HC GAIT TRAINING EA 15 MIN 12/15/2019 Davina Chatman, PT GP 3    33643671019 HC PT THER SUPP EA 15 MIN 12/15/2019 Davina Chatman, PT GP 3          PT G-Codes  Outcome Measure Options: AM-PAC 6 Clicks Basic Mobility (PT)  AM-PAC 6 Clicks Score (PT): 18    Davina Chatman, PT  12/15/2019

## 2019-12-16 LAB
BH CV LOWER VASCULAR LEFT COMMON FEMORAL AUGMENT: NORMAL
BH CV LOWER VASCULAR LEFT COMMON FEMORAL COMPRESS: NORMAL
BH CV LOWER VASCULAR LEFT COMMON FEMORAL PHASIC: NORMAL
BH CV LOWER VASCULAR LEFT COMMON FEMORAL SPONT: NORMAL
BH CV LOWER VASCULAR RIGHT COMMON FEMORAL AUGMENT: NORMAL
BH CV LOWER VASCULAR RIGHT COMMON FEMORAL COMPRESS: NORMAL
BH CV LOWER VASCULAR RIGHT COMMON FEMORAL PHASIC: NORMAL
BH CV LOWER VASCULAR RIGHT COMMON FEMORAL SPONT: NORMAL
BH CV LOWER VASCULAR RIGHT DISTAL FEMORAL COMPRESS: NORMAL
BH CV LOWER VASCULAR RIGHT GASTRONEMIUS COMPRESS: NORMAL
BH CV LOWER VASCULAR RIGHT GREATER SAPH AK COMPRESS: NORMAL
BH CV LOWER VASCULAR RIGHT GREATER SAPH BK COMPRESS: NORMAL
BH CV LOWER VASCULAR RIGHT LESSER SAPH COMPRESS: NORMAL
BH CV LOWER VASCULAR RIGHT MID FEMORAL AUGMENT: NORMAL
BH CV LOWER VASCULAR RIGHT MID FEMORAL COMPRESS: NORMAL
BH CV LOWER VASCULAR RIGHT MID FEMORAL PHASIC: NORMAL
BH CV LOWER VASCULAR RIGHT MID FEMORAL SPONT: NORMAL
BH CV LOWER VASCULAR RIGHT PERONEAL COMPRESS: NORMAL
BH CV LOWER VASCULAR RIGHT POPLITEAL AUGMENT: NORMAL
BH CV LOWER VASCULAR RIGHT POPLITEAL COMPRESS: NORMAL
BH CV LOWER VASCULAR RIGHT POPLITEAL PHASIC: NORMAL
BH CV LOWER VASCULAR RIGHT POPLITEAL SPONT: NORMAL
BH CV LOWER VASCULAR RIGHT POSTERIOR TIBIAL COMPRESS: NORMAL
BH CV LOWER VASCULAR RIGHT PROFUNDA FEMORAL COMPRESS: NORMAL
BH CV LOWER VASCULAR RIGHT PROXIMAL FEMORAL COMPRESS: NORMAL
BH CV LOWER VASCULAR RIGHT SAPHENOFEMORAL JUNCTION AUGMENT: NORMAL
BH CV LOWER VASCULAR RIGHT SAPHENOFEMORAL JUNCTION COMPRESS: NORMAL
BH CV LOWER VASCULAR RIGHT SAPHENOFEMORAL JUNCTION PHASIC: NORMAL
BH CV LOWER VASCULAR RIGHT SAPHENOFEMORAL JUNCTION SPONT: NORMAL
COTININE UR-MCNC: NORMAL NG/ML
NICOTINE SERPL-MCNC: NORMAL NG/ML

## 2020-01-15 DIAGNOSIS — E03.9 ACQUIRED HYPOTHYROIDISM: ICD-10-CM

## 2020-01-16 ENCOUNTER — OFFICE VISIT (OUTPATIENT)
Dept: FAMILY MEDICINE CLINIC | Facility: CLINIC | Age: 55
End: 2020-01-16

## 2020-01-16 VITALS
OXYGEN SATURATION: 98 % | DIASTOLIC BLOOD PRESSURE: 62 MMHG | TEMPERATURE: 97.4 F | SYSTOLIC BLOOD PRESSURE: 106 MMHG | BODY MASS INDEX: 32.39 KG/M2 | HEART RATE: 77 BPM | WEIGHT: 165 LBS | HEIGHT: 60 IN | RESPIRATION RATE: 16 BRPM

## 2020-01-16 DIAGNOSIS — K59.00 CONSTIPATION, UNSPECIFIED CONSTIPATION TYPE: ICD-10-CM

## 2020-01-16 DIAGNOSIS — M54.12 CERVICAL RADICULAR PAIN: ICD-10-CM

## 2020-01-16 DIAGNOSIS — E04.1 THYROID NODULE: ICD-10-CM

## 2020-01-16 DIAGNOSIS — R45.4 IRRITABILITY: ICD-10-CM

## 2020-01-16 DIAGNOSIS — E03.9 ACQUIRED HYPOTHYROIDISM: Primary | ICD-10-CM

## 2020-01-16 PROCEDURE — 99214 OFFICE O/P EST MOD 30 MIN: CPT | Performed by: FAMILY MEDICINE

## 2020-01-16 RX ORDER — LEVOTHYROXINE SODIUM 0.03 MG/1
TABLET ORAL
Qty: 30 TABLET | Refills: 0 | Status: SHIPPED | OUTPATIENT
Start: 2020-01-16 | End: 2020-02-14

## 2020-01-16 RX ORDER — ESCITALOPRAM OXALATE 20 MG/1
20 TABLET ORAL DAILY
Qty: 90 TABLET | Refills: 1 | Status: SHIPPED | OUTPATIENT
Start: 2020-01-16 | End: 2020-06-09 | Stop reason: SDUPTHER

## 2020-01-16 RX ORDER — GABAPENTIN 300 MG/1
300 CAPSULE ORAL DAILY
COMMUNITY
End: 2020-03-11

## 2020-01-16 RX ORDER — GABAPENTIN 600 MG/1
600 TABLET ORAL 3 TIMES DAILY
Qty: 90 TABLET | Refills: 5 | Status: SHIPPED | OUTPATIENT
Start: 2020-01-16 | End: 2020-08-12

## 2020-01-16 RX ORDER — POLYETHYLENE GLYCOL 3350 17 G/17G
17 POWDER, FOR SOLUTION ORAL DAILY PRN
Qty: 250 G | Refills: 0 | Status: SHIPPED | OUTPATIENT
Start: 2020-01-16 | End: 2020-05-13

## 2020-01-16 NOTE — PATIENT INSTRUCTIONS
Go to the nearest ER or return to clinic if symptoms worsen, fever/chill develop    Hypothyroidism    Hypothyroidism is when the thyroid gland does not make enough of certain hormones (it is underactive). The thyroid gland is a small gland located in the lower front part of the neck, just in front of the windpipe (trachea). This gland makes hormones that help control how the body uses food for energy (metabolism) as well as how the heart and brain function. These hormones also play a role in keeping your bones strong. When the thyroid is underactive, it produces too little of the hormones thyroxine (T4) and triiodothyronine (T3).  What are the causes?  This condition may be caused by:  · Hashimoto's disease. This is a disease in which the body's disease-fighting system (immune system) attacks the thyroid gland. This is the most common cause.  · Viral infections.  · Pregnancy.  · Certain medicines.  · Birth defects.  · Past radiation treatments to the head or neck for cancer.  · Past treatment with radioactive iodine.  · Past exposure to radiation in the environment.  · Past surgical removal of part or all of the thyroid.  · Problems with a gland in the center of the brain (pituitary gland).  · Lack of enough iodine in the diet.  What increases the risk?  You are more likely to develop this condition if:  · You are female.  · You have a family history of thyroid conditions.  · You use a medicine called lithium.  · You take medicines that affect the immune system (immunosuppressants).  What are the signs or symptoms?  Symptoms of this condition include:  · Feeling as though you have no energy (lethargy).  · Not being able to tolerate cold.  · Weight gain that is not explained by a change in diet or exercise habits.  · Lack of appetite.  · Dry skin.  · Coarse hair.  · Menstrual irregularity.  · Slowing of thought processes.  · Constipation.  · Sadness or depression.  How is this diagnosed?  This condition may be  diagnosed based on:  · Your symptoms, your medical history, and a physical exam.  · Blood tests.  You may also have imaging tests, such as an ultrasound or MRI.  How is this treated?  This condition is treated with medicine that replaces the thyroid hormones that your body does not make. After you begin treatment, it may take several weeks for symptoms to go away.  Follow these instructions at home:  · Take over-the-counter and prescription medicines only as told by your health care provider.  · If you start taking any new medicines, tell your health care provider.  · Keep all follow-up visits as told by your health care provider. This is important.  ? As your condition improves, your dosage of thyroid hormone medicine may change.  ? You will need to have blood tests regularly so that your health care provider can monitor your condition.  Contact a health care provider if:  · Your symptoms do not get better with treatment.  · You are taking thyroid replacement medicine and you:  ? Sweat a lot.  ? Have tremors.  ? Feel anxious.  ? Lose weight rapidly.  ? Cannot tolerate heat.  ? Have emotional swings.  ? Have diarrhea.  ? Feel weak.  Get help right away if you have:  · Chest pain.  · An irregular heartbeat.  · A rapid heartbeat.  · Difficulty breathing.  Summary  · Hypothyroidism is when the thyroid gland does not make enough of certain hormones (it is underactive).  · When the thyroid is underactive, it produces too little of the hormones thyroxine (T4) and triiodothyronine (T3).  · The most common cause is Hashimoto's disease, a disease in which the body's disease-fighting system (immune system) attacks the thyroid gland. The condition can also be caused by viral infections, medicine, pregnancy, or past radiation treatment to the head or neck.  · Symptoms may include weight gain, dry skin, constipation, feeling as though you do not have energy, and not being able to tolerate cold.  · This condition is treated with  medicine to replace the thyroid hormones that your body does not make.  This information is not intended to replace advice given to you by your health care provider. Make sure you discuss any questions you have with your health care provider.  Document Released: 12/18/2006 Document Revised: 11/28/2018 Document Reviewed: 11/28/2018  [employer] Interactive Patient Education © 2019 [employer]

## 2020-01-16 NOTE — PROGRESS NOTES
Subjective   Astrid Sam is a 54 y.o. female.   Chief Complaint   Patient presents with   • Hypothyroidism     History of Present Illness   Hypothyroidism  Astrid Sam is a 54 y.o. female who presents for follow up of hypothyroidism. Current symptoms: none . Patient denies heat / cold intolerance, nervousness and palpitations. Symptoms have stabilized.  She is due to update thyroid ultrasound to monitor nodule.    She underwent total right hip replacement on 12/12/19 per Dr. Chatterjee.   Doing well with recovery, using a walker to ambulate.   Doing PT with Home Health.   Since her discharge from the hospital, she has been experiencing constipation.  She has tried treating with over-the-counter stool softener without significant improvement.  Has also increased dietary intake of fiber.    She has urinary retention, was treated with Oxybutynin and Flomax while in the hospital.   She is scheduled with urology, Dr. Wilde, on 1/22/20.     She continues to use gabapentin for treatment of chronic neck pain with radiculitis.  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  Doing well with current regimen.    Mood continues to be controlled with escitalopram.    The following portions of the patient's history were reviewed and updated as appropriate: allergies, current medications, past family history, past medical history, past social history, past surgical history and problem list.    Review of Systems   Constitutional: Negative for activity change, appetite change, fatigue and fever.   HENT: Negative.    Eyes: Negative.    Respiratory: Negative for cough, chest tightness and shortness of breath.    Cardiovascular: Negative for chest pain, palpitations and leg swelling.   Gastrointestinal: Positive for constipation.   Endocrine: Negative for heat intolerance.   Genitourinary:        Urinary retention    Musculoskeletal: Positive for  arthralgias and gait problem.   Skin: Negative for rash.   Neurological: Negative for light-headedness and headache.   Hematological: Negative for adenopathy.   Psychiatric/Behavioral: Negative.        Objective   Physical Exam   Constitutional: She is oriented to person, place, and time. She appears well-developed and well-nourished.   HENT:   Head: Normocephalic and atraumatic.   Right Ear: External ear normal.   Left Ear: External ear normal.   Nose: Nose normal.   Eyes: Conjunctivae are normal.   Neck: Normal range of motion. Neck supple.   Cardiovascular: Normal rate, regular rhythm and normal heart sounds.   No murmur heard.  Pulmonary/Chest: Effort normal and breath sounds normal. She has no wheezes.   Musculoskeletal: She exhibits no edema or deformity.   Ambulates with walker    Neurological: She is alert and oriented to person, place, and time.   Skin: Skin is warm and dry.   Psychiatric: She has a normal mood and affect. Her behavior is normal.   Nursing note and vitals reviewed.        Assessment/Plan   Astrid was seen today for hypothyroidism.    Diagnoses and all orders for this visit:    Acquired hypothyroidism  -     CBC & Differential  -     Comprehensive Metabolic Panel  -     TSH+Free T4  -     US Thyroid    Cervical radicular pain  -     CBC & Differential  -     Comprehensive Metabolic Panel  -     gabapentin (NEURONTIN) 600 MG tablet; Take 1 tablet by mouth 3 (Three) Times a Day.    Irritability  -     escitalopram (LEXAPRO) 20 MG tablet; Take 1 tablet by mouth Daily.    Thyroid nodule  -     US Thyroid    Constipation, unspecified constipation type  -     polyethylene glycol (MIRALAX) powder; Take 17 g by mouth Daily As Needed (constipation).    She already has an appointment scheduled with urology for evaluation of urinary retention.  Encouraged to keep this appointment.  Labs updated today.  Gabapentin refilled, no sign of misuse. YOUSIF query complete. Treatment plan to include limited  course of prescribed controlled substance. Risks including addiction, benefits, and alternatives presented to patient.   MiraLAX to improve constipation.  She is aware that this can take up to 4 to 5 days of treatment before she will see a significant bowel movement.  Okay for stool softeners if stools are hard to pass.

## 2020-01-17 LAB
ALBUMIN SERPL-MCNC: 4.5 G/DL (ref 3.5–5.2)
ALBUMIN/GLOB SERPL: 2.3 G/DL
ALP SERPL-CCNC: 61 U/L (ref 39–117)
ALT SERPL-CCNC: 7 U/L (ref 1–33)
AST SERPL-CCNC: 15 U/L (ref 1–32)
BASOPHILS # BLD AUTO: 0.07 10*3/MM3 (ref 0–0.2)
BASOPHILS NFR BLD AUTO: 1.2 % (ref 0–1.5)
BILIRUB SERPL-MCNC: 0.3 MG/DL (ref 0.2–1.2)
BUN SERPL-MCNC: 15 MG/DL (ref 6–20)
BUN/CREAT SERPL: 15.5 (ref 7–25)
CALCIUM SERPL-MCNC: 9.4 MG/DL (ref 8.6–10.5)
CHLORIDE SERPL-SCNC: 104 MMOL/L (ref 98–107)
CO2 SERPL-SCNC: 24.9 MMOL/L (ref 22–29)
CREAT SERPL-MCNC: 0.97 MG/DL (ref 0.57–1)
EOSINOPHIL # BLD AUTO: 0.46 10*3/MM3 (ref 0–0.4)
EOSINOPHIL NFR BLD AUTO: 8.1 % (ref 0.3–6.2)
ERYTHROCYTE [DISTWIDTH] IN BLOOD BY AUTOMATED COUNT: 14.7 % (ref 12.3–15.4)
GLOBULIN SER CALC-MCNC: 2 GM/DL
GLUCOSE SERPL-MCNC: 69 MG/DL (ref 65–99)
HCT VFR BLD AUTO: 31.7 % (ref 34–46.6)
HGB BLD-MCNC: 10.1 G/DL (ref 12–15.9)
IMM GRANULOCYTES # BLD AUTO: 0.01 10*3/MM3 (ref 0–0.05)
IMM GRANULOCYTES NFR BLD AUTO: 0.2 % (ref 0–0.5)
LYMPHOCYTES # BLD AUTO: 1.53 10*3/MM3 (ref 0.7–3.1)
LYMPHOCYTES NFR BLD AUTO: 26.9 % (ref 19.6–45.3)
MCH RBC QN AUTO: 26.9 PG (ref 26.6–33)
MCHC RBC AUTO-ENTMCNC: 31.9 G/DL (ref 31.5–35.7)
MCV RBC AUTO: 84.3 FL (ref 79–97)
MONOCYTES # BLD AUTO: 0.54 10*3/MM3 (ref 0.1–0.9)
MONOCYTES NFR BLD AUTO: 9.5 % (ref 5–12)
NEUTROPHILS # BLD AUTO: 3.07 10*3/MM3 (ref 1.7–7)
NEUTROPHILS NFR BLD AUTO: 54.1 % (ref 42.7–76)
NRBC BLD AUTO-RTO: 0 /100 WBC (ref 0–0.2)
PLATELET # BLD AUTO: 287 10*3/MM3 (ref 140–450)
POTASSIUM SERPL-SCNC: 4.1 MMOL/L (ref 3.5–5.2)
PROT SERPL-MCNC: 6.5 G/DL (ref 6–8.5)
RBC # BLD AUTO: 3.76 10*6/MM3 (ref 3.77–5.28)
SODIUM SERPL-SCNC: 145 MMOL/L (ref 136–145)
T4 FREE SERPL-MCNC: 1.14 NG/DL (ref 0.93–1.7)
TSH SERPL DL<=0.005 MIU/L-ACNC: 2.68 UIU/ML (ref 0.27–4.2)
WBC # BLD AUTO: 5.68 10*3/MM3 (ref 3.4–10.8)

## 2020-01-20 LAB
FERRITIN SERPL-MCNC: 30.6 NG/ML (ref 13–150)
FOLATE SERPL-MCNC: 11.1 NG/ML (ref 4.78–24.2)
IRON SATN MFR SERPL: 10 % (ref 20–50)
IRON SERPL-MCNC: 48 MCG/DL (ref 37–145)
TIBC SERPL-MCNC: 478 MCG/DL
UIBC SERPL-MCNC: 430 MCG/DL (ref 112–346)
VIT B12 SERPL-MCNC: 523 PG/ML (ref 211–946)

## 2020-01-23 ENCOUNTER — HOSPITAL ENCOUNTER (OUTPATIENT)
Dept: ULTRASOUND IMAGING | Facility: HOSPITAL | Age: 55
Discharge: HOME | End: 2020-01-23
Admitting: FAMILY MEDICINE

## 2020-01-23 PROCEDURE — 76536 US EXAM OF HEAD AND NECK: CPT

## 2020-02-14 DIAGNOSIS — E03.9 ACQUIRED HYPOTHYROIDISM: ICD-10-CM

## 2020-02-14 RX ORDER — LEVOTHYROXINE SODIUM 0.03 MG/1
TABLET ORAL
Qty: 90 TABLET | Refills: 1 | Status: SHIPPED | OUTPATIENT
Start: 2020-02-14 | End: 2020-06-05 | Stop reason: SDUPTHER

## 2020-02-21 RX ORDER — CIPROFLOXACIN 500 MG/1
500 TABLET, FILM COATED ORAL 2 TIMES DAILY
Qty: 14 TABLET | Refills: 0 | Status: SHIPPED | OUTPATIENT
Start: 2020-02-21 | End: 2020-02-28

## 2020-03-11 ENCOUNTER — OFFICE VISIT (OUTPATIENT)
Dept: FAMILY MEDICINE CLINIC | Facility: CLINIC | Age: 55
End: 2020-03-11

## 2020-03-11 VITALS
HEART RATE: 78 BPM | SYSTOLIC BLOOD PRESSURE: 98 MMHG | TEMPERATURE: 97.4 F | HEIGHT: 60 IN | BODY MASS INDEX: 31.22 KG/M2 | OXYGEN SATURATION: 99 % | WEIGHT: 159 LBS | DIASTOLIC BLOOD PRESSURE: 62 MMHG

## 2020-03-11 DIAGNOSIS — M19.011 ARTHRITIS OF RIGHT STERNOCLAVICULAR JOINT: Primary | ICD-10-CM

## 2020-03-11 DIAGNOSIS — Z13.828 SCREENING FOR RHEUMATOID ARTHRITIS: ICD-10-CM

## 2020-03-11 PROCEDURE — 99213 OFFICE O/P EST LOW 20 MIN: CPT | Performed by: FAMILY MEDICINE

## 2020-03-11 RX ORDER — PREDNISONE 10 MG/1
TABLET ORAL
Qty: 21 TABLET | Refills: 0 | Status: SHIPPED | OUTPATIENT
Start: 2020-03-11 | End: 2020-03-25

## 2020-03-11 NOTE — PROGRESS NOTES
"Subjective   Astrid Sam is a 54 y.o. female.   Chief Complaint   Patient presents with   • Knot on R side of neck/collar bone     \"shooting pain\" x 1yr      History of Present Illness   She presents with pain and edema of right SC joint. She has had this intermittently over the last year. She has completed xray and CT scan, confirming arthritis of SC joint.   Symptoms have been worse over the last 1-2 weeks. No new injury. She is doing physical therapy for right knee and hip, unsure if maybe overuse of the joint.   When she saw her orthopedist Dr. Miller last year, she was recommended to see Dr. Diallo but she doesn't recall seeing Dr. Fabian.   The area is swollen, but not red.   She treats with acetaminophen and heat, which does improve symptoms temporarily.     The following portions of the patient's history were reviewed and updated as appropriate: allergies, current medications, past family history, past medical history, past social history, past surgical history and problem list.    Review of Systems   Constitutional: Negative for fever.   Respiratory: Negative.    Cardiovascular: Negative.    Musculoskeletal: Positive for arthralgias.       Objective   Physical Exam   Constitutional: She appears well-developed and well-nourished.   HENT:   Head: Normocephalic and atraumatic.   Right Ear: External ear normal.   Left Ear: External ear normal.   Nose: Nose normal.   Eyes: Conjunctivae are normal.   Pulmonary/Chest: Effort normal. She exhibits bony tenderness (SC joint) and edema (SC joint).       Neurological: She is alert.   Skin: Skin is warm.   Psychiatric: Her behavior is normal.   Nursing note and vitals reviewed.        Assessment/Plan   Astrid was seen today for knot on r side of neck/collar bone.    Diagnoses and all orders for this visit:    Arthritis of right sternoclavicular joint  -     CBC & Differential  -     C-reactive Protein  -     Sedimentation Rate  -     Uric Acid  -     YUSEF " by IFA, Reflex 9-biomarkers profile  -     RHEUMATOID FACTOR  -     predniSONE (DELTASONE) 10 MG tablet; Take 60 mg po day 1, 50 mg day 2, 40 mg day 3, 30 mg day 4, 20 mg day 5, 10 mg day 6  -     Ambulatory Referral to Orthopedic Surgery    Screening for rheumatoid arthritis  -     C-reactive Protein  -     Sedimentation Rate  -     YUSEF by IFA, Reflex 9-biomarkers profile  -     RHEUMATOID FACTOR      Labs completed today to evaluate SC edema and tenderness. Will refer to orthopedist.

## 2020-03-11 NOTE — PATIENT INSTRUCTIONS
Go to the nearest ER or return to clinic if symptoms worsen, fever/chill develop    Joint Pain    Joint pain can be caused by many things. It is likely to go away if you follow instructions from your doctor for taking care of yourself at home. Sometimes, you may need more treatment.  Follow these instructions at home:  Managing pain, stiffness, and swelling    · If told, put ice on the painful area.  ? Put ice in a plastic bag.  ? Place a towel between your skin and the bag.  ? Leave the ice on for 20 minutes, 2-3 times a day.  · If told, put heat on the painful area. Do this as often as told by your doctor. Use the heat source that your doctor recommends, such as a moist heat pack or a heating pad.  ? Place a towel between your skin and the heat source.  ? Leave the heat on for 20-30 minutes.  ? Take off the heat if your skin gets bright red. This is especially important if you are unable to feel pain, heat, or cold. You may have a greater risk of getting burned.  · Move your fingers or toes below the painful joint often. This helps with stiffness and swelling.  · If possible, raise (elevate) the painful joint above the level of your heart while you are sitting or lying down. To do this, try putting a few pillows under the painful joint.  Activity  · Rest the painful joint for as long as told. Do not do things that cause pain or make your pain worse.  · Begin exercising or stretching the affected area, as told by your doctor. Ask your doctor what types of exercise are safe for you.  If you have an elastic bandage, sling, or splint:  · Wear the device as told by your doctor. Take it off only as told by your doctor.  · Loosen the device if your fingers or toes below the joint:  ? Tingle.  ? Lose feeling (get numb).  ? Get cold and blue.  · Keep the device clean.  · Ask your doctor if you should take off the device before bathing. You may need to cover it with a watertight covering when you take a bath or a  shower.  General instructions  · Take over-the-counter and prescription medicines only as told by your doctor.  · Do not use any products that contain nicotine or tobacco. These include cigarettes and e-cigarettes. If you need help quitting, ask your doctor.  · Keep all follow-up visits as told by your doctor. This is important.  Contact a doctor if:  · You have pain that gets worse and does not get better with medicine.  · Your joint pain does not get better in 3 days.  · You have more bruising or swelling.  · You have a fever.  · You lose 10 lb (4.5 kg) or more without trying.  Get help right away if:  · You cannot move the joint.  · Your fingers or toes tingle, lose feeling, or get cold and blue.  · You have a fever along with a joint that is red, warm, and swollen.  Summary  · Joint pain can be caused by many things. It often goes away if you follow instructions from your doctor for taking care of yourself at home.  · Rest the painful joint for as long as told. Do not do things that cause pain or make your pain worse.  · Take over-the-counter and prescription medicines only as told by your doctor.  This information is not intended to replace advice given to you by your health care provider. Make sure you discuss any questions you have with your health care provider.  Document Released: 12/06/2010 Document Revised: 10/03/2018 Document Reviewed: 10/03/2018  [employer] Interactive Patient Education © 2020 [employer]

## 2020-03-12 DIAGNOSIS — D64.9 ANEMIA, UNSPECIFIED TYPE: Primary | ICD-10-CM

## 2020-03-12 LAB
ANA TITR SER IF: NEGATIVE {TITER}
BASOPHILS # BLD AUTO: 0.05 10*3/MM3 (ref 0–0.2)
BASOPHILS NFR BLD AUTO: 0.9 % (ref 0–1.5)
CRP SERPL-MCNC: 0.17 MG/DL (ref 0–0.5)
EOSINOPHIL # BLD AUTO: 0.27 10*3/MM3 (ref 0–0.4)
EOSINOPHIL NFR BLD AUTO: 4.9 % (ref 0.3–6.2)
ERYTHROCYTE [DISTWIDTH] IN BLOOD BY AUTOMATED COUNT: 15.7 % (ref 12.3–15.4)
ERYTHROCYTE [SEDIMENTATION RATE] IN BLOOD BY WESTERGREN METHOD: 17 MM/HR (ref 0–30)
HCT VFR BLD AUTO: 27.9 % (ref 34–46.6)
HGB BLD-MCNC: 8.5 G/DL (ref 12–15.9)
IMM GRANULOCYTES # BLD AUTO: 0.01 10*3/MM3 (ref 0–0.05)
IMM GRANULOCYTES NFR BLD AUTO: 0.2 % (ref 0–0.5)
LABORATORY COMMENT REPORT: NORMAL
LYMPHOCYTES # BLD AUTO: 1.76 10*3/MM3 (ref 0.7–3.1)
LYMPHOCYTES NFR BLD AUTO: 32.1 % (ref 19.6–45.3)
MCH RBC QN AUTO: 23.7 PG (ref 26.6–33)
MCHC RBC AUTO-ENTMCNC: 30.5 G/DL (ref 31.5–35.7)
MCV RBC AUTO: 77.7 FL (ref 79–97)
MONOCYTES # BLD AUTO: 0.4 10*3/MM3 (ref 0.1–0.9)
MONOCYTES NFR BLD AUTO: 7.3 % (ref 5–12)
NEUTROPHILS # BLD AUTO: 3 10*3/MM3 (ref 1.7–7)
NEUTROPHILS NFR BLD AUTO: 54.6 % (ref 42.7–76)
NRBC BLD AUTO-RTO: 0 /100 WBC (ref 0–0.2)
PLATELET # BLD AUTO: 282 10*3/MM3 (ref 140–450)
RBC # BLD AUTO: 3.59 10*6/MM3 (ref 3.77–5.28)
RHEUMATOID FACT SERPL-ACNC: <10 IU/ML (ref 0–13.9)
URATE SERPL-MCNC: 4.5 MG/DL (ref 2.4–5.7)
WBC # BLD AUTO: 5.49 10*3/MM3 (ref 3.4–10.8)

## 2020-03-14 LAB
FERRITIN SERPL-MCNC: 9.65 NG/ML (ref 13–150)
FOLATE SERPL-MCNC: 8.9 NG/ML (ref 4.78–24.2)
IRON SATN MFR SERPL: 4 % (ref 20–50)
IRON SERPL-MCNC: 21 MCG/DL (ref 37–145)
TIBC SERPL-MCNC: 552 MCG/DL
UIBC SERPL-MCNC: 531 MCG/DL (ref 112–346)
VIT B12 SERPL-MCNC: 622 PG/ML (ref 211–946)
WRITTEN AUTHORIZATION: NORMAL

## 2020-03-15 DIAGNOSIS — D50.9 IRON DEFICIENCY ANEMIA, UNSPECIFIED IRON DEFICIENCY ANEMIA TYPE: Primary | ICD-10-CM

## 2020-03-15 RX ORDER — ASCORBIC ACID 500 MG
500 TABLET ORAL DAILY
Qty: 30 TABLET | Refills: 2 | Status: SHIPPED | OUTPATIENT
Start: 2020-03-15 | End: 2020-05-13

## 2020-03-15 RX ORDER — LANOLIN ALCOHOL/MO/W.PET/CERES
325 CREAM (GRAM) TOPICAL
Qty: 30 TABLET | Refills: 2 | Status: SHIPPED | OUTPATIENT
Start: 2020-03-15 | End: 2020-07-16

## 2020-03-16 DIAGNOSIS — D50.9 IRON DEFICIENCY ANEMIA, UNSPECIFIED IRON DEFICIENCY ANEMIA TYPE: ICD-10-CM

## 2020-03-17 DIAGNOSIS — D64.9 ANEMIA, UNSPECIFIED TYPE: ICD-10-CM

## 2020-03-18 LAB — HEMOCCULT STL QL IA: NEGATIVE

## 2020-03-23 ENCOUNTER — TELEPHONE (OUTPATIENT)
Dept: FAMILY MEDICINE CLINIC | Facility: CLINIC | Age: 55
End: 2020-03-23

## 2020-03-23 RX ORDER — BENZONATATE 200 MG/1
200 CAPSULE ORAL 3 TIMES DAILY PRN
Qty: 30 CAPSULE | Refills: 1 | Status: SHIPPED | OUTPATIENT
Start: 2020-03-23 | End: 2020-05-13

## 2020-03-23 NOTE — TELEPHONE ENCOUNTER
Pt says she has a bad cough and no other symptoms.  Pt is requesting something be called in to [facility] in [place].

## 2020-03-23 NOTE — TELEPHONE ENCOUNTER
Cough x 2d w/ green mucus, no fever. Requesting Tessalon Pearls.     Informed pt Dr Murphy is out, offered appt today, she asked if I could ask you. Informed her I would pass along the msg. (Pt was coughing a lot while talking to her just fyi)

## 2020-03-25 ENCOUNTER — TELEPHONE (OUTPATIENT)
Dept: FAMILY MEDICINE CLINIC | Facility: CLINIC | Age: 55
End: 2020-03-25

## 2020-03-25 ENCOUNTER — NURSE TRIAGE (OUTPATIENT)
Dept: CALL CENTER | Facility: HOSPITAL | Age: 55
End: 2020-03-25

## 2020-03-25 ENCOUNTER — E-VISIT (OUTPATIENT)
Dept: FAMILY MEDICINE CLINIC | Facility: CLINIC | Age: 55
End: 2020-03-25

## 2020-03-25 DIAGNOSIS — R05.9 COUGH: ICD-10-CM

## 2020-03-25 DIAGNOSIS — J20.9 ACUTE BRONCHITIS, UNSPECIFIED ORGANISM: ICD-10-CM

## 2020-03-25 PROCEDURE — 99421 OL DIG E/M SVC 5-10 MIN: CPT | Performed by: FAMILY MEDICINE

## 2020-03-25 RX ORDER — AMOXICILLIN AND CLAVULANATE POTASSIUM 875; 125 MG/1; MG/1
1 TABLET, FILM COATED ORAL 2 TIMES DAILY WITH MEALS
Qty: 20 TABLET | Refills: 0 | Status: SHIPPED | OUTPATIENT
Start: 2020-03-25 | End: 2020-04-04

## 2020-03-25 RX ORDER — GUAIFENESIN 600 MG/1
600 TABLET, EXTENDED RELEASE ORAL 2 TIMES DAILY
Qty: 20 TABLET | Refills: 0 | Status: SHIPPED | OUTPATIENT
Start: 2020-03-25 | End: 2020-04-04

## 2020-03-25 NOTE — TELEPHONE ENCOUNTER
I have reviewed the patient entered information above. Based on this review my recommendations, orders and follow up are noted below.

## 2020-03-25 NOTE — PATIENT INSTRUCTIONS
Go to the nearest ER or return to clinic if symptoms worsen, fever/chill develop    Acute Bronchitis, Adult  Acute bronchitis is when air tubes (bronchi) in the lungs suddenly get swollen. The condition can make it hard to breathe. It can also cause these symptoms:  · A cough.  · Coughing up clear, yellow, or green mucus.  · Wheezing.  · Chest congestion.  · Shortness of breath.  · A fever.  · Body aches.  · Chills.  · A sore throat.  Follow these instructions at home:    Medicines  · Take over-the-counter and prescription medicines only as told by your doctor.  · If you were prescribed an antibiotic medicine, take it as told by your doctor. Do not stop taking the antibiotic even if you start to feel better.  General instructions  · Rest.  · Drink enough fluids to keep your pee (urine) pale yellow.  · Avoid smoking and secondhand smoke. If you smoke and you need help quitting, ask your doctor. Quitting will help your lungs heal faster.  · Use an inhaler, cool mist vaporizer, or humidifier as told by your doctor.  · Keep all follow-up visits as told by your doctor. This is important.  How is this prevented?  To lower your risk of getting this condition again:  · Wash your hands often with soap and water. If you cannot use soap and water, use hand .  · Avoid contact with people who have cold symptoms.  · Try not to touch your hands to your mouth, nose, or eyes.  · Make sure to get the flu shot every year.  Contact a doctor if:  · Your symptoms do not get better in 2 weeks.  Get help right away if:  · You cough up blood.  · You have chest pain.  · You have very bad shortness of breath.  · You become dehydrated.  · You faint (pass out) or keep feeling like you are going to pass out.  · You keep throwing up (vomiting).  · You have a very bad headache.  · Your fever or chills gets worse.  This information is not intended to replace advice given to you by your health care provider. Make sure you discuss any  questions you have with your health care provider.  Document Released: 06/05/2009 Document Revised: 08/01/2018 Document Reviewed: 06/07/2017  [employer] Interactive Patient Education © 2020 [employer]

## 2020-03-25 NOTE — TELEPHONE ENCOUNTER
I have completed her Evisit. Agumentin and Mucinex sent to pharmacy. Ok to continue tessalon perles.

## 2020-03-25 NOTE — TELEPHONE ENCOUNTER
Cough for > one week. Has been treated but not improved. Suggested to use MY Chart, to speak with provider    Reason for Disposition  • Cough    Additional Information  • Negative: Severe difficulty breathing (e.g., struggling for each breath, speaks in single words)  • Negative: Bluish (or gray) lips or face now  • Negative: [1] Rapid onset of cough AND [2] has hives  • Negative: Coughing started suddenly after medicine, an allergic food or bee sting  • Negative: [1] Difficulty breathing AND [2] exposure to flames, smoke, or fumes  • Negative: [1] Stridor AND [2] difficulty breathing  • Negative: Sounds like a life-threatening emergency to the triager  • Negative: Choked on object of food that could be caught in the throat  • Negative: Chest pain is main symptom  • Negative: [1] Previous asthma attacks AND [2] this feels like asthma attack  • Negative: Cough lasts > 3 weeks  • Negative: Wet (productive) cough (i.e., white-yellow, yellow, green, or noris colored sputum)  • Negative: Chest pain  (Exception: MILD central chest pain, present only when coughing)  • Negative: Difficulty breathing  • Negative: Patient sounds very sick or weak to the triager  • Negative: [1] Coughed up blood AND [2] > 1 tablespoon (15 ml) (Exception: blood-tinged sputum)  • Negative: Fever > 103 F (39.4 C)  • Negative: [1] Fever > 101 F (38.3 C) AND [2] age > 60  • Negative: [1] Fever > 100.0 F (37.8 C) AND [2] bedridden (e.g., nursing home patient, CVA, chronic illness, recovering from surgery)  • Negative: [1] Fever > 100.0 F (37.8 C) AND [2] diabetes mellitus or weak immune system (e.g., HIV positive, cancer chemo, splenectomy, organ transplant, chronic steroids)  • Negative: Wheezing is present  • Negative: [1] Ankle swelling AND [2] swelling is increasing  • Negative: SEVERE coughing spells (e.g., whooping sound after coughing, vomiting after coughing)  • Negative: [1] Continuous (nonstop) coughing interferes with work or school AND  "[2] no improvement using cough treatment per protocol  • Negative: Fever present > 3 days (72 hours)  • Negative: [1] Fever returns after gone for over 24 hours AND [2] symptoms worse or not improved  • Negative: [1] Using nasal washes and pain medicine > 24 hours AND [2] sinus pain (around cheekbone or eye) persists  • Negative: Earache is present  • Negative: Cough has been present for > 3 weeks  • Negative: [1] Nasal discharge AND [2] present > 10 days  • Negative: [1] Coughed up blood-tinged sputum AND [2] more than once  • Negative: [1] Patient also has allergy symptoms (e.g., itchy eyes, clear nasal discharge, postnasal drip) AND [2] they are acting up  • Negative: Taking an ACE Inhibitor medication  (e.g., benazepril/LOTENSIN, captopril/CAPOTEN, enalapril/VASOTEC, lisinopril/ZESTRIL)  • Negative: Exposure to TB (Tuberculosis)    Answer Assessment - Initial Assessment Questions  1. ONSET: \"When did the cough begin?\"     One week  2. SEVERITY: \"How bad is the cough today?\"       Moderate to sevre  3. RESPIRATORY DISTRESS: \"Describe your breathing.\"       no  4. FEVER: \"Do you have a fever?\" If so, ask: \"What is your temperature, how was it measured, and when did it start?\"      no  5. HEMOPTYSIS: \"Are you coughing up any blood?\" If so ask: \"How much?\" (flecks, streaks, tablespoons, etc.)      no  6. TREATMENT: \"What have you done so far to treat the cough?\" (e.g., meds, fluids, humidifier)       Medication for cough 3 times, fulids, humidifier , cough drops  7. CARDIAC HISTORY: \"Do you have any history of heart disease?\" (e.g., heart attack, congestive heart failure)       no  8. LUNG HISTORY: \"Do you have any history of lung disease?\"  (e.g., pulmonary embolus, asthma, emphysema)      no  9. PE RISK FACTORS: \"Do you have a history of blood clots?\" (or: recent major surgery, recent prolonged travel, bedridden)      no  10. OTHER SYMPTOMS: \"Do you have any other symptoms? (e.g., runny nose, wheezing, chest " "pain)        no  11. PREGNANCY: \"Is there any chance you are pregnant?\" \"When was your last menstrual period?\"        no  12. TRAVEL: \"Have you traveled out of the country in the last month?\" (e.g., travel history, exposures)        no    Protocols used: COUGH - ACUTE NON-PRODUCTIVE-ADULT-AH      "

## 2020-03-25 NOTE — PROGRESS NOTES
Subjective   Astrid Sam is a 54 y.o. female.     History of Present Illness   Evisit regarding cough.     The following portions of the patient's history were reviewed and updated as appropriate: allergies, current medications, past family history, past medical history, past social history, past surgical history and problem list.    Review of Systems    Objective   Physical Exam  Unable to perform due to E visit    Assessment/Plan   Diagnoses and all orders for this visit:    Cough  -     guaiFENesin (MUCINEX) 600 MG 12 hr tablet; Take 1 tablet by mouth 2 (Two) Times a Day for 10 days.    Acute bronchitis, unspecified organism  -     guaiFENesin (MUCINEX) 600 MG 12 hr tablet; Take 1 tablet by mouth 2 (Two) Times a Day for 10 days.  -     amoxicillin-clavulanate (AUGMENTIN) 875-125 MG per tablet; Take 1 tablet by mouth 2 (Two) Times a Day With Meals for 10 days.      10 minutes was spent reviewing chart and plan of care.

## 2020-03-25 NOTE — TELEPHONE ENCOUNTER
Will be speaking with the provider thru My chart, wanting to keep the appointment. Until speak with provider    Routing comment       Carmen Red RN 13 minutes ago (10:46 AM)         Cough for > one week. Has been treated but not improved. Suggested to use MY Chart, to speak with provider     Reason for Disposition  • Cough    Additional Information  • Negative: Severe difficulty breathing (e.g., struggling for each breath, speaks in single words)  • Negative: Bluish (or gray) lips or face now  • Negative: [1] Rapid onset of cough AND [2] has hives  • Negative: Coughing started suddenly after medicine, an allergic food or bee sting  • Negative: [1] Difficulty breathing AND [2] exposure to flames, smoke, or fumes  • Negative: [1] Stridor AND [2] difficulty breathing  • Negative: Sounds like a life-threatening emergency to the triager  • Negative: Choked on object of food that could be caught in the throat  • Negative: Chest pain is main symptom  • Negative: [1] Previous asthma attacks AND [2] this feels like asthma attack  • Negative: Cough lasts > 3 weeks  • Negative: Wet (productive) cough (i.e., white-yellow, yellow, green, or noris colored sputum)  • Negative: Chest pain  (Exception: MILD central chest pain, present only when coughing)  • Negative: Difficulty breathing  • Negative: Patient sounds very sick or weak to the triager  • Negative: [1] Coughed up blood AND [2] > 1 tablespoon (15 ml) (Exception: blood-tinged sputum)  • Negative: Fever > 103 F (39.4 C)  • Negative: [1] Fever > 101 F (38.3 C) AND [2] age > 60  • Negative: [1] Fever > 100.0 F (37.8 C) AND [2] bedridden (e.g., nursing home patient, CVA, chronic illness, recovering from surgery)  • Negative: [1] Fever > 100.0 F (37.8 C) AND [2] diabetes mellitus or weak immune system (e.g., HIV positive, cancer chemo, splenectomy, organ transplant, chronic steroids)  • Negative: Wheezing is present  • Negative: [1] Ankle swelling AND [2] swelling is  "increasing  • Negative: SEVERE coughing spells (e.g., whooping sound after coughing, vomiting after coughing)  • Negative: [1] Continuous (nonstop) coughing interferes with work or school AND [2] no improvement using cough treatment per protocol  • Negative: Fever present > 3 days (72 hours)  • Negative: [1] Fever returns after gone for over 24 hours AND [2] symptoms worse or not improved  • Negative: [1] Using nasal washes and pain medicine > 24 hours AND [2] sinus pain (around cheekbone or eye) persists  • Negative: Earache is present  • Negative: Cough has been present for > 3 weeks  • Negative: [1] Nasal discharge AND [2] present > 10 days  • Negative: [1] Coughed up blood-tinged sputum AND [2] more than once  • Negative: [1] Patient also has allergy symptoms (e.g., itchy eyes, clear nasal discharge, postnasal drip) AND [2] they are acting up  • Negative: Taking an ACE Inhibitor medication  (e.g., benazepril/LOTENSIN, captopril/CAPOTEN, enalapril/VASOTEC, lisinopril/ZESTRIL)  • Negative: Exposure to TB (Tuberculosis)    Answer Assessment - Initial Assessment Questions  1. ONSET: \"When did the cough begin?\"     One week  2. SEVERITY: \"How bad is the cough today?\"       Moderate to sevre  3. RESPIRATORY DISTRESS: \"Describe your breathing.\"       no  4. FEVER: \"Do you have a fever?\" If so, ask: \"What is your temperature, how was it measured, and when did it start?\"      no  5. HEMOPTYSIS: \"Are you coughing up any blood?\" If so ask: \"How much?\" (flecks, streaks, tablespoons, etc.)      no  6. TREATMENT: \"What have you done so far to treat the cough?\" (e.g., meds, fluids, humidifier)       Medication for cough 3 times, fulids, humidifier , cough drops  7. CARDIAC HISTORY: \"Do you have any history of heart disease?\" (e.g., heart attack, congestive heart failure)       no  8. LUNG HISTORY: \"Do you have any history of lung disease?\"  (e.g., pulmonary embolus, asthma, emphysema)      no  9. PE RISK FACTORS: \"Do you have " "a history of blood clots?\" (or: recent major surgery, recent prolonged travel, bedridden)      no  10. OTHER SYMPTOMS: \"Do you have any other symptoms? (e.g., runny nose, wheezing, chest pain)        no  11. PREGNANCY: \"Is there any chance you are pregnant?\" \"When was your last menstrual period?\"        no  12. TRAVEL: \"Have you traveled out of the country in the last month?\" (e.g., travel history, exposures)        no    Protocols used: COUGH - ACUTE NON-PRODUCTIVE-ADULT-               Documentation       Astrid Sam Carol S, RN 30 minutes ago (10:29 AM)      has a dry cough, afebrile, has been treated for the cough, not helping, coughing keeps her awake        "

## 2020-04-20 DIAGNOSIS — G25.81 RLS (RESTLESS LEGS SYNDROME): Primary | ICD-10-CM

## 2020-04-20 RX ORDER — ROPINIROLE 0.25 MG/1
0.25 TABLET, FILM COATED ORAL NIGHTLY
Qty: 90 TABLET | Refills: 0 | Status: SHIPPED | OUTPATIENT
Start: 2020-04-20 | End: 2020-05-05 | Stop reason: SDUPTHER

## 2020-04-24 ENCOUNTER — TELEPHONE (OUTPATIENT)
Dept: FAMILY MEDICINE CLINIC | Facility: CLINIC | Age: 55
End: 2020-04-24

## 2020-04-24 ENCOUNTER — E-VISIT (OUTPATIENT)
Dept: FAMILY MEDICINE CLINIC | Facility: CLINIC | Age: 55
End: 2020-04-24

## 2020-04-24 DIAGNOSIS — J20.9 ACUTE BRONCHITIS, UNSPECIFIED ORGANISM: ICD-10-CM

## 2020-04-24 DIAGNOSIS — R05.3 PERSISTENT COUGH FOR 3 WEEKS OR LONGER: Primary | ICD-10-CM

## 2020-04-24 PROCEDURE — 99422 OL DIG E/M SVC 11-20 MIN: CPT | Performed by: FAMILY MEDICINE

## 2020-04-24 RX ORDER — BROMPHENIRAMINE MALEATE, PSEUDOEPHEDRINE HYDROCHLORIDE, AND DEXTROMETHORPHAN HYDROBROMIDE 2; 30; 10 MG/5ML; MG/5ML; MG/5ML
5 SYRUP ORAL 4 TIMES DAILY PRN
Qty: 180 ML | Refills: 0 | Status: SHIPPED | OUTPATIENT
Start: 2020-04-24 | End: 2020-07-16

## 2020-04-24 RX ORDER — LEVOFLOXACIN 500 MG/1
500 TABLET, FILM COATED ORAL DAILY
Qty: 7 TABLET | Refills: 0 | Status: SHIPPED | OUTPATIENT
Start: 2020-04-24 | End: 2020-05-01

## 2020-04-24 RX ORDER — ALBUTEROL SULFATE 90 UG/1
2 AEROSOL, METERED RESPIRATORY (INHALATION) EVERY 4 HOURS PRN
Qty: 8 G | Refills: 0 | Status: SHIPPED | OUTPATIENT
Start: 2020-04-24 | End: 2020-07-16

## 2020-04-24 NOTE — PROGRESS NOTES
I have reviewed the patient entered information above. Based on this review my recommendations, orders and follow up are noted below.       Time spent during E-visit: 15 minutes

## 2020-04-24 NOTE — TELEPHONE ENCOUNTER
"She sent an E-visit today and I responded through [facility] with additional questions, but she hasn't replied back.   \"Hi! I just had a couple of questions regarding the E-visit you submitted. Is this the same cough that we treated on 3/25/20 with Augmentin and Mucinex? Did it improve any with that treatment?   Are you having symptoms related to allergies currently? Post-nasal drainage that could be causing cough?\"    Can we get these details from her?     "

## 2020-04-24 NOTE — PATIENT INSTRUCTIONS
Acute Bronchitis, Adult    Acute bronchitis is sudden (acute) swelling of the air tubes (bronchi) in the lungs. Acute bronchitis causes these tubes to fill with mucus, which can make it hard to breathe. It can also cause coughing or wheezing.  In adults, acute bronchitis usually goes away within 2 weeks. A cough caused by bronchitis may last up to 3 weeks. Smoking, allergies, and asthma can make the condition worse. Repeated episodes of bronchitis may cause further lung problems, such as chronic obstructive pulmonary disease (COPD).  What are the causes?  This condition can be caused by germs and by substances that irritate the lungs, including:  · Cold and flu viruses. This condition is most often caused by the same virus that causes a cold.  · Bacteria.  · Exposure to tobacco smoke, dust, fumes, and air pollution.  What increases the risk?  This condition is more likely to develop in people who:  · Have close contact with someone with acute bronchitis.  · Are exposed to lung irritants, such as tobacco smoke, dust, fumes, and vapors.  · Have a weak immune system.  · Have a respiratory condition such as asthma.  What are the signs or symptoms?  Symptoms of this condition include:  · A cough.  · Coughing up clear, yellow, or green mucus.  · Wheezing.  · Chest congestion.  · Shortness of breath.  · A fever.  · Body aches.  · Chills.  · A sore throat.  How is this diagnosed?  This condition is usually diagnosed with a physical exam. During the exam, your health care provider may order tests, such as chest X-rays, to rule out other conditions. He or she may also:  · Test a sample of your mucus for bacterial infection.  · Check the level of oxygen in your blood. This is done to check for pneumonia.  · Do a chest X-ray or lung function testing to rule out pneumonia and other conditions.  · Perform blood tests.  Your health care provider will also ask about your symptoms and medical history.  How is this treated?  Most  cases of acute bronchitis clear up over time without treatment. Your health care provider may recommend:  · Drinking more fluids. Drinking more makes your mucus thinner, which may make it easier to breathe.  · Taking a medicine for a fever or cough.  · Taking an antibiotic medicine.  · Using an inhaler to help improve shortness of breath and to control a cough.  · Using a cool mist vaporizer or humidifier to make it easier to breathe.  Follow these instructions at home:  Medicines  · Take over-the-counter and prescription medicines only as told by your health care provider.  · If you were prescribed an antibiotic, take it as told by your health care provider. Do not stop taking the antibiotic even if you start to feel better.  General instructions    · Get plenty of rest.  · Drink enough fluids to keep your urine pale yellow.  · Avoid smoking and secondhand smoke. Exposure to cigarette smoke or irritating chemicals will make bronchitis worse. If you smoke and you need help quitting, ask your health care provider. Quitting smoking will help your lungs heal faster.  · Use an inhaler, cool mist vaporizer, or humidifier as told by your health care provider.  · Keep all follow-up visits as told by your health care provider. This is important.  How is this prevented?  To lower your risk of getting this condition again:  · Wash your hands often with soap and water. If soap and water are not available, use hand .  · Avoid contact with people who have cold symptoms.  · Try not to touch your hands to your mouth, nose, or eyes.  · Make sure to get the flu shot every year.  Contact a health care provider if:  · Your symptoms do not improve in 2 weeks of treatment.  Get help right away if:  · You cough up blood.  · You have chest pain.  · You have severe shortness of breath.  · You become dehydrated.  · You faint or keep feeling like you are going to faint.  · You keep vomiting.  · You have a severe headache.  · Your  fever or chills gets worse.  This information is not intended to replace advice given to you by your health care provider. Make sure you discuss any questions you have with your health care provider.  Document Released: 01/25/2006 Document Revised: 10/31/2019 Document Reviewed: 06/07/2017  [employer]vier Interactive Patient Education © 2020 [employer]

## 2020-05-05 DIAGNOSIS — G25.81 RLS (RESTLESS LEGS SYNDROME): ICD-10-CM

## 2020-05-05 RX ORDER — ROPINIROLE 1 MG/1
1 TABLET, FILM COATED ORAL NIGHTLY
Qty: 90 TABLET | Refills: 0 | Status: SHIPPED | OUTPATIENT
Start: 2020-05-05 | End: 2020-07-06 | Stop reason: SDUPTHER

## 2020-05-12 ENCOUNTER — TELEPHONE (OUTPATIENT)
Dept: FAMILY MEDICINE CLINIC | Facility: CLINIC | Age: 55
End: 2020-05-12

## 2020-05-12 NOTE — TELEPHONE ENCOUNTER
Please have her schedule an appt. She has been treated through e-visit twice and symptoms haven't resolved. It would be best to see her in person.

## 2020-05-12 NOTE — TELEPHONE ENCOUNTER
----- Message from Kayce Red MA sent at 5/11/2020  8:34 AM EDT -----  Regarding: FW: Non-Urgent Medical Question  Contact: [phone]      ----- Message -----  From: Astrid Sam  Sent: 5/11/2020   7:10 AM EDT  To: [facility]  Subject: Non-Urgent Medical Question                      I wanted to let you know that my cough has come back again.. It started back up again about 2 days after I finished the antibiotics  that gave me. I've been taking the cough medication you gave me but it seems like I'm taking it like every 2 hours. Please can you let me know what I can do.  Thank you for your time..      Doris Sam

## 2020-05-13 ENCOUNTER — OFFICE VISIT (OUTPATIENT)
Dept: FAMILY MEDICINE CLINIC | Facility: CLINIC | Age: 55
End: 2020-05-13

## 2020-05-13 VITALS
WEIGHT: 159 LBS | TEMPERATURE: 97.7 F | BODY MASS INDEX: 31.22 KG/M2 | HEART RATE: 71 BPM | SYSTOLIC BLOOD PRESSURE: 104 MMHG | HEIGHT: 60 IN | DIASTOLIC BLOOD PRESSURE: 74 MMHG | OXYGEN SATURATION: 99 %

## 2020-05-13 DIAGNOSIS — S29.019A THORACIC MYOFASCIAL STRAIN, INITIAL ENCOUNTER: ICD-10-CM

## 2020-05-13 DIAGNOSIS — J20.9 ACUTE BRONCHITIS, UNSPECIFIED ORGANISM: Primary | ICD-10-CM

## 2020-05-13 PROCEDURE — 99213 OFFICE O/P EST LOW 20 MIN: CPT | Performed by: FAMILY MEDICINE

## 2020-05-13 RX ORDER — OXYBUTYNIN CHLORIDE 10 MG/1
10 TABLET, EXTENDED RELEASE ORAL DAILY
COMMUNITY
End: 2020-11-04

## 2020-05-13 RX ORDER — DOXYCYCLINE 100 MG/1
100 CAPSULE ORAL EVERY 12 HOURS SCHEDULED
Qty: 20 CAPSULE | Refills: 0 | Status: SHIPPED | OUTPATIENT
Start: 2020-05-13 | End: 2020-05-23

## 2020-05-13 RX ORDER — RIZATRIPTAN BENZOATE 10 MG/1
10 TABLET, ORALLY DISINTEGRATING ORAL ONCE AS NEEDED
Qty: 9 TABLET | Refills: 5 | Status: SHIPPED | OUTPATIENT
Start: 2020-05-13 | End: 2021-01-27

## 2020-05-13 RX ORDER — CYCLOBENZAPRINE HCL 10 MG
5-10 TABLET ORAL 3 TIMES DAILY PRN
Qty: 30 TABLET | Refills: 0 | Status: SHIPPED | OUTPATIENT
Start: 2020-05-13 | End: 2020-06-22 | Stop reason: SDUPTHER

## 2020-05-13 RX ORDER — METHYLPREDNISOLONE 4 MG/1
TABLET ORAL
Qty: 21 EACH | Refills: 0 | Status: SHIPPED | OUTPATIENT
Start: 2020-05-13 | End: 2020-07-16

## 2020-05-13 NOTE — PROGRESS NOTES
Subjective   Astrid Sam is a 54 y.o. female.   Chief Complaint   Patient presents with   • Cough, dry x 1mo     no soa or fever      Cough   This is a new problem. The current episode started more than 1 month ago. The problem has been unchanged. The problem occurs hourly. The cough is non-productive. Associated symptoms include ear pain (right). Pertinent negatives include no chest pain, chills, ear congestion, fever, nasal congestion, postnasal drip, rash, rhinorrhea, sore throat, shortness of breath or wheezing. The symptoms are aggravated by lying down. There is no history of asthma or COPD.   Initially treated 3/25/20 through E-visit, Augmentin and Mucinex. Another E-visit on 4/24/20, treated with levofloxacin and Bromfed cough syrup.   Normal chest xray 4/28/20.  Cough and congestion improved with levofloxacin, but returned shortly after completing.   Left sided thoracic and lumbar pain, muscle soreness. Pain is intermittent, worse with palpation and certain movements.    The following portions of the patient's history were reviewed and updated as appropriate: allergies, current medications, past family history, past medical history, past social history, past surgical history and problem list.    Review of Systems   Constitutional: Negative for chills and fever.   HENT: Positive for ear pain (right). Negative for congestion, postnasal drip, rhinorrhea, sinus pressure and sore throat.    Respiratory: Positive for cough. Negative for chest tightness, shortness of breath and wheezing.    Cardiovascular: Negative for chest pain, palpitations and leg swelling.   Musculoskeletal: Positive for back pain.   Skin: Negative for rash.   Neurological: Negative for dizziness, light-headedness and headache.       Objective   Physical Exam   Constitutional: She appears well-developed and well-nourished.   HENT:   Head: Normocephalic and atraumatic.   Right Ear: External ear normal. cerumen impaction is  present.  Left Ear: Hearing, tympanic membrane, external ear and ear canal normal.   Nose: Nose normal.   Eyes: Conjunctivae are normal.   Neck: Neck supple.   Cardiovascular: Normal rate, regular rhythm and normal heart sounds.   No murmur heard.  Pulmonary/Chest: Effort normal and breath sounds normal. No respiratory distress. She has no decreased breath sounds. She has no wheezes. She has no rhonchi. She has no rales.   Non productive cough   Musculoskeletal: She exhibits no deformity.   Neurological: She is alert.   Skin: Skin is warm and dry.   Psychiatric: She has a normal mood and affect. Her behavior is normal. Thought content normal.   Nursing note and vitals reviewed.        Assessment/Plan   Astrid was seen today for cough, dry x 1mo.    Diagnoses and all orders for this visit:    Acute bronchitis, unspecified organism  -     doxycycline (MONODOX) 100 MG capsule; Take 1 capsule by mouth Every 12 (Twelve) Hours for 10 days.  -     methylPREDNISolone (MEDROL, DONG,) 4 MG tablet; Take as directed on package instructions.    Thoracic myofascial strain, initial encounter  -     cyclobenzaprine (FLEXERIL) 10 MG tablet; Take 0.5-1 tablets by mouth 3 (Three) Times a Day As Needed for Muscle Spasms.    Other orders  -     rizatriptan MLT (MAXALT-MLT) 10 MG disintegrating tablet; Place 1 tablet on the tongue 1 (One) Time As Needed for Migraine for up to 1 dose.      Doxycycline and steroid taper for additional treatment. If symptoms persist, consider CT lungs.   Thoracic strain likely secondary to frequent coughing, muscle relaxer and steroid taper to improve.   Rizatriptan refilled per pt request.

## 2020-05-29 RX ORDER — TRAZODONE HYDROCHLORIDE 100 MG/1
50-100 TABLET ORAL NIGHTLY
Qty: 30 TABLET | Refills: 5 | Status: SHIPPED | OUTPATIENT
Start: 2020-05-29 | End: 2021-01-18 | Stop reason: SDUPTHER

## 2020-06-05 DIAGNOSIS — E03.9 ACQUIRED HYPOTHYROIDISM: ICD-10-CM

## 2020-06-05 RX ORDER — LEVOTHYROXINE SODIUM 0.03 MG/1
25 TABLET ORAL DAILY
Qty: 90 TABLET | Refills: 1 | Status: SHIPPED | OUTPATIENT
Start: 2020-06-05 | End: 2020-09-26 | Stop reason: SDUPTHER

## 2020-06-09 DIAGNOSIS — R45.4 IRRITABILITY: ICD-10-CM

## 2020-06-09 NOTE — TELEPHONE ENCOUNTER
Eliu from [facility] requested a refill of    escitalopram (LEXAPRO) 20 MG tablet       [id number] - [place], [place] - [address] - [phone] [facility] [phone] FX

## 2020-06-10 RX ORDER — ESCITALOPRAM OXALATE 20 MG/1
20 TABLET ORAL DAILY
Qty: 90 TABLET | Refills: 1 | Status: SHIPPED | OUTPATIENT
Start: 2020-06-10 | End: 2020-09-26 | Stop reason: SDUPTHER

## 2020-06-15 ENCOUNTER — TRANSCRIBE ORDERS (OUTPATIENT)
Dept: LAB | Facility: HOSPITAL | Age: 55
End: 2020-06-15

## 2020-06-15 ENCOUNTER — LAB (OUTPATIENT)
Dept: LAB | Facility: HOSPITAL | Age: 55
End: 2020-06-15

## 2020-06-15 DIAGNOSIS — M00.811 PYOGENIC BACTERIAL ARTHRITIS OF SHOULDER, RIGHT (HCC): Primary | ICD-10-CM

## 2020-06-15 DIAGNOSIS — M00.811 PYOGENIC BACTERIAL ARTHRITIS OF SHOULDER, RIGHT (HCC): ICD-10-CM

## 2020-06-15 DIAGNOSIS — D50.9 IRON DEFICIENCY ANEMIA, UNSPECIFIED IRON DEFICIENCY ANEMIA TYPE: ICD-10-CM

## 2020-06-15 LAB
ALBUMIN SERPL-MCNC: 4.2 G/DL (ref 3.5–5.2)
ALBUMIN/GLOB SERPL: 1.4 G/DL
ALP SERPL-CCNC: 109 U/L (ref 39–117)
ALT SERPL W P-5'-P-CCNC: 43 U/L (ref 1–33)
ANION GAP SERPL CALCULATED.3IONS-SCNC: 13 MMOL/L (ref 5–15)
AST SERPL-CCNC: 36 U/L (ref 1–32)
BASOPHILS # BLD AUTO: 0.06 10*3/MM3 (ref 0–0.2)
BASOPHILS NFR BLD AUTO: 0.7 % (ref 0–1.5)
BILIRUB SERPL-MCNC: 0.2 MG/DL (ref 0.2–1.2)
BUN BLD-MCNC: 10 MG/DL (ref 6–20)
BUN/CREAT SERPL: 13.2 (ref 7–25)
CALCIUM SPEC-SCNC: 9.6 MG/DL (ref 8.6–10.5)
CHLORIDE SERPL-SCNC: 102 MMOL/L (ref 98–107)
CK SERPL-CCNC: 117 U/L (ref 20–180)
CO2 SERPL-SCNC: 26 MMOL/L (ref 22–29)
CREAT BLD-MCNC: 0.76 MG/DL (ref 0.57–1)
CRP SERPL-MCNC: 6.5 MG/DL (ref 0–0.5)
DEPRECATED RDW RBC AUTO: 53.1 FL (ref 37–54)
EOSINOPHIL # BLD AUTO: 0.56 10*3/MM3 (ref 0–0.4)
EOSINOPHIL NFR BLD AUTO: 6.4 % (ref 0.3–6.2)
ERYTHROCYTE [DISTWIDTH] IN BLOOD BY AUTOMATED COUNT: 17.5 % (ref 12.3–15.4)
ERYTHROCYTE [SEDIMENTATION RATE] IN BLOOD: 34 MM/HR (ref 0–30)
GFR SERPL CREATININE-BSD FRML MDRD: 79 ML/MIN/1.73
GLOBULIN UR ELPH-MCNC: 3.1 GM/DL
GLUCOSE BLD-MCNC: 94 MG/DL (ref 65–99)
HCT VFR BLD AUTO: 38.8 % (ref 34–46.6)
HGB BLD-MCNC: 12.3 G/DL (ref 12–15.9)
IMM GRANULOCYTES # BLD AUTO: 0.15 10*3/MM3 (ref 0–0.05)
IMM GRANULOCYTES NFR BLD AUTO: 1.7 % (ref 0–0.5)
LYMPHOCYTES # BLD AUTO: 2 10*3/MM3 (ref 0.7–3.1)
LYMPHOCYTES NFR BLD AUTO: 22.8 % (ref 19.6–45.3)
MCH RBC QN AUTO: 26.4 PG (ref 26.6–33)
MCHC RBC AUTO-ENTMCNC: 31.7 G/DL (ref 31.5–35.7)
MCV RBC AUTO: 83.3 FL (ref 79–97)
MONOCYTES # BLD AUTO: 0.82 10*3/MM3 (ref 0.1–0.9)
MONOCYTES NFR BLD AUTO: 9.3 % (ref 5–12)
NEUTROPHILS # BLD AUTO: 5.2 10*3/MM3 (ref 1.7–7)
NEUTROPHILS NFR BLD AUTO: 59.1 % (ref 42.7–76)
NRBC BLD AUTO-RTO: 0 /100 WBC (ref 0–0.2)
PLATELET # BLD AUTO: 306 10*3/MM3 (ref 140–450)
PMV BLD AUTO: 8.6 FL (ref 6–12)
POTASSIUM BLD-SCNC: 4.1 MMOL/L (ref 3.5–5.2)
PROT SERPL-MCNC: 7.3 G/DL (ref 6–8.5)
RBC # BLD AUTO: 4.66 10*6/MM3 (ref 3.77–5.28)
SODIUM BLD-SCNC: 141 MMOL/L (ref 136–145)
WBC NRBC COR # BLD: 8.79 10*3/MM3 (ref 3.4–10.8)

## 2020-06-15 PROCEDURE — 80053 COMPREHEN METABOLIC PANEL: CPT

## 2020-06-15 PROCEDURE — 85025 COMPLETE CBC W/AUTO DIFF WBC: CPT

## 2020-06-15 PROCEDURE — 86140 C-REACTIVE PROTEIN: CPT

## 2020-06-15 PROCEDURE — 82550 ASSAY OF CK (CPK): CPT | Performed by: INTERNAL MEDICINE

## 2020-06-15 PROCEDURE — 85652 RBC SED RATE AUTOMATED: CPT

## 2020-06-15 PROCEDURE — 36415 COLL VENOUS BLD VENIPUNCTURE: CPT

## 2020-06-15 RX ORDER — ASCORBIC ACID 500 MG
TABLET ORAL
Qty: 99 TABLET | Refills: 0 | OUTPATIENT
Start: 2020-06-15

## 2020-06-22 ENCOUNTER — E-VISIT (OUTPATIENT)
Dept: FAMILY MEDICINE CLINIC | Facility: CLINIC | Age: 55
End: 2020-06-22

## 2020-06-22 ENCOUNTER — TRANSCRIBE ORDERS (OUTPATIENT)
Dept: LAB | Facility: HOSPITAL | Age: 55
End: 2020-06-22

## 2020-06-22 ENCOUNTER — LAB (OUTPATIENT)
Dept: LAB | Facility: HOSPITAL | Age: 55
End: 2020-06-22

## 2020-06-22 DIAGNOSIS — M00.811 PYOGENIC BACTERIAL ARTHRITIS OF SHOULDER, RIGHT (HCC): Primary | ICD-10-CM

## 2020-06-22 DIAGNOSIS — S29.019A THORACIC MYOFASCIAL STRAIN, INITIAL ENCOUNTER: ICD-10-CM

## 2020-06-22 DIAGNOSIS — M00.811 PYOGENIC BACTERIAL ARTHRITIS OF SHOULDER, RIGHT (HCC): ICD-10-CM

## 2020-06-22 LAB
ALBUMIN SERPL-MCNC: 4.3 G/DL (ref 3.5–5.2)
ALBUMIN/GLOB SERPL: 1.5 G/DL
ALP SERPL-CCNC: 106 U/L (ref 39–117)
ALT SERPL W P-5'-P-CCNC: 23 U/L (ref 1–33)
ANION GAP SERPL CALCULATED.3IONS-SCNC: 11 MMOL/L (ref 5–15)
AST SERPL-CCNC: 23 U/L (ref 1–32)
BASOPHILS # BLD AUTO: 0.06 10*3/MM3 (ref 0–0.2)
BASOPHILS NFR BLD AUTO: 0.7 % (ref 0–1.5)
BILIRUB SERPL-MCNC: 0.2 MG/DL (ref 0.2–1.2)
BUN BLD-MCNC: 12 MG/DL (ref 6–20)
BUN/CREAT SERPL: 15.4 (ref 7–25)
CALCIUM SPEC-SCNC: 9.5 MG/DL (ref 8.6–10.5)
CHLORIDE SERPL-SCNC: 101 MMOL/L (ref 98–107)
CK SERPL-CCNC: 90 U/L (ref 20–180)
CO2 SERPL-SCNC: 28 MMOL/L (ref 22–29)
CREAT BLD-MCNC: 0.78 MG/DL (ref 0.57–1)
CRP SERPL-MCNC: 2.29 MG/DL (ref 0–0.5)
DEPRECATED RDW RBC AUTO: 51.9 FL (ref 37–54)
EOSINOPHIL # BLD AUTO: 0.37 10*3/MM3 (ref 0–0.4)
EOSINOPHIL NFR BLD AUTO: 4.2 % (ref 0.3–6.2)
ERYTHROCYTE [DISTWIDTH] IN BLOOD BY AUTOMATED COUNT: 16.8 % (ref 12.3–15.4)
ERYTHROCYTE [SEDIMENTATION RATE] IN BLOOD: 29 MM/HR (ref 0–30)
GFR SERPL CREATININE-BSD FRML MDRD: 77 ML/MIN/1.73
GLOBULIN UR ELPH-MCNC: 2.8 GM/DL
GLUCOSE BLD-MCNC: 84 MG/DL (ref 65–99)
HCT VFR BLD AUTO: 37.1 % (ref 34–46.6)
HGB BLD-MCNC: 11.6 G/DL (ref 12–15.9)
IMM GRANULOCYTES # BLD AUTO: 0.1 10*3/MM3 (ref 0–0.05)
IMM GRANULOCYTES NFR BLD AUTO: 1.1 % (ref 0–0.5)
LYMPHOCYTES # BLD AUTO: 2.01 10*3/MM3 (ref 0.7–3.1)
LYMPHOCYTES NFR BLD AUTO: 22.7 % (ref 19.6–45.3)
MCH RBC QN AUTO: 26.4 PG (ref 26.6–33)
MCHC RBC AUTO-ENTMCNC: 31.3 G/DL (ref 31.5–35.7)
MCV RBC AUTO: 84.5 FL (ref 79–97)
MONOCYTES # BLD AUTO: 0.73 10*3/MM3 (ref 0.1–0.9)
MONOCYTES NFR BLD AUTO: 8.2 % (ref 5–12)
NEUTROPHILS # BLD AUTO: 5.6 10*3/MM3 (ref 1.7–7)
NEUTROPHILS NFR BLD AUTO: 63.1 % (ref 42.7–76)
NRBC BLD AUTO-RTO: 0 /100 WBC (ref 0–0.2)
PLATELET # BLD AUTO: 256 10*3/MM3 (ref 140–450)
PMV BLD AUTO: 9.1 FL (ref 6–12)
POTASSIUM BLD-SCNC: 4.2 MMOL/L (ref 3.5–5.2)
PROT SERPL-MCNC: 7.1 G/DL (ref 6–8.5)
RBC # BLD AUTO: 4.39 10*6/MM3 (ref 3.77–5.28)
SODIUM BLD-SCNC: 140 MMOL/L (ref 136–145)
WBC NRBC COR # BLD: 8.87 10*3/MM3 (ref 3.4–10.8)

## 2020-06-22 PROCEDURE — 82550 ASSAY OF CK (CPK): CPT | Performed by: INTERNAL MEDICINE

## 2020-06-22 PROCEDURE — 86140 C-REACTIVE PROTEIN: CPT

## 2020-06-22 PROCEDURE — 85652 RBC SED RATE AUTOMATED: CPT

## 2020-06-22 PROCEDURE — 36415 COLL VENOUS BLD VENIPUNCTURE: CPT

## 2020-06-22 PROCEDURE — 85025 COMPLETE CBC W/AUTO DIFF WBC: CPT

## 2020-06-22 PROCEDURE — 80053 COMPREHEN METABOLIC PANEL: CPT

## 2020-06-22 RX ORDER — CYCLOBENZAPRINE HCL 10 MG
5-10 TABLET ORAL 3 TIMES DAILY PRN
Qty: 30 TABLET | Refills: 0 | Status: CANCELLED | OUTPATIENT
Start: 2020-06-22

## 2020-06-22 RX ORDER — CYCLOBENZAPRINE HCL 10 MG
5-10 TABLET ORAL 3 TIMES DAILY PRN
Qty: 90 TABLET | Refills: 2 | Status: SHIPPED | OUTPATIENT
Start: 2020-06-22 | End: 2020-08-05

## 2020-06-29 ENCOUNTER — LAB (OUTPATIENT)
Dept: LAB | Facility: HOSPITAL | Age: 55
End: 2020-06-29

## 2020-06-29 ENCOUNTER — TRANSCRIBE ORDERS (OUTPATIENT)
Dept: LAB | Facility: HOSPITAL | Age: 55
End: 2020-06-29

## 2020-06-29 DIAGNOSIS — M00.811 PYOGENIC BACTERIAL ARTHRITIS OF SHOULDER, RIGHT (HCC): Primary | ICD-10-CM

## 2020-06-29 DIAGNOSIS — M00.811 PYOGENIC BACTERIAL ARTHRITIS OF SHOULDER, RIGHT (HCC): ICD-10-CM

## 2020-06-29 LAB
ALBUMIN SERPL-MCNC: 4.6 G/DL (ref 3.5–5.2)
ALBUMIN/GLOB SERPL: 1.5 G/DL
ALP SERPL-CCNC: 121 U/L (ref 39–117)
ALT SERPL W P-5'-P-CCNC: 21 U/L (ref 1–33)
ANION GAP SERPL CALCULATED.3IONS-SCNC: 14 MMOL/L (ref 5–15)
AST SERPL-CCNC: 22 U/L (ref 1–32)
BASOPHILS # BLD AUTO: 0.04 10*3/MM3 (ref 0–0.2)
BASOPHILS NFR BLD AUTO: 0.5 % (ref 0–1.5)
BILIRUB SERPL-MCNC: 0.3 MG/DL (ref 0.2–1.2)
BUN SERPL-MCNC: 14 MG/DL (ref 6–20)
BUN/CREAT SERPL: 15.4 (ref 7–25)
CALCIUM SPEC-SCNC: 10.1 MG/DL (ref 8.6–10.5)
CHLORIDE SERPL-SCNC: 100 MMOL/L (ref 98–107)
CK SERPL-CCNC: 82 U/L (ref 20–180)
CO2 SERPL-SCNC: 27 MMOL/L (ref 22–29)
CREAT SERPL-MCNC: 0.91 MG/DL (ref 0.57–1)
CRP SERPL-MCNC: 1.05 MG/DL (ref 0–0.5)
DEPRECATED RDW RBC AUTO: 48.8 FL (ref 37–54)
EOSINOPHIL # BLD AUTO: 0.51 10*3/MM3 (ref 0–0.4)
EOSINOPHIL NFR BLD AUTO: 7 % (ref 0.3–6.2)
ERYTHROCYTE [DISTWIDTH] IN BLOOD BY AUTOMATED COUNT: 16.1 % (ref 12.3–15.4)
ERYTHROCYTE [SEDIMENTATION RATE] IN BLOOD: 21 MM/HR (ref 0–30)
GFR SERPL CREATININE-BSD FRML MDRD: 64 ML/MIN/1.73
GLOBULIN UR ELPH-MCNC: 3.1 GM/DL
GLUCOSE SERPL-MCNC: 98 MG/DL (ref 65–99)
HCT VFR BLD AUTO: 41.2 % (ref 34–46.6)
HGB BLD-MCNC: 13.1 G/DL (ref 12–15.9)
IMM GRANULOCYTES # BLD AUTO: 0.05 10*3/MM3 (ref 0–0.05)
IMM GRANULOCYTES NFR BLD AUTO: 0.7 % (ref 0–0.5)
LYMPHOCYTES # BLD AUTO: 1.96 10*3/MM3 (ref 0.7–3.1)
LYMPHOCYTES NFR BLD AUTO: 26.7 % (ref 19.6–45.3)
MCH RBC QN AUTO: 26.4 PG (ref 26.6–33)
MCHC RBC AUTO-ENTMCNC: 31.8 G/DL (ref 31.5–35.7)
MCV RBC AUTO: 83.1 FL (ref 79–97)
MONOCYTES # BLD AUTO: 0.59 10*3/MM3 (ref 0.1–0.9)
MONOCYTES NFR BLD AUTO: 8 % (ref 5–12)
NEUTROPHILS NFR BLD AUTO: 4.18 10*3/MM3 (ref 1.7–7)
NEUTROPHILS NFR BLD AUTO: 57.1 % (ref 42.7–76)
NRBC BLD AUTO-RTO: 0 /100 WBC (ref 0–0.2)
PLATELET # BLD AUTO: 342 10*3/MM3 (ref 140–450)
PMV BLD AUTO: 8.9 FL (ref 6–12)
POTASSIUM SERPL-SCNC: 3.9 MMOL/L (ref 3.5–5.2)
PROT SERPL-MCNC: 7.7 G/DL (ref 6–8.5)
RBC # BLD AUTO: 4.96 10*6/MM3 (ref 3.77–5.28)
SODIUM SERPL-SCNC: 141 MMOL/L (ref 136–145)
WBC # BLD AUTO: 7.33 10*3/MM3 (ref 3.4–10.8)

## 2020-06-29 PROCEDURE — 85652 RBC SED RATE AUTOMATED: CPT

## 2020-06-29 PROCEDURE — 82550 ASSAY OF CK (CPK): CPT

## 2020-06-29 PROCEDURE — 80053 COMPREHEN METABOLIC PANEL: CPT

## 2020-06-29 PROCEDURE — 86140 C-REACTIVE PROTEIN: CPT

## 2020-06-29 PROCEDURE — 36415 COLL VENOUS BLD VENIPUNCTURE: CPT

## 2020-06-29 PROCEDURE — 85025 COMPLETE CBC W/AUTO DIFF WBC: CPT

## 2020-07-06 ENCOUNTER — TRANSCRIBE ORDERS (OUTPATIENT)
Dept: LAB | Facility: HOSPITAL | Age: 55
End: 2020-07-06

## 2020-07-06 ENCOUNTER — LAB (OUTPATIENT)
Dept: LAB | Facility: HOSPITAL | Age: 55
End: 2020-07-06

## 2020-07-06 DIAGNOSIS — T84.63XD INFLAMMATORY REACTION DUE TO INTERNAL FIXATION DEVICE OF SPINE, SUBSEQUENT ENCOUNTER: ICD-10-CM

## 2020-07-06 DIAGNOSIS — G25.81 RLS (RESTLESS LEGS SYNDROME): ICD-10-CM

## 2020-07-06 DIAGNOSIS — M05.79 SEROPOSITIVE RHEUMATOID ARTHRITIS OF MULTIPLE SITES (HCC): ICD-10-CM

## 2020-07-06 DIAGNOSIS — M00.811 PYOGENIC BACTERIAL ARTHRITIS OF SHOULDER, RIGHT (HCC): ICD-10-CM

## 2020-07-06 DIAGNOSIS — M86.18 ACUTE OSTEOMYELITIS OF STERNUM (HCC): Primary | ICD-10-CM

## 2020-07-06 DIAGNOSIS — R21 RASH AND OTHER NONSPECIFIC SKIN ERUPTION: ICD-10-CM

## 2020-07-06 DIAGNOSIS — M86.18 ACUTE OSTEOMYELITIS OF STERNUM (HCC): ICD-10-CM

## 2020-07-06 DIAGNOSIS — D72.823 NEUTROPHILIC LEUKEMOID REACTION: ICD-10-CM

## 2020-07-06 LAB
ALBUMIN SERPL-MCNC: 4.5 G/DL (ref 3.5–5.2)
ALBUMIN/GLOB SERPL: 1.5 G/DL
ALP SERPL-CCNC: 119 U/L (ref 39–117)
ALT SERPL W P-5'-P-CCNC: 16 U/L (ref 1–33)
ANION GAP SERPL CALCULATED.3IONS-SCNC: 12 MMOL/L (ref 5–15)
AST SERPL-CCNC: 21 U/L (ref 1–32)
BASOPHILS # BLD AUTO: 0.04 10*3/MM3 (ref 0–0.2)
BASOPHILS NFR BLD AUTO: 0.7 % (ref 0–1.5)
BILIRUB SERPL-MCNC: 0.2 MG/DL (ref 0.2–1.2)
BUN SERPL-MCNC: 14 MG/DL (ref 6–20)
BUN/CREAT SERPL: 16.1 (ref 7–25)
CALCIUM SPEC-SCNC: 9.4 MG/DL (ref 8.6–10.5)
CHLORIDE SERPL-SCNC: 102 MMOL/L (ref 98–107)
CK SERPL-CCNC: 80 U/L (ref 20–180)
CO2 SERPL-SCNC: 27 MMOL/L (ref 22–29)
CREAT SERPL-MCNC: 0.87 MG/DL (ref 0.57–1)
CRP SERPL-MCNC: 0.63 MG/DL (ref 0–0.5)
DEPRECATED RDW RBC AUTO: 48.5 FL (ref 37–54)
EOSINOPHIL # BLD AUTO: 0.65 10*3/MM3 (ref 0–0.4)
EOSINOPHIL NFR BLD AUTO: 10.6 % (ref 0.3–6.2)
ERYTHROCYTE [DISTWIDTH] IN BLOOD BY AUTOMATED COUNT: 15.9 % (ref 12.3–15.4)
GFR SERPL CREATININE-BSD FRML MDRD: 68 ML/MIN/1.73
GLOBULIN UR ELPH-MCNC: 3 GM/DL
GLUCOSE SERPL-MCNC: 107 MG/DL (ref 65–99)
HCT VFR BLD AUTO: 40 % (ref 34–46.6)
HGB BLD-MCNC: 12.5 G/DL (ref 12–15.9)
IMM GRANULOCYTES # BLD AUTO: 0.04 10*3/MM3 (ref 0–0.05)
IMM GRANULOCYTES NFR BLD AUTO: 0.7 % (ref 0–0.5)
LYMPHOCYTES # BLD AUTO: 1.73 10*3/MM3 (ref 0.7–3.1)
LYMPHOCYTES NFR BLD AUTO: 28.3 % (ref 19.6–45.3)
MCH RBC QN AUTO: 26.4 PG (ref 26.6–33)
MCHC RBC AUTO-ENTMCNC: 31.3 G/DL (ref 31.5–35.7)
MCV RBC AUTO: 84.6 FL (ref 79–97)
MONOCYTES # BLD AUTO: 0.49 10*3/MM3 (ref 0.1–0.9)
MONOCYTES NFR BLD AUTO: 8 % (ref 5–12)
NEUTROPHILS NFR BLD AUTO: 3.16 10*3/MM3 (ref 1.7–7)
NEUTROPHILS NFR BLD AUTO: 51.7 % (ref 42.7–76)
NRBC BLD AUTO-RTO: 0 /100 WBC (ref 0–0.2)
PLATELET # BLD AUTO: 274 10*3/MM3 (ref 140–450)
PMV BLD AUTO: 8.7 FL (ref 6–12)
POTASSIUM SERPL-SCNC: 4 MMOL/L (ref 3.5–5.2)
PROT SERPL-MCNC: 7.5 G/DL (ref 6–8.5)
RBC # BLD AUTO: 4.73 10*6/MM3 (ref 3.77–5.28)
SODIUM SERPL-SCNC: 141 MMOL/L (ref 136–145)
WBC # BLD AUTO: 6.11 10*3/MM3 (ref 3.4–10.8)

## 2020-07-06 PROCEDURE — 80053 COMPREHEN METABOLIC PANEL: CPT

## 2020-07-06 PROCEDURE — 82550 ASSAY OF CK (CPK): CPT | Performed by: INTERNAL MEDICINE

## 2020-07-06 PROCEDURE — 86140 C-REACTIVE PROTEIN: CPT

## 2020-07-06 PROCEDURE — 36415 COLL VENOUS BLD VENIPUNCTURE: CPT

## 2020-07-06 PROCEDURE — 85025 COMPLETE CBC W/AUTO DIFF WBC: CPT

## 2020-07-06 RX ORDER — ROPINIROLE 1 MG/1
1 TABLET, FILM COATED ORAL NIGHTLY
Qty: 90 TABLET | Refills: 1 | Status: SHIPPED | OUTPATIENT
Start: 2020-07-06 | End: 2020-07-16

## 2020-07-06 NOTE — TELEPHONE ENCOUNTER
TERRY FROM [facility] CALLED TO REQUEST REFILL FOR RX  rOPINIRole (REQUIP) 1 MG tablet.  STATED THAT SHE HAS MADE SEVERAL ATTEMPTS TO FAX I REQUEST BUT HAS NOT RECEIVED [place] RESPONSE.    PLEASE ADVISE.  CALL BACK:[phone]

## 2020-07-12 DIAGNOSIS — G25.81 RLS (RESTLESS LEGS SYNDROME): ICD-10-CM

## 2020-07-13 RX ORDER — ROPINIROLE 0.25 MG/1
0.25 TABLET, FILM COATED ORAL NIGHTLY
Qty: 90 TABLET | Refills: 0 | OUTPATIENT
Start: 2020-07-13

## 2020-07-16 ENCOUNTER — OFFICE VISIT (OUTPATIENT)
Dept: FAMILY MEDICINE CLINIC | Facility: CLINIC | Age: 55
End: 2020-07-16

## 2020-07-16 VITALS
BODY MASS INDEX: 32.2 KG/M2 | DIASTOLIC BLOOD PRESSURE: 66 MMHG | RESPIRATION RATE: 18 BRPM | TEMPERATURE: 97.6 F | WEIGHT: 164 LBS | SYSTOLIC BLOOD PRESSURE: 112 MMHG | HEART RATE: 104 BPM | OXYGEN SATURATION: 98 % | HEIGHT: 60 IN

## 2020-07-16 DIAGNOSIS — G25.81 RLS (RESTLESS LEGS SYNDROME): ICD-10-CM

## 2020-07-16 DIAGNOSIS — Z11.59 ENCOUNTER FOR HEPATITIS C SCREENING TEST FOR LOW RISK PATIENT: ICD-10-CM

## 2020-07-16 DIAGNOSIS — Z13.220 SCREENING FOR HYPERLIPIDEMIA: ICD-10-CM

## 2020-07-16 DIAGNOSIS — K21.9 GASTROESOPHAGEAL REFLUX DISEASE, ESOPHAGITIS PRESENCE NOT SPECIFIED: ICD-10-CM

## 2020-07-16 DIAGNOSIS — D50.9 IRON DEFICIENCY ANEMIA, UNSPECIFIED IRON DEFICIENCY ANEMIA TYPE: ICD-10-CM

## 2020-07-16 DIAGNOSIS — E03.9 ACQUIRED HYPOTHYROIDISM: Primary | ICD-10-CM

## 2020-07-16 PROCEDURE — 99214 OFFICE O/P EST MOD 30 MIN: CPT | Performed by: FAMILY MEDICINE

## 2020-07-16 RX ORDER — FAMOTIDINE 20 MG/1
20 TABLET, FILM COATED ORAL 2 TIMES DAILY
Qty: 180 TABLET | Refills: 1 | Status: ON HOLD | OUTPATIENT
Start: 2020-07-16 | End: 2020-08-25

## 2020-07-16 RX ORDER — DOXYCYCLINE 100 MG/1
CAPSULE ORAL
Status: ON HOLD | COMMUNITY
Start: 2020-07-06 | End: 2020-08-25

## 2020-07-16 RX ORDER — PRAMIPEXOLE DIHYDROCHLORIDE 0.25 MG/1
.25-.5 TABLET ORAL NIGHTLY
Qty: 60 TABLET | Refills: 2 | Status: SHIPPED | OUTPATIENT
Start: 2020-07-16 | End: 2020-09-16 | Stop reason: SDUPTHER

## 2020-07-17 DIAGNOSIS — E03.9 ACQUIRED HYPOTHYROIDISM: Primary | ICD-10-CM

## 2020-07-17 LAB
BASOPHILS # BLD AUTO: 0.08 10*3/MM3 (ref 0–0.2)
BASOPHILS NFR BLD AUTO: 1.1 % (ref 0–1.5)
CHOLEST SERPL-MCNC: 254 MG/DL (ref 0–200)
EOSINOPHIL # BLD AUTO: 0.51 10*3/MM3 (ref 0–0.4)
EOSINOPHIL NFR BLD AUTO: 6.9 % (ref 0.3–6.2)
ERYTHROCYTE [DISTWIDTH] IN BLOOD BY AUTOMATED COUNT: 15.9 % (ref 12.3–15.4)
FERRITIN SERPL-MCNC: 22.1 NG/ML (ref 13–150)
HCT VFR BLD AUTO: 40 % (ref 34–46.6)
HCV AB S/CO SERPL IA: 0.2 S/CO RATIO (ref 0–0.9)
HDLC SERPL-MCNC: 59 MG/DL (ref 40–60)
HGB BLD-MCNC: 12.9 G/DL (ref 12–15.9)
IMM GRANULOCYTES # BLD AUTO: 0.07 10*3/MM3 (ref 0–0.05)
IMM GRANULOCYTES NFR BLD AUTO: 1 % (ref 0–0.5)
IRON SATN MFR SERPL: 8 % (ref 20–50)
IRON SERPL-MCNC: 37 MCG/DL (ref 37–145)
LDLC SERPL CALC-MCNC: 164 MG/DL (ref 0–100)
LYMPHOCYTES # BLD AUTO: 2.2 10*3/MM3 (ref 0.7–3.1)
LYMPHOCYTES NFR BLD AUTO: 30 % (ref 19.6–45.3)
MCH RBC QN AUTO: 26.7 PG (ref 26.6–33)
MCHC RBC AUTO-ENTMCNC: 32.3 G/DL (ref 31.5–35.7)
MCV RBC AUTO: 82.8 FL (ref 79–97)
MONOCYTES # BLD AUTO: 0.6 10*3/MM3 (ref 0.1–0.9)
MONOCYTES NFR BLD AUTO: 8.2 % (ref 5–12)
NEUTROPHILS # BLD AUTO: 3.88 10*3/MM3 (ref 1.7–7)
NEUTROPHILS NFR BLD AUTO: 52.8 % (ref 42.7–76)
NRBC BLD AUTO-RTO: 0 /100 WBC (ref 0–0.2)
PLATELET # BLD AUTO: 258 10*3/MM3 (ref 140–450)
RBC # BLD AUTO: 4.83 10*6/MM3 (ref 3.77–5.28)
T4 FREE SERPL-MCNC: 0.77 NG/DL (ref 0.93–1.7)
TIBC SERPL-MCNC: 445 MCG/DL
TRIGL SERPL-MCNC: 156 MG/DL (ref 0–150)
TSH SERPL DL<=0.005 MIU/L-ACNC: 12.6 UIU/ML (ref 0.27–4.2)
UIBC SERPL-MCNC: 408 MCG/DL (ref 112–346)
VLDLC SERPL CALC-MCNC: 31.2 MG/DL
WBC # BLD AUTO: 7.34 10*3/MM3 (ref 3.4–10.8)

## 2020-07-20 ENCOUNTER — TRANSCRIBE ORDERS (OUTPATIENT)
Dept: LAB | Facility: HOSPITAL | Age: 55
End: 2020-07-20

## 2020-07-20 ENCOUNTER — LAB (OUTPATIENT)
Dept: LAB | Facility: HOSPITAL | Age: 55
End: 2020-07-20

## 2020-07-20 DIAGNOSIS — M05.79 SEROPOSITIVE RHEUMATOID ARTHRITIS OF MULTIPLE SITES (HCC): ICD-10-CM

## 2020-07-20 DIAGNOSIS — M00.811 PYOGENIC BACTERIAL ARTHRITIS OF SHOULDER, RIGHT (HCC): ICD-10-CM

## 2020-07-20 DIAGNOSIS — M86.18 ACUTE OSTEOMYELITIS OF STERNUM (HCC): ICD-10-CM

## 2020-07-20 DIAGNOSIS — E78.2 MIXED HYPERLIPIDEMIA: Primary | ICD-10-CM

## 2020-07-20 DIAGNOSIS — D72.823 NEUTROPHILIC LEUKEMOID REACTION: ICD-10-CM

## 2020-07-20 DIAGNOSIS — R21 RASH AND OTHER NONSPECIFIC SKIN ERUPTION: ICD-10-CM

## 2020-07-20 DIAGNOSIS — T84.63XD INFLAMMATORY REACTION DUE TO INTERNAL FIXATION DEVICE OF SPINE, SUBSEQUENT ENCOUNTER: ICD-10-CM

## 2020-07-20 DIAGNOSIS — M86.18 ACUTE OSTEOMYELITIS OF STERNUM (HCC): Primary | ICD-10-CM

## 2020-07-20 LAB
ALBUMIN SERPL-MCNC: 4.5 G/DL (ref 3.5–5.2)
ALBUMIN/GLOB SERPL: 1.6 G/DL
ALP SERPL-CCNC: 121 U/L (ref 39–117)
ALT SERPL W P-5'-P-CCNC: 16 U/L (ref 1–33)
ANION GAP SERPL CALCULATED.3IONS-SCNC: 13 MMOL/L (ref 5–15)
AST SERPL-CCNC: 17 U/L (ref 1–32)
BASOPHILS # BLD AUTO: 0.08 10*3/MM3 (ref 0–0.2)
BASOPHILS NFR BLD AUTO: 0.9 % (ref 0–1.5)
BILIRUB SERPL-MCNC: 0.4 MG/DL (ref 0–1.2)
BUN SERPL-MCNC: 17 MG/DL (ref 6–20)
BUN/CREAT SERPL: 20.2 (ref 7–25)
CALCIUM SPEC-SCNC: 9.5 MG/DL (ref 8.6–10.5)
CHLORIDE SERPL-SCNC: 102 MMOL/L (ref 98–107)
CO2 SERPL-SCNC: 26 MMOL/L (ref 22–29)
CREAT SERPL-MCNC: 0.84 MG/DL (ref 0.57–1)
CRP SERPL-MCNC: 0.87 MG/DL (ref 0–0.5)
DEPRECATED RDW RBC AUTO: 45.5 FL (ref 37–54)
EOSINOPHIL # BLD AUTO: 0.5 10*3/MM3 (ref 0–0.4)
EOSINOPHIL NFR BLD AUTO: 5.8 % (ref 0.3–6.2)
ERYTHROCYTE [DISTWIDTH] IN BLOOD BY AUTOMATED COUNT: 15 % (ref 12.3–15.4)
GFR SERPL CREATININE-BSD FRML MDRD: 70 ML/MIN/1.73
GLOBULIN UR ELPH-MCNC: 2.9 GM/DL
GLUCOSE SERPL-MCNC: 87 MG/DL (ref 65–99)
HCT VFR BLD AUTO: 41 % (ref 34–46.6)
HGB BLD-MCNC: 12.7 G/DL (ref 12–15.9)
IMM GRANULOCYTES # BLD AUTO: 0.08 10*3/MM3 (ref 0–0.05)
IMM GRANULOCYTES NFR BLD AUTO: 0.9 % (ref 0–0.5)
LYMPHOCYTES # BLD AUTO: 2.65 10*3/MM3 (ref 0.7–3.1)
LYMPHOCYTES NFR BLD AUTO: 30.6 % (ref 19.6–45.3)
MCH RBC QN AUTO: 26.1 PG (ref 26.6–33)
MCHC RBC AUTO-ENTMCNC: 31 G/DL (ref 31.5–35.7)
MCV RBC AUTO: 84.2 FL (ref 79–97)
MONOCYTES # BLD AUTO: 0.83 10*3/MM3 (ref 0.1–0.9)
MONOCYTES NFR BLD AUTO: 9.6 % (ref 5–12)
NEUTROPHILS NFR BLD AUTO: 4.53 10*3/MM3 (ref 1.7–7)
NEUTROPHILS NFR BLD AUTO: 52.2 % (ref 42.7–76)
NRBC BLD AUTO-RTO: 0 /100 WBC (ref 0–0.2)
PLATELET # BLD AUTO: 269 10*3/MM3 (ref 140–450)
PMV BLD AUTO: 8.8 FL (ref 6–12)
POTASSIUM SERPL-SCNC: 4 MMOL/L (ref 3.5–5.2)
PROT SERPL-MCNC: 7.4 G/DL (ref 6–8.5)
RBC # BLD AUTO: 4.87 10*6/MM3 (ref 3.77–5.28)
SODIUM SERPL-SCNC: 141 MMOL/L (ref 136–145)
WBC # BLD AUTO: 8.67 10*3/MM3 (ref 3.4–10.8)

## 2020-07-20 PROCEDURE — 80053 COMPREHEN METABOLIC PANEL: CPT

## 2020-07-20 PROCEDURE — 85025 COMPLETE CBC W/AUTO DIFF WBC: CPT

## 2020-07-20 PROCEDURE — 86140 C-REACTIVE PROTEIN: CPT

## 2020-07-20 PROCEDURE — 36415 COLL VENOUS BLD VENIPUNCTURE: CPT

## 2020-07-20 RX ORDER — PRAVASTATIN SODIUM 40 MG
40 TABLET ORAL NIGHTLY
Qty: 90 TABLET | Refills: 1 | Status: SHIPPED | OUTPATIENT
Start: 2020-07-20 | End: 2020-10-29 | Stop reason: SDUPTHER

## 2020-07-22 ENCOUNTER — TELEPHONE (OUTPATIENT)
Dept: FAMILY MEDICINE CLINIC | Facility: CLINIC | Age: 55
End: 2020-07-22

## 2020-07-22 NOTE — TELEPHONE ENCOUNTER
Regarding: Test Results Question  Contact: [phone]  ----- Message from Lidia Berkowitz MA sent at 7/22/2020  4:40 PM EDT -----       ----- Message from Astrid Sam to Beckie Murphy DO sent at 7/22/2020  4:30 PM -----   I have a question about HEPATITIS C ANTIBODY resulted on 7/17/20 at 5:07 AM.    Does this mean I have it or what?

## 2020-07-22 NOTE — TELEPHONE ENCOUNTER
Hepatitis C screening is negative. She does not have hepatitis.     CRP was ordered by another provider. It is an inflammatory marker, non-specific. It is slightly elevated. Will need to contact ordering provider for further details.

## 2020-07-28 DIAGNOSIS — G25.81 RLS (RESTLESS LEGS SYNDROME): ICD-10-CM

## 2020-07-28 RX ORDER — ROPINIROLE 1 MG/1
1 TABLET, FILM COATED ORAL NIGHTLY
Qty: 90 TABLET | Refills: 0 | OUTPATIENT
Start: 2020-07-28

## 2020-07-30 RX ORDER — BROMPHENIRAMINE MALEATE, PSEUDOEPHEDRINE HYDROCHLORIDE, AND DEXTROMETHORPHAN HYDROBROMIDE 2; 30; 10 MG/5ML; MG/5ML; MG/5ML
5 SYRUP ORAL 4 TIMES DAILY PRN
Qty: 180 ML | Refills: 0 | Status: ON HOLD | OUTPATIENT
Start: 2020-07-30 | End: 2020-08-25

## 2020-08-05 RX ORDER — BACLOFEN 10 MG/1
10 TABLET ORAL 3 TIMES DAILY PRN
Qty: 60 TABLET | Refills: 1 | Status: SHIPPED | OUTPATIENT
Start: 2020-08-05 | End: 2020-09-03

## 2020-08-12 DIAGNOSIS — M54.12 CERVICAL RADICULAR PAIN: ICD-10-CM

## 2020-08-12 RX ORDER — GABAPENTIN 600 MG/1
TABLET ORAL
Qty: 90 TABLET | Refills: 2 | Status: SHIPPED | OUTPATIENT
Start: 2020-08-12 | End: 2020-09-03

## 2020-08-17 ENCOUNTER — RESULTS ENCOUNTER (OUTPATIENT)
Dept: FAMILY MEDICINE CLINIC | Facility: CLINIC | Age: 55
End: 2020-08-17

## 2020-08-17 DIAGNOSIS — E03.9 ACQUIRED HYPOTHYROIDISM: ICD-10-CM

## 2020-08-19 ENCOUNTER — TRANSCRIBE ORDERS (OUTPATIENT)
Dept: LAB | Facility: HOSPITAL | Age: 55
End: 2020-08-19

## 2020-08-19 ENCOUNTER — LAB (OUTPATIENT)
Dept: LAB | Facility: HOSPITAL | Age: 55
End: 2020-08-19

## 2020-08-19 DIAGNOSIS — M86.18 ACUTE OSTEOMYELITIS OF STERNUM (HCC): ICD-10-CM

## 2020-08-19 DIAGNOSIS — T84.63XD INFLAMMATORY REACTION DUE TO INTERNAL FIXATION DEVICE OF SPINE, SUBSEQUENT ENCOUNTER: ICD-10-CM

## 2020-08-19 DIAGNOSIS — M86.18 ACUTE OSTEOMYELITIS OF STERNUM (HCC): Primary | ICD-10-CM

## 2020-08-19 DIAGNOSIS — M05.79 SEROPOSITIVE RHEUMATOID ARTHRITIS OF MULTIPLE SITES (HCC): ICD-10-CM

## 2020-08-19 LAB
ALBUMIN SERPL-MCNC: 4.4 G/DL (ref 3.5–5.2)
ALBUMIN/GLOB SERPL: 1.6 G/DL
ALP SERPL-CCNC: 102 U/L (ref 39–117)
ALT SERPL W P-5'-P-CCNC: 15 U/L (ref 1–33)
ANION GAP SERPL CALCULATED.3IONS-SCNC: 9 MMOL/L (ref 5–15)
AST SERPL-CCNC: 23 U/L (ref 1–32)
BASOPHILS # BLD AUTO: 0.05 10*3/MM3 (ref 0–0.2)
BASOPHILS NFR BLD AUTO: 0.7 % (ref 0–1.5)
BILIRUB SERPL-MCNC: 0.4 MG/DL (ref 0–1.2)
BUN SERPL-MCNC: 14 MG/DL (ref 6–20)
BUN/CREAT SERPL: 17.5 (ref 7–25)
CALCIUM SPEC-SCNC: 9.4 MG/DL (ref 8.6–10.5)
CHLORIDE SERPL-SCNC: 105 MMOL/L (ref 98–107)
CO2 SERPL-SCNC: 27 MMOL/L (ref 22–29)
CREAT SERPL-MCNC: 0.8 MG/DL (ref 0.57–1)
CRP SERPL-MCNC: 0.47 MG/DL (ref 0–0.5)
DEPRECATED RDW RBC AUTO: 47.1 FL (ref 37–54)
EOSINOPHIL # BLD AUTO: 0.33 10*3/MM3 (ref 0–0.4)
EOSINOPHIL NFR BLD AUTO: 4.5 % (ref 0.3–6.2)
ERYTHROCYTE [DISTWIDTH] IN BLOOD BY AUTOMATED COUNT: 14.8 % (ref 12.3–15.4)
GFR SERPL CREATININE-BSD FRML MDRD: 74 ML/MIN/1.73
GLOBULIN UR ELPH-MCNC: 2.7 GM/DL
GLUCOSE SERPL-MCNC: 90 MG/DL (ref 65–99)
HCT VFR BLD AUTO: 38.2 % (ref 34–46.6)
HGB BLD-MCNC: 11.8 G/DL (ref 12–15.9)
IMM GRANULOCYTES # BLD AUTO: 0.04 10*3/MM3 (ref 0–0.05)
IMM GRANULOCYTES NFR BLD AUTO: 0.5 % (ref 0–0.5)
LYMPHOCYTES # BLD AUTO: 2.16 10*3/MM3 (ref 0.7–3.1)
LYMPHOCYTES NFR BLD AUTO: 29.3 % (ref 19.6–45.3)
MCH RBC QN AUTO: 26.6 PG (ref 26.6–33)
MCHC RBC AUTO-ENTMCNC: 30.9 G/DL (ref 31.5–35.7)
MCV RBC AUTO: 86.2 FL (ref 79–97)
MONOCYTES # BLD AUTO: 0.51 10*3/MM3 (ref 0.1–0.9)
MONOCYTES NFR BLD AUTO: 6.9 % (ref 5–12)
NEUTROPHILS NFR BLD AUTO: 4.28 10*3/MM3 (ref 1.7–7)
NEUTROPHILS NFR BLD AUTO: 58.1 % (ref 42.7–76)
NRBC BLD AUTO-RTO: 0 /100 WBC (ref 0–0.2)
PLATELET # BLD AUTO: 266 10*3/MM3 (ref 140–450)
PMV BLD AUTO: 9.4 FL (ref 6–12)
POTASSIUM SERPL-SCNC: 4.2 MMOL/L (ref 3.5–5.2)
PROT SERPL-MCNC: 7.1 G/DL (ref 6–8.5)
RBC # BLD AUTO: 4.43 10*6/MM3 (ref 3.77–5.28)
SODIUM SERPL-SCNC: 141 MMOL/L (ref 136–145)
WBC # BLD AUTO: 7.37 10*3/MM3 (ref 3.4–10.8)

## 2020-08-19 PROCEDURE — 85025 COMPLETE CBC W/AUTO DIFF WBC: CPT

## 2020-08-19 PROCEDURE — 36415 COLL VENOUS BLD VENIPUNCTURE: CPT

## 2020-08-19 PROCEDURE — 86140 C-REACTIVE PROTEIN: CPT

## 2020-08-19 PROCEDURE — 80053 COMPREHEN METABOLIC PANEL: CPT

## 2020-08-21 ENCOUNTER — HOSPITAL ENCOUNTER (OUTPATIENT)
Facility: HOSPITAL | Age: 55
Discharge: HOME HEALTH | End: 2020-08-26
Attending: INTERNAL MEDICINE | Admitting: INTERNAL MEDICINE

## 2020-08-21 DIAGNOSIS — Z87.39 HISTORY OF OSTEOMYELITIS: ICD-10-CM

## 2020-08-21 DIAGNOSIS — G93.41 ACUTE METABOLIC ENCEPHALOPATHY: ICD-10-CM

## 2020-08-21 DIAGNOSIS — R13.10 DYSPHAGIA, UNSPECIFIED TYPE: Primary | ICD-10-CM

## 2020-08-21 DIAGNOSIS — R41.841 COGNITIVE COMMUNICATION DEFICIT: ICD-10-CM

## 2020-08-21 DIAGNOSIS — M16.11 ARTHRITIS OF RIGHT HIP: ICD-10-CM

## 2020-08-21 PROBLEM — R41.82 AMS (ALTERED MENTAL STATUS): Status: ACTIVE | Noted: 2020-08-21

## 2020-08-21 LAB
ARTERIAL PATENCY WRIST A: ABNORMAL
ATMOSPHERIC PRESS: ABNORMAL MM[HG]
BASE EXCESS BLDA CALC-SCNC: -1.4 MMOL/L (ref 0–2)
BDY SITE: ABNORMAL
BODY TEMPERATURE: 37 C
CO2 BLDA-SCNC: 24.3 MMOL/L (ref 22–33)
COHGB MFR BLD: 0.7 % (ref 0–2)
EPAP: 0
HCO3 BLDA-SCNC: 23.1 MMOL/L (ref 20–26)
HCT VFR BLD CALC: 42 %
HGB BLDA-MCNC: 13.7 G/DL (ref 14–18)
INHALED O2 CONCENTRATION: 28 %
IPAP: 0
METHGB BLD QL: 0.8 % (ref 0–1.5)
MODALITY: ABNORMAL
NOTE: ABNORMAL
OXYHGB MFR BLDV: 97.8 % (ref 94–99)
PAW @ PEAK INSP FLOW SETTING VENT: 0 CMH2O
PCO2 BLDA: 37.4 MM HG (ref 35–45)
PCO2 TEMP ADJ BLD: 37.4 MM HG (ref 35–45)
PH BLDA: 7.4 PH UNITS (ref 7.35–7.45)
PH, TEMP CORRECTED: 7.4 PH UNITS
PO2 BLDA: 143 MM HG (ref 83–108)
PO2 TEMP ADJ BLD: 143 MM HG (ref 83–108)
TOTAL RATE: 0 BREATHS/MINUTE

## 2020-08-21 PROCEDURE — 25010000002 LORAZEPAM PER 2 MG: Performed by: PSYCHIATRY & NEUROLOGY

## 2020-08-21 PROCEDURE — G0378 HOSPITAL OBSERVATION PER HR: HCPCS

## 2020-08-21 PROCEDURE — 99253 IP/OBS CNSLTJ NEW/EST LOW 45: CPT | Performed by: NURSE PRACTITIONER

## 2020-08-21 PROCEDURE — 82805 BLOOD GASES W/O2 SATURATION: CPT

## 2020-08-21 PROCEDURE — 25010000003 LEVETIRACETAM IN NACL 0.75% 1000 MG/100ML SOLUTION: Performed by: PHYSICIAN ASSISTANT

## 2020-08-21 PROCEDURE — 25010000002 LORAZEPAM PER 2 MG: Performed by: PHYSICIAN ASSISTANT

## 2020-08-21 PROCEDURE — 96375 TX/PRO/DX INJ NEW DRUG ADDON: CPT

## 2020-08-21 PROCEDURE — 36600 WITHDRAWAL OF ARTERIAL BLOOD: CPT

## 2020-08-21 PROCEDURE — 0 LEVETIRACETAM IN NACL 0.75% 1000 MG/100ML SOLUTION: Performed by: PHYSICIAN ASSISTANT

## 2020-08-21 RX ORDER — ASPIRIN 300 MG/1
300 SUPPOSITORY RECTAL DAILY
Status: DISCONTINUED | OUTPATIENT
Start: 2020-08-22 | End: 2020-08-26 | Stop reason: HOSPADM

## 2020-08-21 RX ORDER — LORAZEPAM 2 MG/ML
1 INJECTION INTRAMUSCULAR ONCE
Status: COMPLETED | OUTPATIENT
Start: 2020-08-21 | End: 2020-08-21

## 2020-08-21 RX ORDER — SODIUM CHLORIDE 0.9 % (FLUSH) 0.9 %
10 SYRINGE (ML) INJECTION AS NEEDED
Status: DISCONTINUED | OUTPATIENT
Start: 2020-08-21 | End: 2020-08-26 | Stop reason: HOSPADM

## 2020-08-21 RX ORDER — SODIUM CHLORIDE 0.9 % (FLUSH) 0.9 %
10 SYRINGE (ML) INJECTION EVERY 12 HOURS SCHEDULED
Status: DISCONTINUED | OUTPATIENT
Start: 2020-08-22 | End: 2020-08-26 | Stop reason: HOSPADM

## 2020-08-21 RX ORDER — LORAZEPAM 2 MG/ML
1 INJECTION INTRAMUSCULAR
Status: DISCONTINUED | OUTPATIENT
Start: 2020-08-21 | End: 2020-08-26 | Stop reason: HOSPADM

## 2020-08-21 RX ORDER — ASPIRIN 81 MG/1
81 TABLET, CHEWABLE ORAL DAILY
Status: DISCONTINUED | OUTPATIENT
Start: 2020-08-22 | End: 2020-08-26 | Stop reason: HOSPADM

## 2020-08-21 RX ORDER — LEVETIRACETAM 10 MG/ML
1000 INJECTION INTRAVASCULAR ONCE
Status: COMPLETED | OUTPATIENT
Start: 2020-08-21 | End: 2020-08-22

## 2020-08-21 RX ORDER — ATORVASTATIN CALCIUM 40 MG/1
80 TABLET, FILM COATED ORAL NIGHTLY
Status: DISCONTINUED | OUTPATIENT
Start: 2020-08-22 | End: 2020-08-26 | Stop reason: HOSPADM

## 2020-08-21 RX ADMIN — LEVETIRACETAM INJECTION 1000 MG: 10 INJECTION INTRAVENOUS at 23:52

## 2020-08-21 RX ADMIN — LORAZEPAM 2 MG: 2 INJECTION INTRAMUSCULAR; INTRAVENOUS at 23:52

## 2020-08-21 RX ADMIN — LORAZEPAM 1 MG: 2 INJECTION INTRAMUSCULAR; INTRAVENOUS at 22:43

## 2020-08-22 ENCOUNTER — APPOINTMENT (OUTPATIENT)
Dept: CT IMAGING | Facility: HOSPITAL | Age: 55
End: 2020-08-22

## 2020-08-22 ENCOUNTER — APPOINTMENT (OUTPATIENT)
Dept: MRI IMAGING | Facility: HOSPITAL | Age: 55
End: 2020-08-22

## 2020-08-22 ENCOUNTER — APPOINTMENT (OUTPATIENT)
Dept: GENERAL RADIOLOGY | Facility: HOSPITAL | Age: 55
End: 2020-08-22

## 2020-08-22 ENCOUNTER — APPOINTMENT (OUTPATIENT)
Dept: CARDIOLOGY | Facility: HOSPITAL | Age: 55
End: 2020-08-22

## 2020-08-22 ENCOUNTER — APPOINTMENT (OUTPATIENT)
Dept: NEUROLOGY | Facility: HOSPITAL | Age: 55
End: 2020-08-22

## 2020-08-22 PROBLEM — Z87.39 HISTORY OF OSTEOMYELITIS: Status: ACTIVE | Noted: 2020-08-22

## 2020-08-22 PROBLEM — G93.41 ACUTE METABOLIC ENCEPHALOPATHY: Status: ACTIVE | Noted: 2020-08-22

## 2020-08-22 PROBLEM — E87.6 HYPOKALEMIA: Status: ACTIVE | Noted: 2020-08-22

## 2020-08-22 PROBLEM — R65.10 SIRS (SYSTEMIC INFLAMMATORY RESPONSE SYNDROME) (HCC): Status: ACTIVE | Noted: 2020-08-22

## 2020-08-22 PROBLEM — A41.9 SEPSIS (HCC): Status: ACTIVE | Noted: 2020-08-22

## 2020-08-22 LAB
ALBUMIN SERPL-MCNC: 4.1 G/DL (ref 3.5–5.2)
ALBUMIN SERPL-MCNC: 4.4 G/DL (ref 3.5–5.2)
ALBUMIN/GLOB SERPL: 1.4 G/DL
ALBUMIN/GLOB SERPL: 1.7 G/DL
ALP SERPL-CCNC: 107 U/L (ref 39–117)
ALP SERPL-CCNC: 99 U/L (ref 39–117)
ALT SERPL W P-5'-P-CCNC: 13 U/L (ref 1–33)
ALT SERPL W P-5'-P-CCNC: 13 U/L (ref 1–33)
AMMONIA BLD-SCNC: 20 UMOL/L (ref 11–51)
ANION GAP SERPL CALCULATED.3IONS-SCNC: 11 MMOL/L (ref 5–15)
ANION GAP SERPL CALCULATED.3IONS-SCNC: 13 MMOL/L (ref 5–15)
APTT PPP: 32.8 SECONDS (ref 24–37)
ARTERIAL PATENCY WRIST A: ABNORMAL
AST SERPL-CCNC: 17 U/L (ref 1–32)
AST SERPL-CCNC: 21 U/L (ref 1–32)
ATMOSPHERIC PRESS: ABNORMAL MM[HG]
B PARAPERT DNA SPEC QL NAA+PROBE: NOT DETECTED
B PERT DNA SPEC QL NAA+PROBE: NOT DETECTED
BACTERIA UR QL AUTO: ABNORMAL /HPF
BASE EXCESS BLDA CALC-SCNC: -1.8 MMOL/L (ref 0–2)
BASOPHILS # BLD AUTO: 0.02 10*3/MM3 (ref 0–0.2)
BASOPHILS # BLD AUTO: 0.05 10*3/MM3 (ref 0–0.2)
BASOPHILS NFR BLD AUTO: 0.1 % (ref 0–1.5)
BASOPHILS NFR BLD AUTO: 0.3 % (ref 0–1.5)
BDY SITE: ABNORMAL
BH CV ECHO MEAS - AO MAX PG (FULL): 2.8 MMHG
BH CV ECHO MEAS - AO MAX PG: 6.7 MMHG
BH CV ECHO MEAS - AO MEAN PG (FULL): 2.1 MMHG
BH CV ECHO MEAS - AO MEAN PG: 3.8 MMHG
BH CV ECHO MEAS - AO ROOT AREA (BSA CORRECTED): 1.8
BH CV ECHO MEAS - AO ROOT AREA: 7.3 CM^2
BH CV ECHO MEAS - AO ROOT DIAM: 3 CM
BH CV ECHO MEAS - AO V2 MAX: 129.1 CM/SEC
BH CV ECHO MEAS - AO V2 MEAN: 91.2 CM/SEC
BH CV ECHO MEAS - AO V2 VTI: 29.6 CM
BH CV ECHO MEAS - AVA(I,A): 1.8 CM^2
BH CV ECHO MEAS - AVA(I,D): 1.8 CM^2
BH CV ECHO MEAS - AVA(V,A): 2 CM^2
BH CV ECHO MEAS - AVA(V,D): 2 CM^2
BH CV ECHO MEAS - BSA(HAYCOCK): 1.8 M^2
BH CV ECHO MEAS - BSA: 1.7 M^2
BH CV ECHO MEAS - BZI_BMI: 32.2 KILOGRAMS/M^2
BH CV ECHO MEAS - BZI_METRIC_HEIGHT: 152.4 CM
BH CV ECHO MEAS - BZI_METRIC_WEIGHT: 74.8 KG
BH CV ECHO MEAS - EDV(CUBED): 95.9 ML
BH CV ECHO MEAS - EDV(MOD-SP2): 39 ML
BH CV ECHO MEAS - EDV(MOD-SP4): 62 ML
BH CV ECHO MEAS - EDV(TEICH): 96.2 ML
BH CV ECHO MEAS - EF(CUBED): 73 %
BH CV ECHO MEAS - EF(MOD-BP): 62 %
BH CV ECHO MEAS - EF(MOD-SP2): 61.5 %
BH CV ECHO MEAS - EF(MOD-SP4): 59.7 %
BH CV ECHO MEAS - EF(TEICH): 64.9 %
BH CV ECHO MEAS - ESV(CUBED): 25.9 ML
BH CV ECHO MEAS - ESV(MOD-SP2): 15 ML
BH CV ECHO MEAS - ESV(MOD-SP4): 25 ML
BH CV ECHO MEAS - ESV(TEICH): 33.8 ML
BH CV ECHO MEAS - FS: 35.4 %
BH CV ECHO MEAS - IVS/LVPW: 1
BH CV ECHO MEAS - IVSD: 0.8 CM
BH CV ECHO MEAS - LA DIMENSION: 2.6 CM
BH CV ECHO MEAS - LA/AO: 0.86
BH CV ECHO MEAS - LAD MAJOR: 5.4 CM
BH CV ECHO MEAS - LAT PEAK E' VEL: 8.5 CM/SEC
BH CV ECHO MEAS - LATERAL E/E' RATIO: 8.6
BH CV ECHO MEAS - LV DIASTOLIC VOL/BSA (35-75): 36 ML/M^2
BH CV ECHO MEAS - LV MASS(C)D: 114.9 GRAMS
BH CV ECHO MEAS - LV MASS(C)DI: 66.8 GRAMS/M^2
BH CV ECHO MEAS - LV MAX PG: 3.9 MMHG
BH CV ECHO MEAS - LV MEAN PG: 1.7 MMHG
BH CV ECHO MEAS - LV SYSTOLIC VOL/BSA (12-30): 14.5 ML/M^2
BH CV ECHO MEAS - LV V1 MAX: 98.7 CM/SEC
BH CV ECHO MEAS - LV V1 MEAN: 58.4 CM/SEC
BH CV ECHO MEAS - LV V1 VTI: 20 CM
BH CV ECHO MEAS - LVIDD: 4.6 CM
BH CV ECHO MEAS - LVIDS: 3 CM
BH CV ECHO MEAS - LVLD AP2: 5.6 CM
BH CV ECHO MEAS - LVLD AP4: 6.2 CM
BH CV ECHO MEAS - LVLS AP2: 4.7 CM
BH CV ECHO MEAS - LVLS AP4: 5.1 CM
BH CV ECHO MEAS - LVOT AREA (M): 2.8 CM^2
BH CV ECHO MEAS - LVOT AREA: 2.6 CM^2
BH CV ECHO MEAS - LVOT DIAM: 1.8 CM
BH CV ECHO MEAS - LVPWD: 0.78 CM
BH CV ECHO MEAS - MED PEAK E' VEL: 5.1 CM/SEC
BH CV ECHO MEAS - MEDIAL E/E' RATIO: 14.2
BH CV ECHO MEAS - MV A MAX VEL: 92.9 CM/SEC
BH CV ECHO MEAS - MV DEC TIME: 0.2 SEC
BH CV ECHO MEAS - MV E MAX VEL: 73.7 CM/SEC
BH CV ECHO MEAS - MV E/A: 0.79
BH CV ECHO MEAS - PA ACC SLOPE: 473.1 CM/SEC^2
BH CV ECHO MEAS - PA ACC TIME: 0.11 SEC
BH CV ECHO MEAS - PA PR(ACCEL): 31.5 MMHG
BH CV ECHO MEAS - PULM DIAS VEL: 46.8 CM/SEC
BH CV ECHO MEAS - PULM S/D: 1.5
BH CV ECHO MEAS - PULM SYS VEL: 68.4 CM/SEC
BH CV ECHO MEAS - SI(AO): 125.2 ML/M^2
BH CV ECHO MEAS - SI(CUBED): 40.7 ML/M^2
BH CV ECHO MEAS - SI(LVOT): 30.8 ML/M^2
BH CV ECHO MEAS - SI(MOD-SP2): 14 ML/M^2
BH CV ECHO MEAS - SI(MOD-SP4): 21.5 ML/M^2
BH CV ECHO MEAS - SI(TEICH): 36.3 ML/M^2
BH CV ECHO MEAS - SV(AO): 215.3 ML
BH CV ECHO MEAS - SV(CUBED): 70 ML
BH CV ECHO MEAS - SV(LVOT): 53 ML
BH CV ECHO MEAS - SV(MOD-SP2): 24 ML
BH CV ECHO MEAS - SV(MOD-SP4): 37 ML
BH CV ECHO MEAS - SV(TEICH): 62.4 ML
BH CV ECHO MEAS - TAPSE (>1.6): 1.6 CM2
BH CV ECHO MEASUREMENTS AVERAGE E/E' RATIO: 10.84
BH CV VAS BP LEFT ARM: NORMAL MMHG
BH CV XLRA - RV BASE: 3.1 CM
BH CV XLRA - RV LENGTH: 5.6 CM
BH CV XLRA - RV MID: 2.5 CM
BH CV XLRA - TDI S': 15.9 CM/SEC
BILIRUB SERPL-MCNC: 0.6 MG/DL (ref 0–1.2)
BILIRUB SERPL-MCNC: 0.9 MG/DL (ref 0–1.2)
BILIRUB UR QL STRIP: NEGATIVE
BILIRUB UR QL STRIP: NEGATIVE
BODY TEMPERATURE: 37 C
BUN SERPL-MCNC: 10 MG/DL (ref 6–20)
BUN SERPL-MCNC: 11 MG/DL (ref 6–20)
BUN/CREAT SERPL: 12.5 (ref 7–25)
BUN/CREAT SERPL: 15.5 (ref 7–25)
C PNEUM DNA NPH QL NAA+NON-PROBE: NOT DETECTED
CALCIUM SPEC-SCNC: 8.8 MG/DL (ref 8.6–10.5)
CALCIUM SPEC-SCNC: 9.2 MG/DL (ref 8.6–10.5)
CHLORIDE SERPL-SCNC: 111 MMOL/L (ref 98–107)
CHLORIDE SERPL-SCNC: 111 MMOL/L (ref 98–107)
CHOLEST SERPL-MCNC: 167 MG/DL (ref 0–200)
CLARITY UR: CLEAR
CLARITY UR: CLEAR
CO2 BLDA-SCNC: 24.5 MMOL/L (ref 22–33)
CO2 SERPL-SCNC: 22 MMOL/L (ref 22–29)
CO2 SERPL-SCNC: 23 MMOL/L (ref 22–29)
COHGB MFR BLD: 1.1 % (ref 0–2)
COLOR UR: YELLOW
COLOR UR: YELLOW
CREAT SERPL-MCNC: 0.71 MG/DL (ref 0.57–1)
CREAT SERPL-MCNC: 0.8 MG/DL (ref 0.57–1)
CRP SERPL-MCNC: 1.31 MG/DL (ref 0–0.5)
D DIMER PPP FEU-MCNC: 1.24 MCGFEU/ML (ref 0–0.56)
D-LACTATE SERPL-SCNC: 1.3 MMOL/L (ref 0.5–2)
D-LACTATE SERPL-SCNC: 1.9 MMOL/L (ref 0.5–2)
DEPRECATED RDW RBC AUTO: 45.1 FL (ref 37–54)
DEPRECATED RDW RBC AUTO: 47.8 FL (ref 37–54)
DEPRECATED RDW RBC AUTO: 49.6 FL (ref 37–54)
EOSINOPHIL # BLD AUTO: 0.01 10*3/MM3 (ref 0–0.4)
EOSINOPHIL # BLD AUTO: 0.01 10*3/MM3 (ref 0–0.4)
EOSINOPHIL NFR BLD AUTO: 0.1 % (ref 0.3–6.2)
EOSINOPHIL NFR BLD AUTO: 0.1 % (ref 0.3–6.2)
EPAP: 0
ERYTHROCYTE [DISTWIDTH] IN BLOOD BY AUTOMATED COUNT: 14.6 % (ref 12.3–15.4)
ERYTHROCYTE [DISTWIDTH] IN BLOOD BY AUTOMATED COUNT: 15.1 % (ref 12.3–15.4)
ERYTHROCYTE [DISTWIDTH] IN BLOOD BY AUTOMATED COUNT: 15.2 % (ref 12.3–15.4)
ERYTHROCYTE [SEDIMENTATION RATE] IN BLOOD: 9 MM/HR (ref 0–30)
FLUAV H1 2009 PAND RNA NPH QL NAA+PROBE: NOT DETECTED
FLUAV H1 HA GENE NPH QL NAA+PROBE: NOT DETECTED
FLUAV H3 RNA NPH QL NAA+PROBE: NOT DETECTED
FLUAV SUBTYP SPEC NAA+PROBE: NOT DETECTED
FLUBV RNA ISLT QL NAA+PROBE: NOT DETECTED
FOLATE SERPL-MCNC: 12.3 NG/ML (ref 4.78–24.2)
GFR SERPL CREATININE-BSD FRML MDRD: 74 ML/MIN/1.73
GFR SERPL CREATININE-BSD FRML MDRD: 85 ML/MIN/1.73
GLOBULIN UR ELPH-MCNC: 2.6 GM/DL
GLOBULIN UR ELPH-MCNC: 2.9 GM/DL
GLUCOSE BLDC GLUCOMTR-MCNC: 101 MG/DL (ref 70–130)
GLUCOSE BLDC GLUCOMTR-MCNC: 137 MG/DL (ref 70–130)
GLUCOSE BLDC GLUCOMTR-MCNC: 83 MG/DL (ref 70–130)
GLUCOSE BLDC GLUCOMTR-MCNC: 96 MG/DL (ref 70–130)
GLUCOSE BLDC GLUCOMTR-MCNC: 98 MG/DL (ref 70–130)
GLUCOSE SERPL-MCNC: 109 MG/DL (ref 65–99)
GLUCOSE SERPL-MCNC: 120 MG/DL (ref 65–99)
GLUCOSE UR STRIP-MCNC: NEGATIVE MG/DL
GLUCOSE UR STRIP-MCNC: NEGATIVE MG/DL
HADV DNA SPEC NAA+PROBE: NOT DETECTED
HBA1C MFR BLD: 5.4 % (ref 4.8–5.6)
HCO3 BLDA-SCNC: 23.3 MMOL/L (ref 20–26)
HCOV 229E RNA SPEC QL NAA+PROBE: NOT DETECTED
HCOV HKU1 RNA SPEC QL NAA+PROBE: NOT DETECTED
HCOV NL63 RNA SPEC QL NAA+PROBE: NOT DETECTED
HCOV OC43 RNA SPEC QL NAA+PROBE: NOT DETECTED
HCT VFR BLD AUTO: 40.9 % (ref 34–46.6)
HCT VFR BLD AUTO: 41.7 % (ref 34–46.6)
HCT VFR BLD AUTO: 43 % (ref 34–46.6)
HCT VFR BLD CALC: 39.9 %
HDLC SERPL-MCNC: 66 MG/DL (ref 40–60)
HGB BLD-MCNC: 12.8 G/DL (ref 12–15.9)
HGB BLD-MCNC: 12.8 G/DL (ref 12–15.9)
HGB BLD-MCNC: 12.9 G/DL (ref 12–15.9)
HGB BLDA-MCNC: 13 G/DL (ref 14–18)
HGB UR QL STRIP.AUTO: NEGATIVE
HGB UR QL STRIP.AUTO: NEGATIVE
HMPV RNA NPH QL NAA+NON-PROBE: NOT DETECTED
HPIV1 RNA SPEC QL NAA+PROBE: NOT DETECTED
HPIV2 RNA SPEC QL NAA+PROBE: NOT DETECTED
HPIV3 RNA NPH QL NAA+PROBE: NOT DETECTED
HPIV4 P GENE NPH QL NAA+PROBE: NOT DETECTED
HYALINE CASTS UR QL AUTO: ABNORMAL /LPF
IMM GRANULOCYTES # BLD AUTO: 0.07 10*3/MM3 (ref 0–0.05)
IMM GRANULOCYTES # BLD AUTO: 0.11 10*3/MM3 (ref 0–0.05)
IMM GRANULOCYTES NFR BLD AUTO: 0.4 % (ref 0–0.5)
IMM GRANULOCYTES NFR BLD AUTO: 0.7 % (ref 0–0.5)
INHALED O2 CONCENTRATION: 28 %
INR PPP: 1.17 (ref 0.85–1.16)
IPAP: 0
KETONES UR QL STRIP: NEGATIVE
KETONES UR QL STRIP: NEGATIVE
LDLC SERPL CALC-MCNC: 86 MG/DL (ref 0–100)
LDLC/HDLC SERPL: 1.3 {RATIO}
LEFT ATRIUM VOLUME INDEX: 18.6 ML/M^2
LEFT ATRIUM VOLUME: 32 ML
LEUKOCYTE ESTERASE UR QL STRIP.AUTO: ABNORMAL
LEUKOCYTE ESTERASE UR QL STRIP.AUTO: NEGATIVE
LV EF 2D ECHO EST: 60 %
LYMPHOCYTES # BLD AUTO: 1.4 10*3/MM3 (ref 0.7–3.1)
LYMPHOCYTES # BLD AUTO: 1.79 10*3/MM3 (ref 0.7–3.1)
LYMPHOCYTES NFR BLD AUTO: 11.3 % (ref 19.6–45.3)
LYMPHOCYTES NFR BLD AUTO: 8.3 % (ref 19.6–45.3)
M PNEUMO IGG SER IA-ACNC: NOT DETECTED
MAGNESIUM SERPL-MCNC: 2.1 MG/DL (ref 1.6–2.6)
MCH RBC QN AUTO: 26.8 PG (ref 26.6–33)
MCH RBC QN AUTO: 26.8 PG (ref 26.6–33)
MCH RBC QN AUTO: 26.9 PG (ref 26.6–33)
MCHC RBC AUTO-ENTMCNC: 29.8 G/DL (ref 31.5–35.7)
MCHC RBC AUTO-ENTMCNC: 30.7 G/DL (ref 31.5–35.7)
MCHC RBC AUTO-ENTMCNC: 31.5 G/DL (ref 31.5–35.7)
MCV RBC AUTO: 84.9 FL (ref 79–97)
MCV RBC AUTO: 87.6 FL (ref 79–97)
MCV RBC AUTO: 90.1 FL (ref 79–97)
METHGB BLD QL: 0.8 % (ref 0–1.5)
MODALITY: ABNORMAL
MONOCYTES # BLD AUTO: 0.92 10*3/MM3 (ref 0.1–0.9)
MONOCYTES # BLD AUTO: 1.29 10*3/MM3 (ref 0.1–0.9)
MONOCYTES NFR BLD AUTO: 5.5 % (ref 5–12)
MONOCYTES NFR BLD AUTO: 8.1 % (ref 5–12)
NEUTROPHILS NFR BLD AUTO: 12.66 10*3/MM3 (ref 1.7–7)
NEUTROPHILS NFR BLD AUTO: 14.37 10*3/MM3 (ref 1.7–7)
NEUTROPHILS NFR BLD AUTO: 80 % (ref 42.7–76)
NEUTROPHILS NFR BLD AUTO: 85.1 % (ref 42.7–76)
NITRITE UR QL STRIP: NEGATIVE
NITRITE UR QL STRIP: NEGATIVE
NOTE: ABNORMAL
NRBC BLD AUTO-RTO: 0 /100 WBC (ref 0–0.2)
NRBC BLD AUTO-RTO: 0 /100 WBC (ref 0–0.2)
OXYHGB MFR BLDV: 95.5 % (ref 94–99)
PAW @ PEAK INSP FLOW SETTING VENT: 0 CMH2O
PCO2 BLDA: 40.1 MM HG (ref 35–45)
PCO2 TEMP ADJ BLD: 40.1 MM HG (ref 35–45)
PH BLDA: 7.37 PH UNITS (ref 7.35–7.45)
PH UR STRIP.AUTO: 6 [PH] (ref 5–8)
PH UR STRIP.AUTO: 6 [PH] (ref 5–8)
PH, TEMP CORRECTED: 7.37 PH UNITS
PHOSPHATE SERPL-MCNC: 4.3 MG/DL (ref 2.5–4.5)
PLATELET # BLD AUTO: 266 10*3/MM3 (ref 140–450)
PLATELET # BLD AUTO: 294 10*3/MM3 (ref 140–450)
PLATELET # BLD AUTO: 303 10*3/MM3 (ref 140–450)
PMV BLD AUTO: 9.3 FL (ref 6–12)
PMV BLD AUTO: 9.7 FL (ref 6–12)
PMV BLD AUTO: 9.9 FL (ref 6–12)
PO2 BLDA: 87.6 MM HG (ref 83–108)
PO2 TEMP ADJ BLD: 87.6 MM HG (ref 83–108)
POTASSIUM SERPL-SCNC: 3.2 MMOL/L (ref 3.5–5.2)
POTASSIUM SERPL-SCNC: 4.2 MMOL/L (ref 3.5–5.2)
PROCALCITONIN SERPL-MCNC: 0.06 NG/ML (ref 0–0.25)
PROT SERPL-MCNC: 7 G/DL (ref 6–8.5)
PROT SERPL-MCNC: 7 G/DL (ref 6–8.5)
PROT UR QL STRIP: NEGATIVE
PROT UR QL STRIP: NEGATIVE
PROTHROMBIN TIME: 14.6 SECONDS (ref 11.5–14)
RBC # BLD AUTO: 4.76 10*6/MM3 (ref 3.77–5.28)
RBC # BLD AUTO: 4.77 10*6/MM3 (ref 3.77–5.28)
RBC # BLD AUTO: 4.82 10*6/MM3 (ref 3.77–5.28)
RBC # UR: ABNORMAL /HPF
REF LAB TEST METHOD: ABNORMAL
RESP PATH DNA+RNA PNL NPH NAA+NON-PROBE: NOT DETECTED
RHINOVIRUS RNA SPEC NAA+PROBE: NOT DETECTED
RSV RNA NPH QL NAA+NON-PROBE: NOT DETECTED
SODIUM SERPL-SCNC: 145 MMOL/L (ref 136–145)
SODIUM SERPL-SCNC: 146 MMOL/L (ref 136–145)
SP GR UR STRIP: 1.04 (ref 1–1.03)
SP GR UR STRIP: 1.05 (ref 1–1.03)
SQUAMOUS #/AREA URNS HPF: ABNORMAL /HPF
TOTAL RATE: 0 BREATHS/MINUTE
TRIGL SERPL-MCNC: 75 MG/DL (ref 0–150)
TSH SERPL DL<=0.05 MIU/L-ACNC: 6.53 UIU/ML (ref 0.27–4.2)
UROBILINOGEN UR QL STRIP: ABNORMAL
UROBILINOGEN UR QL STRIP: ABNORMAL
VIT B12 BLD-MCNC: 653 PG/ML (ref 211–946)
VLDLC SERPL-MCNC: 15 MG/DL
WBC # BLD AUTO: 15.84 10*3/MM3 (ref 3.4–10.8)
WBC # BLD AUTO: 16.86 10*3/MM3 (ref 3.4–10.8)
WBC # BLD AUTO: 17.11 10*3/MM3 (ref 3.4–10.8)
WBC UR QL AUTO: ABNORMAL /HPF

## 2020-08-22 PROCEDURE — 99253 IP/OBS CNSLTJ NEW/EST LOW 45: CPT | Performed by: PSYCHIATRY & NEUROLOGY

## 2020-08-22 PROCEDURE — 25010000002 ACYCLOVIR PER 5 MG: Performed by: PSYCHIATRY & NEUROLOGY

## 2020-08-22 PROCEDURE — 86140 C-REACTIVE PROTEIN: CPT | Performed by: INTERNAL MEDICINE

## 2020-08-22 PROCEDURE — 85610 PROTHROMBIN TIME: CPT | Performed by: PSYCHIATRY & NEUROLOGY

## 2020-08-22 PROCEDURE — 0 IOPAMIDOL PER 1 ML: Performed by: INTERNAL MEDICINE

## 2020-08-22 PROCEDURE — 25010000002 LEVETIRACETAM IN NACL 0.82% 500 MG/100ML SOLUTION: Performed by: NURSE PRACTITIONER

## 2020-08-22 PROCEDURE — 85379 FIBRIN DEGRADATION QUANT: CPT | Performed by: PHYSICIAN ASSISTANT

## 2020-08-22 PROCEDURE — 83735 ASSAY OF MAGNESIUM: CPT | Performed by: INTERNAL MEDICINE

## 2020-08-22 PROCEDURE — 70498 CT ANGIOGRAPHY NECK: CPT

## 2020-08-22 PROCEDURE — 92523 SPEECH SOUND LANG COMPREHEN: CPT

## 2020-08-22 PROCEDURE — 85025 COMPLETE CBC W/AUTO DIFF WBC: CPT | Performed by: INTERNAL MEDICINE

## 2020-08-22 PROCEDURE — 0 GADOBENATE DIMEGLUMINE 529 MG/ML SOLUTION: Performed by: INTERNAL MEDICINE

## 2020-08-22 PROCEDURE — A9577 INJ MULTIHANCE: HCPCS | Performed by: INTERNAL MEDICINE

## 2020-08-22 PROCEDURE — 0202U NFCT DS 22 TRGT SARS-COV-2: CPT | Performed by: PHYSICIAN ASSISTANT

## 2020-08-22 PROCEDURE — 87086 URINE CULTURE/COLONY COUNT: CPT | Performed by: NURSE PRACTITIONER

## 2020-08-22 PROCEDURE — 82607 VITAMIN B-12: CPT | Performed by: PSYCHIATRY & NEUROLOGY

## 2020-08-22 PROCEDURE — 84443 ASSAY THYROID STIM HORMONE: CPT | Performed by: INTERNAL MEDICINE

## 2020-08-22 PROCEDURE — 82805 BLOOD GASES W/O2 SATURATION: CPT

## 2020-08-22 PROCEDURE — 93010 ELECTROCARDIOGRAM REPORT: CPT | Performed by: INTERNAL MEDICINE

## 2020-08-22 PROCEDURE — 96366 THER/PROPH/DIAG IV INF ADDON: CPT

## 2020-08-22 PROCEDURE — 25010000002 LEVETRIRACETAM PER 10 MG: Performed by: PSYCHIATRY & NEUROLOGY

## 2020-08-22 PROCEDURE — 71045 X-RAY EXAM CHEST 1 VIEW: CPT

## 2020-08-22 PROCEDURE — 25010000002 LORAZEPAM PER 2 MG: Performed by: PHYSICIAN ASSISTANT

## 2020-08-22 PROCEDURE — 82746 ASSAY OF FOLIC ACID SERUM: CPT | Performed by: PSYCHIATRY & NEUROLOGY

## 2020-08-22 PROCEDURE — 81001 URINALYSIS AUTO W/SCOPE: CPT | Performed by: NURSE PRACTITIONER

## 2020-08-22 PROCEDURE — 80053 COMPREHEN METABOLIC PANEL: CPT | Performed by: PHYSICIAN ASSISTANT

## 2020-08-22 PROCEDURE — 93005 ELECTROCARDIOGRAM TRACING: CPT | Performed by: INTERNAL MEDICINE

## 2020-08-22 PROCEDURE — G0378 HOSPITAL OBSERVATION PER HR: HCPCS

## 2020-08-22 PROCEDURE — 70496 CT ANGIOGRAPHY HEAD: CPT

## 2020-08-22 PROCEDURE — 25010000003 POTASSIUM CHLORIDE 10 MEQ/100ML SOLUTION: Performed by: INTERNAL MEDICINE

## 2020-08-22 PROCEDURE — 93306 TTE W/DOPPLER COMPLETE: CPT | Performed by: INTERNAL MEDICINE

## 2020-08-22 PROCEDURE — 25010000002 VANCOMYCIN PER 500 MG

## 2020-08-22 PROCEDURE — 99223 1ST HOSP IP/OBS HIGH 75: CPT | Performed by: INTERNAL MEDICINE

## 2020-08-22 PROCEDURE — 83605 ASSAY OF LACTIC ACID: CPT | Performed by: NURSE PRACTITIONER

## 2020-08-22 PROCEDURE — 81003 URINALYSIS AUTO W/O SCOPE: CPT | Performed by: INTERNAL MEDICINE

## 2020-08-22 PROCEDURE — 25010000002 VANCOMYCIN 10 G RECONSTITUTED SOLUTION

## 2020-08-22 PROCEDURE — 71275 CT ANGIOGRAPHY CHEST: CPT

## 2020-08-22 PROCEDURE — 25010000002 ACYCLOVIR PER 5 MG: Performed by: INTERNAL MEDICINE

## 2020-08-22 PROCEDURE — 0 IOPAMIDOL PER 1 ML: Performed by: HOSPITALIST

## 2020-08-22 PROCEDURE — 96365 THER/PROPH/DIAG IV INF INIT: CPT

## 2020-08-22 PROCEDURE — 82962 GLUCOSE BLOOD TEST: CPT

## 2020-08-22 PROCEDURE — 84100 ASSAY OF PHOSPHORUS: CPT | Performed by: PHYSICIAN ASSISTANT

## 2020-08-22 PROCEDURE — 83605 ASSAY OF LACTIC ACID: CPT | Performed by: PHYSICIAN ASSISTANT

## 2020-08-22 PROCEDURE — 74176 CT ABD & PELVIS W/O CONTRAST: CPT

## 2020-08-22 PROCEDURE — 25010000002 CEFTRIAXONE PER 250 MG: Performed by: INTERNAL MEDICINE

## 2020-08-22 PROCEDURE — 92610 EVALUATE SWALLOWING FUNCTION: CPT

## 2020-08-22 PROCEDURE — 82140 ASSAY OF AMMONIA: CPT | Performed by: PSYCHIATRY & NEUROLOGY

## 2020-08-22 PROCEDURE — 25010000002 PIPERACILLIN SOD-TAZOBACTAM PER 1 G: Performed by: INTERNAL MEDICINE

## 2020-08-22 PROCEDURE — 36600 WITHDRAWAL OF ARTERIAL BLOOD: CPT

## 2020-08-22 PROCEDURE — 85652 RBC SED RATE AUTOMATED: CPT | Performed by: INTERNAL MEDICINE

## 2020-08-22 PROCEDURE — 80053 COMPREHEN METABOLIC PANEL: CPT | Performed by: INTERNAL MEDICINE

## 2020-08-22 PROCEDURE — 85730 THROMBOPLASTIN TIME PARTIAL: CPT | Performed by: PSYCHIATRY & NEUROLOGY

## 2020-08-22 PROCEDURE — 95819 EEG AWAKE AND ASLEEP: CPT

## 2020-08-22 PROCEDURE — 84145 PROCALCITONIN (PCT): CPT | Performed by: PHYSICIAN ASSISTANT

## 2020-08-22 PROCEDURE — 83036 HEMOGLOBIN GLYCOSYLATED A1C: CPT | Performed by: PHYSICIAN ASSISTANT

## 2020-08-22 PROCEDURE — 0 POTASSIUM CHLORIDE 10 MEQ/100ML SOLUTION: Performed by: INTERNAL MEDICINE

## 2020-08-22 PROCEDURE — 96375 TX/PRO/DX INJ NEW DRUG ADDON: CPT

## 2020-08-22 PROCEDURE — 0042T HC CT CEREBRAL PERFUSION W/WO CONTRAST: CPT

## 2020-08-22 PROCEDURE — 70553 MRI BRAIN STEM W/O & W/DYE: CPT

## 2020-08-22 PROCEDURE — 94799 UNLISTED PULMONARY SVC/PX: CPT

## 2020-08-22 PROCEDURE — 87040 BLOOD CULTURE FOR BACTERIA: CPT | Performed by: NURSE PRACTITIONER

## 2020-08-22 PROCEDURE — 70450 CT HEAD/BRAIN W/O DYE: CPT

## 2020-08-22 PROCEDURE — 93306 TTE W/DOPPLER COMPLETE: CPT

## 2020-08-22 PROCEDURE — 0 LEVETIRACETAM IN NACL 0.75% 1000 MG/100ML SOLUTION: Performed by: PSYCHIATRY & NEUROLOGY

## 2020-08-22 PROCEDURE — 80061 LIPID PANEL: CPT | Performed by: PHYSICIAN ASSISTANT

## 2020-08-22 PROCEDURE — 25010000003 LEVETIRACETAM IN NACL 0.75% 1000 MG/100ML SOLUTION: Performed by: PSYCHIATRY & NEUROLOGY

## 2020-08-22 PROCEDURE — 87040 BLOOD CULTURE FOR BACTERIA: CPT | Performed by: PHYSICIAN ASSISTANT

## 2020-08-22 RX ORDER — ESCITALOPRAM OXALATE 20 MG/1
20 TABLET ORAL DAILY
Status: DISCONTINUED | OUTPATIENT
Start: 2020-08-22 | End: 2020-08-26 | Stop reason: HOSPADM

## 2020-08-22 RX ORDER — ACETAMINOPHEN 650 MG/1
650 SUPPOSITORY RECTAL EVERY 4 HOURS PRN
Status: DISCONTINUED | OUTPATIENT
Start: 2020-08-22 | End: 2020-08-26 | Stop reason: HOSPADM

## 2020-08-22 RX ORDER — LEVETIRACETAM 10 MG/ML
1000 INJECTION INTRAVASCULAR EVERY 12 HOURS SCHEDULED
Status: DISCONTINUED | OUTPATIENT
Start: 2020-08-22 | End: 2020-08-26

## 2020-08-22 RX ORDER — POTASSIUM CHLORIDE 7.45 MG/ML
10 INJECTION INTRAVENOUS
Status: DISCONTINUED | OUTPATIENT
Start: 2020-08-22 | End: 2020-08-26 | Stop reason: HOSPADM

## 2020-08-22 RX ORDER — POTASSIUM CHLORIDE 750 MG/1
40 CAPSULE, EXTENDED RELEASE ORAL AS NEEDED
Status: DISCONTINUED | OUTPATIENT
Start: 2020-08-22 | End: 2020-08-26 | Stop reason: HOSPADM

## 2020-08-22 RX ORDER — FLUMAZENIL 0.1 MG/ML
INJECTION INTRAVENOUS
Status: DISPENSED
Start: 2020-08-22 | End: 2020-08-22

## 2020-08-22 RX ORDER — LEVOTHYROXINE SODIUM 20 UG/ML
12.5 INJECTION, SOLUTION INTRAVENOUS
Status: DISCONTINUED | OUTPATIENT
Start: 2020-08-22 | End: 2020-08-26

## 2020-08-22 RX ORDER — VANCOMYCIN HYDROCHLORIDE 1 G/200ML
1000 INJECTION, SOLUTION INTRAVENOUS EVERY 12 HOURS SCHEDULED
Status: DISCONTINUED | OUTPATIENT
Start: 2020-08-22 | End: 2020-08-24 | Stop reason: DRUGHIGH

## 2020-08-22 RX ORDER — LEVETIRACETAM 5 MG/ML
500 INJECTION INTRAVASCULAR EVERY 12 HOURS SCHEDULED
Status: DISCONTINUED | OUTPATIENT
Start: 2020-08-22 | End: 2020-08-22

## 2020-08-22 RX ORDER — POTASSIUM CHLORIDE 1.5 G/1.77G
40 POWDER, FOR SOLUTION ORAL AS NEEDED
Status: DISCONTINUED | OUTPATIENT
Start: 2020-08-22 | End: 2020-08-26 | Stop reason: HOSPADM

## 2020-08-22 RX ORDER — LEVOTHYROXINE SODIUM 0.03 MG/1
25 TABLET ORAL
Status: DISCONTINUED | OUTPATIENT
Start: 2020-08-22 | End: 2020-08-22

## 2020-08-22 RX ORDER — SODIUM CHLORIDE 0.9 % (FLUSH) 0.9 %
10 SYRINGE (ML) INJECTION AS NEEDED
Status: DISCONTINUED | OUTPATIENT
Start: 2020-08-22 | End: 2020-08-26 | Stop reason: HOSPADM

## 2020-08-22 RX ORDER — FLUMAZENIL 0.1 MG/ML
0.2 INJECTION INTRAVENOUS ONCE
Status: COMPLETED | OUTPATIENT
Start: 2020-08-22 | End: 2020-08-22

## 2020-08-22 RX ORDER — LEVOTHYROXINE SODIUM 20 UG/ML
25 INJECTION, SOLUTION INTRAVENOUS
Status: DISCONTINUED | OUTPATIENT
Start: 2020-08-22 | End: 2020-08-22

## 2020-08-22 RX ORDER — SODIUM CHLORIDE 0.9 % (FLUSH) 0.9 %
10 SYRINGE (ML) INJECTION EVERY 12 HOURS SCHEDULED
Status: DISCONTINUED | OUTPATIENT
Start: 2020-08-22 | End: 2020-08-26 | Stop reason: HOSPADM

## 2020-08-22 RX ADMIN — LEVETIRACETAM INJECTION 1000 MG: 10 INJECTION INTRAVENOUS at 22:51

## 2020-08-22 RX ADMIN — IOPAMIDOL 85 ML: 755 INJECTION, SOLUTION INTRAVENOUS at 17:15

## 2020-08-22 RX ADMIN — LORAZEPAM 1 MG: 2 INJECTION INTRAMUSCULAR; INTRAVENOUS at 01:03

## 2020-08-22 RX ADMIN — TAZOBACTAM SODIUM AND PIPERACILLIN SODIUM 3.38 G: 375; 3 INJECTION, SOLUTION INTRAVENOUS at 11:14

## 2020-08-22 RX ADMIN — LEVETIRACETAM 2000 MG: 100 INJECTION, SOLUTION INTRAVENOUS at 17:41

## 2020-08-22 RX ADMIN — IOPAMIDOL 115 ML: 755 INJECTION, SOLUTION INTRAVENOUS at 01:30

## 2020-08-22 RX ADMIN — ACETAMINOPHEN 650 MG: 650 SUPPOSITORY RECTAL at 22:49

## 2020-08-22 RX ADMIN — FLUMAZENIL 0.2 MG: 0.1 INJECTION, SOLUTION INTRAVENOUS at 06:48

## 2020-08-22 RX ADMIN — VANCOMYCIN HYDROCHLORIDE 1500 MG: 10 INJECTION, POWDER, LYOPHILIZED, FOR SOLUTION INTRAVENOUS at 04:10

## 2020-08-22 RX ADMIN — ACYCLOVIR SODIUM 570 MG: 50 INJECTION, SOLUTION INTRAVENOUS at 21:42

## 2020-08-22 RX ADMIN — LEVETIRACETAM INJECTION 500 MG: 5 INJECTION INTRAVENOUS at 10:20

## 2020-08-22 RX ADMIN — SODIUM CHLORIDE, PRESERVATIVE FREE 10 ML: 5 INJECTION INTRAVENOUS at 08:53

## 2020-08-22 RX ADMIN — LEVOTHYROXINE SODIUM 12.5 MCG: 20 INJECTION, SOLUTION INTRAVENOUS at 11:14

## 2020-08-22 RX ADMIN — GADOBENATE DIMEGLUMINE 15 ML: 529 INJECTION, SOLUTION INTRAVENOUS at 02:30

## 2020-08-22 RX ADMIN — CEFTRIAXONE SODIUM 2 G: 2 INJECTION, POWDER, FOR SOLUTION INTRAMUSCULAR; INTRAVENOUS at 18:17

## 2020-08-22 RX ADMIN — POTASSIUM CHLORIDE 10 MEQ: 7.46 INJECTION, SOLUTION INTRAVENOUS at 06:28

## 2020-08-22 RX ADMIN — VANCOMYCIN HYDROCHLORIDE 1000 MG: 1 INJECTION, SOLUTION INTRAVENOUS at 19:36

## 2020-08-22 RX ADMIN — ASPIRIN 300 MG: 300 SUPPOSITORY RECTAL at 08:53

## 2020-08-22 RX ADMIN — POTASSIUM CHLORIDE 10 MEQ: 7.46 INJECTION, SOLUTION INTRAVENOUS at 07:33

## 2020-08-22 RX ADMIN — TAZOBACTAM SODIUM AND PIPERACILLIN SODIUM 3.38 G: 375; 3 INJECTION, SOLUTION INTRAVENOUS at 04:11

## 2020-08-22 RX ADMIN — SODIUM CHLORIDE, PRESERVATIVE FREE 10 ML: 5 INJECTION INTRAVENOUS at 21:42

## 2020-08-22 RX ADMIN — POTASSIUM CHLORIDE 10 MEQ: 7.46 INJECTION, SOLUTION INTRAVENOUS at 04:11

## 2020-08-22 RX ADMIN — POTASSIUM CHLORIDE 10 MEQ: 7.46 INJECTION, SOLUTION INTRAVENOUS at 06:29

## 2020-08-23 ENCOUNTER — APPOINTMENT (OUTPATIENT)
Dept: INTERVENTIONAL RADIOLOGY/VASCULAR | Facility: HOSPITAL | Age: 55
End: 2020-08-23

## 2020-08-23 LAB
GLUCOSE BLDC GLUCOMTR-MCNC: 108 MG/DL (ref 70–130)
GLUCOSE BLDC GLUCOMTR-MCNC: 93 MG/DL (ref 70–130)

## 2020-08-23 PROCEDURE — 94799 UNLISTED PULMONARY SVC/PX: CPT

## 2020-08-23 PROCEDURE — 25010000002 HYDROMORPHONE PER 4 MG: Performed by: INTERNAL MEDICINE

## 2020-08-23 PROCEDURE — 0 LEVETIRACETAM IN NACL 0.75% 1000 MG/100ML SOLUTION: Performed by: PSYCHIATRY & NEUROLOGY

## 2020-08-23 PROCEDURE — G0378 HOSPITAL OBSERVATION PER HR: HCPCS

## 2020-08-23 PROCEDURE — 25010000002 LORAZEPAM PER 2 MG: Performed by: PHYSICIAN ASSISTANT

## 2020-08-23 PROCEDURE — 25010000002 CEFTRIAXONE PER 250 MG: Performed by: INTERNAL MEDICINE

## 2020-08-23 PROCEDURE — 82962 GLUCOSE BLOOD TEST: CPT

## 2020-08-23 PROCEDURE — 25010000002 VANCOMYCIN PER 500 MG

## 2020-08-23 PROCEDURE — 99233 SBSQ HOSP IP/OBS HIGH 50: CPT | Performed by: INTERNAL MEDICINE

## 2020-08-23 PROCEDURE — 96367 TX/PROPH/DG ADDL SEQ IV INF: CPT

## 2020-08-23 PROCEDURE — 25010000002 ACYCLOVIR PER 5 MG: Performed by: PSYCHIATRY & NEUROLOGY

## 2020-08-23 PROCEDURE — 25010000002 ACYCLOVIR PER 5 MG: Performed by: INTERNAL MEDICINE

## 2020-08-23 PROCEDURE — 94660 CPAP INITIATION&MGMT: CPT

## 2020-08-23 PROCEDURE — 99232 SBSQ HOSP IP/OBS MODERATE 35: CPT | Performed by: PSYCHIATRY & NEUROLOGY

## 2020-08-23 PROCEDURE — 96375 TX/PRO/DX INJ NEW DRUG ADDON: CPT

## 2020-08-23 PROCEDURE — 92610 EVALUATE SWALLOWING FUNCTION: CPT

## 2020-08-23 PROCEDURE — 25010000003 LEVETIRACETAM IN NACL 0.75% 1000 MG/100ML SOLUTION: Performed by: PSYCHIATRY & NEUROLOGY

## 2020-08-23 PROCEDURE — 96376 TX/PRO/DX INJ SAME DRUG ADON: CPT

## 2020-08-23 PROCEDURE — 25010000002 HYDROMORPHONE 1 MG/ML SOLUTION: Performed by: INTERNAL MEDICINE

## 2020-08-23 RX ORDER — HYDROMORPHONE HYDROCHLORIDE 1 MG/ML
0.5 INJECTION, SOLUTION INTRAMUSCULAR; INTRAVENOUS; SUBCUTANEOUS
Status: DISCONTINUED | OUTPATIENT
Start: 2020-08-23 | End: 2020-08-26 | Stop reason: HOSPADM

## 2020-08-23 RX ADMIN — ACYCLOVIR SODIUM 570 MG: 50 INJECTION, SOLUTION INTRAVENOUS at 20:09

## 2020-08-23 RX ADMIN — HYDROMORPHONE HYDROCHLORIDE 0.5 MG: 1 INJECTION, SOLUTION INTRAMUSCULAR; INTRAVENOUS; SUBCUTANEOUS at 18:19

## 2020-08-23 RX ADMIN — LEVETIRACETAM INJECTION 1000 MG: 10 INJECTION INTRAVENOUS at 20:09

## 2020-08-23 RX ADMIN — CEFTRIAXONE SODIUM 2 G: 2 INJECTION, POWDER, FOR SOLUTION INTRAMUSCULAR; INTRAVENOUS at 17:11

## 2020-08-23 RX ADMIN — ACYCLOVIR SODIUM 570 MG: 50 INJECTION, SOLUTION INTRAVENOUS at 11:35

## 2020-08-23 RX ADMIN — VANCOMYCIN HYDROCHLORIDE 1000 MG: 1 INJECTION, SOLUTION INTRAVENOUS at 06:12

## 2020-08-23 RX ADMIN — HYDROMORPHONE HYDROCHLORIDE 1 MG: 1 INJECTION, SOLUTION INTRAMUSCULAR; INTRAVENOUS; SUBCUTANEOUS at 12:28

## 2020-08-23 RX ADMIN — SODIUM CHLORIDE, PRESERVATIVE FREE 10 ML: 5 INJECTION INTRAVENOUS at 09:13

## 2020-08-23 RX ADMIN — LEVETIRACETAM INJECTION 1000 MG: 10 INJECTION INTRAVENOUS at 09:12

## 2020-08-23 RX ADMIN — ASPIRIN 300 MG: 300 SUPPOSITORY RECTAL at 11:00

## 2020-08-23 RX ADMIN — HYDROMORPHONE HYDROCHLORIDE 0.5 MG: 1 INJECTION, SOLUTION INTRAMUSCULAR; INTRAVENOUS; SUBCUTANEOUS at 23:02

## 2020-08-23 RX ADMIN — SODIUM CHLORIDE, PRESERVATIVE FREE 10 ML: 5 INJECTION INTRAVENOUS at 20:09

## 2020-08-23 RX ADMIN — LEVOTHYROXINE SODIUM 12.5 MCG: 20 INJECTION, SOLUTION INTRAVENOUS at 11:35

## 2020-08-23 RX ADMIN — LORAZEPAM 1 MG: 2 INJECTION INTRAMUSCULAR; INTRAVENOUS at 10:11

## 2020-08-23 RX ADMIN — CEFTRIAXONE SODIUM 2 G: 2 INJECTION, POWDER, FOR SOLUTION INTRAMUSCULAR; INTRAVENOUS at 05:29

## 2020-08-23 RX ADMIN — ACYCLOVIR SODIUM 570 MG: 50 INJECTION, SOLUTION INTRAVENOUS at 03:47

## 2020-08-23 RX ADMIN — VANCOMYCIN HYDROCHLORIDE 1000 MG: 1 INJECTION, SOLUTION INTRAVENOUS at 18:07

## 2020-08-23 RX ADMIN — LORAZEPAM 1 MG: 2 INJECTION INTRAMUSCULAR; INTRAVENOUS at 17:14

## 2020-08-24 ENCOUNTER — APPOINTMENT (OUTPATIENT)
Dept: GENERAL RADIOLOGY | Facility: HOSPITAL | Age: 55
End: 2020-08-24

## 2020-08-24 ENCOUNTER — APPOINTMENT (OUTPATIENT)
Dept: NEUROLOGY | Facility: HOSPITAL | Age: 55
End: 2020-08-24

## 2020-08-24 LAB
ALBUMIN SERPL-MCNC: 4 G/DL (ref 3.5–5.2)
ALBUMIN/GLOB SERPL: 1.4 G/DL
ALP SERPL-CCNC: 92 U/L (ref 39–117)
ALT SERPL W P-5'-P-CCNC: 20 U/L (ref 1–33)
ANION GAP SERPL CALCULATED.3IONS-SCNC: 13 MMOL/L (ref 5–15)
APPEARANCE CSF: CLEAR
APPEARANCE CSF: CLEAR
AST SERPL-CCNC: 26 U/L (ref 1–32)
BACTERIA SPEC AEROBE CULT: NO GROWTH
BASOPHILS # BLD AUTO: 0.03 10*3/MM3 (ref 0–0.2)
BASOPHILS NFR BLD AUTO: 0.5 % (ref 0–1.5)
BILIRUB SERPL-MCNC: 0.5 MG/DL (ref 0–1.2)
BUN SERPL-MCNC: 8 MG/DL (ref 6–20)
BUN/CREAT SERPL: 11.8 (ref 7–25)
C GATTII+NEOFOR DNA CSF QL NAA+NON-PROBE: NOT DETECTED
CALCIUM SPEC-SCNC: 8.7 MG/DL (ref 8.6–10.5)
CHLORIDE SERPL-SCNC: 107 MMOL/L (ref 98–107)
CMV DNA CSF QL NAA+PROBE: NOT DETECTED
CO2 SERPL-SCNC: 22 MMOL/L (ref 22–29)
COLOR CSF: COLORLESS
COLOR CSF: COLORLESS
COLOR SPUN CSF: COLORLESS
COLOR SPUN CSF: COLORLESS
CREAT SERPL-MCNC: 0.68 MG/DL (ref 0.57–1)
CRP SERPL-MCNC: 8.92 MG/DL (ref 0–0.5)
DEPRECATED RDW RBC AUTO: 44.7 FL (ref 37–54)
E COLI K1 DNA CSF QL NAA+NON-PROBE: NOT DETECTED
EOSINOPHIL # BLD AUTO: 0.26 10*3/MM3 (ref 0–0.4)
EOSINOPHIL NFR BLD AUTO: 4.7 % (ref 0.3–6.2)
ERYTHROCYTE [DISTWIDTH] IN BLOOD BY AUTOMATED COUNT: 14.5 % (ref 12.3–15.4)
ERYTHROCYTE [SEDIMENTATION RATE] IN BLOOD: 19 MM/HR (ref 0–30)
EV RNA CSF QL NAA+PROBE: NOT DETECTED
GFR SERPL CREATININE-BSD FRML MDRD: 90 ML/MIN/1.73
GLOBULIN UR ELPH-MCNC: 2.8 GM/DL
GLUCOSE CSF-MCNC: 57 MG/DL (ref 40–70)
GLUCOSE SERPL-MCNC: 90 MG/DL (ref 65–99)
GP B STREP DNA SPEC QL NAA+PROBE: NOT DETECTED
HAEM INFLU SEROTYP DNA SPEC NAA+PROBE: NOT DETECTED
HCT VFR BLD AUTO: 36.1 % (ref 34–46.6)
HGB BLD-MCNC: 11.4 G/DL (ref 12–15.9)
HHV6 DNA CSF QL NAA+PROBE: NOT DETECTED
HSV1 DNA CSF QL NAA+PROBE: NOT DETECTED
HSV2 DNA CSF QL NAA+PROBE: NOT DETECTED
IMM GRANULOCYTES # BLD AUTO: 0.05 10*3/MM3 (ref 0–0.05)
IMM GRANULOCYTES NFR BLD AUTO: 0.9 % (ref 0–0.5)
L MONOCYTOG RRNA SPEC QL PROBE: NOT DETECTED
LYMPHOCYTES # BLD AUTO: 0.59 10*3/MM3 (ref 0.7–3.1)
LYMPHOCYTES NFR BLD AUTO: 10.6 % (ref 19.6–45.3)
MCH RBC QN AUTO: 27.1 PG (ref 26.6–33)
MCHC RBC AUTO-ENTMCNC: 31.6 G/DL (ref 31.5–35.7)
MCV RBC AUTO: 85.7 FL (ref 79–97)
MONOCYTES # BLD AUTO: 0.46 10*3/MM3 (ref 0.1–0.9)
MONOCYTES NFR BLD AUTO: 8.3 % (ref 5–12)
N MEN DNA SPEC QL NAA+PROBE: NOT DETECTED
NEUTROPHILS NFR BLD AUTO: 4.15 10*3/MM3 (ref 1.7–7)
NEUTROPHILS NFR BLD AUTO: 75 % (ref 42.7–76)
NRBC BLD AUTO-RTO: 0 /100 WBC (ref 0–0.2)
PARECHOVIRUS A RNA CSF QL NAA+NON-PROBE: NOT DETECTED
PLATELET # BLD AUTO: 222 10*3/MM3 (ref 140–450)
PMV BLD AUTO: 9.4 FL (ref 6–12)
POTASSIUM SERPL-SCNC: 3.3 MMOL/L (ref 3.5–5.2)
PROCALCITONIN SERPL-MCNC: 0.11 NG/ML (ref 0–0.25)
PROT CSF-MCNC: 25.6 MG/DL (ref 15–45)
PROT SERPL-MCNC: 6.8 G/DL (ref 6–8.5)
RBC # BLD AUTO: 4.21 10*6/MM3 (ref 3.77–5.28)
RBC # CSF MANUAL: 0 /MM3 (ref 0–5)
RBC # CSF MANUAL: 1 /MM3 (ref 0–5)
S PNEUM DNA CSF QL NAA+NON-PROBE: NOT DETECTED
SODIUM SERPL-SCNC: 142 MMOL/L (ref 136–145)
SPECIMEN VOL CSF: 10 ML
SPECIMEN VOL CSF: 10 ML
TUBE # CSF: 1
TUBE # CSF: 4
VANCOMYCIN TROUGH SERPL-MCNC: 12.5 MCG/ML (ref 5–20)
VZV DNA CSF QL NAA+PROBE: NOT DETECTED
WBC # BLD AUTO: 5.54 10*3/MM3 (ref 3.4–10.8)
WBC # CSF MANUAL: 0 /MM3 (ref 0–5)
WBC # CSF MANUAL: 1 /MM3 (ref 0–5)

## 2020-08-24 PROCEDURE — 87102 FUNGUS ISOLATION CULTURE: CPT | Performed by: PSYCHIATRY & NEUROLOGY

## 2020-08-24 PROCEDURE — 25010000002 ACYCLOVIR PER 5 MG: Performed by: PSYCHIATRY & NEUROLOGY

## 2020-08-24 PROCEDURE — 25010000002 VANCOMYCIN PER 500 MG

## 2020-08-24 PROCEDURE — 96376 TX/PRO/DX INJ SAME DRUG ADON: CPT

## 2020-08-24 PROCEDURE — 25010000002 CEFTRIAXONE PER 250 MG: Performed by: INTERNAL MEDICINE

## 2020-08-24 PROCEDURE — G0378 HOSPITAL OBSERVATION PER HR: HCPCS

## 2020-08-24 PROCEDURE — 95816 EEG AWAKE AND DROWSY: CPT

## 2020-08-24 PROCEDURE — 87206 SMEAR FLUORESCENT/ACID STAI: CPT | Performed by: PSYCHIATRY & NEUROLOGY

## 2020-08-24 PROCEDURE — 87070 CULTURE OTHR SPECIMN AEROBIC: CPT | Performed by: PSYCHIATRY & NEUROLOGY

## 2020-08-24 PROCEDURE — 87015 SPECIMEN INFECT AGNT CONCNTJ: CPT | Performed by: PSYCHIATRY & NEUROLOGY

## 2020-08-24 PROCEDURE — 80202 ASSAY OF VANCOMYCIN: CPT

## 2020-08-24 PROCEDURE — 82945 GLUCOSE OTHER FLUID: CPT | Performed by: PSYCHIATRY & NEUROLOGY

## 2020-08-24 PROCEDURE — 99233 SBSQ HOSP IP/OBS HIGH 50: CPT | Performed by: INTERNAL MEDICINE

## 2020-08-24 PROCEDURE — 87075 CULTR BACTERIA EXCEPT BLOOD: CPT | Performed by: PSYCHIATRY & NEUROLOGY

## 2020-08-24 PROCEDURE — 87529 HSV DNA AMP PROBE: CPT | Performed by: PSYCHIATRY & NEUROLOGY

## 2020-08-24 PROCEDURE — 25010000002 ACYCLOVIR PER 5 MG: Performed by: INTERNAL MEDICINE

## 2020-08-24 PROCEDURE — 86140 C-REACTIVE PROTEIN: CPT | Performed by: NURSE PRACTITIONER

## 2020-08-24 PROCEDURE — 84145 PROCALCITONIN (PCT): CPT | Performed by: NURSE PRACTITIONER

## 2020-08-24 PROCEDURE — 0 LEVETIRACETAM IN NACL 0.75% 1000 MG/100ML SOLUTION: Performed by: PSYCHIATRY & NEUROLOGY

## 2020-08-24 PROCEDURE — 97162 PT EVAL MOD COMPLEX 30 MIN: CPT

## 2020-08-24 PROCEDURE — 92610 EVALUATE SWALLOWING FUNCTION: CPT

## 2020-08-24 PROCEDURE — 93005 ELECTROCARDIOGRAM TRACING: CPT | Performed by: NURSE PRACTITIONER

## 2020-08-24 PROCEDURE — 25010000002 HYDROMORPHONE 1 MG/ML SOLUTION: Performed by: INTERNAL MEDICINE

## 2020-08-24 PROCEDURE — 97166 OT EVAL MOD COMPLEX 45 MIN: CPT

## 2020-08-24 PROCEDURE — 009U3ZX DRAINAGE OF SPINAL CANAL, PERCUTANEOUS APPROACH, DIAGNOSTIC: ICD-10-PCS | Performed by: INTERNAL MEDICINE

## 2020-08-24 PROCEDURE — 87205 SMEAR GRAM STAIN: CPT | Performed by: PSYCHIATRY & NEUROLOGY

## 2020-08-24 PROCEDURE — 89050 BODY FLUID CELL COUNT: CPT | Performed by: PSYCHIATRY & NEUROLOGY

## 2020-08-24 PROCEDURE — 87116 MYCOBACTERIA CULTURE: CPT | Performed by: PSYCHIATRY & NEUROLOGY

## 2020-08-24 PROCEDURE — 85652 RBC SED RATE AUTOMATED: CPT | Performed by: NURSE PRACTITIONER

## 2020-08-24 PROCEDURE — 85025 COMPLETE CBC W/AUTO DIFF WBC: CPT | Performed by: INTERNAL MEDICINE

## 2020-08-24 PROCEDURE — 86592 SYPHILIS TEST NON-TREP QUAL: CPT | Performed by: PSYCHIATRY & NEUROLOGY

## 2020-08-24 PROCEDURE — 93010 ELECTROCARDIOGRAM REPORT: CPT | Performed by: INTERNAL MEDICINE

## 2020-08-24 PROCEDURE — 80053 COMPREHEN METABOLIC PANEL: CPT | Performed by: NURSE PRACTITIONER

## 2020-08-24 PROCEDURE — 84157 ASSAY OF PROTEIN OTHER: CPT | Performed by: PSYCHIATRY & NEUROLOGY

## 2020-08-24 PROCEDURE — 99232 SBSQ HOSP IP/OBS MODERATE 35: CPT | Performed by: PSYCHIATRY & NEUROLOGY

## 2020-08-24 PROCEDURE — 25010000002 VANCOMYCIN 10 G RECONSTITUTED SOLUTION

## 2020-08-24 PROCEDURE — 87483 CNS DNA AMP PROBE TYPE 12-25: CPT | Performed by: PSYCHIATRY & NEUROLOGY

## 2020-08-24 PROCEDURE — 97535 SELF CARE MNGMENT TRAINING: CPT

## 2020-08-24 PROCEDURE — 87327 CRYPTOCOCCUS NEOFORM AG IA: CPT | Performed by: PSYCHIATRY & NEUROLOGY

## 2020-08-24 PROCEDURE — 25010000003 LEVETIRACETAM IN NACL 0.75% 1000 MG/100ML SOLUTION: Performed by: PSYCHIATRY & NEUROLOGY

## 2020-08-24 RX ORDER — BISACODYL 10 MG
10 SUPPOSITORY, RECTAL RECTAL DAILY PRN
Status: DISCONTINUED | OUTPATIENT
Start: 2020-08-24 | End: 2020-08-24 | Stop reason: SDUPTHER

## 2020-08-24 RX ORDER — SODIUM CHLORIDE 9 MG/ML
75 INJECTION, SOLUTION INTRAVENOUS CONTINUOUS
Status: DISCONTINUED | OUTPATIENT
Start: 2020-08-24 | End: 2020-08-26 | Stop reason: HOSPADM

## 2020-08-24 RX ORDER — BISACODYL 10 MG
10 SUPPOSITORY, RECTAL RECTAL DAILY PRN
Status: DISCONTINUED | OUTPATIENT
Start: 2020-08-24 | End: 2020-08-26 | Stop reason: HOSPADM

## 2020-08-24 RX ORDER — LIDOCAINE HYDROCHLORIDE 10 MG/ML
5 INJECTION, SOLUTION EPIDURAL; INFILTRATION; INTRACAUDAL; PERINEURAL ONCE
Status: COMPLETED | OUTPATIENT
Start: 2020-08-24 | End: 2020-08-24

## 2020-08-24 RX ADMIN — SODIUM CHLORIDE 500 ML: 9 INJECTION, SOLUTION INTRAVENOUS at 03:36

## 2020-08-24 RX ADMIN — SODIUM CHLORIDE, PRESERVATIVE FREE 10 ML: 5 INJECTION INTRAVENOUS at 12:56

## 2020-08-24 RX ADMIN — ACYCLOVIR SODIUM 570 MG: 50 INJECTION, SOLUTION INTRAVENOUS at 12:56

## 2020-08-24 RX ADMIN — VANCOMYCIN HYDROCHLORIDE 1000 MG: 1 INJECTION, SOLUTION INTRAVENOUS at 08:02

## 2020-08-24 RX ADMIN — CEFTRIAXONE SODIUM 2 G: 2 INJECTION, POWDER, FOR SOLUTION INTRAMUSCULAR; INTRAVENOUS at 18:12

## 2020-08-24 RX ADMIN — SODIUM CHLORIDE 75 ML/HR: 9 INJECTION, SOLUTION INTRAVENOUS at 14:00

## 2020-08-24 RX ADMIN — LEVETIRACETAM INJECTION 1000 MG: 10 INJECTION INTRAVENOUS at 21:45

## 2020-08-24 RX ADMIN — LEVOTHYROXINE SODIUM 12.5 MCG: 20 INJECTION, SOLUTION INTRAVENOUS at 12:55

## 2020-08-24 RX ADMIN — LIDOCAINE HYDROCHLORIDE 5 ML: 10 INJECTION, SOLUTION EPIDURAL; INFILTRATION; INTRACAUDAL; PERINEURAL at 11:10

## 2020-08-24 RX ADMIN — CEFTRIAXONE SODIUM 2 G: 2 INJECTION, POWDER, FOR SOLUTION INTRAMUSCULAR; INTRAVENOUS at 05:54

## 2020-08-24 RX ADMIN — SODIUM CHLORIDE, PRESERVATIVE FREE 10 ML: 5 INJECTION INTRAVENOUS at 21:45

## 2020-08-24 RX ADMIN — VANCOMYCIN HYDROCHLORIDE 1250 MG: 10 INJECTION, POWDER, LYOPHILIZED, FOR SOLUTION INTRAVENOUS at 19:39

## 2020-08-24 RX ADMIN — HYDROMORPHONE HYDROCHLORIDE 1 MG: 1 INJECTION, SOLUTION INTRAMUSCULAR; INTRAVENOUS; SUBCUTANEOUS at 10:20

## 2020-08-24 RX ADMIN — ATORVASTATIN CALCIUM 40 MG: 40 TABLET, FILM COATED ORAL at 21:44

## 2020-08-24 RX ADMIN — LEVETIRACETAM INJECTION 1000 MG: 10 INJECTION INTRAVENOUS at 09:52

## 2020-08-24 RX ADMIN — ACYCLOVIR SODIUM 570 MG: 50 INJECTION, SOLUTION INTRAVENOUS at 21:45

## 2020-08-24 RX ADMIN — ACYCLOVIR SODIUM 570 MG: 50 INJECTION, SOLUTION INTRAVENOUS at 03:38

## 2020-08-25 LAB
ALBUMIN SERPL-MCNC: 3.8 G/DL (ref 3.5–5.2)
ALBUMIN/GLOB SERPL: 1.4 G/DL
ALP SERPL-CCNC: 85 U/L (ref 39–117)
ALT SERPL W P-5'-P-CCNC: 23 U/L (ref 1–33)
ANION GAP SERPL CALCULATED.3IONS-SCNC: 14 MMOL/L (ref 5–15)
AST SERPL-CCNC: 26 U/L (ref 1–32)
BASOPHILS # BLD AUTO: 0.04 10*3/MM3 (ref 0–0.2)
BASOPHILS NFR BLD AUTO: 0.8 % (ref 0–1.5)
BILIRUB SERPL-MCNC: 0.3 MG/DL (ref 0–1.2)
BUN SERPL-MCNC: 6 MG/DL (ref 6–20)
BUN/CREAT SERPL: 9.7 (ref 7–25)
CALCIUM SPEC-SCNC: 8.7 MG/DL (ref 8.6–10.5)
CHLORIDE SERPL-SCNC: 107 MMOL/L (ref 98–107)
CO2 SERPL-SCNC: 22 MMOL/L (ref 22–29)
CREAT SERPL-MCNC: 0.62 MG/DL (ref 0.57–1)
CRP SERPL-MCNC: 3.23 MG/DL (ref 0–0.5)
D-LACTATE SERPL-SCNC: 1.7 MMOL/L (ref 0.5–2)
DEPRECATED RDW RBC AUTO: 44.2 FL (ref 37–54)
EOSINOPHIL # BLD AUTO: 0.45 10*3/MM3 (ref 0–0.4)
EOSINOPHIL NFR BLD AUTO: 8.9 % (ref 0.3–6.2)
ERYTHROCYTE [DISTWIDTH] IN BLOOD BY AUTOMATED COUNT: 14.4 % (ref 12.3–15.4)
GFR SERPL CREATININE-BSD FRML MDRD: 100 ML/MIN/1.73
GLOBULIN UR ELPH-MCNC: 2.8 GM/DL
GLUCOSE SERPL-MCNC: 83 MG/DL (ref 65–99)
HCT VFR BLD AUTO: 36.8 % (ref 34–46.6)
HGB BLD-MCNC: 11.9 G/DL (ref 12–15.9)
IMM GRANULOCYTES # BLD AUTO: 0.04 10*3/MM3 (ref 0–0.05)
IMM GRANULOCYTES NFR BLD AUTO: 0.8 % (ref 0–0.5)
LYMPHOCYTES # BLD AUTO: 0.97 10*3/MM3 (ref 0.7–3.1)
LYMPHOCYTES NFR BLD AUTO: 19.3 % (ref 19.6–45.3)
MCH RBC QN AUTO: 27.4 PG (ref 26.6–33)
MCHC RBC AUTO-ENTMCNC: 32.3 G/DL (ref 31.5–35.7)
MCV RBC AUTO: 84.6 FL (ref 79–97)
MONOCYTES # BLD AUTO: 0.74 10*3/MM3 (ref 0.1–0.9)
MONOCYTES NFR BLD AUTO: 14.7 % (ref 5–12)
NEUTROPHILS NFR BLD AUTO: 2.79 10*3/MM3 (ref 1.7–7)
NEUTROPHILS NFR BLD AUTO: 55.5 % (ref 42.7–76)
NRBC BLD AUTO-RTO: 0 /100 WBC (ref 0–0.2)
PLATELET # BLD AUTO: 223 10*3/MM3 (ref 140–450)
PMV BLD AUTO: 9.3 FL (ref 6–12)
POTASSIUM SERPL-SCNC: 3.4 MMOL/L (ref 3.5–5.2)
PROCALCITONIN SERPL-MCNC: 0.08 NG/ML (ref 0–0.25)
PROT SERPL-MCNC: 6.6 G/DL (ref 6–8.5)
RBC # BLD AUTO: 4.35 10*6/MM3 (ref 3.77–5.28)
SODIUM SERPL-SCNC: 143 MMOL/L (ref 136–145)
WBC # BLD AUTO: 5.03 10*3/MM3 (ref 3.4–10.8)

## 2020-08-25 PROCEDURE — 99232 SBSQ HOSP IP/OBS MODERATE 35: CPT | Performed by: PSYCHIATRY & NEUROLOGY

## 2020-08-25 PROCEDURE — 25010000002 HYDROMORPHONE PER 4 MG: Performed by: INTERNAL MEDICINE

## 2020-08-25 PROCEDURE — 96376 TX/PRO/DX INJ SAME DRUG ADON: CPT

## 2020-08-25 PROCEDURE — 25010000002 VANCOMYCIN 10 G RECONSTITUTED SOLUTION

## 2020-08-25 PROCEDURE — 83605 ASSAY OF LACTIC ACID: CPT | Performed by: INTERNAL MEDICINE

## 2020-08-25 PROCEDURE — 80053 COMPREHEN METABOLIC PANEL: CPT | Performed by: INTERNAL MEDICINE

## 2020-08-25 PROCEDURE — 85025 COMPLETE CBC W/AUTO DIFF WBC: CPT | Performed by: INTERNAL MEDICINE

## 2020-08-25 PROCEDURE — 97530 THERAPEUTIC ACTIVITIES: CPT

## 2020-08-25 PROCEDURE — G0378 HOSPITAL OBSERVATION PER HR: HCPCS

## 2020-08-25 PROCEDURE — 97116 GAIT TRAINING THERAPY: CPT

## 2020-08-25 PROCEDURE — 86140 C-REACTIVE PROTEIN: CPT | Performed by: INTERNAL MEDICINE

## 2020-08-25 PROCEDURE — 84145 PROCALCITONIN (PCT): CPT | Performed by: INTERNAL MEDICINE

## 2020-08-25 PROCEDURE — 25010000003 LEVETIRACETAM IN NACL 0.75% 1000 MG/100ML SOLUTION: Performed by: PSYCHIATRY & NEUROLOGY

## 2020-08-25 PROCEDURE — 99233 SBSQ HOSP IP/OBS HIGH 50: CPT | Performed by: INTERNAL MEDICINE

## 2020-08-25 PROCEDURE — 0 LEVETIRACETAM IN NACL 0.75% 1000 MG/100ML SOLUTION: Performed by: PSYCHIATRY & NEUROLOGY

## 2020-08-25 PROCEDURE — 25010000002 ACYCLOVIR PER 5 MG: Performed by: INTERNAL MEDICINE

## 2020-08-25 PROCEDURE — 25010000002 CEFTRIAXONE PER 250 MG: Performed by: INTERNAL MEDICINE

## 2020-08-25 RX ORDER — OXYCODONE HYDROCHLORIDE AND ACETAMINOPHEN 5; 325 MG/1; MG/1
1 TABLET ORAL EVERY 6 HOURS PRN
Status: DISCONTINUED | OUTPATIENT
Start: 2020-08-25 | End: 2020-08-26 | Stop reason: HOSPADM

## 2020-08-25 RX ORDER — PRAMIPEXOLE DIHYDROCHLORIDE 0.25 MG/1
0.5 TABLET ORAL NIGHTLY
Status: DISCONTINUED | OUTPATIENT
Start: 2020-08-25 | End: 2020-08-26 | Stop reason: HOSPADM

## 2020-08-25 RX ORDER — SACCHAROMYCES BOULARDII 250 MG
250 CAPSULE ORAL 2 TIMES DAILY
Status: DISCONTINUED | OUTPATIENT
Start: 2020-08-25 | End: 2020-08-26 | Stop reason: HOSPADM

## 2020-08-25 RX ORDER — POLYVINYL ALCOHOL 14 MG/ML
1 SOLUTION/ DROPS OPHTHALMIC
Status: DISCONTINUED | OUTPATIENT
Start: 2020-08-25 | End: 2020-08-26 | Stop reason: HOSPADM

## 2020-08-25 RX ORDER — ONDANSETRON 2 MG/ML
4 INJECTION INTRAMUSCULAR; INTRAVENOUS EVERY 6 HOURS PRN
Status: DISCONTINUED | OUTPATIENT
Start: 2020-08-25 | End: 2020-08-26 | Stop reason: HOSPADM

## 2020-08-25 RX ADMIN — POLYVINYL ALCOHOL 1 DROP: 14 SOLUTION/ DROPS OPHTHALMIC at 20:24

## 2020-08-25 RX ADMIN — LEVETIRACETAM INJECTION 1000 MG: 10 INJECTION INTRAVENOUS at 22:39

## 2020-08-25 RX ADMIN — POLYVINYL ALCOHOL 1 DROP: 14 SOLUTION/ DROPS OPHTHALMIC at 18:38

## 2020-08-25 RX ADMIN — SODIUM CHLORIDE 75 ML/HR: 9 INJECTION, SOLUTION INTRAVENOUS at 08:37

## 2020-08-25 RX ADMIN — SODIUM CHLORIDE, PRESERVATIVE FREE 10 ML: 5 INJECTION INTRAVENOUS at 20:23

## 2020-08-25 RX ADMIN — ATORVASTATIN CALCIUM 80 MG: 40 TABLET, FILM COATED ORAL at 20:22

## 2020-08-25 RX ADMIN — ESCITALOPRAM OXALATE 20 MG: 20 TABLET ORAL at 08:34

## 2020-08-25 RX ADMIN — Medication 250 MG: at 20:23

## 2020-08-25 RX ADMIN — LEVETIRACETAM INJECTION 1000 MG: 10 INJECTION INTRAVENOUS at 10:46

## 2020-08-25 RX ADMIN — PRAMIPEXOLE DIHYDROCHLORIDE 0.5 MG: 0.25 TABLET ORAL at 20:22

## 2020-08-25 RX ADMIN — HYDROMORPHONE HYDROCHLORIDE 0.5 MG: 1 INJECTION, SOLUTION INTRAMUSCULAR; INTRAVENOUS; SUBCUTANEOUS at 01:28

## 2020-08-25 RX ADMIN — POTASSIUM CHLORIDE 40 MEQ: 10 CAPSULE, COATED, EXTENDED RELEASE ORAL at 20:23

## 2020-08-25 RX ADMIN — ACYCLOVIR SODIUM 570 MG: 50 INJECTION, SOLUTION INTRAVENOUS at 04:34

## 2020-08-25 RX ADMIN — VANCOMYCIN HYDROCHLORIDE 1250 MG: 10 INJECTION, POWDER, LYOPHILIZED, FOR SOLUTION INTRAVENOUS at 08:34

## 2020-08-25 RX ADMIN — ASPIRIN 81 MG: 81 TABLET, CHEWABLE ORAL at 08:34

## 2020-08-25 RX ADMIN — SODIUM CHLORIDE, PRESERVATIVE FREE 10 ML: 5 INJECTION INTRAVENOUS at 01:28

## 2020-08-25 RX ADMIN — LEVOTHYROXINE SODIUM 12.5 MCG: 20 INJECTION, SOLUTION INTRAVENOUS at 11:26

## 2020-08-25 RX ADMIN — OXYCODONE HYDROCHLORIDE AND ACETAMINOPHEN 1 TABLET: 5; 325 TABLET ORAL at 18:35

## 2020-08-25 RX ADMIN — Medication 250 MG: at 11:26

## 2020-08-25 RX ADMIN — CEFTRIAXONE SODIUM 2 G: 2 INJECTION, POWDER, FOR SOLUTION INTRAMUSCULAR; INTRAVENOUS at 05:40

## 2020-08-25 RX ADMIN — SODIUM CHLORIDE, PRESERVATIVE FREE 10 ML: 5 INJECTION INTRAVENOUS at 08:35

## 2020-08-26 ENCOUNTER — READMISSION MANAGEMENT (OUTPATIENT)
Dept: CALL CENTER | Facility: HOSPITAL | Age: 55
End: 2020-08-26

## 2020-08-26 ENCOUNTER — TELEPHONE (OUTPATIENT)
Dept: FAMILY MEDICINE CLINIC | Facility: CLINIC | Age: 55
End: 2020-08-26

## 2020-08-26 VITALS
SYSTOLIC BLOOD PRESSURE: 122 MMHG | WEIGHT: 165 LBS | BODY MASS INDEX: 32.39 KG/M2 | OXYGEN SATURATION: 98 % | HEART RATE: 77 BPM | RESPIRATION RATE: 16 BRPM | TEMPERATURE: 98 F | DIASTOLIC BLOOD PRESSURE: 93 MMHG | HEIGHT: 60 IN

## 2020-08-26 PROBLEM — R13.10 DYSPHAGIA: Status: ACTIVE | Noted: 2020-08-26

## 2020-08-26 LAB
ALBUMIN SERPL-MCNC: 3.8 G/DL (ref 3.5–5.2)
ALBUMIN/GLOB SERPL: 1.4 G/DL
ALP SERPL-CCNC: 81 U/L (ref 39–117)
ALT SERPL W P-5'-P-CCNC: 20 U/L (ref 1–33)
ANION GAP SERPL CALCULATED.3IONS-SCNC: 9 MMOL/L (ref 5–15)
AST SERPL-CCNC: 21 U/L (ref 1–32)
BASOPHILS # BLD AUTO: 0.04 10*3/MM3 (ref 0–0.2)
BASOPHILS NFR BLD AUTO: 0.9 % (ref 0–1.5)
BILIRUB SERPL-MCNC: 0.5 MG/DL (ref 0–1.2)
BUN SERPL-MCNC: 6 MG/DL (ref 6–20)
BUN/CREAT SERPL: 9.5 (ref 7–25)
CALCIUM SPEC-SCNC: 9.3 MG/DL (ref 8.6–10.5)
CHLORIDE SERPL-SCNC: 106 MMOL/L (ref 98–107)
CO2 SERPL-SCNC: 24 MMOL/L (ref 22–29)
CREAT SERPL-MCNC: 0.63 MG/DL (ref 0.57–1)
CRYPTOC AG CSF QL LA: NEGATIVE
D-LACTATE SERPL-SCNC: 0.8 MMOL/L (ref 0.5–2)
DEPRECATED RDW RBC AUTO: 46.5 FL (ref 37–54)
EOSINOPHIL # BLD AUTO: 0.37 10*3/MM3 (ref 0–0.4)
EOSINOPHIL NFR BLD AUTO: 8.5 % (ref 0.3–6.2)
ERYTHROCYTE [DISTWIDTH] IN BLOOD BY AUTOMATED COUNT: 14.6 % (ref 12.3–15.4)
GFR SERPL CREATININE-BSD FRML MDRD: 98 ML/MIN/1.73
GLOBULIN UR ELPH-MCNC: 2.8 GM/DL
GLUCOSE SERPL-MCNC: 85 MG/DL (ref 65–99)
HCT VFR BLD AUTO: 37.2 % (ref 34–46.6)
HGB BLD-MCNC: 11.4 G/DL (ref 12–15.9)
HSV1 DNA SPEC QL NAA+PROBE: NEGATIVE
HSV2 DNA SPEC QL NAA+PROBE: NEGATIVE
IMM GRANULOCYTES # BLD AUTO: 0.07 10*3/MM3 (ref 0–0.05)
IMM GRANULOCYTES NFR BLD AUTO: 1.6 % (ref 0–0.5)
LYMPHOCYTES # BLD AUTO: 1.11 10*3/MM3 (ref 0.7–3.1)
LYMPHOCYTES NFR BLD AUTO: 25.4 % (ref 19.6–45.3)
MCH RBC QN AUTO: 27.1 PG (ref 26.6–33)
MCHC RBC AUTO-ENTMCNC: 30.6 G/DL (ref 31.5–35.7)
MCV RBC AUTO: 88.6 FL (ref 79–97)
MONOCYTES # BLD AUTO: 0.56 10*3/MM3 (ref 0.1–0.9)
MONOCYTES NFR BLD AUTO: 12.8 % (ref 5–12)
NEUTROPHILS NFR BLD AUTO: 2.22 10*3/MM3 (ref 1.7–7)
NEUTROPHILS NFR BLD AUTO: 50.8 % (ref 42.7–76)
NRBC BLD AUTO-RTO: 0 /100 WBC (ref 0–0.2)
PLATELET # BLD AUTO: 233 10*3/MM3 (ref 140–450)
PMV BLD AUTO: 8.8 FL (ref 6–12)
POTASSIUM SERPL-SCNC: 4.2 MMOL/L (ref 3.5–5.2)
PROT SERPL-MCNC: 6.6 G/DL (ref 6–8.5)
RBC # BLD AUTO: 4.2 10*6/MM3 (ref 3.77–5.28)
REAGIN AB CSF QL: NON REACTIVE
SODIUM SERPL-SCNC: 139 MMOL/L (ref 136–145)
WBC # BLD AUTO: 4.37 10*3/MM3 (ref 3.4–10.8)

## 2020-08-26 PROCEDURE — 99239 HOSP IP/OBS DSCHRG MGMT >30: CPT | Performed by: NURSE PRACTITIONER

## 2020-08-26 PROCEDURE — 02HV33Z INSERTION OF INFUSION DEVICE INTO SUPERIOR VENA CAVA, PERCUTANEOUS APPROACH: ICD-10-PCS | Performed by: INTERNAL MEDICINE

## 2020-08-26 PROCEDURE — 25010000002 CEFTRIAXONE PER 250 MG: Performed by: INTERNAL MEDICINE

## 2020-08-26 PROCEDURE — 85025 COMPLETE CBC W/AUTO DIFF WBC: CPT | Performed by: INTERNAL MEDICINE

## 2020-08-26 PROCEDURE — 0 LEVETIRACETAM IN NACL 0.75% 1000 MG/100ML SOLUTION: Performed by: PSYCHIATRY & NEUROLOGY

## 2020-08-26 PROCEDURE — 80053 COMPREHEN METABOLIC PANEL: CPT | Performed by: INTERNAL MEDICINE

## 2020-08-26 PROCEDURE — 97535 SELF CARE MNGMENT TRAINING: CPT

## 2020-08-26 PROCEDURE — 83605 ASSAY OF LACTIC ACID: CPT | Performed by: INTERNAL MEDICINE

## 2020-08-26 PROCEDURE — C1751 CATH, INF, PER/CENT/MIDLINE: HCPCS

## 2020-08-26 PROCEDURE — C1894 INTRO/SHEATH, NON-LASER: HCPCS

## 2020-08-26 PROCEDURE — 25010000003 LEVETIRACETAM IN NACL 0.75% 1000 MG/100ML SOLUTION: Performed by: PSYCHIATRY & NEUROLOGY

## 2020-08-26 PROCEDURE — 96376 TX/PRO/DX INJ SAME DRUG ADON: CPT

## 2020-08-26 RX ORDER — SODIUM CHLORIDE 0.9 % (FLUSH) 0.9 %
10 SYRINGE (ML) INJECTION EVERY 12 HOURS SCHEDULED
Status: DISCONTINUED | OUTPATIENT
Start: 2020-08-26 | End: 2020-08-26 | Stop reason: HOSPADM

## 2020-08-26 RX ORDER — LEVOTHYROXINE SODIUM 0.03 MG/1
25 TABLET ORAL
Status: DISCONTINUED | OUTPATIENT
Start: 2020-08-26 | End: 2020-08-26 | Stop reason: HOSPADM

## 2020-08-26 RX ORDER — ASPIRIN 81 MG/1
81 TABLET, CHEWABLE ORAL DAILY
Start: 2020-08-27 | End: 2022-04-02 | Stop reason: HOSPADM

## 2020-08-26 RX ORDER — SODIUM CHLORIDE 0.9 % (FLUSH) 0.9 %
10 SYRINGE (ML) INJECTION AS NEEDED
Status: DISCONTINUED | OUTPATIENT
Start: 2020-08-26 | End: 2020-08-26 | Stop reason: HOSPADM

## 2020-08-26 RX ORDER — SACCHAROMYCES BOULARDII 250 MG
250 CAPSULE ORAL 2 TIMES DAILY
Qty: 14 CAPSULE | Refills: 0 | Status: SHIPPED | OUTPATIENT
Start: 2020-08-26 | End: 2020-09-03

## 2020-08-26 RX ORDER — LEVETIRACETAM 500 MG/1
500 TABLET ORAL EVERY 12 HOURS SCHEDULED
Status: DISCONTINUED | OUTPATIENT
Start: 2020-08-26 | End: 2020-08-26 | Stop reason: HOSPADM

## 2020-08-26 RX ORDER — SODIUM CHLORIDE 0.9 % (FLUSH) 0.9 %
20 SYRINGE (ML) INJECTION AS NEEDED
Status: DISCONTINUED | OUTPATIENT
Start: 2020-08-26 | End: 2020-08-26 | Stop reason: HOSPADM

## 2020-08-26 RX ORDER — LEVOTHYROXINE SODIUM 0.03 MG/1
12.5 TABLET ORAL
Status: DISCONTINUED | OUTPATIENT
Start: 2020-08-26 | End: 2020-08-26

## 2020-08-26 RX ORDER — LEVETIRACETAM 500 MG/1
500 TABLET ORAL EVERY 12 HOURS SCHEDULED
Qty: 60 TABLET | Refills: 0 | Status: SHIPPED | OUTPATIENT
Start: 2020-08-26 | End: 2020-09-21 | Stop reason: SDUPTHER

## 2020-08-26 RX ADMIN — LEVETIRACETAM INJECTION 1000 MG: 10 INJECTION INTRAVENOUS at 09:02

## 2020-08-26 RX ADMIN — OXYCODONE HYDROCHLORIDE AND ACETAMINOPHEN 1 TABLET: 5; 325 TABLET ORAL at 11:29

## 2020-08-26 RX ADMIN — OXYCODONE HYDROCHLORIDE AND ACETAMINOPHEN 1 TABLET: 5; 325 TABLET ORAL at 03:16

## 2020-08-26 RX ADMIN — POTASSIUM CHLORIDE 40 MEQ: 10 CAPSULE, COATED, EXTENDED RELEASE ORAL at 01:38

## 2020-08-26 RX ADMIN — Medication 250 MG: at 08:07

## 2020-08-26 RX ADMIN — LEVOTHYROXINE SODIUM 25 MCG: 25 TABLET ORAL at 11:52

## 2020-08-26 RX ADMIN — ASPIRIN 81 MG: 81 TABLET, CHEWABLE ORAL at 08:07

## 2020-08-26 RX ADMIN — CEFTRIAXONE SODIUM 2 G: 2 INJECTION, POWDER, FOR SOLUTION INTRAMUSCULAR; INTRAVENOUS at 06:53

## 2020-08-26 RX ADMIN — POLYVINYL ALCOHOL 1 DROP: 14 SOLUTION/ DROPS OPHTHALMIC at 03:16

## 2020-08-26 RX ADMIN — ESCITALOPRAM OXALATE 20 MG: 20 TABLET ORAL at 08:07

## 2020-08-26 NOTE — TELEPHONE ENCOUNTER
Susan from [facility] requested a call back. Susan would like to know if Dr. Murphy will be following the patient's home health orders.    Please call and advise. Susan's call back [phone]

## 2020-08-27 ENCOUNTER — TRANSITIONAL CARE MANAGEMENT TELEPHONE ENCOUNTER (OUTPATIENT)
Dept: CALL CENTER | Facility: HOSPITAL | Age: 55
End: 2020-08-27

## 2020-08-27 LAB
BACTERIA SPEC AEROBE CULT: NORMAL
GRAM STN SPEC: NORMAL

## 2020-08-27 NOTE — OUTREACH NOTE
Call Center TCM Note      Responses   [facility] patient discharged from?  [facility]   COVID-19 Test Status  Negative   Does the patient have one of the following disease processes/diagnoses(primary or secondary)?  Sepsis   TCM attempt successful?  Yes   Call start time  1504   Call end time  1505   Discharge diagnosis  Acute metabolic encephalopathy , sepsis   Meds reviewed with patient/caregiver?  Yes   Is the patient having any side effects they believe may be caused by any medication additions or changes?  No   Does the patient have all medications related to this admission filled (includes all antibiotics, inhalers, nebulizers,steroids,etc.)  Yes   Is the patient taking all medications as directed (includes completed medication regime)?  Yes   Does the patient have a primary care provider?   Yes   Comments regarding PCP  f/u with Dr. Murphy on 9/3.   Does the patient have an appointment with their PCP within 7 days of discharge?  Yes   Has the patient kept scheduled appointments due by today?  N/A   What is the Home health agency?   [facility]   Has home health visited the patient within 72 hours of discharge?  Yes   Psychosocial issues?  No   Did the patient receive a copy of their discharge instructions?  Yes   Nursing interventions  Reviewed instructions with patient   What is the patient's perception of their health status since discharge?  Improving   Is the patient/caregiver able to teach back signs and symptoms of worsening condition:  Fever   Is the patient/caregiver able to teach back the hierarchy of who to call/visit for symptoms/problems? PCP, Specialist, Home health nurse, Urgent Care, ED, 911  Yes   TCM call completed?  Yes   Wrap up additional comments  Pateint says she is doing well, no questions or concerns at this time.          Rola Richmond RN    8/27/2020, 15:05

## 2020-08-27 NOTE — OUTREACH NOTE
Prep Survey      Responses   [facility] facility patient discharged from?  [place]   Is LACE score < 7 ?  Yes   Eligibility  [facility]   Date of Admission  08/21/20   Date of Discharge  08/26/20   Discharge Disposition  Home-Health Care Svc   Discharge diagnosis  Acute metabolic encephalopathy , sepsis   COVID-19 Test Status  Negative   Does the patient have one of the following disease processes/diagnoses(primary or secondary)?  Sepsis   Does the patient have Home health ordered?  Yes   What is the Home health agency?   [facility]   Is there a DME ordered?  No   Prep survey completed?  Yes          Melissa Montes, RN

## 2020-08-29 LAB — BACTERIA SPEC ANAEROBE CULT: NORMAL

## 2020-09-02 ENCOUNTER — TRANSCRIBE ORDERS (OUTPATIENT)
Dept: LAB | Facility: HOSPITAL | Age: 55
End: 2020-09-02

## 2020-09-02 ENCOUNTER — LAB (OUTPATIENT)
Dept: LAB | Facility: HOSPITAL | Age: 55
End: 2020-09-02

## 2020-09-02 DIAGNOSIS — M86.18 ACUTE OSTEOMYELITIS OF STERNUM (HCC): Primary | ICD-10-CM

## 2020-09-02 DIAGNOSIS — M00.811 PYOGENIC BACTERIAL ARTHRITIS OF SHOULDER, RIGHT (HCC): ICD-10-CM

## 2020-09-02 LAB
ALBUMIN SERPL-MCNC: 5 G/DL (ref 3.5–5.2)
ALBUMIN/GLOB SERPL: 1.7 G/DL
ALP SERPL-CCNC: 98 U/L (ref 39–117)
ALT SERPL W P-5'-P-CCNC: 24 U/L (ref 1–33)
ANION GAP SERPL CALCULATED.3IONS-SCNC: 11 MMOL/L (ref 5–15)
AST SERPL-CCNC: 26 U/L (ref 1–32)
BASOPHILS # BLD AUTO: 0.09 10*3/MM3 (ref 0–0.2)
BASOPHILS NFR BLD AUTO: 1.2 % (ref 0–1.5)
BILIRUB SERPL-MCNC: 0.7 MG/DL (ref 0–1.2)
BUN SERPL-MCNC: 16 MG/DL (ref 6–20)
BUN/CREAT SERPL: 19 (ref 7–25)
CALCIUM SPEC-SCNC: 9.8 MG/DL (ref 8.6–10.5)
CHLORIDE SERPL-SCNC: 102 MMOL/L (ref 98–107)
CO2 SERPL-SCNC: 27 MMOL/L (ref 22–29)
CREAT SERPL-MCNC: 0.84 MG/DL (ref 0.57–1)
CRP SERPL-MCNC: 0.31 MG/DL (ref 0–0.5)
DEPRECATED RDW RBC AUTO: 47.4 FL (ref 37–54)
EOSINOPHIL # BLD AUTO: 0.29 10*3/MM3 (ref 0–0.4)
EOSINOPHIL NFR BLD AUTO: 3.8 % (ref 0.3–6.2)
ERYTHROCYTE [DISTWIDTH] IN BLOOD BY AUTOMATED COUNT: 15.6 % (ref 12.3–15.4)
ERYTHROCYTE [SEDIMENTATION RATE] IN BLOOD: 9 MM/HR (ref 0–30)
GFR SERPL CREATININE-BSD FRML MDRD: 70 ML/MIN/1.73
GLOBULIN UR ELPH-MCNC: 3 GM/DL
GLUCOSE SERPL-MCNC: 78 MG/DL (ref 65–99)
HCT VFR BLD AUTO: 41.8 % (ref 34–46.6)
HGB BLD-MCNC: 13.3 G/DL (ref 12–15.9)
IMM GRANULOCYTES # BLD AUTO: 0.1 10*3/MM3 (ref 0–0.05)
IMM GRANULOCYTES NFR BLD AUTO: 1.3 % (ref 0–0.5)
LYMPHOCYTES # BLD AUTO: 1.87 10*3/MM3 (ref 0.7–3.1)
LYMPHOCYTES NFR BLD AUTO: 24.5 % (ref 19.6–45.3)
MCH RBC QN AUTO: 27.3 PG (ref 26.6–33)
MCHC RBC AUTO-ENTMCNC: 31.8 G/DL (ref 31.5–35.7)
MCV RBC AUTO: 85.8 FL (ref 79–97)
MONOCYTES # BLD AUTO: 0.53 10*3/MM3 (ref 0.1–0.9)
MONOCYTES NFR BLD AUTO: 7 % (ref 5–12)
NEUTROPHILS NFR BLD AUTO: 4.74 10*3/MM3 (ref 1.7–7)
NEUTROPHILS NFR BLD AUTO: 62.2 % (ref 42.7–76)
NRBC BLD AUTO-RTO: 0 /100 WBC (ref 0–0.2)
PLATELET # BLD AUTO: 342 10*3/MM3 (ref 140–450)
PMV BLD AUTO: 9.2 FL (ref 6–12)
POTASSIUM SERPL-SCNC: 4.4 MMOL/L (ref 3.5–5.2)
PROT SERPL-MCNC: 8 G/DL (ref 6–8.5)
RBC # BLD AUTO: 4.87 10*6/MM3 (ref 3.77–5.28)
SODIUM SERPL-SCNC: 140 MMOL/L (ref 136–145)
WBC # BLD AUTO: 7.62 10*3/MM3 (ref 3.4–10.8)

## 2020-09-02 PROCEDURE — 80053 COMPREHEN METABOLIC PANEL: CPT | Performed by: INTERNAL MEDICINE

## 2020-09-02 PROCEDURE — 85025 COMPLETE CBC W/AUTO DIFF WBC: CPT | Performed by: INTERNAL MEDICINE

## 2020-09-02 PROCEDURE — 86140 C-REACTIVE PROTEIN: CPT | Performed by: INTERNAL MEDICINE

## 2020-09-02 PROCEDURE — 85652 RBC SED RATE AUTOMATED: CPT | Performed by: INTERNAL MEDICINE

## 2020-09-02 PROCEDURE — 36415 COLL VENOUS BLD VENIPUNCTURE: CPT | Performed by: INTERNAL MEDICINE

## 2020-09-03 ENCOUNTER — OFFICE VISIT (OUTPATIENT)
Dept: FAMILY MEDICINE CLINIC | Facility: CLINIC | Age: 55
End: 2020-09-03

## 2020-09-03 VITALS
DIASTOLIC BLOOD PRESSURE: 76 MMHG | SYSTOLIC BLOOD PRESSURE: 122 MMHG | RESPIRATION RATE: 18 BRPM | BODY MASS INDEX: 30.63 KG/M2 | TEMPERATURE: 97.3 F | WEIGHT: 156 LBS | HEART RATE: 72 BPM | HEIGHT: 60 IN

## 2020-09-03 DIAGNOSIS — Z87.39 HISTORY OF OSTEOMYELITIS: ICD-10-CM

## 2020-09-03 DIAGNOSIS — G93.41 ACUTE METABOLIC ENCEPHALOPATHY: ICD-10-CM

## 2020-09-03 DIAGNOSIS — R35.0 URINARY FREQUENCY: ICD-10-CM

## 2020-09-03 DIAGNOSIS — Z09 HOSPITAL DISCHARGE FOLLOW-UP: Primary | ICD-10-CM

## 2020-09-03 LAB
BILIRUB BLD-MCNC: NEGATIVE MG/DL
CLARITY, POC: CLEAR
COLOR UR: YELLOW
GLUCOSE UR STRIP-MCNC: NEGATIVE MG/DL
KETONES UR QL: NEGATIVE
LEUKOCYTE EST, POC: NEGATIVE
NITRITE UR-MCNC: NEGATIVE MG/ML
PH UR: 6 [PH] (ref 5–8)
PROT UR STRIP-MCNC: NEGATIVE MG/DL
RBC # UR STRIP: NEGATIVE /UL
SP GR UR: 1.01 (ref 1–1.03)
UROBILINOGEN UR QL: NORMAL

## 2020-09-03 PROCEDURE — 81002 URINALYSIS NONAUTO W/O SCOPE: CPT | Performed by: FAMILY MEDICINE

## 2020-09-03 PROCEDURE — 99213 OFFICE O/P EST LOW 20 MIN: CPT | Performed by: FAMILY MEDICINE

## 2020-09-03 RX ORDER — CYCLOBENZAPRINE HCL 10 MG
10 TABLET ORAL 3 TIMES DAILY PRN
COMMUNITY
End: 2020-09-28 | Stop reason: SDUPTHER

## 2020-09-03 NOTE — PROGRESS NOTES
Transitional Care Follow Up Visit  Subjective     Astrid Sam is a 54 y.o. female who presents for a transitional care management visit.    Within 48 business hours after discharge our office contacted her via telephone to coordinate her care and needs.      I reviewed and discussed the details of that call along with the discharge summary, hospital problems, inpatient lab results, inpatient diagnostic studies, and consultation reports with Astrid.     Current outpatient and discharge medications have been reconciled for the patient.  Reviewed by: Beckie Murphy DO      Date of TCM Phone Call 8/26/2020   [facility]   Date of Admission 8/21/2020   Date of Discharge 8/26/2020   Discharge Disposition Home-Health Care [facility]     Risk for Readmission (LACE) Score: 5 (8/26/2020  6:00 AM)      History of Present Illness   Course During Hospital Stay:  She was inpatient at [facility] 8/21/20-8/26/20 for treatment of acute metabolic encephalopathy, hypokalemia, history of osteomyelitis, sepsis.  Clear source of AMS is undetermined. Followed up with infectious disease and had PICC line removed yesterday at their office. Has completed antibiotic treatment, no oral antibiotic at this time.  Thought to have some seizure activity seen on MRI brain, partial seizure. Normal EEG. Treating with [facility] and scheduled with neurology in Nov 2020. Per her discharge summary, it states that she was taking Valtrex high dose daily, however this is untrue and patient states that she told multiple providers that she only takes as needed for fever blisters and hadn't taken in over a year. Looking through her chart, it is evident that she was not prescribed this long term for daily use, as it was only #12 at a time and last prescribed 1/8/2019 #12 with refill of 3.   Also thought that gabapentin was possibly the cause of her AMS, so this has been stopped.   Home health is treating and providing physical therapy.   Overall, doing  well since her discharge home. No other episode of AMS or seizure activity. She has had some urinary frequency.   Sees ID 10/5/20 and Neurology in 11/2020      The following portions of the patient's history were reviewed and updated as appropriate: allergies, current medications, past family history, past medical history, past social history, past surgical history and problem list.    Review of Systems   Constitutional: Negative for activity change, appetite change and fever.   Respiratory: Negative for cough, chest tightness and shortness of breath.    Cardiovascular: Negative for chest pain and leg swelling.   Gastrointestinal: Negative for abdominal pain, nausea and vomiting.   Endocrine: Negative for cold intolerance and heat intolerance.   Genitourinary: Positive for frequency.   Musculoskeletal: Positive for gait problem (ambulates with walker).   Neurological: Positive for weakness. Negative for dizziness, syncope, light-headedness and headaches.   Hematological: Negative for adenopathy.   Psychiatric/Behavioral: Negative for agitation and behavioral problems.       Objective   Physical Exam   Constitutional: She is oriented to person, place, and time. She appears well-developed and well-nourished.   HENT:   Head: Normocephalic and atraumatic.   Right Ear: External ear normal.   Left Ear: External ear normal.   Nose: Nose normal.   Eyes: Conjunctivae and EOM are normal.   Neck: Neck supple.   Cardiovascular: Normal rate, regular rhythm and normal heart sounds.   No murmur heard.  Pulmonary/Chest: Effort normal and breath sounds normal. She has no wheezes.   Musculoskeletal: She exhibits no edema or deformity.   Ambulates with walker   Neurological: She is alert and oriented to person, place, and time.   Skin: Skin is warm and dry.   Psychiatric: She has a normal mood and affect. Her behavior is normal.   Nursing note and vitals reviewed.      Assessment/Plan   Astrid was seen today for transitional care  management.    Diagnoses and all orders for this visit:    Hospital discharge follow-up    Urinary frequency  Comments:  UA normal  Orders:  -     POC Urinalysis Dipstick    Acute metabolic encephalopathy  Comments:  Symptoms resolved. Completed labs through ID yesterday.   Follow up with neurology    History of osteomyelitis  Comments:  Continue care with ID

## 2020-09-16 ENCOUNTER — TELEPHONE (OUTPATIENT)
Dept: FAMILY MEDICINE CLINIC | Facility: CLINIC | Age: 55
End: 2020-09-16

## 2020-09-16 DIAGNOSIS — G25.81 RLS (RESTLESS LEGS SYNDROME): ICD-10-CM

## 2020-09-16 RX ORDER — PRAMIPEXOLE DIHYDROCHLORIDE 0.25 MG/1
0.75 TABLET ORAL NIGHTLY
Qty: 90 TABLET | Refills: 2 | Status: SHIPPED | OUTPATIENT
Start: 2020-09-16 | End: 2020-12-19 | Stop reason: SDUPTHER

## 2020-09-16 NOTE — TELEPHONE ENCOUNTER
----- Message from Astrid Sam sent at 9/16/2020  9:03 AM EDT -----  Regarding: Prescription Question  Contact: [phone]  On the medicine that you have me on doesn't seem to be working.  Do I need to try taking 3 instead of 2 or would I need a new prescription for higher mg or what. Thank you for your time     Doris Sma

## 2020-09-21 RX ORDER — LEVETIRACETAM 500 MG/1
500 TABLET ORAL EVERY 12 HOURS SCHEDULED
Qty: 60 TABLET | Refills: 2 | Status: SHIPPED | OUTPATIENT
Start: 2020-09-21 | End: 2021-01-27

## 2020-09-21 NOTE — TELEPHONE ENCOUNTER
PT WAS PRESCRIBED MEDICATION WHILE IN THE HOSP WHEN SHE WAS D/C ON 8/26/20. PT HAS HOSP FOLLOW UP WITH DR. BANEGAS ON 11/4/20. PLEASE ADVISE PT WHETHER OR NOT OFFICE WILL FILL MEDICATION OR IF SHE SHOULD FOLLOW UP WITH HER PCP      CALL BACK- [phone]

## 2020-09-24 DIAGNOSIS — M54.12 CERVICAL RADICULAR PAIN: ICD-10-CM

## 2020-09-25 RX ORDER — GABAPENTIN 600 MG/1
TABLET ORAL
Qty: 90 TABLET | Refills: 2 | OUTPATIENT
Start: 2020-09-25

## 2020-09-26 DIAGNOSIS — R45.4 IRRITABILITY: ICD-10-CM

## 2020-09-26 DIAGNOSIS — E03.9 ACQUIRED HYPOTHYROIDISM: ICD-10-CM

## 2020-09-28 ENCOUNTER — TELEPHONE (OUTPATIENT)
Dept: FAMILY MEDICINE CLINIC | Facility: CLINIC | Age: 55
End: 2020-09-28

## 2020-09-28 RX ORDER — LEVOTHYROXINE SODIUM 0.03 MG/1
25 TABLET ORAL DAILY
Qty: 90 TABLET | Refills: 1 | Status: SHIPPED | OUTPATIENT
Start: 2020-09-28 | End: 2021-02-10 | Stop reason: SDUPTHER

## 2020-09-28 RX ORDER — ESCITALOPRAM OXALATE 20 MG/1
20 TABLET ORAL DAILY
Qty: 90 TABLET | Refills: 1 | Status: SHIPPED | OUTPATIENT
Start: 2020-09-28 | End: 2020-10-29 | Stop reason: SDUPTHER

## 2020-09-28 RX ORDER — CYCLOBENZAPRINE HCL 10 MG
10 TABLET ORAL 3 TIMES DAILY PRN
Qty: 60 TABLET | Refills: 1 | Status: SHIPPED | OUTPATIENT
Start: 2020-09-28 | End: 2020-10-29 | Stop reason: SDUPTHER

## 2020-09-28 NOTE — TELEPHONE ENCOUNTER
Regarding: RE: Prescription Question  Contact: [phone]  ----- Message from Lidia Berkowitz MA sent at 9/28/2020  8:16 AM EDT -----       ----- Message from Astrid Sam to Beckie Murphy DO sent at 9/28/2020  8:16 AM -----   Could I stop by sometime today and do a urine test because I feel like I have a UTI..having to go to restroom every 10 minutes and still have the urge to go again..thank you    Doris Sam       ----- Message -----       From:Beckie Murphy DO       Sent:9/21/2020  1:42 PM EDT         To:Astrid Sam    Subject:RE: Prescription Question    Hi! I was going to refill it, but it appears that Dr. Chirinos has already refilled it as of today. Let me know if you have any additional issues.     -Dr. Murphy      ----- Message -----       From:Astrid Sam       Sent:9/21/2020  9:15 AM EDT         To:Beckie Murphy DO    Subject:RE: Prescription Question    Good morning,     Is there anyway that you can call me in a refill for the medication Keppra 500 mg take 1 tablet every 12 hours.. The doctor who wrote it for me when I was discharged from the hospital does not do the refills because she only works out of the hospital.  They told me to call my primary care doctor to get refill.. I don't see the neurologist  until November 4. I will run out if this medication on Tuesday. Thank you for your help..    Doris Sam       ----- Message -----       From:Beckie Murphy DO       Sent:9/16/2020  5:33 PM EDT         To:Astrid Sam    Subject:RE: Prescription Question    Ok to increase the dose to 0.75 mg nightly. Let me know if this doesn't seem to help.   I sent additional medication to your pharmacy.     -Dr. Murphy      ----- Message -----       From:Astrid Sam       Sent:9/15/2020  9:39 AM EDT         To:Beckie Murphy, DO    Subject:Prescription Question    PRAMIPEXOLE 0.25 MG TABLeT what i take for my Restless leg.  I take 2 but it dont seem to  be working can I take 3 or do I need to get a stronger dose. Thank you      Doris Sam

## 2020-09-30 DIAGNOSIS — R39.9 UTI SYMPTOMS: Primary | ICD-10-CM

## 2020-09-30 LAB
BILIRUB BLD-MCNC: NEGATIVE MG/DL
CLARITY, POC: CLEAR
COLOR UR: YELLOW
GLUCOSE UR STRIP-MCNC: NEGATIVE MG/DL
KETONES UR QL: ABNORMAL
LEUKOCYTE EST, POC: ABNORMAL
NITRITE UR-MCNC: NEGATIVE MG/ML
PH UR: 6 [PH] (ref 5–8)
PROT UR STRIP-MCNC: NEGATIVE MG/DL
RBC # UR STRIP: NEGATIVE /UL
SP GR UR: 1.03 (ref 1–1.03)
UROBILINOGEN UR QL: NORMAL

## 2020-09-30 PROCEDURE — 81003 URINALYSIS AUTO W/O SCOPE: CPT | Performed by: FAMILY MEDICINE

## 2020-10-02 ENCOUNTER — TELEPHONE (OUTPATIENT)
Dept: FAMILY MEDICINE CLINIC | Facility: CLINIC | Age: 55
End: 2020-10-02

## 2020-10-02 DIAGNOSIS — E03.9 ACQUIRED HYPOTHYROIDISM: ICD-10-CM

## 2020-10-02 RX ORDER — LEVOTHYROXINE SODIUM 0.03 MG/1
25 TABLET ORAL DAILY
Qty: 90 TABLET | Refills: 1 | OUTPATIENT
Start: 2020-10-02

## 2020-10-03 LAB
BACTERIA UR CULT: ABNORMAL
BACTERIA UR CULT: ABNORMAL
OTHER ANTIBIOTIC SUSC ISLT: ABNORMAL

## 2020-10-04 RX ORDER — SULFAMETHOXAZOLE AND TRIMETHOPRIM 800; 160 MG/1; MG/1
1 TABLET ORAL 2 TIMES DAILY
Qty: 14 TABLET | Refills: 0 | Status: SHIPPED | OUTPATIENT
Start: 2020-10-04 | End: 2020-10-11

## 2020-10-05 LAB
FUNGUS WND CULT: NORMAL
MYCOBACTERIUM SPEC CULT: NORMAL
NIGHT BLUE STAIN TISS: NORMAL

## 2020-10-29 DIAGNOSIS — E78.2 MIXED HYPERLIPIDEMIA: ICD-10-CM

## 2020-10-29 DIAGNOSIS — R45.4 IRRITABILITY: ICD-10-CM

## 2020-10-29 RX ORDER — CYCLOBENZAPRINE HCL 10 MG
10 TABLET ORAL 3 TIMES DAILY PRN
Qty: 60 TABLET | Refills: 1 | Status: SHIPPED | OUTPATIENT
Start: 2020-10-29 | End: 2021-01-18 | Stop reason: SDUPTHER

## 2020-10-29 RX ORDER — ESCITALOPRAM OXALATE 20 MG/1
20 TABLET ORAL DAILY
Qty: 90 TABLET | Refills: 1 | Status: SHIPPED | OUTPATIENT
Start: 2020-10-29 | End: 2020-12-19 | Stop reason: SDUPTHER

## 2020-10-29 RX ORDER — PRAVASTATIN SODIUM 40 MG
40 TABLET ORAL NIGHTLY
Qty: 90 TABLET | Refills: 1 | Status: SHIPPED | OUTPATIENT
Start: 2020-10-29 | End: 2021-02-10 | Stop reason: SDUPTHER

## 2020-11-04 ENCOUNTER — TELEPHONE (OUTPATIENT)
Dept: FAMILY MEDICINE CLINIC | Facility: CLINIC | Age: 55
End: 2020-11-04

## 2020-11-04 ENCOUNTER — OFFICE VISIT (OUTPATIENT)
Dept: NEUROLOGY | Facility: CLINIC | Age: 55
End: 2020-11-04

## 2020-11-04 VITALS
BODY MASS INDEX: 32.63 KG/M2 | HEIGHT: 60 IN | OXYGEN SATURATION: 99 % | HEART RATE: 112 BPM | TEMPERATURE: 98 F | SYSTOLIC BLOOD PRESSURE: 104 MMHG | WEIGHT: 166.2 LBS | DIASTOLIC BLOOD PRESSURE: 78 MMHG

## 2020-11-04 DIAGNOSIS — R56.9 SEIZURE (HCC): Primary | ICD-10-CM

## 2020-11-04 DIAGNOSIS — R05.3 PERSISTENT COUGH FOR 3 WEEKS OR LONGER: Primary | ICD-10-CM

## 2020-11-04 PROCEDURE — 99215 OFFICE O/P EST HI 40 MIN: CPT | Performed by: PSYCHIATRY & NEUROLOGY

## 2020-11-04 NOTE — PROGRESS NOTES
Subjective:    CC: Atsrid Sam is seen today in consultation at the request of the hospital for seizures    HPI:  55-year-old female with a past medical history of migraine headaches, hyperlipidemia, TIA status post PFO closure in 2011, bilateral hip replacement presents as a hospital follow-up for encephalopathy/new onset seizure.  As per patient she had an ACDF procedure on C5-6 in June.  After that she also developed osteomyelitis of the right sternoclavicular joint for which he underwent debridement and was placed on IV antibiotics (Rocephin and daptomycin).  Then in August she was placed on p.o. doxycycline.  A few days before her hospitalization she went out in the sun and developed a diffuse rash.  Took Benadryl which made her slightly confused.  2 days later she was found at home by her daughter extremely confused having thrown up all over herself.  She took her to [facility] initially where she had a CT head that was unremarkable.  After that she developed left arm jerking with blinking and staring ahead and space.  Patient states that she does not remember the next 3 days.  After she was transferred to [facility] her white count was elevated at 17.2.  Her CRP was also mildly elevated and she had a T-max of 99.7.  Subsequent blood cultures were negative.  Initially her EEG showed moderate generalized slowing with frequent generalized sharp/slow wave complexes with triphasic morphology.  She was given Ativan and IV Keppra.  CT head and CT perfusion were  unremarkable.  CT angiogram of the head showed narrowing of the right distal M2/M3.  CTA of the neck did not show any significant stenosis.  MRI of the brain also did not show any acute intracranial abnormalities or severe chronic ischemic changes.  She also had a lumbar puncture that failed to show any evidence of a CNS infection.  EEG was repeated in 2 days and showed just mild generalized slowing with no epileptiform activity.  It was  presumed that patient could have had seizures/encephalopathy in the presence of sepsis versus due to high doses of Valtrex that she had been presumably taking at home however patient denies that as she states that she had not taken that medication in over 1 year.  She was discharged home on Keppra 500 mg twice a day.  Has not had any more episodes since.  She continues to have generalized weakness.  Had been getting PT up until last week.  She denies having any febrile seizures as a child, abnormal birth history or severe concussions.  There is a family history of seizures in her brother who is also a diabetic  Of note-I personally reviewed her MRI brain and Dr. Guerrier's notes from the hospital    The following portions of the patient's history were reviewed today and updated as of 11/04/2020  : allergies, current medications, past family history, past medical history, past social history, past surgical history and problem list  These document will be scanned to patient's chart.      Current Outpatient Medications:   •  aspirin 81 MG chewable tablet, Chew 1 tablet Daily., Disp: , Rfl:   •  cyclobenzaprine (FLEXERIL) 10 MG tablet, Take 1 tablet by mouth 3 (Three) Times a Day As Needed for Muscle Spasms., Disp: 60 tablet, Rfl: 1  •  escitalopram (LEXAPRO) 20 MG tablet, Take 1 tablet by mouth Daily., Disp: 90 tablet, Rfl: 1  •  levETIRAcetam (KEPPRA) 500 MG tablet, Take 1 tablet by mouth Every 12 (Twelve) Hours., Disp: 60 tablet, Rfl: 2  •  levothyroxine (SYNTHROID, LEVOTHROID) 25 MCG tablet, Take 1 tablet by mouth Daily., Disp: 90 tablet, Rfl: 1  •  pramipexole (MIRAPEX) 0.25 MG tablet, Take 3 tablets by mouth Every Night. Take 2-3 hours prior to bedtime, Disp: 90 tablet, Rfl: 2  •  pravastatin (Pravachol) 40 MG tablet, Take 1 tablet by mouth Every Night., Disp: 90 tablet, Rfl: 1  •  rizatriptan MLT (MAXALT-MLT) 10 MG disintegrating tablet, Place 1 tablet on the tongue 1 (One) Time As Needed for Migraine for up to 1  dose., Disp: 9 tablet, Rfl: 5  •  traZODone (DESYREL) 100 MG tablet, Take 0.5-1 tablets by mouth Every Night., Disp: 30 tablet, Rfl: 5   Past Medical History:   Diagnosis Date   • Acquired hypothyroidism    • Allergic    • Anemia     after hip replacement    • Arthritis    • Low back pain     BACK PAIN WERE I HAD HIP REPLANCED   • Migraine     h/o   • Mixed hyperlipidemia 2020   • PFO (patent foramen ovale)    • PONV (postoperative nausea and vomiting)     preprocedural meds help and pt will need them    • Right shoulder pain    • Seizure (CMS/HCC) 2020   • Stroke (CMS/HCC)    • Wears glasses       Past Surgical History:   Procedure Laterality Date   • CARDIAC CATHETERIZATION     •  SECTION     • CHOLECYSTECTOMY     • COLONOSCOPY     • ENDOSCOPY     • FRACTURE SURGERY      left leg- hardware in place    • JOINT REPLACEMENT      TOTAL HIP REPLACEMENT   • KNEE ARTHROSCOPY Right     done twice   • PATELLA SURGERY Left     removed left knee cap   • REPLACEMENT TOTAL KNEE Right 2018    revision    • TOTAL HIP ARTHROPLASTY Left    • TOTAL HIP ARTHROPLASTY Right 2019    Procedure: DIRECT TOTAL HIP ARTHROPLASTY ANTERIOR RIGHT;  Surgeon: Mele Chatterjee MD;  [facility]: [facility];  Service: Orthopedics   • TUBAL ABDOMINAL LIGATION        Family History   Problem Relation Age of Onset   • Hypertension Mother    • Migraines Mother    • Thyroid disease Mother    • Hypertension Father    • COPD Father       Social History     Socioeconomic History   • Marital status:      Spouse name: Not on file   • Number of children: Not on file   • Years of education: Not on file   • Highest education level: Not on file   Occupational History   • 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   All other systems reviewed and are negative.      Objective:    /78   Pulse 112   Temp 98 °F (36.7 °C) (Temporal)   Ht 152.4 cm (60\")   Wt 75.4 kg (166 lb 3.2 oz)   LMP  (LMP Unknown)   SpO2 99%   BMI 32.46 kg/m²     Neurology Exam:    General apperance: NAD.     Mental status: Alert, awake and oriented to time place and person.    Recent and Remote memory: Intact.    Attention span and Concentration: Normal.     Language and Speech: Intact- No dysarthria.    Fluency, Naming , Repitition and Comprehension:  Intact    Cranial Nerves:   CN II: Visual fields are full. Intact. Fundi - Normal, No papillederma, Pupils - KAYLYN  CN III, IV and VI: Extraocular movements are intact. Normal saccades.   CN V: Facial sensation is intact.   CN VII: Muscles of facial expression reveal no asymmetry. Intact.   CN VIII: Hearing is intact. Whispered voice intact.   CN IX and X: Palate elevates symmetrically. Intact  CN XI: Shoulder shrug is intact.   CN XII: Tongue is midline without evidence of atrophy or fasciculation.     Ophthalmoscopic exam of optic disc-normal    Motor:  Right UE muscle strength 5/5. Normal tone.     Left UE muscle strength 5/5. Normal tone.      Right LE muscle strength5-/5. Normal tone.     Left LE muscle strength 5-/5. Normal tone.      Sensory: Normal light touch, vibration and pinprick sensation bilaterally.    DTRs: 2+ bilaterally in upper and lower extremities.    Babinski: Negative bilaterally.    Co-ordination: Normal finger-to-nose, heel to shin B/L.    Rhomberg: Negative.    Gait: Walks with a limp due to limb length discrepancy     Cardiovascular: Regular rate and rhythm without murmur, gallop or rub.    Assessment and Plan:  1. Seizure (CMS/HCC)  Patient could have had a seizure in the setting of sepsis  I have told her to continue " Keppra 500 mg twice a day for now.  I will order a repeat EEG.  1 week prior to the EEG I want her to taper her Keppra down to just 1 tablet daily and 2 days prior to the study she can stop taking it  If her EEG is abnormal then I may resume Keppra  Counseled on seizure precautions    - EEG Continuous Monitoring With Video; Future       Return in about 6 weeks (around 12/16/2020).     I spent over 50 minutes with the patient face to face out of which over 50% (30 minutes) was spent in management, instructions and education.     Sarai Chirinos MD

## 2020-11-05 NOTE — TELEPHONE ENCOUNTER
Regarding: Prescription Question  Contact: [phone]  ----- Message from Lidia Berkowitz MA sent at 11/2/2020  8:06 AM EST -----       ----- Message from Astrid Sam to Beckie Murphy DO sent at 11/2/2020  3:26 AM -----   I've got this dry cough again. I've been taking the mucinex but it only works for about 2 hours. The last cough medication you called in I've taken and it seems to only last about 3 hours. Is there something you can call me in at [facility]. I've not been able to sleep all  night  since this cough. I've had it for about month and half. Thank you for your time. Look forward to hearing from you.     Doris Sam

## 2020-11-05 NOTE — TELEPHONE ENCOUNTER
Pt denies fever and SOA. It is just a dry cough. She will complete the chest xray and agreed to the pulm referral.

## 2020-11-05 NOTE — TELEPHONE ENCOUNTER
Does she have shortness of breath or wheezing? Fever?  Will place CXR order. Due to recurrent cough, I think it would be best if she sees pulmonology.

## 2020-11-06 ENCOUNTER — HOSPITAL ENCOUNTER (OUTPATIENT)
Dept: GENERAL RADIOLOGY | Facility: HOSPITAL | Age: 55
Discharge: HOME | End: 2020-11-06
Admitting: FAMILY MEDICINE

## 2020-11-06 DIAGNOSIS — R05.3 PERSISTENT COUGH FOR 3 WEEKS OR LONGER: Primary | ICD-10-CM

## 2020-11-06 PROCEDURE — 71046 X-RAY EXAM CHEST 2 VIEWS: CPT

## 2020-11-06 RX ORDER — BENZONATATE 100 MG/1
100 CAPSULE ORAL 3 TIMES DAILY PRN
Qty: 30 CAPSULE | Refills: 0 | Status: SHIPPED | OUTPATIENT
Start: 2020-11-06 | End: 2020-12-17

## 2020-11-06 RX ORDER — DEXTROMETHORPHAN HYDROBROMIDE AND PROMETHAZINE HYDROCHLORIDE 15; 6.25 MG/5ML; MG/5ML
5 SYRUP ORAL 4 TIMES DAILY PRN
Qty: 180 ML | Refills: 0 | Status: CANCELLED | OUTPATIENT
Start: 2020-11-06

## 2020-11-17 ENCOUNTER — TELEPHONE (OUTPATIENT)
Dept: NEUROLOGY | Facility: CLINIC | Age: 55
End: 2020-11-17

## 2020-11-17 NOTE — TELEPHONE ENCOUNTER
PT CALLED IN TODAY TO CHECK THE STATUS OF THE EEG REFERRAL ENTERED BY DR. BANEGAS ON 11/4/20. SHE STATES SHE HAS NOT HEARD ANYTHING TO ASHLEE THE APPT YET . SHE WAS GIVEN SCHEDULING'S PHONE NUMBER BUT WOULD LIKE DR. BANEGAS TO BE AWARE. REFERRAL ALSO NOTATED.      CALL BACK- [phone]

## 2020-11-17 NOTE — TELEPHONE ENCOUNTER
Spoke to patient and informed of EEG appointment and instruction for prior to EEG with Keppra. Week prior take one QD then 2 days prior stop.

## 2020-12-10 ENCOUNTER — HOSPITAL ENCOUNTER (OUTPATIENT)
Dept: NEUROLOGY | Facility: HOSPITAL | Age: 55
Discharge: HOME | End: 2020-12-10
Admitting: PSYCHIATRY & NEUROLOGY

## 2020-12-10 DIAGNOSIS — R56.9 SEIZURE (HCC): ICD-10-CM

## 2020-12-10 PROCEDURE — 95713 VEEG 2-12 HR CONT MNTR: CPT

## 2020-12-10 PROCEDURE — 95718 EEG PHYS/QHP 2-12 HR W/VEEG: CPT | Performed by: PSYCHIATRY & NEUROLOGY

## 2020-12-17 ENCOUNTER — OFFICE VISIT (OUTPATIENT)
Dept: NEUROLOGY | Facility: CLINIC | Age: 55
End: 2020-12-17

## 2020-12-17 VITALS
TEMPERATURE: 96.9 F | DIASTOLIC BLOOD PRESSURE: 84 MMHG | OXYGEN SATURATION: 100 % | SYSTOLIC BLOOD PRESSURE: 132 MMHG | HEART RATE: 102 BPM

## 2020-12-17 DIAGNOSIS — Z01.812 BLOOD TESTS PRIOR TO TREATMENT OR PROCEDURE: Primary | ICD-10-CM

## 2020-12-17 DIAGNOSIS — R56.9 SEIZURE (HCC): Primary | ICD-10-CM

## 2020-12-17 PROCEDURE — 99214 OFFICE O/P EST MOD 30 MIN: CPT | Performed by: PSYCHIATRY & NEUROLOGY

## 2020-12-17 NOTE — PROGRESS NOTES
Subjective:    CC: Astrid Sam is seen today  for seizures    HPI:  Current visit-patient had an EEG off medications which showed bifrontal slowing as well as excessive beta activity but no epileptiform activity.  She states she resumed her Keppra 500 mg twice a day a few days after stopping it as her  noted that patient was having leg jerks during sleep.  She does have RLS and takes Mirapex 0.75 mg at night.  Also takes trazodone for sleep in addition to Flexeril 10 mg 3 times a day for muscle spasms.  Denies having any episodes of staring or whole-body shaking.  On a few occasions her  has also mentioned to her that she speaks something but does not make sense although the patient herself does not realize she is doing it.    Initial [id number] female with a past medical history of migraine headaches, hyperlipidemia, TIA status post PFO closure in 2011, bilateral hip replacement presents as a hospital follow-up for encephalopathy/new onset seizure.  As per patient she had an ACDF procedure on C5-6 in June.  After that she also developed osteomyelitis of the right sternoclavicular joint for which he underwent debridement and was placed on IV antibiotics (Rocephin and daptomycin).  Then in August she was placed on p.o. doxycycline.  A few days before her hospitalization she went out in the sun and developed a diffuse rash.  Took Benadryl which made her slightly confused.  2 days later she was found at home by her daughter extremely confused having thrown up all over herself.  She took her to [facility] initially where she had a CT head that was unremarkable.  After that she developed left arm jerking with blinking and staring ahead and space.  Patient states that she does not remember the next 3 days.  After she was transferred to [facility] her white count was elevated at 17.2.  Her CRP was also mildly elevated and she had a T-max of 99.7.  Subsequent blood cultures were negative.   Initially her EEG showed moderate generalized slowing with frequent generalized sharp/slow wave complexes with triphasic morphology.  She was given Ativan and IV Keppra.  CT head and CT perfusion were  unremarkable.  CT angiogram of the head showed narrowing of the right distal M2/M3.  CTA of the neck did not show any significant stenosis.  MRI of the brain also did not show any acute intracranial abnormalities or severe chronic ischemic changes.  She also had a lumbar puncture that failed to show any evidence of a CNS infection.  EEG was repeated in 2 days and showed just mild generalized slowing with no epileptiform activity.  It was presumed that patient could have had seizures/encephalopathy in the presence of sepsis versus due to high doses of Valtrex that she had been presumably taking at home however patient denies that as she states that she had not taken that medication in over 1 year.  She was discharged home on Keppra 500 mg twice a day.  Has not had any more episodes since.  She continues to have generalized weakness.  Had been getting PT up until last week.  She denies having any febrile seizures as a child, abnormal birth history or severe concussions.  There is a family history of seizures in her brother who is also a diabetic  Of note-I personally reviewed her MRI brain and Dr. Guerrier's notes from the hospital    The following portions of the patient's history were reviewed today and updated as of 11/04/2020  : allergies, current medications, past family history, past medical history, past social history, past surgical history and problem list  These document will be scanned to patient's chart.      Current Outpatient Medications:   •  aspirin 81 MG chewable tablet, Chew 1 tablet Daily., Disp: , Rfl:   •  cyclobenzaprine (FLEXERIL) 10 MG tablet, Take 1 tablet by mouth 3 (Three) Times a Day As Needed for Muscle Spasms., Disp: 60 tablet, Rfl: 1  •  escitalopram (LEXAPRO) 20 MG tablet, Take 1 tablet  by mouth Daily., Disp: 90 tablet, Rfl: 1  •  levETIRAcetam (KEPPRA) 500 MG tablet, Take 1 tablet by mouth Every 12 (Twelve) Hours., Disp: 60 tablet, Rfl: 2  •  levothyroxine (SYNTHROID, LEVOTHROID) 25 MCG tablet, Take 1 tablet by mouth Daily., Disp: 90 tablet, Rfl: 1  •  pramipexole (MIRAPEX) 0.25 MG tablet, Take 3 tablets by mouth Every Night. Take 2-3 hours prior to bedtime, Disp: 90 tablet, Rfl: 2  •  pravastatin (Pravachol) 40 MG tablet, Take 1 tablet by mouth Every Night., Disp: 90 tablet, Rfl: 1  •  rizatriptan MLT (MAXALT-MLT) 10 MG disintegrating tablet, Place 1 tablet on the tongue 1 (One) Time As Needed for Migraine for up to 1 dose., Disp: 9 tablet, Rfl: 5  •  traZODone (DESYREL) 100 MG tablet, Take 0.5-1 tablets by mouth Every Night., Disp: 30 tablet, Rfl: 5   Past Medical History:   Diagnosis Date   • Acquired hypothyroidism    • Allergic    • Anemia     after hip replacement    • Arthritis    • Low back pain     BACK PAIN WERE I HAD HIP REPLANCED   • Migraine     h/o   • Mixed hyperlipidemia 2020   • PFO (patent foramen ovale)    • PONV (postoperative nausea and vomiting)     preprocedural meds help and pt will need them    • Right shoulder pain    • Seizure (CMS/HCC) 2020   • Stroke (CMS/HCC)    • Wears glasses       Past Surgical History:   Procedure Laterality Date   • CARDIAC CATHETERIZATION     •  SECTION     • CHOLECYSTECTOMY     • COLONOSCOPY     • ENDOSCOPY     • FRACTURE SURGERY      left leg- hardware in place    • JOINT REPLACEMENT      TOTAL HIP REPLACEMENT   • KNEE ARTHROSCOPY Right     done twice   • PATELLA SURGERY Left     removed left knee cap   • REPLACEMENT TOTAL KNEE Right 2018    revision 2019   • TOTAL HIP ARTHROPLASTY Left    • TOTAL HIP ARTHROPLASTY Right 2019    Procedure: DIRECT TOTAL HIP ARTHROPLASTY ANTERIOR RIGHT;  Surgeon: Mele Chatterjee MD;  Location: [facility];  Service: Orthopedics   • TUBAL ABDOMINAL LIGATION         Family History   Problem Relation Age of Onset   • Hypertension Mother    • Migraines Mother    • Thyroid disease Mother    • Hypertension Father    • COPD Father       Social History     Socioeconomic History   • Marital status:      Spouse name: Not on file   • Number of children: Not on file   • Years of education: Not on file   • Highest education level: Not on file   Occupational History   • 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   All other systems reviewed and are negative.      Objective:    /84   Pulse 102   Temp 96.9 °F (36.1 °C)   LMP  (LMP Unknown)   SpO2 100%     Neurology Exam:    General apperance: NAD.     Mental status: Alert, awake and oriented to time place and person.    Recent and Remote memory: Intact.    Attention span and Concentration: Normal.     Language and Speech: Intact- No dysarthria.    Fluency, Naming , Repitition and Comprehension:  Intact    Cranial Nerves:   CN II: Visual fields are full. Intact. Fundi - Normal, No papillederma, Pupils - KAYLYN  CN III, IV and VI: Extraocular movements are intact. Normal saccades.   CN V: Facial sensation is intact.   CN VII: Muscles of facial expression reveal no asymmetry. Intact.   CN VIII: Hearing is intact. Whispered voice intact.   CN IX and X: Palate elevates symmetrically. Intact  CN XI: Shoulder shrug is intact.   CN XII: Tongue is midline without evidence of atrophy or fasciculation.     Ophthalmoscopic exam of optic disc-normal    Motor:  Right UE muscle strength 5/5. Normal tone.     Left UE muscle strength 5/5. Normal tone.      Right LE muscle strength5-/5.  Normal tone.     Left LE muscle strength 5-/5. Normal tone.      Sensory: Normal light touch, vibration and pinprick sensation bilaterally.    DTRs: 2+ bilaterally in upper and lower extremities.    Babinski: Negative bilaterally.    Co-ordination: Normal finger-to-nose, heel to shin B/L.    Rhomberg: Negative.    Gait: Walks with a limp and uses a cane due to limb length discrepancy     Cardiovascular: Regular rate and rhythm without murmur, gallop or rub.    Assessment and Plan:  1. Seizure (CMS/HCC)  Patient could have had a seizure in the setting of sepsis.  Repeat EEG showed mild frontal slowing but no epileptiform activity.  I feel the leg jerks that she had could have been hypnic jerks versus periodic limb movements.  Patient also has RLS  I have told her to gradually taper and stop Keppra.  If she has any more episodes of confusion I will resume it.  Also if she continues to have her mild speech abnormalities then I may get a repeat brain scan  I have also told her to gradually cut back on her Flexeril as I feel she is taking a very high dose  Counseled on seizure precautions           Return in about 8 weeks (around 2/11/2021).       Sarai Chirinos MD

## 2020-12-18 ENCOUNTER — LAB (OUTPATIENT)
Dept: PULMONOLOGY | Facility: CLINIC | Age: 55
End: 2020-12-18

## 2020-12-18 ENCOUNTER — OFFICE VISIT (OUTPATIENT)
Dept: PULMONOLOGY | Facility: CLINIC | Age: 55
End: 2020-12-18

## 2020-12-18 VITALS
WEIGHT: 168.8 LBS | HEIGHT: 60 IN | TEMPERATURE: 97.9 F | BODY MASS INDEX: 33.14 KG/M2 | SYSTOLIC BLOOD PRESSURE: 130 MMHG | OXYGEN SATURATION: 99 % | HEART RATE: 95 BPM | DIASTOLIC BLOOD PRESSURE: 80 MMHG

## 2020-12-18 DIAGNOSIS — R05.3 CHRONIC COUGH: Primary | ICD-10-CM

## 2020-12-18 DIAGNOSIS — R13.10 DYSPHAGIA, UNSPECIFIED TYPE: ICD-10-CM

## 2020-12-18 DIAGNOSIS — K21.9 GERD WITHOUT ESOPHAGITIS: ICD-10-CM

## 2020-12-18 DIAGNOSIS — Z01.812 BLOOD TESTS PRIOR TO TREATMENT OR PROCEDURE: ICD-10-CM

## 2020-12-18 PROBLEM — A41.9 SEPSIS (HCC): Status: RESOLVED | Noted: 2020-08-22 | Resolved: 2020-12-18

## 2020-12-18 PROBLEM — D72.829 LEUKOCYTOSIS: Status: RESOLVED | Noted: 2019-12-13 | Resolved: 2020-12-18

## 2020-12-18 PROCEDURE — U0004 COV-19 TEST NON-CDC HGH THRU: HCPCS | Performed by: INTERNAL MEDICINE

## 2020-12-18 PROCEDURE — 99000 SPECIMEN HANDLING OFFICE-LAB: CPT | Performed by: INTERNAL MEDICINE

## 2020-12-18 PROCEDURE — 99204 OFFICE O/P NEW MOD 45 MIN: CPT | Performed by: INTERNAL MEDICINE

## 2020-12-18 RX ORDER — PANTOPRAZOLE SODIUM 40 MG/1
40 TABLET, DELAYED RELEASE ORAL 2 TIMES DAILY
Qty: 90 TABLET | Refills: 3 | Status: SHIPPED | OUTPATIENT
Start: 2020-12-18 | End: 2021-02-15 | Stop reason: SDUPTHER

## 2020-12-18 NOTE — PROGRESS NOTES
New Patient Pulmonary Office Visit      Patient Name: Astrid Sam    Referring Physician: Beckie Murphy DO    Chief Complaint:    Chief Complaint   Patient presents with   • Cough           History of Present Illness: Astrid Sam is a 55 y.o. female who is here today to establish care with Pulmonary.  Patient is a past medical history significant for hypothyroidism, allergic rhinitis, hyperlipidemia, PFO status post anterior atrial closure, CVA, and seizures.  Who presents to pulmonary for evaluation of a cough.  States she has had a cough for roughly 1 year, nonproductive in nature.  Stable over the course of that 1 year..  She denies any significant congestion or allergic rhinitis symptoms.  No history of asthma tobacco abuse.  She states that occasionally she will have food getting stuck into the back of her throat or in the upper neck area but this is only intermittently.  She is not sure that she has reflux.  The cough is worse at night when she lays down she has no other complaints.  She does have a CT scan of her chest back in August 2020 at which point time she had a seizure that led to vomiting and likely an aspiration event.  But her chest x-ray on follow-up.  Clear.  She has no other complaints.    Review of Systems:   Review of Systems   Constitutional: Negative for activity change, appetite change, chills and diaphoresis.   HENT: Negative for congestion, postnasal drip, sinus pressure and voice change.    Eyes: Negative for blurred vision.   Respiratory: Positive for cough. Negative for shortness of breath and wheezing.    Cardiovascular: Negative for chest pain.   Gastrointestinal: Negative for abdominal pain.   Musculoskeletal: Negative for myalgias.   Skin: Negative for color change and dry skin.   Allergic/Immunologic: Negative for environmental allergies.   Neurological: Negative for weakness and confusion.   Hematological: Negative for adenopathy.   Psychiatric/Behavioral:  Negative for sleep disturbance and depressed mood.       Past Medical History:   Past Medical History:   Diagnosis Date   • Acquired hypothyroidism    • Allergic    • Anemia     after hip replacement    • Arthritis    • Low back pain     BACK PAIN WERE I HAD HIP REPLANCED   • Migraine     h/o   • Mixed hyperlipidemia 2020   • PFO (patent foramen ovale)    • PONV (postoperative nausea and vomiting)     preprocedural meds help and pt will need them    • Right shoulder pain    • Seizure (CMS/HCC) 2020   • Stroke (CMS/[facility])    • Wears glasses        Past Surgical History:   Past Surgical History:   Procedure Laterality Date   • CARDIAC CATHETERIZATION     •  SECTION     • CHOLECYSTECTOMY     • COLONOSCOPY     • ENDOSCOPY     • FRACTURE SURGERY      left leg- hardware in place    • JOINT REPLACEMENT      TOTAL HIP REPLACEMENT   • KNEE ARTHROSCOPY Right     done twice   • PATELLA SURGERY Left     removed left knee cap   • REPLACEMENT TOTAL KNEE Right 2018    revision    • TOTAL HIP ARTHROPLASTY Left    • TOTAL HIP ARTHROPLASTY Right 2019    Procedure: DIRECT TOTAL HIP ARTHROPLASTY ANTERIOR RIGHT;  Surgeon: Mele Chatterjee MD;  Location: [facility];  Service: Orthopedics   • TUBAL ABDOMINAL LIGATION         Family History:   Family History   Problem Relation Age of Onset   • Hypertension Mother    • Migraines Mother    • Thyroid disease Mother    • Hypertension Father    • COPD Father        Social History:   Social History     Socioeconomic History   • Marital status:      Spouse name: Not on file   • Number of children: Not on file   • Years of education: Not on file   • Highest education level: Not on file   Occupational History   • 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 [place]   Tobacco Use   • Smoking status: Never Smoker   • Smokeless tobacco: Never Used   Substance and Sexual Activity   • Alcohol use: Yes     Comment: rare   • Drug use: No   • Sexual  "activity: Yes     Partners: Male     Birth control/protection: None     Comment: TUBES TIDE       Medications:     Current Outpatient Medications:   •  aspirin 81 MG chewable tablet, Chew 1 tablet Daily., Disp: , Rfl:   •  cyclobenzaprine (FLEXERIL) 10 MG tablet, Take 1 tablet by mouth 3 (Three) Times a Day As Needed for Muscle Spasms., Disp: 60 tablet, Rfl: 1  •  escitalopram (LEXAPRO) 20 MG tablet, Take 1 tablet by mouth Daily., Disp: 90 tablet, Rfl: 1  •  levETIRAcetam (KEPPRA) 500 MG tablet, Take 1 tablet by mouth Every 12 (Twelve) Hours., Disp: 60 tablet, Rfl: 2  •  levothyroxine (SYNTHROID, LEVOTHROID) 25 MCG tablet, Take 1 tablet by mouth Daily., Disp: 90 tablet, Rfl: 1  •  pantoprazole (PROTONIX) 40 MG EC tablet, Take 1 tablet by mouth 2 (Two) Times a Day., Disp: 90 tablet, Rfl: 3  •  pramipexole (MIRAPEX) 0.25 MG tablet, Take 3 tablets by mouth Every Night. Take 2-3 hours prior to bedtime, Disp: 90 tablet, Rfl: 2  •  pravastatin (Pravachol) 40 MG tablet, Take 1 tablet by mouth Every Night., Disp: 90 tablet, Rfl: 1  •  rizatriptan MLT (MAXALT-MLT) 10 MG disintegrating tablet, Place 1 tablet on the tongue 1 (One) Time As Needed for Migraine for up to 1 dose., Disp: 9 tablet, Rfl: 5  •  traZODone (DESYREL) 100 MG tablet, Take 0.5-1 tablets by mouth Every Night., Disp: 30 tablet, Rfl: 5    Allergies:   Allergies   Allergen Reactions   • Morphine Nausea And Vomiting       Physical Exam:  Vital Signs:   Vitals:    12/18/20 1040   BP: 130/80   Pulse: 95   Temp: 97.9 °F (36.6 °C)   SpO2: 99%  Comment: resting, room air   Weight: 76.6 kg (168 lb 12.8 oz)   Height: 152.4 cm (60\")       Physical Exam  Vitals signs and nursing note reviewed.   Constitutional:       General: She is not in acute distress.     Appearance: She is well-developed and normal weight. She is not ill-appearing or toxic-appearing.   HENT:      Head: Normocephalic and atraumatic.      Right Ear: External ear normal.      Left Ear: External ear " normal.      Nose: Nose normal.      Mouth/Throat:      Mouth: Mucous membranes are moist.      Pharynx: Oropharynx is clear. No oropharyngeal exudate or posterior oropharyngeal erythema.   Eyes:      Conjunctiva/sclera: Conjunctivae normal.      Pupils: Pupils are equal, round, and reactive to light.   Neck:      Musculoskeletal: Normal range of motion and neck supple. No neck rigidity.   Cardiovascular:      Rate and Rhythm: Normal rate and regular rhythm.      Pulses: Normal pulses.      Heart sounds: Normal heart sounds. No murmur. No friction rub. No gallop.    Pulmonary:      Effort: Pulmonary effort is normal. No respiratory distress.      Breath sounds: Normal breath sounds. No wheezing, rhonchi or rales.   Abdominal:      General: Bowel sounds are normal. There is no distension.      Palpations: Abdomen is soft.      Tenderness: There is no abdominal tenderness. There is no rebound.   Musculoskeletal: Normal range of motion.      Right lower leg: No edema.      Left lower leg: No edema.   Skin:     General: Skin is warm and dry.      Capillary Refill: Capillary refill takes less than 2 seconds.   Neurological:      General: No focal deficit present.      Mental Status: She is alert and oriented to person, place, and time.   Psychiatric:         Mood and Affect: Mood normal.         Behavior: Behavior normal.         Thought Content: Thought content normal.         Judgment: Judgment normal.         Results Review:   - I personally reviewed the pts imaging from chest x-ray from November 4, 2020 shows no acute cardiopulmonary process, CT scan of the chest from August 22, 2020 showed some very mild groundglass interstitial changes in the upper lobes bilaterally, and otherwise unremarkable.  - I personally reviewed the pts Echo from 8/22/2020 showed an EF of 60%, with normal left ventricular systolic function, and no signs of a shunt.  -Personally reviewed the patient's chart with regards to her PCP evaluations  as well as imaging and laboratory findings.    Assessment / Plan:   1. Chronic cough (Primary)  -I explained her that chronic cough is usually caused by postnasal drip, reflux, or asthma.  Her CT scan of the chest from August is reassuring that she is likely not have any underlying interstitial lung disease.  Especially when catheter chest x-ray appeared clear in November 2020.  For now we will treat GERD as noted below and get a barium swallow.  In addition to this she is getting a full set of pulmonary function testing on Monday which we will need to review.  If there are signs of asthma we will treat appropriately, if there are signs or restriction then I would likely get a high-resolution CT scan prior to our next visit.  And then if this is negative, then I will start her on my allergy regimen at our next visit.  She is going to monitor the cough to see if there is any improvement with the reflux precautions.    2. Dysphagia, unspecified type  -We will obtain a barium swallow looking for any signs of stenotic areas.  This could be leading to silent aspiration and chronic cough.    3. GERD without esophagitis  -We will go ahead and start empirically treating her with high-dose PPI for possible reflux causes cough.  We discussed dietary and lifestyle modifications that she can make.  In addition I will start her on Protonix 40 mg twice daily.    Follow Up:   Return in about 6 weeks (around 1/29/2021) for PFTs on Monday.  And barium swallow prior to the next visit.     IWONA Alfaro, DO  Pulmonary and Critical Care Medicine  Note Electronically Signed    Please note that portions of this note may have been completed with a voice recognition program. Efforts were made to edit the dictations, but occasionally words are mistranscribed.

## 2020-12-19 DIAGNOSIS — G25.81 RLS (RESTLESS LEGS SYNDROME): ICD-10-CM

## 2020-12-19 DIAGNOSIS — R45.4 IRRITABILITY: ICD-10-CM

## 2020-12-19 LAB — SARS-COV-2 RNA RESP QL NAA+PROBE: NOT DETECTED

## 2020-12-21 ENCOUNTER — OFFICE VISIT (OUTPATIENT)
Dept: PULMONOLOGY | Facility: CLINIC | Age: 55
End: 2020-12-21

## 2020-12-21 DIAGNOSIS — R05.3 CHRONIC COUGH: Primary | ICD-10-CM

## 2020-12-21 PROCEDURE — 94375 RESPIRATORY FLOW VOLUME LOOP: CPT | Performed by: INTERNAL MEDICINE

## 2020-12-21 PROCEDURE — 94729 DIFFUSING CAPACITY: CPT | Performed by: INTERNAL MEDICINE

## 2020-12-21 PROCEDURE — 94726 PLETHYSMOGRAPHY LUNG VOLUMES: CPT | Performed by: INTERNAL MEDICINE

## 2020-12-21 RX ORDER — ESCITALOPRAM OXALATE 20 MG/1
20 TABLET ORAL DAILY
Qty: 90 TABLET | Refills: 1 | Status: SHIPPED | OUTPATIENT
Start: 2020-12-21 | End: 2021-02-10 | Stop reason: SDUPTHER

## 2020-12-21 RX ORDER — PRAMIPEXOLE DIHYDROCHLORIDE 0.25 MG/1
0.75 TABLET ORAL NIGHTLY
Qty: 90 TABLET | Refills: 2 | Status: SHIPPED | OUTPATIENT
Start: 2020-12-21 | End: 2021-02-10 | Stop reason: SDUPTHER

## 2021-01-04 RX ORDER — DEXTROMETHORPHAN HYDROBROMIDE AND PROMETHAZINE HYDROCHLORIDE 15; 6.25 MG/5ML; MG/5ML
5 SYRUP ORAL 4 TIMES DAILY PRN
Qty: 180 ML | Refills: 0 | Status: SHIPPED | OUTPATIENT
Start: 2021-01-04 | End: 2021-02-17

## 2021-01-12 ENCOUNTER — APPOINTMENT (OUTPATIENT)
Dept: PREADMISSION TESTING | Facility: HOSPITAL | Age: 56
End: 2021-01-12

## 2021-01-14 ENCOUNTER — HOSPITAL ENCOUNTER (OUTPATIENT)
Dept: GENERAL RADIOLOGY | Facility: HOSPITAL | Age: 56
Discharge: HOME | End: 2021-01-14
Admitting: INTERNAL MEDICINE

## 2021-01-14 DIAGNOSIS — R13.10 DYSPHAGIA, UNSPECIFIED TYPE: ICD-10-CM

## 2021-01-14 PROCEDURE — 74220 X-RAY XM ESOPHAGUS 1CNTRST: CPT

## 2021-01-14 RX ADMIN — BARIUM SULFATE 183 ML: 960 POWDER, FOR SUSPENSION ORAL at 10:16

## 2021-01-18 RX ORDER — TRAZODONE HYDROCHLORIDE 100 MG/1
TABLET ORAL
Qty: 30 TABLET | Refills: 4 | OUTPATIENT
Start: 2021-01-18

## 2021-01-18 RX ORDER — CYCLOBENZAPRINE HCL 10 MG
10 TABLET ORAL 3 TIMES DAILY PRN
Qty: 60 TABLET | Refills: 1 | Status: SHIPPED | OUTPATIENT
Start: 2021-01-18 | End: 2021-02-02

## 2021-01-18 RX ORDER — TRAZODONE HYDROCHLORIDE 100 MG/1
50-100 TABLET ORAL NIGHTLY
Qty: 30 TABLET | Refills: 0 | Status: SHIPPED | OUTPATIENT
Start: 2021-01-18 | End: 2021-02-02 | Stop reason: SDUPTHER

## 2021-01-22 ENCOUNTER — TELEMEDICINE (OUTPATIENT)
Dept: PULMONOLOGY | Facility: CLINIC | Age: 56
End: 2021-01-22

## 2021-01-22 DIAGNOSIS — J45.40 MODERATE PERSISTENT ASTHMA, UNSPECIFIED WHETHER COMPLICATED: Primary | ICD-10-CM

## 2021-01-22 DIAGNOSIS — K22.9 ABNORMALITY OF ESOPHAGUS: ICD-10-CM

## 2021-01-22 DIAGNOSIS — K21.9 GERD WITHOUT ESOPHAGITIS: ICD-10-CM

## 2021-01-22 DIAGNOSIS — R05.3 CHRONIC COUGH: ICD-10-CM

## 2021-01-22 PROCEDURE — 99214 OFFICE O/P EST MOD 30 MIN: CPT | Performed by: INTERNAL MEDICINE

## 2021-01-22 NOTE — PROGRESS NOTES
Follow Up Office Note       Patient Name: Astrid Sam    Referring Physician: No ref. provider found    Chief Complaint: Chronic cough    You have chosen to receive care through a telehealth visit.  Do you consent to use a video/audio connection for your medical care today? Yes    History of Present Illness: Astrid Sam is a 55 y.o. female who is here today to follow-up care with Pulmonary.   Patient is a past medical history significant for hypothyroidism, allergic rhinitis, hyperlipidemia, PFO status post anterior atrial closure, CVA, and seizures.  Patient is here today to follow-up on chronic cough.  Cough remains present been present for 1 year.  Please see my initial note from December 18, 2020 for full description of the underlying cough.  I did start her on Protonix at the last visit, with no change in her cough.  She did undergo a barium swallow which showed reflux in addition to narrowing at the distal esophagus concerning for possible stricture.  She also had pulmonary function testing which were consistent with asthma.  She has no new complaints.    Review of Systems:   Review of Systems   Constitutional: Negative for chills, fatigue and fever.   HENT: Negative for congestion and voice change.    Eyes: Negative for blurred vision.   Respiratory: Positive for cough. Negative for shortness of breath and wheezing.    Cardiovascular: Negative for chest pain.   Skin: Negative for dry skin.   Hematological: Negative for adenopathy.   Psychiatric/Behavioral: Negative for agitation and depressed mood.       The following portions of the patient's history were reviewed and updated as appropriate: allergies, current medications, past family history, past medical history, past social history, past surgical history and problem list.    Physical Exam:  Vital Signs: There were no vitals filed for this visit.    Physical Exam  Constitutional:       General: She is not in acute distress.      Appearance: She is obese. She is not ill-appearing or toxic-appearing.   HENT:      Head: Normocephalic and atraumatic.   Eyes:      Extraocular Movements: Extraocular movements intact.      Conjunctiva/sclera: Conjunctivae normal.   Pulmonary:      Effort: Pulmonary effort is normal.   Neurological:      Mental Status: She is alert and oriented to person, place, and time.         Results Review:   - imaging from chest x-ray from November 4, 2020 shows no acute cardiopulmonary process, CT scan of the chest from August 22, 2020 showed some very mild groundglass interstitial changes in the upper lobes bilaterally, and otherwise unremarkable.  - Echo from 8/22/2020 showed an EF of 60%, with normal left ventricular systolic function, and no signs of a shunt.  - Personally reviewed the patient's barium swallow report, which showed that the patient had mild gastroesophageal reflux to the thoracic inlet, with mild smooth luminal narrowing of the distal esophagus which may represent a stricture or spasm.  -I personally viewed the patient's pulmonary function testing from 12/21/2020 which showed no obstruction or restriction with a normal DLCO, although notable for significant air trapping    Assessment / Plan:   1. Moderate persistent asthma, unspecified whether complicated (Primary)  2. GERD without esophagitis  3. Chronic cough  4.  Abnormality of the esophagus secondary to either stricture or spasm  -Patient's cough is likely a combination of asthma and GERD.  Her barium swallow is consistent with this although she may have some level of esophageal narrowing as well.  With regard to the narrowing of the esophagus I am going to refer the patient on over to gastroenterology for an EGD, to look for a possible esophageal stricture.  This could explain uncontrolled reflux that could lead to ongoing chronic cough.  In addition to that I am also going to start treating her for asthma as with her significant residual volume on  the PFTs and groundglass changes on the CT scan of the chest this was fairly consistent with underlying asthma.  I have started her on Advair 500 mcg 1 puff twice daily.  I did explain to her though that if the inhaler is too expensive to please call us and we will be happy to switch her over to a different inhaler that is equivalent.  She verbalized understanding of this and agreed with the plan.  I will plan to see her back in 3 months after she has had a chance to see gastroenterology in addition to being on the inhaler.    Follow Up:   Return in about 3 months (around 4/22/2021).       IWONA Alfaro, DO  Pulmonary and Critical Care Medicine  Note Electronically Signed    Please note that portions of this note may have been completed with a voice recognition program. Efforts were made to edit the dictations, but occasionally words are mistranscribed.

## 2021-01-27 ENCOUNTER — OFFICE VISIT (OUTPATIENT)
Dept: FAMILY MEDICINE CLINIC | Facility: CLINIC | Age: 56
End: 2021-01-27

## 2021-01-27 VITALS
WEIGHT: 164 LBS | BODY MASS INDEX: 32.2 KG/M2 | DIASTOLIC BLOOD PRESSURE: 80 MMHG | HEIGHT: 60 IN | SYSTOLIC BLOOD PRESSURE: 122 MMHG | TEMPERATURE: 98.2 F | RESPIRATION RATE: 18 BRPM | HEART RATE: 88 BPM

## 2021-01-27 DIAGNOSIS — E03.9 ACQUIRED HYPOTHYROIDISM: Primary | ICD-10-CM

## 2021-01-27 DIAGNOSIS — G43.119 INTRACTABLE MIGRAINE WITH AURA WITHOUT STATUS MIGRAINOSUS: ICD-10-CM

## 2021-01-27 DIAGNOSIS — D50.9 IRON DEFICIENCY ANEMIA, UNSPECIFIED IRON DEFICIENCY ANEMIA TYPE: ICD-10-CM

## 2021-01-27 PROCEDURE — 99214 OFFICE O/P EST MOD 30 MIN: CPT | Performed by: FAMILY MEDICINE

## 2021-01-27 RX ORDER — OXYBUTYNIN CHLORIDE 10 MG/1
10 TABLET, EXTENDED RELEASE ORAL DAILY
COMMUNITY
End: 2021-11-14

## 2021-01-27 RX ORDER — TOFACITINIB 11 MG/1
TABLET, FILM COATED, EXTENDED RELEASE ORAL
COMMUNITY
Start: 2021-01-16 | End: 2021-05-24

## 2021-01-27 NOTE — PROGRESS NOTES
"Chief Complaint  Hypothyroidism (6 month f/u )    Subjective          Astrid Sam presents to [facility] for   History of Present Illness   Hypothyroidism  Astrid Sam is a 55 y.o. female who presents for follow up of hypothyroidism. Current symptoms: none . Patient denies heat / cold intolerance, palpitations and weight changes. Symptoms have been well-controlled. Elevated TSH August 2020, no recheck since then.     She followed up with pulmonology for evaluation of chronic cough. PFT shows asthma, prescribed Advair for treatment, but she hasn't started this yet. Also, swallow study showed mild smooth luminal narrowing of the distal esophagus which may represent stricture, or spasm and mild GERD. She has been referred for EGD.     She had Covid recently, diagnosed on 1/5/21. Since then, she has had increased frequency of migraines.  Using Maxalt prescription more often, which usually resolves the headache, but has not been effective.   Also, treating headaches with OTC ibuprofen and tylenol.     Iron deficient anemia, needs to update labs.   Answers for HPI/ROS submitted by the patient on 1/21/2021   What is the primary reason for your visit?: Other  Please describe your symptoms.: Here for 6 month follow up  Have you had these symptoms before?: No  How long have you been having these symptoms?: 1-4 days    Objective   Vital Signs:   /80   Pulse 88   Temp 98.2 °F (36.8 °C)   Resp 18   Ht 152.4 cm (60\")   Wt 74.4 kg (164 lb)   BMI 32.03 kg/m²     Physical Exam  Vitals signs and nursing note reviewed.   Constitutional:       Appearance: She is well-developed.   HENT:      Head: Normocephalic and atraumatic.      Right Ear: External ear normal.      Left Ear: External ear normal.      Nose: Nose normal.   Eyes:      Conjunctiva/sclera: Conjunctivae normal.   Neck:      Musculoskeletal: Neck supple.   Cardiovascular:      Rate and Rhythm: Normal rate and " regular rhythm.      Heart sounds: Normal heart sounds. No murmur.   Pulmonary:      Effort: Pulmonary effort is normal.      Breath sounds: Normal breath sounds. No wheezing or rhonchi.   Musculoskeletal:         General: No deformity.      Comments: Ambulates with cane    Skin:     General: Skin is warm and dry.   Neurological:      General: No focal deficit present.      Mental Status: She is alert and oriented to person, place, and time.   Psychiatric:         Mood and Affect: Mood normal.         Behavior: Behavior normal.        Result Review :{ Labs  Result Review  Imaging  Med Tab  Media :23}                 Assessment and Plan    Problem List Items Addressed This Visit        Endocrine and Metabolic    Acquired hypothyroidism - Primary    Relevant Orders    Comprehensive Metabolic Panel    TSH+Free T4    CBC (No Diff)       Hematology and Neoplasia    Iron deficiency anemia    Relevant Orders    Comprehensive Metabolic Panel    TSH+Free T4    CBC (No Diff)    Iron and TIBC    Ferritin       Neuro    Migraine    Relevant Medications        ubrogepant (ubrogepant) 50 MG tablet      She will return to complete labs.   Sample of Ubrelvy provided to treat migraines. If symptoms don't resolve, consider daily preventive medication    Follow Up   Return in about 6 months (around 7/27/2021) for Recheck.  Patient was given instructions and counseling regarding her condition or for health maintenance advice. Please see specific information pulled into the AVS if appropriate.

## 2021-02-02 RX ORDER — TRAZODONE HYDROCHLORIDE 100 MG/1
50-100 TABLET ORAL NIGHTLY
Qty: 90 TABLET | Refills: 1 | Status: SHIPPED | OUTPATIENT
Start: 2021-02-02 | End: 2021-02-10 | Stop reason: SDUPTHER

## 2021-02-02 RX ORDER — BACLOFEN 10 MG/1
10 TABLET ORAL 3 TIMES DAILY PRN
Qty: 90 TABLET | Refills: 0 | Status: SHIPPED | OUTPATIENT
Start: 2021-02-02 | End: 2021-02-10 | Stop reason: SDUPTHER

## 2021-02-03 ENCOUNTER — OUTSIDE FACILITY SERVICE (OUTPATIENT)
Dept: GASTROENTEROLOGY | Facility: CLINIC | Age: 56
End: 2021-02-03

## 2021-02-03 PROCEDURE — 88305 TISSUE EXAM BY PATHOLOGIST: CPT | Performed by: INTERNAL MEDICINE

## 2021-02-03 PROCEDURE — 43239 EGD BIOPSY SINGLE/MULTIPLE: CPT | Performed by: INTERNAL MEDICINE

## 2021-02-04 ENCOUNTER — LAB REQUISITION (OUTPATIENT)
Dept: LAB | Facility: HOSPITAL | Age: 56
End: 2021-02-04

## 2021-02-04 DIAGNOSIS — R13.14 DYSPHAGIA, PHARYNGOESOPHAGEAL PHASE: ICD-10-CM

## 2021-02-04 DIAGNOSIS — K21.9 GASTRO-ESOPHAGEAL REFLUX DISEASE WITHOUT ESOPHAGITIS: ICD-10-CM

## 2021-02-05 LAB
CYTO UR: NORMAL
LAB AP CASE REPORT: NORMAL
LAB AP CLINICAL INFORMATION: NORMAL
PATH REPORT.FINAL DX SPEC: NORMAL
PATH REPORT.GROSS SPEC: NORMAL

## 2021-02-08 ENCOUNTER — LAB (OUTPATIENT)
Dept: FAMILY MEDICINE CLINIC | Facility: CLINIC | Age: 56
End: 2021-02-08

## 2021-02-09 ENCOUNTER — TELEPHONE (OUTPATIENT)
Dept: GASTROENTEROLOGY | Facility: CLINIC | Age: 56
End: 2021-02-09

## 2021-02-09 LAB
ALBUMIN SERPL-MCNC: 4.5 G/DL (ref 3.5–5.2)
ALBUMIN/GLOB SERPL: 1.9 G/DL
ALP SERPL-CCNC: 86 U/L (ref 39–117)
ALT SERPL-CCNC: 20 U/L (ref 1–33)
AST SERPL-CCNC: 23 U/L (ref 1–32)
BILIRUB SERPL-MCNC: 0.8 MG/DL (ref 0–1.2)
BUN SERPL-MCNC: 20 MG/DL (ref 6–20)
BUN/CREAT SERPL: 24.4 (ref 7–25)
CALCIUM SERPL-MCNC: 9.5 MG/DL (ref 8.6–10.5)
CHLORIDE SERPL-SCNC: 103 MMOL/L (ref 98–107)
CO2 SERPL-SCNC: 27.3 MMOL/L (ref 22–29)
CREAT SERPL-MCNC: 0.82 MG/DL (ref 0.57–1)
ERYTHROCYTE [DISTWIDTH] IN BLOOD BY AUTOMATED COUNT: 16.7 % (ref 12.3–15.4)
FERRITIN SERPL-MCNC: 55.2 NG/ML (ref 13–150)
GLOBULIN SER CALC-MCNC: 2.4 GM/DL
GLUCOSE SERPL-MCNC: 78 MG/DL (ref 65–99)
HCT VFR BLD AUTO: 37.7 % (ref 34–46.6)
HGB BLD-MCNC: 12.5 G/DL (ref 12–15.9)
IRON SATN MFR SERPL: 23 % (ref 20–50)
IRON SERPL-MCNC: 107 MCG/DL (ref 37–145)
MCH RBC QN AUTO: 30.3 PG (ref 26.6–33)
MCHC RBC AUTO-ENTMCNC: 33.2 G/DL (ref 31.5–35.7)
MCV RBC AUTO: 91.5 FL (ref 79–97)
PLATELET # BLD AUTO: 275 10*3/MM3 (ref 140–450)
POTASSIUM SERPL-SCNC: 4.2 MMOL/L (ref 3.5–5.2)
PROT SERPL-MCNC: 6.9 G/DL (ref 6–8.5)
RBC # BLD AUTO: 4.12 10*6/MM3 (ref 3.77–5.28)
SODIUM SERPL-SCNC: 139 MMOL/L (ref 136–145)
T4 FREE SERPL-MCNC: 1.1 NG/DL (ref 0.93–1.7)
TIBC SERPL-MCNC: 457 MCG/DL
TSH SERPL DL<=0.005 MIU/L-ACNC: 1.98 UIU/ML (ref 0.27–4.2)
UIBC SERPL-MCNC: 350 MCG/DL (ref 112–346)
WBC # BLD AUTO: 8.84 10*3/MM3 (ref 3.4–10.8)

## 2021-02-09 NOTE — TELEPHONE ENCOUNTER
I spoke with Ms. Sam on the phone.  There was evidence of reflux.  She should continue the Protonix on a daily basis and lifestyle modifications.  The patient will return for a follow-up in August or September.

## 2021-02-10 DIAGNOSIS — E03.9 ACQUIRED HYPOTHYROIDISM: ICD-10-CM

## 2021-02-10 DIAGNOSIS — R45.4 IRRITABILITY: ICD-10-CM

## 2021-02-10 DIAGNOSIS — G43.119 INTRACTABLE MIGRAINE WITH AURA WITHOUT STATUS MIGRAINOSUS: Primary | ICD-10-CM

## 2021-02-10 DIAGNOSIS — G25.81 RLS (RESTLESS LEGS SYNDROME): ICD-10-CM

## 2021-02-10 DIAGNOSIS — E78.2 MIXED HYPERLIPIDEMIA: ICD-10-CM

## 2021-02-10 RX ORDER — ESCITALOPRAM OXALATE 20 MG/1
20 TABLET ORAL DAILY
Qty: 90 TABLET | Refills: 1 | Status: SHIPPED | OUTPATIENT
Start: 2021-02-10 | End: 2021-09-02 | Stop reason: SDUPTHER

## 2021-02-10 RX ORDER — PRAMIPEXOLE DIHYDROCHLORIDE 0.25 MG/1
0.75 TABLET ORAL NIGHTLY
Qty: 270 TABLET | Refills: 1 | Status: SHIPPED | OUTPATIENT
Start: 2021-02-10 | End: 2021-03-30 | Stop reason: SDUPTHER

## 2021-02-10 RX ORDER — PRAVASTATIN SODIUM 40 MG
40 TABLET ORAL NIGHTLY
Qty: 90 TABLET | Refills: 1 | Status: SHIPPED | OUTPATIENT
Start: 2021-02-10 | End: 2021-05-11

## 2021-02-10 RX ORDER — LEVOTHYROXINE SODIUM 0.03 MG/1
25 TABLET ORAL DAILY
Qty: 90 TABLET | Refills: 1 | Status: SHIPPED | OUTPATIENT
Start: 2021-02-10 | End: 2021-06-14

## 2021-02-10 RX ORDER — BACLOFEN 10 MG/1
10 TABLET ORAL 3 TIMES DAILY PRN
Qty: 90 TABLET | Refills: 2 | Status: SHIPPED | OUTPATIENT
Start: 2021-02-10 | End: 2021-03-30

## 2021-02-10 RX ORDER — TRAZODONE HYDROCHLORIDE 100 MG/1
50-100 TABLET ORAL NIGHTLY
Qty: 90 TABLET | Refills: 1 | Status: SHIPPED | OUTPATIENT
Start: 2021-02-10 | End: 2021-04-11 | Stop reason: SDUPTHER

## 2021-02-15 DIAGNOSIS — K21.9 GERD WITHOUT ESOPHAGITIS: ICD-10-CM

## 2021-02-15 NOTE — TELEPHONE ENCOUNTER
Fax refill request for Rx Protonix 40 mg needing to be sent to [facility] for 90 day supply.

## 2021-02-16 RX ORDER — PANTOPRAZOLE SODIUM 40 MG/1
40 TABLET, DELAYED RELEASE ORAL 2 TIMES DAILY
Qty: 90 TABLET | Refills: 3 | Status: SHIPPED | OUTPATIENT
Start: 2021-02-16 | End: 2021-03-29 | Stop reason: SDUPTHER

## 2021-02-17 ENCOUNTER — E-VISIT (OUTPATIENT)
Dept: FAMILY MEDICINE CLINIC | Facility: TELEHEALTH | Age: 56
End: 2021-02-17

## 2021-02-17 ENCOUNTER — TELEMEDICINE (OUTPATIENT)
Dept: NEUROLOGY | Facility: CLINIC | Age: 56
End: 2021-02-17

## 2021-02-17 DIAGNOSIS — R56.9 SEIZURE (HCC): Primary | ICD-10-CM

## 2021-02-17 DIAGNOSIS — N39.0 URINARY TRACT INFECTION WITHOUT HEMATURIA, SITE UNSPECIFIED: Primary | ICD-10-CM

## 2021-02-17 DIAGNOSIS — G25.81 RESTLESS LEGS SYNDROME (RLS): ICD-10-CM

## 2021-02-17 PROCEDURE — 99213 OFFICE O/P EST LOW 20 MIN: CPT | Performed by: PSYCHIATRY & NEUROLOGY

## 2021-02-17 PROCEDURE — 99422 OL DIG E/M SVC 11-20 MIN: CPT | Performed by: NURSE PRACTITIONER

## 2021-02-17 RX ORDER — PHENAZOPYRIDINE HYDROCHLORIDE 200 MG/1
200 TABLET, FILM COATED ORAL 3 TIMES DAILY PRN
Qty: 6 TABLET | Refills: 0 | Status: SHIPPED | OUTPATIENT
Start: 2021-02-17 | End: 2021-02-19

## 2021-02-17 RX ORDER — NITROFURANTOIN 25; 75 MG/1; MG/1
100 CAPSULE ORAL 2 TIMES DAILY
Qty: 14 CAPSULE | Refills: 0 | Status: SHIPPED | OUTPATIENT
Start: 2021-02-17 | End: 2021-02-24

## 2021-02-17 NOTE — PATIENT INSTRUCTIONS
Take antibiotic until gone.   If your symptoms do not improve after completion of the antibiotic, seek further evaluation with your primary care provider or Urgent Care, as you may need a urine culture or re-evaluation. If you develop severe symptoms, such as chest pain, high fever, difficulty breathing, difficulty urinating or have any symptoms that interfere with your ability to function go to the nearest Emergency Department immediately.       Urinary Tract Infection, Adult    A urinary tract infection (UTI) is an infection of any part of the urinary tract. The urinary tract includes the kidneys, ureters, bladder, and urethra. These organs make, store, and get rid of urine in the body.  Your health care provider may use other names to describe the infection. An upper UTI affects the ureters and kidneys (pyelonephritis). A lower UTI affects the bladder (cystitis) and urethra (urethritis).  What are the causes?  Most urinary tract infections are caused by bacteria in your genital area, around the entrance to your urinary tract (urethra). These bacteria grow and cause inflammation of your urinary tract.  What increases the risk?  You are more likely to develop this condition if:  · You have a urinary catheter that stays in place (indwelling).  · You are not able to control when you urinate or have a bowel movement (you have incontinence).  · You are female and you:  ? Use a spermicide or diaphragm for birth control.  ? Have low estrogen levels.  ? Are pregnant.  · You have certain genes that increase your risk (genetics).  · You are sexually active.  · You take antibiotic medicines.  · You have a condition that causes your flow of urine to slow down, such as:  ? An enlarged prostate, if you are male.  ? Blockage in your urethra (stricture).  ? A kidney stone.  ? A nerve condition that affects your bladder control (neurogenic bladder).  ? Not getting enough to drink, or not urinating often.  · You have certain  medical conditions, such as:  ? Diabetes.  ? A weak disease-fighting system (immunesystem).  ? Sickle cell disease.  ? Gout.  ? Spinal cord injury.  What are the signs or symptoms?  Symptoms of this condition include:  · Needing to urinate right away (urgently).  · Frequent urination or passing small amounts of urine frequently.  · Pain or burning with urination.  · Blood in the urine.  · Urine that smells bad or unusual.  · Trouble urinating.  · Cloudy urine.  · Vaginal discharge, if you are female.  · Pain in the abdomen or the lower back.  You may also have:  · Vomiting or a decreased appetite.  · Confusion.  · Irritability or tiredness.  · A fever.  · Diarrhea.  The first symptom in older adults may be confusion. In some cases, they may not have any symptoms until the infection has worsened.  How is this diagnosed?  This condition is diagnosed based on your medical history and a physical exam. You may also have other tests, including:  · Urine tests.  · Blood tests.  · Tests for sexually transmitted infections (STIs).  If you have had more than one UTI, a cystoscopy or imaging studies may be done to determine the cause of the infections.  How is this treated?  Treatment for this condition includes:  · Antibiotic medicine.  · Over-the-counter medicines to treat discomfort.  · Drinking enough water to stay hydrated.  If you have frequent infections or have other conditions such as a kidney stone, you may need to see a health care provider who specializes in the urinary tract (urologist).  In rare cases, urinary tract infections can cause sepsis. Sepsis is a life-threatening condition that occurs when the body responds to an infection. Sepsis is treated in the hospital with IV antibiotics, fluids, and other medicines.  Follow these instructions at home:    Medicines  · Take over-the-counter and prescription medicines only as told by your health care provider.  · If you were prescribed an antibiotic medicine, take  it as told by your health care provider. Do not stop using the antibiotic even if you start to feel better.  General instructions  · Make sure you:  ? Empty your bladder often and completely. Do not hold urine for long periods of time.  ? Empty your bladder after sex.  ? Wipe from front to back after a bowel movement if you are female. Use each tissue one time when you wipe.  · Drink enough fluid to keep your urine pale yellow.  · Keep all follow-up visits as told by your health care provider. This is important.  Contact a health care provider if:  · Your symptoms do not get better after 1-2 days.  · Your symptoms go away and then return.  Get help right away if you have:  · Severe pain in your back or your lower abdomen.  · A fever.  · Nausea or vomiting.  Summary  · A urinary tract infection (UTI) is an infection of any part of the urinary tract, which includes the kidneys, ureters, bladder, and urethra.  · Most urinary tract infections are caused by bacteria in your genital area, around the entrance to your urinary tract (urethra).  · Treatment for this condition often includes antibiotic medicines.  · If you were prescribed an antibiotic medicine, take it as told by your health care provider. Do not stop using the antibiotic even if you start to feel better.  · Keep all follow-up visits as told by your health care provider. This is important.  This information is not intended to replace advice given to you by your health care provider. Make sure you discuss any questions you have with your health care provider.  Document Revised: 12/05/2019 Document Reviewed: 06/27/2019  [email or web address] Patient Education © 2020 [employer]

## 2021-02-17 NOTE — PROGRESS NOTES
Astrid Sam    1965  [id number]    E-Visit questionnaire, patient's answers and chart have been reviewed. The patient's complaints, my assessment and plan are as follows:    Questionnaire:   --Reason for E-Visit: Urinary frequency, urgency  --exposure to flu or COVID-19 in past 14 days: no  --sudden loss of taste or smell:  no    HPI    Astrid Sam is a 55 y.o. female with complaints of Urinary Tract Infection  Patient complains of frequency and urgency. She has had symptoms for 2 weeks. Patient denies fever. Patient does not have a history of recurrent UTI. Patient does not have a history of pyelonephritis.       Review of Systems - History obtained from chart review and the patient  General ROS: negative  Genito-Urinary ROS: positive for - urinary frequency/urgency      Diagnoses and all orders for this visit:    1. Urinary tract infection without hematuria, site unspecified (Primary)  -     nitrofurantoin, macrocrystal-monohydrate, (MACROBID) 100 MG capsule; Take 1 capsule by mouth 2 (Two) Times a Day for 7 days.  Dispense: 14 capsule; Refill: 0  -     phenazopyridine (Pyridium) 200 MG tablet; Take 1 tablet by mouth 3 (Three) Times a Day As Needed for Bladder Spasms for up to 2 days.  Dispense: 6 tablet; Refill: 0          Take antibiotic until gone.   If your symptoms do not improve after completion of the antibiotic, seek further evaluation with your primary care provider or Urgent Care, as you may need a urine culture or re-evaluation. If you develop severe symptoms, such as chest pain, high fever, difficulty breathing, difficulty urinating or have any symptoms that interfere with your ability to function go to the nearest Emergency Department immediately.     Any medications prescribed have been sent electronically to   [facility]/pharmacy #[id number] - [place], [place] - [address] [place] [address] AT [facility] 25 - [phone]  - [phone] [address] [address]  Phone:  [phone] Fax: [phone]    [facility] - [place], [place] - [address] - [phone]  - [phone]   [address]  [facility] [address]  Phone: [phone] Fax: [phone]      Time Documentation  Counseled patient  Counseling topics: diagnosis, treatment, plan of care and follow up instructions  Total encounter time: counseling time more than 50% of visit: 15 minutes        Yuliet Barrios, APRN  02/17/21  15:22 EST

## 2021-02-17 NOTE — PROGRESS NOTES
Subjective:    CC: Astrid Sam is seen today via video visit for seizures    HPI:  Current visit-patient states that she tapered and stopped Keppra after her last visit and has not had any more episodes of confusion, shaking or loss of awareness.  She occasionally has word finding problems.  With regards to her RLS she continues to take Mirapex 0.75 mg at night and baclofen 10 mg nightly for leg jerks that are now well controlled.    Last visit-patient had an EEG off medications which showed bifrontal slowing as well as excessive beta activity but no epileptiform activity.  She states she resumed her Keppra 500 mg twice a day a few days after stopping it as her  noted that patient was having leg jerks during sleep.  She does have RLS and takes Mirapex 0.75 mg at night.  Also takes trazodone for sleep in addition to Flexeril 10 mg 3 times a day for muscle spasms.  Denies having any episodes of staring or whole-body shaking.  On a few occasions her  has also mentioned to her that she speaks something but does not make sense although the patient herself does not realize she is doing it.    Initial bxpby-68-mmmk-old female with a past medical history of migraine headaches, hyperlipidemia, TIA status post PFO closure in 2011, bilateral hip replacement presents as a hospital follow-up for encephalopathy/new onset seizure.  As per patient she had an ACDF procedure on C5-6 in June.  After that she also developed osteomyelitis of the right sternoclavicular joint for which he underwent debridement and was placed on IV antibiotics (Rocephin and daptomycin).  Then in August she was placed on p.o. doxycycline.  A few days before her hospitalization she went out in the sun and developed a diffuse rash.  Took Benadryl which made her slightly confused.  2 days later she was found at home by her daughter extremely confused having thrown up all over herself.  She took her to [facility] initially where she  had a CT head that was unremarkable.  After that she developed left arm jerking with blinking and staring ahead and space.  Patient states that she does not remember the next 3 days.  After she was transferred to [facility] her white count was elevated at 17.2.  Her CRP was also mildly elevated and she had a T-max of 99.7.  Subsequent blood cultures were negative.  Initially her EEG showed moderate generalized slowing with frequent generalized sharp/slow wave complexes with triphasic morphology.  She was given Ativan and IV Keppra.  CT head and CT perfusion were  unremarkable.  CT angiogram of the head showed narrowing of the right distal M2/M3.  CTA of the neck did not show any significant stenosis.  MRI of the brain also did not show any acute intracranial abnormalities or severe chronic ischemic changes.  She also had a lumbar puncture that failed to show any evidence of a CNS infection.  EEG was repeated in 2 days and showed just mild generalized slowing with no epileptiform activity.  It was presumed that patient could have had seizures/encephalopathy in the presence of sepsis versus due to high doses of Valtrex that she had been presumably taking at home however patient denies that as she states that she had not taken that medication in over 1 year.  She was discharged home on Keppra 500 mg twice a day.  Has not had any more episodes since.  She continues to have generalized weakness.  Had been getting PT up until last week.  She denies having any febrile seizures as a child, abnormal birth history or severe concussions.  There is a family history of seizures in her brother who is also a diabetic  Of note-I personally reviewed her MRI brain and Dr. Guerrier's notes from the hospital    The following portions of the patient's history were reviewed today and updated as of 11/04/2020  : allergies, current medications, past family history, past medical history, past social history, past surgical history and problem  list  These document will be scanned to patient's chart.      Current Outpatient Medications:   •  aspirin 81 MG chewable tablet, Chew 1 tablet Daily., Disp: , Rfl:   •  baclofen (LIORESAL) 10 MG tablet, Take 1 tablet by mouth 3 (Three) Times a Day As Needed for Muscle Spasms., Disp: 90 tablet, Rfl: 2  •  escitalopram (LEXAPRO) 20 MG tablet, Take 1 tablet by mouth Daily., Disp: 90 tablet, Rfl: 1  •  fluticasone-salmeterol (Advair Diskus) 500-50 MCG/DOSE DISKUS, Inhale 1 puff 2 (Two) Times a Day., Disp: 60 each, Rfl: 11  •  levothyroxine (SYNTHROID, LEVOTHROID) 25 MCG tablet, Take 1 tablet by mouth Daily., Disp: 90 tablet, Rfl: 1  •  nitrofurantoin, macrocrystal-monohydrate, (MACROBID) 100 MG capsule, Take 1 capsule by mouth 2 (Two) Times a Day for 7 days., Disp: 14 capsule, Rfl: 0  •  oxybutynin XL (DITROPAN-XL) 10 MG 24 hr tablet, Take 10 mg by mouth Daily., Disp: , Rfl:   •  pantoprazole (PROTONIX) 40 MG EC tablet, Take 1 tablet by mouth 2 (Two) Times a Day., Disp: 90 tablet, Rfl: 3  •  phenazopyridine (Pyridium) 200 MG tablet, Take 1 tablet by mouth 3 (Three) Times a Day As Needed for Bladder Spasms for up to 2 days., Disp: 6 tablet, Rfl: 0  •  pramipexole (MIRAPEX) 0.25 MG tablet, Take 3 tablets by mouth Every Night. Take 2-3 hours prior to bedtime, Disp: 270 tablet, Rfl: 1  •  pravastatin (Pravachol) 40 MG tablet, Take 1 tablet by mouth Every Night., Disp: 90 tablet, Rfl: 1  •  Probiotic Product (PROBIOTIC-10 PO), Take  by mouth., Disp: , Rfl:   •  traZODone (DESYREL) 100 MG tablet, Take 0.5-1 tablets by mouth Every Night., Disp: 90 tablet, Rfl: 1  •  ubrogepant (ubrogepant) 100 MG tablet, Take 1 tablet by mouth 1 (One) Time As Needed (migraine) for up to 1 dose. Can repeat dose x 1 after 2 hours if headache not resolved, Disp: 10 tablet, Rfl: 2  •  Xeljanz XR 11 MG tablet sustained-release 24 hour, , Disp: , Rfl:    Past Medical History:   Diagnosis Date   • Acquired hypothyroidism    • Allergic    • Anemia      after hip replacement    • Arthritis    • Low back pain     BACK PAIN WERE I HAD HIP REPLANCED   • Migraine     h/o   • Mixed hyperlipidemia 2020   • PFO (patent foramen ovale)    • PONV (postoperative nausea and vomiting)     preprocedural meds help and pt will need them    • Right shoulder pain    • Seizure (CMS/HCC) 2020   • Stroke (CMS/HCC)    • Wears glasses       Past Surgical History:   Procedure Laterality Date   • CARDIAC CATHETERIZATION     •  SECTION     • CHOLECYSTECTOMY     • COLONOSCOPY     • ENDOSCOPY     • FRACTURE SURGERY      left leg- hardware in place    • JOINT REPLACEMENT      TOTAL HIP REPLACEMENT   • KNEE ARTHROSCOPY Right     done twice   • PATELLA SURGERY Left     removed left knee cap   • REPLACEMENT TOTAL KNEE Right 2018    revision    • TOTAL HIP ARTHROPLASTY Left    • TOTAL HIP ARTHROPLASTY Right 2019    Procedure: DIRECT TOTAL HIP ARTHROPLASTY ANTERIOR RIGHT;  Surgeon: Mele Chatterjee MD;  Location: [facility];  Service: Orthopedics   • TUBAL ABDOMINAL LIGATION        Family History   Problem Relation Age of Onset   • Hypertension Mother    • Migraines Mother    • Thyroid disease Mother    • Hypertension Father    • COPD Father       Social History     Socioeconomic History   • Marital status:      Spouse name: Not on file   • Number of children: Not on file   • Years of education: Not on file   • Highest education level: Not on file   Occupational History   • 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   All other systems reviewed and are negative.      Objective:    LMP  (LMP Unknown)      Neurology Exam:    General apperance: NAD.     Mental status: Alert, awake and oriented to time place and person.    Recent and Remote memory: Intact.    Attention span and Concentration: Normal.     Language and Speech: Intact- No dysarthria.    Fluency, Naming , Repitition and Comprehension:  Intact    Cranial Nerves:   CN II: Visual fields are full. Intact. Fundi - Normal, No papillederma, Pupils - KAYLYN  CN III, IV and VI: Extraocular movements are intact. Normal saccades.   CN V: Facial sensation is intact.   CN VII: Muscles of facial expression reveal no asymmetry. Intact.   CN VIII: Hearing is intact. Whispered voice intact.   CN IX and X: Palate elevates symmetrically. Intact  CN XI: Shoulder shrug is intact.   CN XII: Tongue is midline without evidence of atrophy or fasciculation.       Motor:  Intact.    Gait: Walks with a limp and uses a cane due to limb length discrepancy       Assessment and Plan:  1. Seizure (CMS/HCC)  Patient could have had a seizure in the setting of sepsis.  Repeat EEG showed mild frontal slowing but no epileptiform activity.   She has now tapered and stopped Keppra  I have told her to let me know ASAP if she has any more episodes of confusion , loss of awareness or shaking     2.  RLS  With leg jerks  On Mirapex 0.75 mg and baclofen 10 mg nightly    Return in about 5 months (around 7/17/2021).       Sarai Chirinos MD

## 2021-02-18 DIAGNOSIS — N39.0 URINARY TRACT INFECTION WITHOUT HEMATURIA, SITE UNSPECIFIED: ICD-10-CM

## 2021-02-18 RX ORDER — NITROFURANTOIN 25; 75 MG/1; MG/1
100 CAPSULE ORAL 2 TIMES DAILY
Qty: 14 CAPSULE | Refills: 0 | OUTPATIENT
Start: 2021-02-18 | End: 2021-02-25

## 2021-02-18 NOTE — TELEPHONE ENCOUNTER
"She did an E-visit yesterday and this medication was sent to [facility]. This message is attached to prescription \"Virtual Provider If questions about Rx call \"  "

## 2021-02-18 NOTE — TELEPHONE ENCOUNTER
Caller: Astrid Sam    Relationship: Self    Best call back number: [phone]     Medication needed:   Requested Prescriptions     Pending Prescriptions Disp Refills   • nitrofurantoin, macrocrystal-monohydrate, (MACROBID) 100 MG capsule 14 capsule 0     Sig: Take 1 capsule by mouth 2 (Two) Times a Day for 7 days.       When do you need the refill by: ASAP    What details did the patient provide when requesting the medication: PATIENT STATES THAT SHE NEEDS THIS REFILLED.    Does the patient have less than a 3 day supply:  [x] Yes  [] No    What is the patient's preferred pharmacy: [facility]/PHARMACY #[id number] - [place], [place] - [address] [place] [address] AT [facility] 25 - [phone] [facility] [phone] FX

## 2021-03-05 RX ORDER — TRAZODONE HYDROCHLORIDE 100 MG/1
50-100 TABLET ORAL NIGHTLY
Qty: 90 TABLET | Refills: 1 | OUTPATIENT
Start: 2021-03-05

## 2021-03-19 DIAGNOSIS — G25.81 RLS (RESTLESS LEGS SYNDROME): ICD-10-CM

## 2021-03-19 RX ORDER — PRAMIPEXOLE DIHYDROCHLORIDE 0.25 MG/1
0.75 TABLET ORAL NIGHTLY
Qty: 90 TABLET | Refills: 2 | OUTPATIENT
Start: 2021-03-19

## 2021-03-26 DIAGNOSIS — G25.81 RLS (RESTLESS LEGS SYNDROME): ICD-10-CM

## 2021-03-26 RX ORDER — PRAMIPEXOLE DIHYDROCHLORIDE 0.25 MG/1
0.75 TABLET ORAL NIGHTLY
Qty: 90 TABLET | Refills: 2 | OUTPATIENT
Start: 2021-03-26

## 2021-03-29 DIAGNOSIS — K21.9 GERD WITHOUT ESOPHAGITIS: ICD-10-CM

## 2021-03-29 RX ORDER — PANTOPRAZOLE SODIUM 40 MG/1
40 TABLET, DELAYED RELEASE ORAL 2 TIMES DAILY
Qty: 90 TABLET | Refills: 3 | Status: SHIPPED | OUTPATIENT
Start: 2021-03-29 | End: 2021-10-17 | Stop reason: SDUPTHER

## 2021-03-30 DIAGNOSIS — G25.81 RLS (RESTLESS LEGS SYNDROME): ICD-10-CM

## 2021-03-30 RX ORDER — METHOCARBAMOL 750 MG/1
750 TABLET, FILM COATED ORAL 3 TIMES DAILY PRN
Qty: 90 TABLET | Refills: 2 | Status: SHIPPED | OUTPATIENT
Start: 2021-03-30 | End: 2021-06-10

## 2021-03-30 RX ORDER — PRAMIPEXOLE DIHYDROCHLORIDE 0.25 MG/1
0.75 TABLET ORAL NIGHTLY
Qty: 270 TABLET | Refills: 1 | Status: SHIPPED | OUTPATIENT
Start: 2021-03-30 | End: 2021-09-02 | Stop reason: SDUPTHER

## 2021-03-30 NOTE — TELEPHONE ENCOUNTER
Caller: Astrid Sam    Relationship: Self    Best call back number:[phone]    Medication needed:   Requested Prescriptions     Pending Prescriptions Disp Refills   • pramipexole (MIRAPEX) 0.25 MG tablet 270 tablet 1     Sig: Take 3 tablets by mouth Every Night. Take 2-3 hours prior to bedtime       When do you need the refill by: ASAP     What additional details did the patient provide when requesting the medication: PATIENT STATED THE REFILL WAS SENT TO [facility].  PATIENT WOULD LIKE TO  ASAP AT [facility]    Does the patient have less than a 3 day supply:  [x] Yes  [] No    What is the patient's preferred pharmacy: [facility]/PHARMACY #[id number] [place], [place] - [address] [place] [address] AT [facility] 21 - [phone] [facility] [phone] FX

## 2021-04-11 DIAGNOSIS — G43.119 INTRACTABLE MIGRAINE WITH AURA WITHOUT STATUS MIGRAINOSUS: ICD-10-CM

## 2021-04-12 RX ORDER — TRAZODONE HYDROCHLORIDE 100 MG/1
50-100 TABLET ORAL NIGHTLY
Qty: 90 TABLET | Refills: 1 | Status: SHIPPED | OUTPATIENT
Start: 2021-04-12 | End: 2021-12-02

## 2021-04-14 RX ORDER — AMOXICILLIN AND CLAVULANATE POTASSIUM 875; 125 MG/1; MG/1
1 TABLET, FILM COATED ORAL 2 TIMES DAILY
Qty: 20 TABLET | Refills: 0 | Status: SHIPPED | OUTPATIENT
Start: 2021-04-14 | End: 2021-04-24

## 2021-05-05 ENCOUNTER — OFFICE VISIT (OUTPATIENT)
Dept: PULMONOLOGY | Facility: CLINIC | Age: 56
End: 2021-05-05

## 2021-05-05 VITALS
OXYGEN SATURATION: 95 % | HEIGHT: 60 IN | DIASTOLIC BLOOD PRESSURE: 90 MMHG | TEMPERATURE: 97.1 F | SYSTOLIC BLOOD PRESSURE: 138 MMHG | BODY MASS INDEX: 33.18 KG/M2 | WEIGHT: 169 LBS | HEART RATE: 96 BPM

## 2021-05-05 DIAGNOSIS — J45.40 MODERATE PERSISTENT ASTHMA, UNSPECIFIED WHETHER COMPLICATED: ICD-10-CM

## 2021-05-05 DIAGNOSIS — R05.3 CHRONIC COUGH: Primary | ICD-10-CM

## 2021-05-05 DIAGNOSIS — K21.00 GASTROESOPHAGEAL REFLUX DISEASE WITH ESOPHAGITIS WITHOUT HEMORRHAGE: ICD-10-CM

## 2021-05-05 DIAGNOSIS — K22.719 BARRETT'S ESOPHAGUS WITH DYSPLASIA: ICD-10-CM

## 2021-05-05 PROCEDURE — 99215 OFFICE O/P EST HI 40 MIN: CPT | Performed by: INTERNAL MEDICINE

## 2021-05-05 RX ORDER — MONTELUKAST SODIUM 10 MG/1
10 TABLET ORAL NIGHTLY
Qty: 90 TABLET | Refills: 3 | Status: SHIPPED | OUTPATIENT
Start: 2021-05-05 | End: 2021-06-16

## 2021-05-05 RX ORDER — FLUTICASONE PROPIONATE 50 MCG
2 SPRAY, SUSPENSION (ML) NASAL DAILY
Qty: 16 G | Refills: 5 | Status: SHIPPED | OUTPATIENT
Start: 2021-05-05 | End: 2021-06-16

## 2021-05-05 NOTE — PROGRESS NOTES
Follow Up Office Note       Patient Name: Astrid Sam    Referring Physician: No ref. provider found    Chief Complaint:    Chief Complaint   Patient presents with   • Cough       History of Present Illness: Astrid Sam is a 55 y.o. female who is here today to follow-up care with Pulmonary.   Patient is a past medical history significant for hypothyroidism, allergic rhinitis, hyperlipidemia, PFO status post anterior atrial closure, CVA, asthma, Neil's esophagus and seizures.    She is here today for follow-up.  She continues on the Advair 500 mcg 1 puff twice daily she continues have a cough though is sporadic and can be worse at any given time.  She has not found any correlation.  She did undergo an EGD since her last visit showing signs of esophagitis in addition to Neil's esophagus.  She has been trying to do her best with dietary lifestyle modifications in addition to this she has been utilizing Protonix 40 mg daily.  She did have an infection near her clavicle had to be drained recently and she reported that she had a CT of the chest done which showed a nodule in the right lower lobe, but I do not have records of that.  She denies any fever, chills, nausea, or vomiting.    Review of Systems:   Review of Systems   Constitutional: Negative for chills, fatigue and fever.   HENT: Negative for congestion and voice change.    Eyes: Negative for blurred vision.   Respiratory: Positive for cough. Negative for shortness of breath and wheezing.    Cardiovascular: Negative for chest pain.   Skin: Negative for dry skin.   Hematological: Negative for adenopathy.   Psychiatric/Behavioral: Negative for agitation and depressed mood.       The following portions of the patient's history were reviewed and updated as appropriate: allergies, current medications, past family history, past medical history, past social history, past surgical history and problem list.    Physical Exam:  Vital Signs:   Vitals:    05/05/21  "1251   BP: 138/90   Pulse: 96   Temp: 97.1 °F (36.2 °C)   SpO2: 95%  Comment: resting at room air   Weight: 76.7 kg (169 lb)   Height: 152.4 cm (60\")       Physical Exam  Vitals and nursing note reviewed.   Constitutional:       General: She is not in acute distress.     Appearance: She is well-developed and normal weight. She is not ill-appearing or toxic-appearing.   HENT:      Head: Normocephalic and atraumatic.   Cardiovascular:      Rate and Rhythm: Normal rate and regular rhythm.      Pulses: Normal pulses.      Heart sounds: Normal heart sounds. No murmur heard.   No friction rub. No gallop.    Pulmonary:      Effort: Pulmonary effort is normal. No respiratory distress.      Breath sounds: Normal breath sounds. No wheezing, rhonchi or rales.   Musculoskeletal:      Right lower leg: No edema.      Left lower leg: No edema.   Skin:     General: Skin is warm and dry.   Neurological:      Mental Status: She is alert and oriented to person, place, and time.         Immunization History   Administered Date(s) Administered   • COVID-19 (PFIZER) 04/05/2021       Results Review:   - imaging from chest x-ray from November 4, 2020 shows no acute cardiopulmonary process, CT scan of the chest from August 22, 2020 showed some very mild groundglass interstitial changes in the upper lobes bilaterally, and otherwise unremarkable.  - Echo from 8/22/2020 showed an EF of 60%, with normal left ventricular systolic function, and no signs of a shunt.  - barium swallow report, which showed that the patient had mild gastroesophageal reflux to the thoracic inlet, with mild smooth luminal narrowing of the distal esophagus which may represent a stricture or spasm.  - pulmonary function testing from 12/21/2020 which showed no obstruction or restriction with a normal DLCO, although notable for significant air trapping  - I personally reviewed the patient's EGD pathology report from February 2021 which was negative for H. pylori, showed " signs of chronic inflammation, most notably in the distal esophagus there was signs of reflux esophagitis that was compatible with Neil's.  -I reviewed the patient's chart.    Assessment / Plan:   1. Chronic cough (Primary)  -At this point patient has 2 good reasons for chronic cough with asthma and Neil's esophagus.  I explained to her that we are currently treating the asthma and reflux as noted below I will likely go ahead and continue this but there could be a component of allergic rhinitis as that is not a common cause.  We discussed options of doing an empiric allergy regimen for 6 weeks to see if this helps improve her cough she was agreeable.  -I will start the patient on Flonase 2 puffs per nostril nightly x6 weeks, Singulair 10 mg nightly x6 weeks, a second generation antihistamine (loratidine, cetirizine, fexofenadine) daily x2 weeks, a decongestant (pseudoephedrine) x2 weeks, a first generation antihistamine (Benadryl) nightly x2 weeks, and Afrin (oxymetazoline) 2 to 3 sprays per nostril twice daily x48 hours.  I discussed with the patient the risk and benefits of these medications including but not limited to urinary retention, sleep disturbances, drowsiness and rebound rhinitis.  The patient verbalized understanding.  -I have requested the CT scan from [facility] to compare images.  There was no signs of a nodule on the CT scan that we did back in August 2020, think most likely if there is something there it would be from reflux that is led to a right lower lobe infiltrate which would not be uncommon but I will need to look at it for comparison.  I would be interested on seeing if the upper lobes still have the groundglass changes.  Explained to the patient that if they do and we are not getting any improvement then I would proceed with a bronchoscopy for transbronchial biopsies prior to any further treatment for reflux.    2. Moderate persistent asthma, unspecified whether complicated  -For her  asthma still want her to stay on the Advair 500 mcg 1 puff once daily until we get the cough under better control.    3. Gastroesophageal reflux disease with esophagitis without hemorrhage  4. Neil's esophagus with dysplasia  -I think reflux is playing with the major role in the patient's symptoms as consistent irritation from reflux could lead to the asthma worsening even though that we are treating the asthma to the best or possible ability if it will get the reflux under better control that will not see any significant improvement.  I counseled her on dietary and lifestyle modifications.  To give her printed on today's evaluation.  And a again instructed her on optimal use of a PPI.  I explained her that if she continues to have the problems and there is no improvement with the allergic rhinitis regimen, the asthma regimen and we may need surgery in the future.  -Patient should keep her appointment with Dr. Burt in GI in the fall.    5.  Lung nodule  -As noted above I am requesting the CT scan from [facility] to compare images.  Per her report she did have a right lower lobe nodule that was not present in August 2020.    Level of service justified based on 40 minutes spent in patient care on this date of service including, but not limited to: preparing to see the patient, obtaining and/or reviewing history, performing medically appropriate examination, ordering tests/medicine/procedures, independently interpreting results, documenting clinical information in EHR, and counseling/education of patient/family/caregiver. (Level 4 30-39 minutes; Level 5 40-54 minutes)    Follow Up:   Return in about 6 weeks (around 6/16/2021).       IWONA Alfaro,   Pulmonary and Critical Care Medicine  Note Electronically Signed    Please note that portions of this note may have been completed with a voice recognition program. Efforts were made to edit the dictations, but occasionally words are mistranscribed.

## 2021-05-08 DIAGNOSIS — E78.2 MIXED HYPERLIPIDEMIA: ICD-10-CM

## 2021-05-11 RX ORDER — PRAVASTATIN SODIUM 40 MG
TABLET ORAL
Qty: 90 TABLET | Refills: 1 | Status: SHIPPED | OUTPATIENT
Start: 2021-05-11 | End: 2021-07-29 | Stop reason: SDUPTHER

## 2021-05-12 DIAGNOSIS — Z00.6 EXAMINATION FOR NORMAL COMPARISON OR CONTROL IN CLINICAL RESEARCH: Primary | ICD-10-CM

## 2021-05-12 DIAGNOSIS — R91.1 LUNG NODULE: Primary | ICD-10-CM

## 2021-05-12 NOTE — PROGRESS NOTES
I reviewed the patient's CT scan from the outside hospital.  The CT of the chest actually was significantly improved compared to August 2020 with the upper lobe infiltrates resolved in addition to that there was a small nodule in the right lower lobe although it appeared to be more inflammatory in nature.  I informed her that we would repeat a scan in roughly 6 months, but no other acute findings were seen.  I would like her to go ahead and continue reflux precautions, treatment for asthma, and allergic rhinitis treatment as we discussed previously and I will follow her up on her regular scheduled appointment.    Electronically signed by Levi Alfaro DO, 05/12/21, 12:18 PM EDT.

## 2021-05-24 ENCOUNTER — OFFICE VISIT (OUTPATIENT)
Dept: FAMILY MEDICINE CLINIC | Facility: CLINIC | Age: 56
End: 2021-05-24

## 2021-05-24 VITALS
DIASTOLIC BLOOD PRESSURE: 82 MMHG | TEMPERATURE: 98.2 F | OXYGEN SATURATION: 98 % | HEART RATE: 104 BPM | RESPIRATION RATE: 20 BRPM | SYSTOLIC BLOOD PRESSURE: 118 MMHG | HEIGHT: 60 IN | BODY MASS INDEX: 32.83 KG/M2 | WEIGHT: 167.2 LBS

## 2021-05-24 DIAGNOSIS — J02.9 ACUTE PHARYNGITIS, UNSPECIFIED ETIOLOGY: Primary | ICD-10-CM

## 2021-05-24 DIAGNOSIS — R05.9 COUGH: ICD-10-CM

## 2021-05-24 LAB
EXPIRATION DATE: NORMAL
INTERNAL CONTROL: NORMAL
Lab: NORMAL
S PYO AG THROAT QL: NEGATIVE

## 2021-05-24 PROCEDURE — 87880 STREP A ASSAY W/OPTIC: CPT | Performed by: PHYSICIAN ASSISTANT

## 2021-05-24 PROCEDURE — 99213 OFFICE O/P EST LOW 20 MIN: CPT | Performed by: PHYSICIAN ASSISTANT

## 2021-05-24 RX ORDER — PREDNISONE 10 MG/1
TABLET ORAL
Qty: 21 EACH | Refills: 0 | Status: SHIPPED | OUTPATIENT
Start: 2021-05-24 | End: 2021-06-10

## 2021-05-24 RX ORDER — AMOXICILLIN AND CLAVULANATE POTASSIUM 875; 125 MG/1; MG/1
1 TABLET, FILM COATED ORAL 2 TIMES DAILY
Qty: 14 TABLET | Refills: 0 | Status: SHIPPED | OUTPATIENT
Start: 2021-05-24 | End: 2021-06-10

## 2021-05-24 RX ORDER — UPADACITINIB 15 MG/1
TABLET, EXTENDED RELEASE ORAL
COMMUNITY
Start: 2021-05-24 | End: 2021-07-29

## 2021-05-24 RX ORDER — BROMPHENIRAMINE MALEATE, PSEUDOEPHEDRINE HYDROCHLORIDE, AND DEXTROMETHORPHAN HYDROBROMIDE 2; 30; 10 MG/5ML; MG/5ML; MG/5ML
5 SYRUP ORAL 4 TIMES DAILY PRN
Qty: 150 ML | Refills: 0 | Status: SHIPPED | OUTPATIENT
Start: 2021-05-24 | End: 2021-06-10

## 2021-05-24 NOTE — PROGRESS NOTES
Subjective   Astrid Sam is a 55 y.o. female.     History of Present Illness   Patient presents with chief complaint of sore throat, hoarse voice,                                                                                                                   trouble swallowing, cough, and congestion x4 days  Symptoms are not improving.  Some sick contacts within the family.  No suspected contact with COVID-19.  Patient has received her Covid vaccine series.  Patient is currently not taking a RA medication.  Is about to start 1, however is aware not to start while she is acutely ill.  Notes that her joint pain and muscle pain have been worse lately.  Feels like she is having charley horse cramping.  Has blood work scheduled with rheumatology tomorrow.    The following portions of the patient's history were reviewed and updated as appropriate: allergies, current medications, past family history, past medical history, past social history, past surgical history and problem list.    Review of Systems   Constitutional: Positive for fatigue. Negative for chills, diaphoresis and fever.   HENT: Positive for congestion, sore throat, trouble swallowing and voice change. Negative for ear discharge, ear pain, hearing loss, nosebleeds, postnasal drip, sinus pressure and sneezing.    Eyes: Negative.    Respiratory: Positive for cough. Negative for chest tightness, shortness of breath and wheezing.    Cardiovascular: Negative.  Negative for chest pain, palpitations and leg swelling.   Gastrointestinal: Negative for abdominal pain, diarrhea, nausea and vomiting.   Genitourinary: Negative.  Negative for difficulty urinating, dysuria, flank pain, frequency, hematuria and urgency.   Musculoskeletal: Positive for arthralgias and myalgias.   Skin: Negative.  Negative for color change, pallor, rash and wound.   Neurological: Negative for headaches.       Objective    Blood pressure 118/82, pulse 104, temperature 98.2 °F (36.8 °C),  "resp. rate 20, height 152.4 cm (60\"), weight 75.8 kg (167 lb 3.2 oz), SpO2 98 %, not currently breastfeeding.     Physical Exam  Constitutional:       Appearance: Normal appearance.   HENT:      Right Ear: External ear normal. There is impacted cerumen.      Left Ear: External ear normal. There is impacted cerumen.      Mouth/Throat:      Pharynx: Posterior oropharyngeal erythema present. No oropharyngeal exudate.      Comments: Hoarse voice  Eyes:      Conjunctiva/sclera: Conjunctivae normal.   Cardiovascular:      Rate and Rhythm: Normal rate and regular rhythm.      Heart sounds: Normal heart sounds.   Pulmonary:      Effort: Pulmonary effort is normal.      Breath sounds: Normal breath sounds. No wheezing or rhonchi.      Comments: Hacking cough   Musculoskeletal:      Cervical back: Normal range of motion and neck supple.      Comments: Antalgic gait    Lymphadenopathy:      Cervical: No cervical adenopathy.   Neurological:      Mental Status: She is alert and oriented to person, place, and time.   Psychiatric:         Behavior: Behavior normal.         Thought Content: Thought content normal.         Judgment: Judgment normal.         Assessment/Plan   Diagnoses and all orders for this visit:    1. Acute pharyngitis, unspecified etiology (Primary)  -     POCT rapid strep A  -     amoxicillin-clavulanate (Augmentin) 875-125 MG per tablet; Take 1 tablet by mouth 2 (Two) Times a Day.  Dispense: 14 tablet; Refill: 0  -     predniSONE (DELTASONE) 10 MG (21) dose pack; Use as directed on package  Dispense: 21 each; Refill: 0    2. Cough  -     amoxicillin-clavulanate (Augmentin) 875-125 MG per tablet; Take 1 tablet by mouth 2 (Two) Times a Day.  Dispense: 14 tablet; Refill: 0  -     brompheniramine-pseudoephedrine-DM 30-2-10 MG/5ML syrup; Take 5 mL by mouth 4 (Four) Times a Day As Needed for Congestion or Cough.  Dispense: 150 mL; Refill: 0      Strep screen negative.  Patient aware.  Treatment as outlined in plan.  " Do not start new RA medication during treatment, patient voiced understanding..

## 2021-06-10 ENCOUNTER — OFFICE VISIT (OUTPATIENT)
Dept: FAMILY MEDICINE CLINIC | Facility: CLINIC | Age: 56
End: 2021-06-10

## 2021-06-10 VITALS
DIASTOLIC BLOOD PRESSURE: 76 MMHG | HEIGHT: 60 IN | OXYGEN SATURATION: 98 % | RESPIRATION RATE: 16 BRPM | WEIGHT: 169.6 LBS | BODY MASS INDEX: 33.3 KG/M2 | SYSTOLIC BLOOD PRESSURE: 118 MMHG | TEMPERATURE: 97.7 F | HEART RATE: 91 BPM

## 2021-06-10 DIAGNOSIS — E66.9 OBESITY (BMI 30.0-34.9): ICD-10-CM

## 2021-06-10 DIAGNOSIS — R63.5 WEIGHT GAIN: ICD-10-CM

## 2021-06-10 DIAGNOSIS — R25.2 MUSCLE CRAMPS: ICD-10-CM

## 2021-06-10 DIAGNOSIS — Z00.00 MEDICARE ANNUAL WELLNESS VISIT, INITIAL: ICD-10-CM

## 2021-06-10 DIAGNOSIS — E03.9 ACQUIRED HYPOTHYROIDISM: Primary | ICD-10-CM

## 2021-06-10 DIAGNOSIS — Z12.31 ENCOUNTER FOR SCREENING MAMMOGRAM FOR MALIGNANT NEOPLASM OF BREAST: ICD-10-CM

## 2021-06-10 PROCEDURE — 99396 PREV VISIT EST AGE 40-64: CPT | Performed by: FAMILY MEDICINE

## 2021-06-10 RX ORDER — PHENTERMINE HYDROCHLORIDE 37.5 MG/1
TABLET ORAL
Qty: 30 TABLET | Refills: 0 | Status: SHIPPED | OUTPATIENT
Start: 2021-06-10 | End: 2021-07-16

## 2021-06-10 NOTE — PATIENT INSTRUCTIONS
Go to the nearest ER or return to clinic if symptoms worsen, fever/chill develop    Medicare Wellness  Personal Prevention Plan of Service     Date of Office Visit:  06/10/2021  Encounter Provider:  Beckie Murphy DO  Place of Service:  [facility]  Patient Name: Astrid Sam  :  1965    As part of the Medicare Wellness portion of your visit today, we are providing you with this personalized preventive plan of services (PPPS). This plan is based upon recommendations of the [place] Preventive Services Task Force (USPSTF) and the Advisory Committee on Immunization Practices (ACIP).    This lists the preventive care services that should be considered, and provides dates of when you are due. Items listed as completed are up-to-date and do not require any further intervention.    Health Maintenance   Topic Date Due   • Pneumococcal Vaccine 0-64 (1 of 1 - PPSV23) Never done   • PAP SMEAR  Never done   • TDAP/TD VACCINES (1 - Tdap) 06/10/2022 (Originally 1984)   • ZOSTER VACCINE (1 of 2) 2022 (Originally 2014)   • INFLUENZA VACCINE  2021   • LIPID PANEL  2021   • MAMMOGRAM  2022   • ANNUAL WELLNESS VISIT  06/10/2022   • COLORECTAL CANCER SCREENING  2027   • HEPATITIS C SCREENING  Completed   • COVID-19 Vaccine  Completed       Orders Placed This Encounter   Procedures   • Mammo Screening Digital Tomosynthesis Bilateral With CAD     Order Specific Question:   Reason for Exam:     Answer:   screening   • TSH+Free T4     Order Specific Question:   Release to patient     Answer:   Immediate   • Comprehensive Metabolic Panel     Order Specific Question:   Release to patient     Answer:   Immediate   • Magnesium     Order Specific Question:   Release to patient     Answer:   Immediate   • CBC & Differential     Order Specific Question:   Manual Differential     Answer:   No       Return in about 1 year (around 6/10/2022) for Medicare  Wellness.

## 2021-06-10 NOTE — PROGRESS NOTES
The ABCs of the Annual Wellness Visit  Initial Medicare Wellness Visit    Chief Complaint   Patient presents with   • Annual Exam       Subjective   History of Present Illness:  Astrid Sam is a 55 y.o. female who presents for an Initial Medicare Wellness Visit.  She is having difficulty losing weight. Unable to lose weight, unable to exercise due to hip and lower extremity pain. She is still going to PT to treat leg pain. Requesting medication to help with weight loss. She tries to eat healthy overall, however when she is bored, finds herself eating. She is working on breaking this habit.     HEALTH RISK ASSESSMENT    Recent Hospitalizations:  No hospitalization(s) within the last year.    Current Medical Providers:  Patient Care Team:  Beckie Murphy DO as PCP - General (Family Medicine)    Smoking Status:  Social History     Tobacco Use   Smoking Status Never Smoker   Smokeless Tobacco Never Used       Alcohol Consumption:  Social History     Substance and Sexual Activity   Alcohol Use Yes    Comment: rare       Depression Screen:   PHQ-2/PHQ-9 Depression Screening 6/10/2021   Little interest or pleasure in doing things 0   Feeling down, depressed, or hopeless 0   Total Score 0       Fall Risk Screen:  YESSICAADI Fall Risk Assessment has not been completed.    Health Habits and Functional and Cognitive Screening:  Functional & Cognitive Status 6/10/2021   Do you have difficulty preparing food and eating? No   Do you have difficulty bathing yourself, getting dressed or grooming yourself? No   Do you have difficulty using the toilet? No   Do you have difficulty moving around from place to place? No   Do you have trouble with steps or getting out of a bed or a chair? Yes   Current Diet Well Balanced Diet   Dental Exam Up to date   Eye Exam Up to date   Exercise (times per week) 1 times per week   Current Exercise Activities Include Walking   Do you need help using the phone?  No   Are you deaf or do you have  serious difficulty hearing?  No   Do you need help with transportation? Yes   Do you need help shopping? No   Do you need help preparing meals?  No   Do you need help with housework?  No   Do you need help with laundry? No   Do you need help taking your medications? No   Do you need help managing money? No   Do you ever drive or ride in a car without wearing a seat belt? No   Have you felt unusual stress, anger or loneliness in the last month? No   Who do you live with? Spouse   If you need help, do you have trouble finding someone available to you? No   Have you been bothered in the last four weeks by sexual problems? No   Do you have difficulty concentrating, remembering or making decisions? No         Does the patient have evidence of cognitive impairment? No    Asprin use counseling:Taking ASA appropriately as indicated    Age-appropriate Screening Schedule:  Refer to the list below for future screening recommendations based on patient's age, sex and/or medical conditions. Orders for these recommended tests are listed in the plan section. The patient has been provided with a written plan.    Health Maintenance   Topic Date Due   • PAP SMEAR  Never done   • TDAP/TD VACCINES (1 - Tdap) 06/10/2022 (Originally 11/1/1984)   • ZOSTER VACCINE (1 of 2) 06/19/2022 (Originally 11/1/2014)   • INFLUENZA VACCINE  08/01/2021   • LIPID PANEL  08/22/2021   • MAMMOGRAM  02/20/2022          The following portions of the patient's history were reviewed and updated as appropriate: allergies, current medications, past family history, past medical history, past social history, past surgical history and problem list.    Outpatient Medications Prior to Visit   Medication Sig Dispense Refill   • aspirin 81 MG chewable tablet Chew 1 tablet Daily.     • escitalopram (LEXAPRO) 20 MG tablet Take 1 tablet by mouth Daily. 90 tablet 1   • fluticasone (Flonase) 50 MCG/ACT nasal spray 2 sprays into the nostril(s) as directed by provider Daily.  16 g 5   • fluticasone-salmeterol (Advair Diskus) 500-50 MCG/DOSE DISKUS Inhale 1 puff 2 (Two) Times a Day. 60 each 11   • levothyroxine (SYNTHROID, LEVOTHROID) 25 MCG tablet Take 1 tablet by mouth Daily. 90 tablet 1   • montelukast (SINGULAIR) 10 MG tablet Take 1 tablet by mouth Every Night. 90 tablet 3   • oxybutynin XL (DITROPAN-XL) 10 MG 24 hr tablet Take 10 mg by mouth Daily.     • pantoprazole (PROTONIX) 40 MG EC tablet Take 1 tablet by mouth 2 (Two) Times a Day. 90 tablet 3   • pramipexole (MIRAPEX) 0.25 MG tablet Take 3 tablets by mouth Every Night. Take 2-3 hours prior to bedtime 270 tablet 1   • pravastatin (PRAVACHOL) 40 MG tablet TAKE 1 TABLET BY MOUTH EVERY DAY AT NIGHT 90 tablet 1   • Probiotic Product (PROBIOTIC-10 PO) Take  by mouth.     • Rinvoq 15 MG tablet sustained-release 24 hour      • traZODone (DESYREL) 100 MG tablet Take 0.5-1 tablets by mouth Every Night. 90 tablet 1   • ubrogepant (ubrogepant) 100 MG tablet Take 1 tablet by mouth 1 (One) Time As Needed (migraine) for up to 1 dose. Can repeat dose x 1 after 2 hours if headache not resolved 10 tablet 2   • amoxicillin-clavulanate (Augmentin) 875-125 MG per tablet Take 1 tablet by mouth 2 (Two) Times a Day. 14 tablet 0   • brompheniramine-pseudoephedrine-DM 30-2-10 MG/5ML syrup Take 5 mL by mouth 4 (Four) Times a Day As Needed for Congestion or Cough. 150 mL 0   • methocarbamol (ROBAXIN) 750 MG tablet Take 1 tablet by mouth 3 (Three) Times a Day As Needed for Muscle Spasms. 90 tablet 2   • predniSONE (DELTASONE) 10 MG (21) dose pack Use as directed on package 21 each 0     No facility-administered medications prior to visit.       Patient Active Problem List   Diagnosis   • Migraine   • Insomnia   • Iron deficiency anemia   • Left shoulder pain   • Cervicalgia   • Cervical radicular pain   • Diarrhea   • Irritability   • Acquired hypothyroidism   • Fever blister   • Right hip pain   • Arthritis of right hip   • Status post total replacement  "of right hip   • Acute postoperative pain   • Acute blood loss anemia   • Acute urinary retention   • Urge incontinence of urine   • Mixed hyperlipidemia   • Acute metabolic encephalopathy   • Hypokalemia   • History of osteomyelitis   • Dysphagia   • Seizure (CMS/HCC)       Advanced Care Planning:  ACP discussion was held with the patient during this visit. Patient does not have an advance directive, information provided.    Review of Systems   Constitutional: Positive for unexpected weight change (gain).   Respiratory: Positive for cough (chronic). Negative for shortness of breath and wheezing.    Cardiovascular: Negative.    Musculoskeletal: Positive for arthralgias and gait problem.       Compared to one year ago, the patient feels her physical health is the same.  Compared to one year ago, the patient feels her mental health is the same.    Reviewed chart for potential of high risk medication in the elderly: yes  Reviewed chart for potential of harmful drug interactions in the elderly:yes    Objective         Vitals:    06/10/21 1109   BP: 118/76   Pulse: 91   Resp: 16   Temp: 97.7 °F (36.5 °C)   TempSrc: Infrared   SpO2: 98%   Weight: 76.9 kg (169 lb 9.6 oz)   Height: 152.4 cm (60\")       Body mass index is 33.12 kg/m².  Discussed the patient's BMI with her. The BMI is above average; BMI management plan is completed.    Physical Exam  Vitals and nursing note reviewed.   Constitutional:       Appearance: She is well-developed. She is obese.   HENT:      Head: Normocephalic and atraumatic.      Right Ear: External ear normal.      Left Ear: External ear normal.      Nose: Nose normal.   Eyes:      Conjunctiva/sclera: Conjunctivae normal.   Cardiovascular:      Rate and Rhythm: Normal rate and regular rhythm.      Heart sounds: Normal heart sounds. No murmur heard.     Pulmonary:      Effort: Pulmonary effort is normal.      Breath sounds: Normal breath sounds. No wheezing or rhonchi.   Musculoskeletal:         " General: No deformity.      Cervical back: Neck supple.   Skin:     General: Skin is warm and dry.   Neurological:      Mental Status: She is alert and oriented to person, place, and time.   Psychiatric:         Mood and Affect: Mood normal.         Behavior: Behavior normal.               Assessment/Plan   Medicare Risks and Personalized Health Plan  CMS Preventative Services Quick Reference  Advance Directive Discussion  Breast Cancer/Mammogram Screening  Immunizations Discussed/Encouraged (specific immunizations; Tdap, Pneumococcal 23 and Shingrix )  Inactivity/Sedentary  Obesity/Overweight     The above risks/problems have been discussed with the patient.  Pertinent information has been shared with the patient in the After Visit Summary.  Follow up plans and orders are seen below in the Assessment/Plan Section.    Diagnoses and all orders for this visit:    1. Acquired hypothyroidism (Primary)  -     TSH+Free T4    2. Muscle cramps  -     CBC & Differential  -     Comprehensive Metabolic Panel  -     Magnesium    3. Weight gain  -     phentermine (ADIPEX-P) 37.5 MG tablet; Take 1/2 tab po qAM x 4 days, then increase to 1 tab po qAM  Dispense: 30 tablet; Refill: 0  -     TSH+Free T4  -     Comprehensive Metabolic Panel  -     Magnesium    4. Obesity (BMI 30.0-34.9)  -     phentermine (ADIPEX-P) 37.5 MG tablet; Take 1/2 tab po qAM x 4 days, then increase to 1 tab po qAM  Dispense: 30 tablet; Refill: 0  -     CBC & Differential  -     Comprehensive Metabolic Panel    5. Encounter for screening mammogram for malignant neoplasm of breast  -     Mammo Screening Digital Tomosynthesis Bilateral With CAD  -     CBC & Differential  -     Comprehensive Metabolic Panel    6. BMI 33.0-33.9,adult  -     phentermine (ADIPEX-P) 37.5 MG tablet; Take 1/2 tab po qAM x 4 days, then increase to 1 tab po qAM  Dispense: 30 tablet; Refill: 0  -     CBC & Differential  -     Comprehensive Metabolic Panel    7. Medicare annual wellness  visit, initial  -     Mammo Screening Digital Tomosynthesis Bilateral With CAD  -     CBC & Differential  -     TSH+Free T4  -     Comprehensive Metabolic Panel  -     Magnesium    Health advice: healthy food choices with fresh fruits and vegetables, maintain sleep pattern at least 8 hours, avoid texting and distracted driving practices; wear safety belt, engage in regular exercise, maintain healthy weight, use safe sex practices, avoid alcohol and illicit drugs. Maintain immunizations that are up to date. Maintain health maintenance: Mammogram, Pap, etc.  Follow up with PCP if struggling with depression or anxiety. Keep regular dental and eye exams. Brush and floss teeth daily.   F/U annually and prn.       Follow Up:  Return in about 1 year (around 6/10/2022) for Medicare Wellness.     An After Visit Summary and PPPS were given to the patient.

## 2021-06-14 ENCOUNTER — LAB (OUTPATIENT)
Dept: FAMILY MEDICINE CLINIC | Facility: CLINIC | Age: 56
End: 2021-06-14

## 2021-06-14 DIAGNOSIS — E03.9 ACQUIRED HYPOTHYROIDISM: ICD-10-CM

## 2021-06-14 RX ORDER — LEVOTHYROXINE SODIUM 0.03 MG/1
25 TABLET ORAL DAILY
Qty: 90 TABLET | Refills: 1 | Status: SHIPPED | OUTPATIENT
Start: 2021-06-14 | End: 2021-12-27

## 2021-06-15 LAB
ALBUMIN SERPL-MCNC: 4.9 G/DL (ref 3.8–4.9)
ALBUMIN/GLOB SERPL: 2 {RATIO} (ref 1.2–2.2)
ALP SERPL-CCNC: 104 IU/L (ref 48–121)
ALT SERPL-CCNC: 21 IU/L (ref 0–32)
AST SERPL-CCNC: 25 IU/L (ref 0–40)
BASOPHILS # BLD AUTO: 0 X10E3/UL (ref 0–0.2)
BASOPHILS NFR BLD AUTO: 1 %
BILIRUB SERPL-MCNC: 1 MG/DL (ref 0–1.2)
BUN SERPL-MCNC: 20 MG/DL (ref 6–24)
BUN/CREAT SERPL: 19 (ref 9–23)
CALCIUM SERPL-MCNC: 9.7 MG/DL (ref 8.7–10.2)
CHLORIDE SERPL-SCNC: 104 MMOL/L (ref 96–106)
CO2 SERPL-SCNC: 22 MMOL/L (ref 20–29)
CREAT SERPL-MCNC: 1.07 MG/DL (ref 0.57–1)
EOSINOPHIL # BLD AUTO: 0.3 X10E3/UL (ref 0–0.4)
EOSINOPHIL NFR BLD AUTO: 3 %
ERYTHROCYTE [DISTWIDTH] IN BLOOD BY AUTOMATED COUNT: 15.2 % (ref 11.7–15.4)
GLOBULIN SER CALC-MCNC: 2.4 G/DL (ref 1.5–4.5)
GLUCOSE SERPL-MCNC: 112 MG/DL (ref 65–99)
HCT VFR BLD AUTO: 40.5 % (ref 34–46.6)
HGB BLD-MCNC: 13.2 G/DL (ref 11.1–15.9)
IMM GRANULOCYTES # BLD AUTO: 0.1 X10E3/UL (ref 0–0.1)
IMM GRANULOCYTES NFR BLD AUTO: 1 %
LYMPHOCYTES # BLD AUTO: 1.4 X10E3/UL (ref 0.7–3.1)
LYMPHOCYTES NFR BLD AUTO: 17 %
MAGNESIUM SERPL-MCNC: 2.2 MG/DL (ref 1.6–2.3)
MCH RBC QN AUTO: 30.1 PG (ref 26.6–33)
MCHC RBC AUTO-ENTMCNC: 32.6 G/DL (ref 31.5–35.7)
MCV RBC AUTO: 92 FL (ref 79–97)
MONOCYTES # BLD AUTO: 0.5 X10E3/UL (ref 0.1–0.9)
MONOCYTES NFR BLD AUTO: 6 %
NEUTROPHILS # BLD AUTO: 5.8 X10E3/UL (ref 1.4–7)
NEUTROPHILS NFR BLD AUTO: 72 %
PLATELET # BLD AUTO: 317 X10E3/UL (ref 150–450)
POTASSIUM SERPL-SCNC: 4.2 MMOL/L (ref 3.5–5.2)
PROT SERPL-MCNC: 7.3 G/DL (ref 6–8.5)
RBC # BLD AUTO: 4.39 X10E6/UL (ref 3.77–5.28)
SODIUM SERPL-SCNC: 142 MMOL/L (ref 134–144)
T4 FREE SERPL-MCNC: 1.24 NG/DL (ref 0.82–1.77)
TSH SERPL DL<=0.005 MIU/L-ACNC: 1.84 UIU/ML (ref 0.45–4.5)
WBC # BLD AUTO: 8 X10E3/UL (ref 3.4–10.8)

## 2021-06-16 ENCOUNTER — OFFICE VISIT (OUTPATIENT)
Dept: PULMONOLOGY | Facility: CLINIC | Age: 56
End: 2021-06-16

## 2021-06-16 VITALS
HEART RATE: 77 BPM | OXYGEN SATURATION: 95 % | DIASTOLIC BLOOD PRESSURE: 68 MMHG | SYSTOLIC BLOOD PRESSURE: 118 MMHG | WEIGHT: 165 LBS | TEMPERATURE: 98.4 F | BODY MASS INDEX: 32.39 KG/M2 | HEIGHT: 60 IN

## 2021-06-16 DIAGNOSIS — R05.3 CHRONIC COUGH: Primary | ICD-10-CM

## 2021-06-16 DIAGNOSIS — J45.40 MODERATE PERSISTENT ASTHMA, UNSPECIFIED WHETHER COMPLICATED: ICD-10-CM

## 2021-06-16 DIAGNOSIS — R91.1 LUNG NODULE: ICD-10-CM

## 2021-06-16 DIAGNOSIS — K22.719 BARRETT'S ESOPHAGUS WITH DYSPLASIA: ICD-10-CM

## 2021-06-16 PROCEDURE — 86003 ALLG SPEC IGE CRUDE XTRC EA: CPT | Performed by: INTERNAL MEDICINE

## 2021-06-16 PROCEDURE — 83520 IMMUNOASSAY QUANT NOS NONAB: CPT | Performed by: INTERNAL MEDICINE

## 2021-06-16 PROCEDURE — 86256 FLUORESCENT ANTIBODY TITER: CPT | Performed by: INTERNAL MEDICINE

## 2021-06-16 PROCEDURE — 82785 ASSAY OF IGE: CPT | Performed by: INTERNAL MEDICINE

## 2021-06-16 PROCEDURE — 99214 OFFICE O/P EST MOD 30 MIN: CPT | Performed by: INTERNAL MEDICINE

## 2021-06-16 RX ORDER — ALBUTEROL SULFATE 90 UG/1
2 AEROSOL, METERED RESPIRATORY (INHALATION) EVERY 4 HOURS PRN
Qty: 6.7 G | Refills: 11 | Status: SHIPPED | OUTPATIENT
Start: 2021-06-16 | End: 2021-09-12 | Stop reason: SDUPTHER

## 2021-06-16 RX ORDER — PREDNISONE 10 MG/1
TABLET ORAL
Qty: 31 TABLET | Refills: 0 | Status: SHIPPED | OUTPATIENT
Start: 2021-06-16 | End: 2021-07-16

## 2021-06-16 NOTE — PROGRESS NOTES
"Follow Up Office Note       Patient Name: Astrid Sam    Referring Physician: No ref. provider found    Chief Complaint:    Chief Complaint   Patient presents with   • Cough     f/u       History of Present Illness: Astrid Sam is a 55 y.o. female who is here today to follow-up care with Pulmonary.  Patient is a past medical history significant for hypothyroidism, allergic rhinitis, hyperlipidemia, PFO status post anterior atrial closure, CVA, asthma, Neil's esophagus and seizures.  Patient is here today for follow-up.  Continues to have a chronic cough nonproductive in nature worse throughout the day and at night.  This is despite empiric allergy regimen without any benefit.  She continues on reflux precautions.  Continues to take a PPI daily basis she also continues on Advair 500 mcg 1 puff once daily without any significant benefit.  She also found no benefit with Singulair as well.    Review of Systems:   Review of Systems   Constitutional: Negative for chills, fatigue and fever.   HENT: Negative for congestion and voice change.    Eyes: Negative for blurred vision.   Respiratory: Positive for cough. Negative for shortness of breath and wheezing.    Cardiovascular: Negative for chest pain.   Skin: Negative for dry skin.   Hematological: Negative for adenopathy.   Psychiatric/Behavioral: Negative for agitation and depressed mood.       The following portions of the patient's history were reviewed and updated as appropriate: allergies, current medications, past family history, past medical history, past social history, past surgical history and problem list.    Physical Exam:  Vital Signs:   Vitals:    06/16/21 0957   BP: 118/68   BP Location: Right arm   Patient Position: Sitting   Cuff Size: Adult   Pulse: 77   Temp: 98.4 °F (36.9 °C)   TempSrc: Temporal   SpO2: 95%  Comment: room air, resting   Weight: 74.8 kg (165 lb)   Height: 152.4 cm (60\")       Physical Exam  Vitals and nursing note reviewed. "   Constitutional:       General: She is not in acute distress.     Appearance: She is well-developed and normal weight. She is not ill-appearing or toxic-appearing.   HENT:      Head: Normocephalic and atraumatic.   Cardiovascular:      Rate and Rhythm: Normal rate and regular rhythm.      Pulses: Normal pulses.      Heart sounds: Normal heart sounds. No murmur heard.   No friction rub. No gallop.    Pulmonary:      Effort: Pulmonary effort is normal. No respiratory distress.      Breath sounds: Normal breath sounds. No wheezing, rhonchi or rales.   Musculoskeletal:      Right lower leg: No edema.      Left lower leg: No edema.   Skin:     General: Skin is warm and dry.   Neurological:      Mental Status: She is alert and oriented to person, place, and time.         Immunization History   Administered Date(s) Administered   • COVID-19 (PFIZER) 04/05/2021, 05/03/2021       Results Review:   -I personally viewed the patient's CT scan from May 2021 which showed significant improvement in the upper lobe infiltrates compared to August 2020, she was noted to have a small nodule in the right lower lobe.   - imaging from chest x-ray from November 4, 2020 shows no acute cardiopulmonary process, CT scan of the chest from August 22, 2020 showed some very mild groundglass interstitial changes in the upper lobes bilaterally, and otherwise unremarkable.  - Echo from 8/22/2020 showed an EF of 60%, with normal left ventricular systolic function, and no signs of a shunt.  - barium swallow report, which showed that the patient had mild gastroesophageal reflux to the thoracic inlet, with mild smooth luminal narrowing of the distal esophagus which may represent a stricture or spasm.  - pulmonary function testing from 12/21/2020 which showed no obstruction or restriction with a normal DLCO, although notable for significant air trapping  - EGD pathology report from February 2021 which was negative for H. pylori, showed signs of chronic  inflammation, most notably in the distal esophagus there was signs of reflux esophagitis that was compatible with Neil's.  -I reviewed the patient's chart.    Assessment / Plan:   1. Chronic cough (Primary)  2. Moderate persistent asthma, unspecified whether complicated  3. Neil's esophagus with dysplasia  4. Lung nodule  -I think the patient's cough is likely multifactorial combination of asthma and reflux from Neil's esophagus.  I want her going continue on the Advair 500 mcg 1 puff twice daily and albuterol to be used on an as-needed basis.  I will also send off because this was secondary asthma as with labs as listed below.  She has already had a CBC with differential 2 days ago showing an elevated eosinophil count of 300 which would qualify her for Fasenra or Nucala.  I explained to her that did not see signs of rheumatoid arthritis affecting the lung, and no obvious drug causes of the patient's cough either.  I am going to do a round of steroids for the next 2 weeks I will slowly taper to see if this helps calm down the cough especially from an asthmatic standpoint.  After the steroids have been completed and the labs are back I will bring her back and likely start biologic therapy at her next visit.  -Patient has a known lung nodule in the right lower lobe, 5 mm groundglass in nature, repeat CT scan has already been ordered.    Labs ordered today:  -     Allergens, Zone 8  -     ANCA Panel  -     Aspergillus Fumigatus IgE  -     IgE    Follow Up:   Return in about 4 weeks (around 7/14/2021).       IWONA Alfaro, DO  Pulmonary and Critical Care Medicine  Note Electronically Signed    Please note that portions of this note may have been completed with a voice recognition program. Efforts were made to edit the dictations, but occasionally words are mistranscribed.

## 2021-06-17 ENCOUNTER — TELEPHONE (OUTPATIENT)
Dept: FAMILY MEDICINE CLINIC | Facility: CLINIC | Age: 56
End: 2021-06-17

## 2021-06-17 LAB
HBA1C MFR BLD: 5.4 % (ref 4.8–5.6)
Lab: NORMAL
WRITTEN AUTHORIZATION: NORMAL

## 2021-06-17 NOTE — TELEPHONE ENCOUNTER
Pt takes an asprin every day, she wanted to know if she needs to discontinue this due to the kidney function being elevated?

## 2021-06-19 LAB
C-ANCA TITR SER IF: NORMAL TITER
MYELOPEROXIDASE AB SER IA-ACNC: <9 U/ML (ref 0–9)
P-ANCA ATYPICAL TITR SER IF: NORMAL TITER
P-ANCA TITR SER IF: NORMAL TITER
PROTEINASE3 AB SER IA-ACNC: <3.5 U/ML (ref 0–3.5)

## 2021-06-23 LAB
A ALTERNATA IGE QN: <0.1 KU/L
A FUMIGATUS IGE QN: <0.1 KU/L
AMER ROACH IGE QN: <0.1 KU/L
BAHIA GRASS IGE QN: <0.1 KU/L
BERMUDA GRASS IGE QN: <0.1 KU/L
BOXELDER IGE QN: <0.1 KU/L
C HERBARUM IGE QN: <0.1 KU/L
CAT DANDER IGE QN: <0.1 KU/L
CMN PIGWEED IGE QN: <0.1 KU/L
COMMON RAGWEED IGE QN: <0.1 KU/L
CONV CLASS DESCRIPTION: NORMAL
D FARINAE IGE QN: <0.1 KU/L
D PTERONYSS IGE QN: <0.1 KU/L
DOG DANDER IGE QN: <0.1 KU/L
ENGL PLANTAIN IGE QN: <0.1 KU/L
HAZELNUT POLN IGE QN: <0.1 KU/L
JOHNSON GRASS IGE QN: <0.1 KU/L
KENT BLUE GRASS IGE QN: <0.1 KU/L
LONDON PLANE IGE QN: <0.1 KU/L
M RACEMOSUS IGE QN: <0.1 KU/L
MT JUNIPER IGE QN: <0.1 KU/L
MUGWORT IGE QN: <0.1 KU/L
NETTLE IGE QN: <0.1 KU/L
P NOTATUM IGE QN: <0.1 KU/L
S BOTRYOSUM IGE QN: <0.1 KU/L
SHEEP SORREL IGE QN: <0.1 KU/L
SWEET GUM IGE QN: <0.1 KU/L
WHITE ELM IGE QN: <0.1 KU/L
WHITE HICKORY IGE QN: <0.1 KU/L
WHITE MULBERRY IGE QN: <0.1 KU/L
WHITE OAK IGE QN: <0.1 KU/L

## 2021-06-24 LAB
A FUMIGATUS IGE QN: <0.1 KU/L
IGE SERPL-ACNC: 8 IU/ML (ref 6–495)

## 2021-06-28 DIAGNOSIS — B37.0 ORAL CANDIDA: Primary | ICD-10-CM

## 2021-07-09 ENCOUNTER — HOSPITAL ENCOUNTER (OUTPATIENT)
Dept: MAMMOGRAPHY | Facility: HOSPITAL | Age: 56
End: 2021-07-09

## 2021-07-15 ENCOUNTER — APPOINTMENT (OUTPATIENT)
Dept: OTHER | Facility: HOSPITAL | Age: 56
End: 2021-07-15

## 2021-07-15 ENCOUNTER — LAB (OUTPATIENT)
Dept: LAB | Facility: HOSPITAL | Age: 56
End: 2021-07-15

## 2021-07-15 ENCOUNTER — TRANSCRIBE ORDERS (OUTPATIENT)
Dept: LAB | Facility: HOSPITAL | Age: 56
End: 2021-07-15

## 2021-07-15 ENCOUNTER — TELEPHONE (OUTPATIENT)
Dept: FAMILY MEDICINE CLINIC | Facility: CLINIC | Age: 56
End: 2021-07-15

## 2021-07-15 ENCOUNTER — HOSPITAL ENCOUNTER (OUTPATIENT)
Dept: MAMMOGRAPHY | Facility: HOSPITAL | Age: 56
Discharge: HOME | End: 2021-07-15
Admitting: FAMILY MEDICINE

## 2021-07-15 DIAGNOSIS — E55.9 AVITAMINOSIS D: ICD-10-CM

## 2021-07-15 DIAGNOSIS — Z92.89 H/O MAMMOGRAM: ICD-10-CM

## 2021-07-15 DIAGNOSIS — E58 CALCIUM DEFICIENCY: ICD-10-CM

## 2021-07-15 DIAGNOSIS — Z00.00 MEDICARE ANNUAL WELLNESS VISIT, INITIAL: ICD-10-CM

## 2021-07-15 DIAGNOSIS — M81.0 SENILE OSTEOPOROSIS: Primary | ICD-10-CM

## 2021-07-15 DIAGNOSIS — M81.0 SENILE OSTEOPOROSIS: ICD-10-CM

## 2021-07-15 DIAGNOSIS — Z12.31 ENCOUNTER FOR SCREENING MAMMOGRAM FOR MALIGNANT NEOPLASM OF BREAST: ICD-10-CM

## 2021-07-15 LAB
25(OH)D3 SERPL-MCNC: 34.5 NG/ML
ALBUMIN SERPL-MCNC: 4.5 G/DL (ref 3.5–5.2)
ALP SERPL-CCNC: 70 U/L (ref 39–117)
ALT SERPL W P-5'-P-CCNC: 18 U/L (ref 1–33)
AST SERPL-CCNC: 21 U/L (ref 1–32)
BILIRUB CONJ SERPL-MCNC: 0.2 MG/DL (ref 0–0.3)
BILIRUB INDIRECT SERPL-MCNC: 0.6 MG/DL
BILIRUB SERPL-MCNC: 0.8 MG/DL (ref 0–1.2)
CALCIUM SPEC-SCNC: 9.4 MG/DL (ref 8.6–10.5)
CREAT SERPL-MCNC: 0.8 MG/DL (ref 0.57–1)
DEPRECATED RDW RBC AUTO: 53.2 FL (ref 37–54)
ERYTHROCYTE [DISTWIDTH] IN BLOOD BY AUTOMATED COUNT: 15.9 % (ref 12.3–15.4)
GFR SERPL CREATININE-BSD FRML MDRD: 74 ML/MIN/1.73
HCT VFR BLD AUTO: 36.6 % (ref 34–46.6)
HGB BLD-MCNC: 12.3 G/DL (ref 12–15.9)
MCH RBC QN AUTO: 30.7 PG (ref 26.6–33)
MCHC RBC AUTO-ENTMCNC: 33.6 G/DL (ref 31.5–35.7)
MCV RBC AUTO: 91.3 FL (ref 79–97)
PHOSPHATE SERPL-MCNC: 4.4 MG/DL (ref 2.5–4.5)
PLATELET # BLD AUTO: 265 10*3/MM3 (ref 140–450)
PMV BLD AUTO: 9.5 FL (ref 6–12)
PROT SERPL-MCNC: 6.8 G/DL (ref 6–8.5)
PTH-INTACT SERPL-MCNC: 53.9 PG/ML (ref 15–65)
RBC # BLD AUTO: 4.01 10*6/MM3 (ref 3.77–5.28)
WBC # BLD AUTO: 5.41 10*3/MM3 (ref 3.4–10.8)

## 2021-07-15 PROCEDURE — 82310 ASSAY OF CALCIUM: CPT

## 2021-07-15 PROCEDURE — 77067 SCR MAMMO BI INCL CAD: CPT

## 2021-07-15 PROCEDURE — 77063 BREAST TOMOSYNTHESIS BI: CPT | Performed by: RADIOLOGY

## 2021-07-15 PROCEDURE — 84100 ASSAY OF PHOSPHORUS: CPT

## 2021-07-15 PROCEDURE — 77063 BREAST TOMOSYNTHESIS BI: CPT

## 2021-07-15 PROCEDURE — 82306 VITAMIN D 25 HYDROXY: CPT | Performed by: ORTHOPAEDIC SURGERY

## 2021-07-15 PROCEDURE — 83970 ASSAY OF PARATHORMONE: CPT

## 2021-07-15 PROCEDURE — 36415 COLL VENOUS BLD VENIPUNCTURE: CPT | Performed by: ORTHOPAEDIC SURGERY

## 2021-07-15 PROCEDURE — 85027 COMPLETE CBC AUTOMATED: CPT | Performed by: ORTHOPAEDIC SURGERY

## 2021-07-15 PROCEDURE — 80076 HEPATIC FUNCTION PANEL: CPT

## 2021-07-15 PROCEDURE — 82565 ASSAY OF CREATININE: CPT

## 2021-07-15 PROCEDURE — 77067 SCR MAMMO BI INCL CAD: CPT | Performed by: RADIOLOGY

## 2021-07-15 NOTE — TELEPHONE ENCOUNTER
It's difficult to say. Please schedule appt to view in person, likely needs to be evaluated with labs, too.

## 2021-07-15 NOTE — TELEPHONE ENCOUNTER
----- Message from Brittney Beaver LPN sent at 7/14/2021  7:54 AM EDT -----  Regarding: FW: Non-Urgent Medical Question  Contact: [phone]    ----- Message -----  From: Astrid Sam  Sent: 7/14/2021   7:45 AM EDT  To: [facility] Clinical Pool  Subject: Non-Urgent Medical Question                      Good morning Dr. Murphy,    I've got these coming up on my arms as you can see in pictures above.  I called the Infection  Disease doctor on this and they told me to go to my PCP doctor to have them look at it.  I was wondering do you have any idea what may be causing this  or do I need to come on and see you. Thank you for your time and I look forward to hearing from you.     Doris Sam   [phone]

## 2021-07-16 ENCOUNTER — OFFICE VISIT (OUTPATIENT)
Dept: FAMILY MEDICINE CLINIC | Facility: CLINIC | Age: 56
End: 2021-07-16

## 2021-07-16 VITALS
HEIGHT: 60 IN | OXYGEN SATURATION: 94 % | TEMPERATURE: 98.4 F | HEART RATE: 102 BPM | DIASTOLIC BLOOD PRESSURE: 82 MMHG | RESPIRATION RATE: 18 BRPM | WEIGHT: 159 LBS | BODY MASS INDEX: 31.22 KG/M2 | SYSTOLIC BLOOD PRESSURE: 112 MMHG

## 2021-07-16 DIAGNOSIS — K08.89 PAIN, DENTAL: ICD-10-CM

## 2021-07-16 DIAGNOSIS — R63.5 WEIGHT GAIN: ICD-10-CM

## 2021-07-16 DIAGNOSIS — R23.3 PETECHIAL RASH: Primary | ICD-10-CM

## 2021-07-16 DIAGNOSIS — E66.9 OBESITY (BMI 30.0-34.9): ICD-10-CM

## 2021-07-16 PROCEDURE — 99213 OFFICE O/P EST LOW 20 MIN: CPT | Performed by: FAMILY MEDICINE

## 2021-07-16 RX ORDER — PHENTERMINE HYDROCHLORIDE 37.5 MG/1
TABLET ORAL
Qty: 30 TABLET | Refills: 0 | OUTPATIENT
Start: 2021-07-16

## 2021-07-16 RX ORDER — AMOXICILLIN AND CLAVULANATE POTASSIUM 875; 125 MG/1; MG/1
1 TABLET, FILM COATED ORAL 2 TIMES DAILY
Qty: 14 TABLET | Refills: 0 | Status: SHIPPED | OUTPATIENT
Start: 2021-07-16 | End: 2021-07-23

## 2021-07-16 NOTE — PROGRESS NOTES
"Chief Complaint  Little red dots on L forearm (x 1mo or so, doesn't itch )    Subjective          Astrid Sam presents to [facility]  Rash  This is a recurrent problem. The current episode started more than 1 month ago (1 month). The affected locations include the left arm and right arm. The rash is characterized by redness. She was exposed to nothing. Pertinent negatives include no congestion or cough. Past treatments include nothing. The treatment provided no relief.   She does take ASA 81 mg daily.   Rash has been intermittent since last year, after she was admitted to hospital with AMS  CBC completed yesterday, normal platelet count    She also has dental pain, thinks it is an abscess. Her dentist isn't in office today. She will contact them to schedule an appointment.     The following portions of the patient's history were reviewed and updated as appropriate: allergies, current medications, past family history, past medical history, past social history, past surgical history and problem list.    Objective   Vital Signs:   /82   Pulse 102   Temp 98.4 °F (36.9 °C)   Resp 18   Ht 152.4 cm (60\")   Wt 72.1 kg (159 lb)   SpO2 94%   BMI 31.05 kg/m²     Physical Exam  Vitals and nursing note reviewed.   Constitutional:       Appearance: She is well-developed.   HENT:      Head: Normocephalic and atraumatic.      Right Ear: External ear normal.      Left Ear: External ear normal.      Nose: Nose normal.   Eyes:      Conjunctiva/sclera: Conjunctivae normal.   Cardiovascular:      Heart sounds: No murmur heard.     Pulmonary:      Effort: Pulmonary effort is normal. No respiratory distress.   Musculoskeletal:         General: No deformity.   Skin:     General: Skin is warm and dry.      Findings: Petechiae (bilateral forearms) present.   Neurological:      Mental Status: She is alert and oriented to person, place, and time.   Psychiatric:         Behavior: Behavior " normal.        Result Review :                 Assessment and Plan    Diagnoses and all orders for this visit:    1. Petechial rash (Primary)  -     ANCA Panel  -     Sedimentation Rate  -     C-reactive Protein  -     YUSEF by IFA, Reflex 9-biomarkers profile  -     Vitamin K1  -     Protime-INR    2. Pain, dental  -     amoxicillin-clavulanate (Augmentin) 875-125 MG per tablet; Take 1 tablet by mouth 2 (Two) Times a Day for 7 days.  Dispense: 14 tablet; Refill: 0    Labs completed to evaluate petechiae.   Augmentin to treat dental pain likely related to abscess.  Advised her to schedule appointment with dentist for further treatment.    Follow Up   Return if symptoms worsen or fail to improve.  Patient was given instructions and counseling regarding her condition or for health maintenance advice. Please see specific information pulled into the AVS if appropriate.

## 2021-07-16 NOTE — PATIENT INSTRUCTIONS
Rash, Adult    A rash is a change in the color of your skin. A rash can also change the way your skin feels. There are many different conditions and factors that can cause a rash.  Follow these instructions at home:  The goal of treatment is to stop the itching and keep the rash from spreading. Watch for any changes in your symptoms. Let your doctor know about them. Follow these instructions to help with your condition:  Medicine  Take or apply over-the-counter and prescription medicines only as told by your doctor. These may include medicines:  · To treat red or swollen skin (corticosteroid creams).  · To treat itching.  · To treat an allergy (oral antihistamines).  · To treat very bad symptoms (oral corticosteroids).    Skin care  · Put cool cloths (compresses) on the affected areas.  · Do not scratch or rub your skin.  · Avoid covering the rash. Make sure that the rash is exposed to air as much as possible.  Managing itching and discomfort  · Avoid hot showers or baths. These can make itching worse. A cold shower may help.  · Try taking a bath with:  ? Epsom salts. You can get these at your local pharmacy or grocery store. Follow the instructions on the package.  ? Baking soda. Pour a small amount into the bath as told by your doctor.  ? Colloidal oatmeal. You can get this at your local pharmacy or grocery store. Follow the instructions on the package.  · Try putting baking soda paste onto your skin. Stir water into baking soda until it gets like a paste.  · Try putting on a lotion that relieves itchiness (calamine lotion).  · Keep cool and out of the sun. Sweating and being hot can make itching worse.  General instructions    · Rest as needed.  · Drink enough fluid to keep your pee (urine) pale yellow.  · Wear loose-fitting clothing.  · Avoid scented soaps, detergents, and perfumes. Use gentle soaps, detergents, perfumes, and other cosmetic products.  · Avoid anything that causes your rash. Keep a journal to  "help track what causes your rash. Write down:  ? What you eat.  ? What cosmetic products you use.  ? What you drink.  ? What you wear. This includes jewelry.  · Keep all follow-up visits as told by your doctor. This is important.  Contact a doctor if:  · You sweat at night.  · You lose weight.  · You pee (urinate) more than normal.  · You pee less than normal, or you notice that your pee is a darker color than normal.  · You feel weak.  · You throw up (vomit).  · Your skin or the whites of your eyes look yellow (jaundice).  · Your skin:  ? Tingles.  ? Is numb.  · Your rash:  ? Does not go away after a few days.  ? Gets worse.  · You are:  ? More thirsty than normal.  ? More tired than normal.  · You have:  ? New symptoms.  ? Pain in your belly (abdomen).  ? A fever.  ? Watery poop (diarrhea).  Get help right away if:  · You have a fever and your symptoms suddenly get worse.  · You start to feel mixed up (confused).  · You have a very bad headache or a stiff neck.  · You have very bad joint pains or stiffness.  · You have jerky movements that you cannot control (seizure).  · Your rash covers all or most of your body. The rash may or may not be painful.  · You have blisters that:  ? Are on top of the rash.  ? Grow larger.  ? Grow together.  ? Are painful.  ? Are inside your nose or mouth.  · You have a rash that:  ? Looks like purple pinprick-sized spots all over your body.  ? Has a \"bull's eye\" or looks like a target.  ? Is red and painful, causes your skin to peel, and is not from being in the sun too long.  Summary  · A rash is a change in the color of your skin. A rash can also change the way your skin feels.  · The goal of treatment is to stop the itching and keep the rash from spreading.  · Take or apply over-the-counter and prescription medicines only as told by your doctor.  · Contact a doctor if you have new symptoms or symptoms that get worse.  · Keep all follow-up visits as told by your doctor. This is " important.  This information is not intended to replace advice given to you by your health care provider. Make sure you discuss any questions you have with your health care provider.  Document Revised: 04/10/2020 Document Reviewed: 07/22/2019  Elsevier Patient Education © 2021 [employer]

## 2021-07-20 LAB
ANA SPECKLED TITR SER: NORMAL {TITER}
ANA TITR SER IF: POSITIVE {TITER}
C-ANCA TITR SER IF: NORMAL TITER
CENTROMERE B AB SER-ACNC: <0.2 AI (ref 0–0.9)
CHROMATIN AB SERPL-ACNC: <0.2 AI (ref 0–0.9)
CRP SERPL-MCNC: 0.36 MG/DL (ref 0–0.5)
DSDNA AB SER-ACNC: <1 IU/ML (ref 0–9)
ENA JO1 AB SER-ACNC: <0.2 AI (ref 0–0.9)
ENA RNP AB SER-ACNC: 0.7 AI (ref 0–0.9)
ENA SCL70 AB SER-ACNC: <0.2 AI (ref 0–0.9)
ENA SM AB SER-ACNC: <0.2 AI (ref 0–0.9)
ENA SS-A AB SER-ACNC: <0.2 AI (ref 0–0.9)
ENA SS-B AB SER-ACNC: <0.2 AI (ref 0–0.9)
ERYTHROCYTE [SEDIMENTATION RATE] IN BLOOD BY WESTERGREN METHOD: 8 MM/HR (ref 0–30)
INR PPP: 1.06 (ref 0.85–1.16)
LABORATORY COMMENT REPORT: ABNORMAL
Lab: NORMAL
Lab: NORMAL
MYELOPEROXIDASE AB SER IA-ACNC: <9 U/ML (ref 0–9)
P-ANCA ATYPICAL TITR SER IF: NORMAL TITER
P-ANCA TITR SER IF: NORMAL TITER
PHYTONADIONE SERPL-MCNC: 0.2 NG/ML (ref 0.1–2.2)
PROTEINASE3 AB SER IA-ACNC: <3.5 U/ML (ref 0–3.5)
PROTHROMBIN TIME: 13.5 SECONDS (ref 11.4–14.4)

## 2021-07-22 DIAGNOSIS — R76.8 ANA POSITIVE: ICD-10-CM

## 2021-07-22 DIAGNOSIS — R23.3 PETECHIAE: Primary | ICD-10-CM

## 2021-07-28 ENCOUNTER — OFFICE VISIT (OUTPATIENT)
Dept: PULMONOLOGY | Facility: CLINIC | Age: 56
End: 2021-07-28

## 2021-07-28 VITALS
DIASTOLIC BLOOD PRESSURE: 82 MMHG | HEART RATE: 95 BPM | BODY MASS INDEX: 30.82 KG/M2 | SYSTOLIC BLOOD PRESSURE: 128 MMHG | HEIGHT: 60 IN | TEMPERATURE: 97.5 F | WEIGHT: 157 LBS | OXYGEN SATURATION: 95 %

## 2021-07-28 DIAGNOSIS — K22.719 BARRETT'S ESOPHAGUS WITH DYSPLASIA: ICD-10-CM

## 2021-07-28 DIAGNOSIS — J45.40 MODERATE PERSISTENT ASTHMA, UNSPECIFIED WHETHER COMPLICATED: Primary | ICD-10-CM

## 2021-07-28 DIAGNOSIS — R05.3 CHRONIC COUGH: ICD-10-CM

## 2021-07-28 DIAGNOSIS — R91.1 LUNG NODULE: ICD-10-CM

## 2021-07-28 PROCEDURE — 99214 OFFICE O/P EST MOD 30 MIN: CPT | Performed by: INTERNAL MEDICINE

## 2021-07-28 RX ORDER — ERGOCALCIFEROL (VITAMIN D2) 10 MCG
400 TABLET ORAL DAILY
COMMUNITY
End: 2021-12-10

## 2021-07-28 NOTE — PROGRESS NOTES
"Follow Up Office Note       Patient Name: Astrid Sam    Referring Physician: No ref. provider found    Chief Complaint:    Chief Complaint   Patient presents with   • Cough       History of Present Illness: Astrid Sam is a 55 y.o. female who is here today to follow-up care with Pulmonary.  Patient is a past medical history significant for hypothyroidism, allergic rhinitis, hyperlipidemia, PFO status post anterior atrial closure, CVA, asthma, Neil's esophagus and seizures.  Patient is here today for follow-up.  The patient states that she continues to have cough although it is actually quite a bit better at this point.  She did get thrush since her last visit and had a round of fluconazole but now has now resolved.  She is doing her best to control her reflux, but it is quite difficult for her.  She is taking her medications as prescribed.  She denies any chest pain, nausea, fever, or chills.  She continue to use the albuterol daily.  She has no other acute complaints.    Review of Systems:   Review of Systems   Constitutional: Negative for chills, fatigue and fever.   HENT: Negative for congestion and voice change.    Eyes: Negative for blurred vision.   Respiratory: Positive for cough. Negative for shortness of breath and wheezing.    Cardiovascular: Negative for chest pain.   Skin: Negative for dry skin.   Hematological: Negative for adenopathy.   Psychiatric/Behavioral: Negative for agitation and depressed mood.       The following portions of the patient's history were reviewed and updated as appropriate: allergies, current medications, past family history, past medical history, past social history, past surgical history and problem list.    Physical Exam:  Vital Signs:   Vitals:    07/28/21 1109   BP: 128/82   Pulse: 95   Temp: 97.5 °F (36.4 °C)   SpO2: 95%   Weight: 71.2 kg (157 lb)   Height: 152.4 cm (60\")       Physical Exam  Vitals and nursing note reviewed.   Constitutional:       " General: She is not in acute distress.     Appearance: She is well-developed and normal weight. She is not ill-appearing or toxic-appearing.   HENT:      Head: Normocephalic and atraumatic.   Cardiovascular:      Rate and Rhythm: Normal rate and regular rhythm.      Pulses: Normal pulses.      Heart sounds: Normal heart sounds. No murmur heard.   No friction rub. No gallop.    Pulmonary:      Effort: Pulmonary effort is normal. No respiratory distress.      Breath sounds: Normal breath sounds. No wheezing, rhonchi or rales.   Musculoskeletal:      Right lower leg: No edema.      Left lower leg: No edema.   Skin:     General: Skin is warm and dry.   Neurological:      Mental Status: She is alert and oriented to person, place, and time.         Immunization History   Administered Date(s) Administered   • COVID-19 (PFIZER) 04/05/2021, 05/03/2021       Results Review:   - CT scan from May 2021 which showed significant improvement in the upper lobe infiltrates compared to August 2020, she was noted to have a small nodule in the right lower lobe.              - imaging from chest x-ray from November 4, 2020 shows no acute cardiopulmonary process, CT scan of the chest from August 22, 2020 showed some very mild groundglass interstitial changes in the upper lobes bilaterally, and otherwise unremarkable.  - Echo from 8/22/2020 showed an EF of 60%, with normal left ventricular systolic function, and no signs of a shunt.  - barium swallow report, which showed that the patient had mild gastroesophageal reflux to the thoracic inlet, with mild smooth luminal narrowing of the distal esophagus which may represent a stricture or spasm.  - pulmonary function testing from 12/21/2020 which showed no obstruction or restriction with a normal DLCO, although notable for significant air trapping  - EGD pathology report from February 2021 which was negative for H. pylori, showed signs of chronic inflammation, most notably in the distal  esophagus there was signs of reflux esophagitis that was compatible with Neil's.    Assessment / Plan:   1. Moderate persistent asthma, unspecified whether complicated (Primary)  2. Chronic cough  3. Neil's esophagus with dysplasia  -With regards to the patient's asthma I want her to continue on the Advair in addition to albuterol.  I did discuss with her proper inhaler technique I offered to switch her to a different LABA/ICS combination inhaler but she did not want one at this time.  I think chronic cough is likely a combination of reflux and asthma.  Her CT scans previously showing the room and infiltrates are consistent with somebody who could be aspirating.  I highly recommend continuing her PPI in addition to lifestyle changes, she informed that she has implemented many lifestyle changes which have made a significant difference.  I do not think surgery is indicated at this time.  - I am also going to start the patient on Fasenra.  I did review her labs recently from June 2021 and it was notable for a CBC with differential of 200, otherwise IgE, allergens, and Aspergillus were all negative.    4. Lung nodule  -Patient has a known lung nodule in the right lower lobe, as noted before a CT scan is already been ordered for follow-up.    Follow Up:   Return in about 4 months (around 11/28/2021).       IWONA Alfaro, DO  Pulmonary and Critical Care Medicine  Note Electronically Signed    Please note that portions of this note may have been completed with a voice recognition program. Efforts were made to edit the dictations, but occasionally words are mistranscribed.

## 2021-07-29 ENCOUNTER — TELEMEDICINE (OUTPATIENT)
Dept: FAMILY MEDICINE CLINIC | Facility: CLINIC | Age: 56
End: 2021-07-29

## 2021-07-29 VITALS
TEMPERATURE: 98 F | HEART RATE: 76 BPM | RESPIRATION RATE: 18 BRPM | WEIGHT: 157 LBS | BODY MASS INDEX: 30.82 KG/M2 | OXYGEN SATURATION: 98 % | HEIGHT: 60 IN | DIASTOLIC BLOOD PRESSURE: 70 MMHG | SYSTOLIC BLOOD PRESSURE: 102 MMHG

## 2021-07-29 DIAGNOSIS — R35.0 URINARY FREQUENCY: ICD-10-CM

## 2021-07-29 DIAGNOSIS — E78.2 MIXED HYPERLIPIDEMIA: ICD-10-CM

## 2021-07-29 DIAGNOSIS — N30.00 ACUTE CYSTITIS WITHOUT HEMATURIA: Primary | ICD-10-CM

## 2021-07-29 LAB
BILIRUB BLD-MCNC: ABNORMAL MG/DL
CLARITY, POC: CLEAR
COLOR UR: YELLOW
GLUCOSE UR STRIP-MCNC: NEGATIVE MG/DL
KETONES UR QL: NEGATIVE
LEUKOCYTE EST, POC: ABNORMAL
NITRITE UR-MCNC: NEGATIVE MG/ML
PH UR: 6 [PH] (ref 5–8)
PROT UR STRIP-MCNC: ABNORMAL MG/DL
RBC # UR STRIP: NEGATIVE /UL
SP GR UR: 1.03 (ref 1–1.03)
UROBILINOGEN UR QL: NORMAL

## 2021-07-29 PROCEDURE — 99213 OFFICE O/P EST LOW 20 MIN: CPT | Performed by: FAMILY MEDICINE

## 2021-07-29 PROCEDURE — 81002 URINALYSIS NONAUTO W/O SCOPE: CPT | Performed by: FAMILY MEDICINE

## 2021-07-29 RX ORDER — SULFAMETHOXAZOLE AND TRIMETHOPRIM 800; 160 MG/1; MG/1
1 TABLET ORAL 2 TIMES DAILY
Qty: 14 TABLET | Refills: 0 | Status: SHIPPED | OUTPATIENT
Start: 2021-07-29 | End: 2021-08-11

## 2021-07-29 RX ORDER — PRAVASTATIN SODIUM 40 MG
40 TABLET ORAL
Qty: 90 TABLET | Refills: 1 | Status: SHIPPED | OUTPATIENT
Start: 2021-07-29 | End: 2022-04-19

## 2021-07-29 NOTE — PROGRESS NOTES
"Chief Complaint  Back pain-R side x 3-4d (feels better when sitting down) and Urinary Frequency w/ odor    Subjective          Astrid Sam presents to [facility]  Urinary Tract Infection   This is a new problem. The current episode started in the past 7 days. The quality of the pain is described as aching. The pain is moderate. There has been no fever. Associated symptoms include flank pain (right sided), frequency and urgency. Pertinent negatives include no hematuria. She has tried nothing for the symptoms. The treatment provided no relief. There is no history of kidney stones.       The following portions of the patient's history were reviewed and updated as appropriate: allergies, current medications, past family history, past medical history, past social history, past surgical history and problem list.    Objective   Vital Signs:   /70   Pulse 76   Temp 98 °F (36.7 °C)   Resp 18   Ht 152.4 cm (60\")   Wt 71.2 kg (157 lb)   SpO2 98%   BMI 30.66 kg/m²     Physical Exam  Vitals and nursing note reviewed.   Constitutional:       Appearance: She is well-developed.   HENT:      Head: Normocephalic and atraumatic.      Right Ear: External ear normal.      Left Ear: External ear normal.      Nose: Nose normal.   Eyes:      Conjunctiva/sclera: Conjunctivae normal.   Pulmonary:      Effort: Pulmonary effort is normal. No respiratory distress.   Abdominal:      Tenderness: There is right CVA tenderness.   Skin:     General: Skin is warm.   Neurological:      Mental Status: She is alert and oriented to person, place, and time.        Result Review :                 Assessment and Plan    Diagnoses and all orders for this visit:    1. Acute cystitis without hematuria (Primary)  -     sulfamethoxazole-trimethoprim (Bactrim DS) 800-160 MG per tablet; Take 1 tablet by mouth 2 (Two) Times a Day.  Dispense: 14 tablet; Refill: 0    2. Urinary frequency  -     POC Urinalysis " Dipstick  -     Urine Culture - , Urine, Clean Catch  -     sulfamethoxazole-trimethoprim (Bactrim DS) 800-160 MG per tablet; Take 1 tablet by mouth 2 (Two) Times a Day.  Dispense: 14 tablet; Refill: 0    UA shows leukocytes, will culture. Start Bactrim for treatment.   If symptoms worsen, go to ED for evaluation and r/o kidney stones.     Follow Up   No follow-ups on file.  Patient was given instructions and counseling regarding her condition or for health maintenance advice. Please see specific information pulled into the AVS if appropriate.

## 2021-08-02 LAB
BACTERIA UR CULT: ABNORMAL
BACTERIA UR CULT: ABNORMAL
OTHER ANTIBIOTIC SUSC ISLT: ABNORMAL

## 2021-08-03 ENCOUNTER — TELEPHONE (OUTPATIENT)
Dept: PULMONOLOGY | Facility: CLINIC | Age: 56
End: 2021-08-03

## 2021-08-03 ENCOUNTER — TELEPHONE (OUTPATIENT)
Dept: FAMILY MEDICINE CLINIC | Facility: CLINIC | Age: 56
End: 2021-08-03

## 2021-08-03 DIAGNOSIS — J45.50 SEVERE PERSISTENT ASTHMA WITHOUT COMPLICATION: Primary | ICD-10-CM

## 2021-08-03 RX ORDER — EPINEPHRINE 0.3 MG/.3ML
0.3 INJECTION SUBCUTANEOUS AS NEEDED
Qty: 1 EACH | Refills: 1 | Status: SHIPPED | OUTPATIENT
Start: 2021-08-03 | End: 2022-04-02 | Stop reason: HOSPADM

## 2021-08-03 NOTE — TELEPHONE ENCOUNTER
PATIENT STATES SHE NEEDS THE COPY OF WELLNESS AND  MAMMOGRAM VISITS FOR PROOF FOR HER INSURANCE. PATIENT WOULD LIKE THESE MAILED TO HER AS SOON AS POSSIBLE TO HER ADDRESS ON FILE.     CALL BACK [phone]

## 2021-08-03 NOTE — TELEPHONE ENCOUNTER
Approval received from [facility] for Fasenra Pen 7/30/21 good through 12/31/21. Script sent to [facility] for loading and maintenance doses. Epi pen sent to local pharmacy.    Called patient and she is concerned that PA was through [facility] and not [place]. Will send PA comment to Shellie in [facility] before proceeding.

## 2021-08-09 ENCOUNTER — TELEPHONE (OUTPATIENT)
Dept: FAMILY MEDICINE CLINIC | Facility: CLINIC | Age: 56
End: 2021-08-09

## 2021-08-09 DIAGNOSIS — R10.9 FLANK PAIN: Primary | ICD-10-CM

## 2021-08-09 NOTE — TELEPHONE ENCOUNTER
Regarding: Non-Urgent Medical Question  Contact: [phone]  ----- Message from Patricia Messer sent at 8/9/2021  7:47 AM EDT -----       ----- Message from Astrid Sam to Beckie Murphy DO sent at 8/9/2021  3:10 AM -----   Good morning,     My back is still hurting.  Is there anyway that you can see me to get the Xrays? I really don't want to go to the hospital and get charged a 300.00 copay. Im sorry about having to ask you to do this but its hurting and  nothing I take seems to help it anymore.  Thank you for your time and I look forward to hearing from you MACIE Sam  [phone]

## 2021-08-10 ENCOUNTER — DOCUMENTATION (OUTPATIENT)
Dept: PULMONOLOGY | Facility: CLINIC | Age: 56
End: 2021-08-10

## 2021-08-10 ENCOUNTER — TELEPHONE (OUTPATIENT)
Dept: FAMILY MEDICINE CLINIC | Facility: CLINIC | Age: 56
End: 2021-08-10

## 2021-08-10 ENCOUNTER — HOSPITAL ENCOUNTER (OUTPATIENT)
Dept: CT IMAGING | Facility: HOSPITAL | Age: 56
Discharge: HOME | End: 2021-08-10
Admitting: FAMILY MEDICINE

## 2021-08-10 DIAGNOSIS — M54.50 LOW BACK PAIN WITHOUT SCIATICA, UNSPECIFIED BACK PAIN LATERALITY, UNSPECIFIED CHRONICITY: Primary | ICD-10-CM

## 2021-08-10 PROCEDURE — 74176 CT ABD & PELVIS W/O CONTRAST: CPT

## 2021-08-10 NOTE — PROGRESS NOTES
"Received PA approval through [facility] with [employer] which is patient's primary insurance through her . Authorized 8/3/21 to 1/30/22. [employer] insurance is under \"[employer]is\" [employer], while secondary [employer] is \"Astrid\" [employer].     Called [facility]. They have scripts for Fasenra loading and maintenance. Called [employer] [facility] to cancel (since approved thru secondary insurance).     Called patient with update. Numbers to [facility] and [facility] provided. She will call to set up apt for initial injection/teaching once delivery date is set and bring med with her. She already has her Epi pen available.   "

## 2021-08-10 NOTE — TELEPHONE ENCOUNTER
Caller: Astrid Sam    Relationship: Self    Best call back number: [phone]     Caller requesting test results: PATIENT    What test was performed: CT SCAN    When was the test performed: TODAY    Where was the test performed: [facility]

## 2021-08-10 NOTE — TELEPHONE ENCOUNTER
Informed pt. She asked what can she do for this? Has tired PT and its not better. What can she take that would be best?     Aware it would be tomorrow before she would here anything

## 2021-08-10 NOTE — TELEPHONE ENCOUNTER
Only preliminary read available. There is no kidney stone present. Degenerative changes of spine and pelvis are present (normal wear and tear).

## 2021-08-11 ENCOUNTER — TELEPHONE (OUTPATIENT)
Dept: FAMILY MEDICINE CLINIC | Facility: CLINIC | Age: 56
End: 2021-08-11

## 2021-08-11 ENCOUNTER — PATIENT MESSAGE (OUTPATIENT)
Dept: FAMILY MEDICINE CLINIC | Facility: CLINIC | Age: 56
End: 2021-08-11

## 2021-08-11 RX ORDER — BACLOFEN 10 MG/1
10 TABLET ORAL 3 TIMES DAILY PRN
Qty: 30 TABLET | Refills: 0 | Status: SHIPPED | OUTPATIENT
Start: 2021-08-11 | End: 2021-08-29 | Stop reason: SDUPTHER

## 2021-08-11 RX ORDER — METHYLPREDNISOLONE 4 MG/1
TABLET ORAL
Qty: 21 EACH | Refills: 0 | Status: SHIPPED | OUTPATIENT
Start: 2021-08-11 | End: 2021-10-18

## 2021-08-11 NOTE — TELEPHONE ENCOUNTER
From: Astrid Sam  To: Beckie Murphy DO  Sent: 8/11/2021 9:23 AM EDT  Subject: Test Results Question    Can you please let me know what this mean. Thank you     Doris Sam   [phone]

## 2021-08-11 NOTE — TELEPHONE ENCOUNTER
PATIENT IS REQUESTING A CALL BACK ASAP TO DISCUSS RESULTS OF CT SCAN.    CALL BACK NUMBER [phone]

## 2021-08-11 NOTE — TELEPHONE ENCOUNTER
Has she completed imaging of lumbar spine? How long ago did she do physical therapy?   I will send a muscle relaxer to pharmacy for her to try.

## 2021-08-11 NOTE — TELEPHONE ENCOUNTER
Spoke w/ pt and she does not remember ever having any imaging of lumbar area. She has done PT at [facility] previously.  She has already picked up the muscle relaxer and took it twice, not really helping. She is not looking for pain medicine just the cause of her pain. (her words)

## 2021-08-11 NOTE — TELEPHONE ENCOUNTER
Caller: Astrid Sam    Relationship: Self    Best call back number: [phone]    Caller requesting test results: YES    What test was performed: CT SCAN    When was the test performed: 8/10/21    Additional notes: PATIENT WOULD LIKE TO KNOW IF HER RESULTS ARE IN AS SHE IS IN A LOT OF PAIN

## 2021-08-12 ENCOUNTER — HOSPITAL ENCOUNTER (OUTPATIENT)
Dept: GENERAL RADIOLOGY | Facility: HOSPITAL | Age: 56
Discharge: HOME | End: 2021-08-12
Admitting: FAMILY MEDICINE

## 2021-08-12 DIAGNOSIS — M54.50 LOW BACK PAIN WITHOUT SCIATICA, UNSPECIFIED BACK PAIN LATERALITY, UNSPECIFIED CHRONICITY: ICD-10-CM

## 2021-08-12 PROCEDURE — 72110 X-RAY EXAM L-2 SPINE 4/>VWS: CPT

## 2021-08-17 DIAGNOSIS — M54.50 LOW BACK PAIN WITHOUT SCIATICA, UNSPECIFIED BACK PAIN LATERALITY, UNSPECIFIED CHRONICITY: Primary | ICD-10-CM

## 2021-08-18 ENCOUNTER — TELEPHONE (OUTPATIENT)
Dept: PULMONOLOGY | Facility: CLINIC | Age: 56
End: 2021-08-18

## 2021-08-18 NOTE — TELEPHONE ENCOUNTER
Called patient to see if she received delivery date from [facility]. She has yet to call pharmacy or  [facility]. (see note 8/10/21)     She will call to set up apt for initial injection/teaching once delivery date is set and bring med with her. She already has her Epi pen available.

## 2021-08-25 ENCOUNTER — TELEPHONE (OUTPATIENT)
Dept: PULMONOLOGY | Facility: CLINIC | Age: 56
End: 2021-08-25

## 2021-08-25 NOTE — TELEPHONE ENCOUNTER
Patient to receive Fasenra delivery 8/26/21 from [facility] and will call office back to schedule apt for initial injection/teaching. Epi Pen already sent to local pharmacy as well.

## 2021-08-27 ENCOUNTER — CLINICAL SUPPORT (OUTPATIENT)
Dept: PULMONOLOGY | Facility: CLINIC | Age: 56
End: 2021-08-27

## 2021-08-27 DIAGNOSIS — J45.40 MODERATE PERSISTENT ASTHMA, UNSPECIFIED WHETHER COMPLICATED: Primary | ICD-10-CM

## 2021-08-27 PROCEDURE — 96372 THER/PROPH/DIAG INJ SC/IM: CPT | Performed by: INTERNAL MEDICINE

## 2021-08-29 DIAGNOSIS — M54.50 LOW BACK PAIN WITHOUT SCIATICA, UNSPECIFIED BACK PAIN LATERALITY, UNSPECIFIED CHRONICITY: ICD-10-CM

## 2021-08-30 RX ORDER — BACLOFEN 10 MG/1
10 TABLET ORAL 3 TIMES DAILY PRN
Qty: 30 TABLET | Refills: 0 | Status: SHIPPED | OUTPATIENT
Start: 2021-08-30 | End: 2021-10-04

## 2021-09-02 DIAGNOSIS — R45.4 IRRITABILITY: ICD-10-CM

## 2021-09-02 DIAGNOSIS — G43.119 INTRACTABLE MIGRAINE WITH AURA WITHOUT STATUS MIGRAINOSUS: ICD-10-CM

## 2021-09-02 DIAGNOSIS — G25.81 RLS (RESTLESS LEGS SYNDROME): ICD-10-CM

## 2021-09-02 RX ORDER — ESCITALOPRAM OXALATE 20 MG/1
20 TABLET ORAL DAILY
Qty: 90 TABLET | Refills: 1 | Status: SHIPPED | OUTPATIENT
Start: 2021-09-02 | End: 2021-12-27

## 2021-09-02 RX ORDER — PRAMIPEXOLE DIHYDROCHLORIDE 0.25 MG/1
0.75 TABLET ORAL NIGHTLY
Qty: 270 TABLET | Refills: 1 | Status: SHIPPED | OUTPATIENT
Start: 2021-09-02 | End: 2021-09-12 | Stop reason: SDUPTHER

## 2021-09-12 DIAGNOSIS — G25.81 RLS (RESTLESS LEGS SYNDROME): ICD-10-CM

## 2021-09-12 DIAGNOSIS — J45.40 MODERATE PERSISTENT ASTHMA, UNSPECIFIED WHETHER COMPLICATED: ICD-10-CM

## 2021-09-13 RX ORDER — PRAMIPEXOLE DIHYDROCHLORIDE 0.25 MG/1
0.75 TABLET ORAL NIGHTLY
Qty: 270 TABLET | Refills: 1 | Status: SHIPPED | OUTPATIENT
Start: 2021-09-13 | End: 2022-07-11 | Stop reason: SDUPTHER

## 2021-09-13 RX ORDER — ALBUTEROL SULFATE 90 UG/1
2 AEROSOL, METERED RESPIRATORY (INHALATION) EVERY 4 HOURS PRN
Qty: 6.7 G | Refills: 11 | Status: SHIPPED | OUTPATIENT
Start: 2021-09-13 | End: 2021-12-02 | Stop reason: SDUPTHER

## 2021-09-20 RX ORDER — NITROFURANTOIN 25; 75 MG/1; MG/1
100 CAPSULE ORAL 2 TIMES DAILY
Qty: 14 CAPSULE | Refills: 0 | Status: SHIPPED | OUTPATIENT
Start: 2021-09-20 | End: 2021-09-27

## 2021-10-01 DIAGNOSIS — M54.50 LOW BACK PAIN WITHOUT SCIATICA, UNSPECIFIED BACK PAIN LATERALITY, UNSPECIFIED CHRONICITY: ICD-10-CM

## 2021-10-04 RX ORDER — BACLOFEN 10 MG/1
TABLET ORAL
Qty: 90 TABLET | Refills: 2 | Status: SHIPPED | OUTPATIENT
Start: 2021-10-04 | End: 2021-11-14

## 2021-10-17 DIAGNOSIS — K21.9 GERD WITHOUT ESOPHAGITIS: ICD-10-CM

## 2021-10-18 RX ORDER — DOXYCYCLINE 100 MG/1
100 CAPSULE ORAL EVERY 12 HOURS SCHEDULED
Qty: 20 CAPSULE | Refills: 0 | Status: SHIPPED | OUTPATIENT
Start: 2021-10-18 | End: 2021-10-28

## 2021-10-18 RX ORDER — PANTOPRAZOLE SODIUM 40 MG/1
40 TABLET, DELAYED RELEASE ORAL 2 TIMES DAILY
Qty: 90 TABLET | Refills: 3 | Status: SHIPPED | OUTPATIENT
Start: 2021-10-18 | End: 2021-12-02 | Stop reason: SDUPTHER

## 2021-10-21 ENCOUNTER — HOSPITAL ENCOUNTER (OUTPATIENT)
Dept: CT IMAGING | Facility: HOSPITAL | Age: 56
Discharge: HOME | End: 2021-10-21
Admitting: INTERNAL MEDICINE

## 2021-10-21 DIAGNOSIS — R91.1 LUNG NODULE: ICD-10-CM

## 2021-10-21 PROCEDURE — 71250 CT THORAX DX C-: CPT

## 2021-10-25 DIAGNOSIS — J45.50 SEVERE PERSISTENT ASTHMA WITHOUT COMPLICATION: ICD-10-CM

## 2021-10-26 RX ORDER — BENRALIZUMAB 30 MG/ML
INJECTION, SOLUTION SUBCUTANEOUS
Qty: 1 PEN | Refills: 1 | Status: SHIPPED | OUTPATIENT
Start: 2021-10-26 | End: 2022-04-02 | Stop reason: HOSPADM

## 2021-11-04 ENCOUNTER — APPOINTMENT (OUTPATIENT)
Dept: CT IMAGING | Facility: HOSPITAL | Age: 56
End: 2021-11-04

## 2021-11-14 RX ORDER — HYDROXYZINE HYDROCHLORIDE 25 MG/1
25-50 TABLET, FILM COATED ORAL NIGHTLY PRN
Qty: 45 TABLET | Refills: 3 | Status: SHIPPED | OUTPATIENT
Start: 2021-11-14 | End: 2022-01-31

## 2021-11-30 DIAGNOSIS — G25.81 RLS (RESTLESS LEGS SYNDROME): ICD-10-CM

## 2021-12-02 ENCOUNTER — OFFICE VISIT (OUTPATIENT)
Dept: PULMONOLOGY | Facility: CLINIC | Age: 56
End: 2021-12-02

## 2021-12-02 VITALS
HEART RATE: 88 BPM | WEIGHT: 157 LBS | TEMPERATURE: 97.5 F | SYSTOLIC BLOOD PRESSURE: 120 MMHG | HEIGHT: 60 IN | BODY MASS INDEX: 30.82 KG/M2 | OXYGEN SATURATION: 99 % | DIASTOLIC BLOOD PRESSURE: 76 MMHG

## 2021-12-02 DIAGNOSIS — J45.40 MODERATE PERSISTENT ASTHMA, UNSPECIFIED WHETHER COMPLICATED: Primary | ICD-10-CM

## 2021-12-02 DIAGNOSIS — R91.1 LUNG NODULE: ICD-10-CM

## 2021-12-02 DIAGNOSIS — K22.719 BARRETT'S ESOPHAGUS WITH DYSPLASIA: ICD-10-CM

## 2021-12-02 DIAGNOSIS — R05.3 CHRONIC COUGH: ICD-10-CM

## 2021-12-02 PROCEDURE — 99214 OFFICE O/P EST MOD 30 MIN: CPT | Performed by: INTERNAL MEDICINE

## 2021-12-02 RX ORDER — ALBUTEROL SULFATE 90 UG/1
2 AEROSOL, METERED RESPIRATORY (INHALATION) EVERY 4 HOURS PRN
Qty: 6.7 G | Refills: 11 | Status: SHIPPED | OUTPATIENT
Start: 2021-12-02 | End: 2022-10-05

## 2021-12-02 RX ORDER — PANTOPRAZOLE SODIUM 40 MG/1
40 TABLET, DELAYED RELEASE ORAL 2 TIMES DAILY
Qty: 180 TABLET | Refills: 3 | Status: SHIPPED | OUTPATIENT
Start: 2021-12-02 | End: 2022-03-11 | Stop reason: SDUPTHER

## 2021-12-02 NOTE — PROGRESS NOTES
"Follow Up Office Note       Patient Name: Astrid Sam    Referring Physician: No ref. provider found    Chief Complaint:    Chief Complaint   Patient presents with   • Cough   • Asthma   • Follow-up       History of Present Illness: Astrid Sam is a 56 y.o. female who is here today to follow-up care with Pulmonary.  Patient has a past medical history significant for hypothyroidism, allergic rhinitis, hyperlipidemia, PFO status post anterior atrial closure, CVA, asthma, Neil's esophagus and seizures.  Patient is here today for follow-up.  For her asthma she is currently on Advair and Fasenra.  In addition to this she continues her medications to try to control her reflux which has been fairly severe.  She denies any chest pain, nausea, fever, or chills.  She has no other acute complaints at this time.    Review of Systems:   Review of Systems   Constitutional: Negative for chills, fatigue and fever.   HENT: Negative for congestion and voice change.    Eyes: Negative for blurred vision.   Respiratory: Negative for cough, shortness of breath and wheezing.    Cardiovascular: Negative for chest pain.   Skin: Negative for dry skin.   Hematological: Negative for adenopathy.   Psychiatric/Behavioral: Negative for agitation and depressed mood.       The following portions of the patient's history were reviewed and updated as appropriate: allergies, current medications, past family history, past medical history, past social history, past surgical history and problem list.    Physical Exam:  Vital Signs:   Vitals:    12/02/21 0843   BP: 120/76   Pulse: 88   Temp: 97.5 °F (36.4 °C)   SpO2: 99%  Comment: RESTING, ROOM AIR   Weight: 71.2 kg (157 lb)   Height: 152.4 cm (60\")       Physical Exam  Vitals and nursing note reviewed.   Constitutional:       General: She is not in acute distress.     Appearance: She is well-developed and normal weight. She is not ill-appearing or toxic-appearing.   HENT:      Head: " Normocephalic and atraumatic.   Cardiovascular:      Rate and Rhythm: Normal rate and regular rhythm.      Pulses: Normal pulses.      Heart sounds: Normal heart sounds. No murmur heard.  No friction rub. No gallop.    Pulmonary:      Effort: Pulmonary effort is normal. No respiratory distress.      Breath sounds: Normal breath sounds. No wheezing, rhonchi or rales.   Musculoskeletal:      Right lower leg: No edema.      Left lower leg: No edema.   Skin:     General: Skin is warm and dry.   Neurological:      Mental Status: She is alert and oriented to person, place, and time.         Immunization History   Administered Date(s) Administered   • COVID-19 (PFIZER) 04/05/2021, 05/03/2021       Results Review:   -I personally viewed the patient's CT scan from October 2021 which showed the nodules in the right lower lobe is now resolved, with no other acute infiltrates   - CT scan from May 2021 which showed significant improvement in the upper lobe infiltrates compared to August 2020, she was noted to have a small nodule in the right lower lobe.              - imaging from chest x-ray from November 4, 2020 shows no acute cardiopulmonary process, CT scan of the chest from August 22, 2020 showed some very mild groundglass interstitial changes in the upper lobes bilaterally, and otherwise unremarkable.  - Echo from 8/22/2020 showed an EF of 60%, with normal left ventricular systolic function, and no signs of a shunt.  - barium swallow report, which showed that the patient had mild gastroesophageal reflux to the thoracic inlet, with mild smooth luminal narrowing of the distal esophagus which may represent a stricture or spasm.  - pulmonary function testing from 12/21/2020 which showed no obstruction or restriction with a normal DLCO, although notable for significant air trapping  - EGD pathology report from February 2021 which was negative for H. pylori, showed signs of chronic inflammation, most notably in the distal  esophagus there was signs of reflux esophagitis that was compatible with Neil's.    Assessment / Plan:   1. Moderate persistent asthma, unspecified whether complicated (Primary)  -Asthma is currently stable.  I will continue on the Advair 500 mcg 1 puff twice daily, Fasenra, and albuterol on as-needed basis.    2. Chronic cough  -Cough is now improving with control of her asthma and reflux although continues to have some difficulty with cough intermittently.  We are treating each as noted below.    3. Neil's esophagus with dysplasia  -Continue 40 mg Protonix twice daily which I refilled on today's visit.  She continues to follow with GI.  She is continuing to utilize dietary lifestyle modifications.    4. Lung nodule  -Previously nodules in the right lower lobe and bilateral upper lobes has now resolved.  She has no further signs of any nodules.  No further imaging is required.      Follow Up:   Return in about 6 months (around 6/2/2022).       IWONA Alfaro, DO  Pulmonary and Critical Care Medicine  Note Electronically Signed    Please note that portions of this note may have been completed with a voice recognition program. Efforts were made to edit the dictations, but occasionally words are mistranscribed.

## 2021-12-10 ENCOUNTER — OFFICE VISIT (OUTPATIENT)
Dept: FAMILY MEDICINE CLINIC | Facility: CLINIC | Age: 56
End: 2021-12-10

## 2021-12-10 VITALS
DIASTOLIC BLOOD PRESSURE: 74 MMHG | TEMPERATURE: 97.8 F | BODY MASS INDEX: 30.82 KG/M2 | HEART RATE: 76 BPM | OXYGEN SATURATION: 98 % | SYSTOLIC BLOOD PRESSURE: 102 MMHG | HEIGHT: 60 IN | RESPIRATION RATE: 16 BRPM | WEIGHT: 157 LBS

## 2021-12-10 DIAGNOSIS — G25.81 RLS (RESTLESS LEGS SYNDROME): ICD-10-CM

## 2021-12-10 DIAGNOSIS — E03.9 ACQUIRED HYPOTHYROIDISM: Primary | ICD-10-CM

## 2021-12-10 DIAGNOSIS — Z23 NEED FOR INFLUENZA VACCINATION: ICD-10-CM

## 2021-12-10 DIAGNOSIS — E78.2 MIXED HYPERLIPIDEMIA: ICD-10-CM

## 2021-12-10 PROCEDURE — 90471 IMMUNIZATION ADMIN: CPT | Performed by: FAMILY MEDICINE

## 2021-12-10 PROCEDURE — 99213 OFFICE O/P EST LOW 20 MIN: CPT | Performed by: FAMILY MEDICINE

## 2021-12-10 PROCEDURE — 90686 IIV4 VACC NO PRSV 0.5 ML IM: CPT | Performed by: FAMILY MEDICINE

## 2021-12-10 NOTE — PROGRESS NOTES
"Chief Complaint  6mo f/u Hypothyroidism    Subjective          Astrid Sam presents to [facility]  Hyperlipidemia  This is a chronic problem. The current episode started more than 1 year ago. The problem is controlled. Recent lipid tests were reviewed and are normal. Exacerbating diseases include obesity. She has no history of diabetes. Current antihyperlipidemic treatment includes statins. The current treatment provides significant improvement of lipids. Risk factors for coronary artery disease include dyslipidemia and obesity.     Hypothyroidism  Astrid Sam is a 56 y.o. female who presents for follow up of hypothyroidism. Current symptoms: none . Patient denies change in energy level, palpitations and weight changes. Symptoms have been well-controlled.    Left lateral thigh pain, worse after sitting for a while  She is going to PT for treatment, along with back pain.   They are treating her IT band, but she isn't getting much relief.   She has follow up appointment with orthopedist next week, she will address the persistent symptoms with them.   If she reclines, it does relieve pain some.     Restless leg syndrome improved with Mirapex.  She does have history of iron deficiency, but normal on her most recent labs.    Answers for HPI/ROS submitted by the patient on 12/8/2021  Please describe your symptoms.: It's for a follow up with doctor.  Have you had these symptoms before?: No  How long have you been having these symptoms?: 1-4 days  What is the primary reason for your visit?: Other      The following portions of the patient's history were reviewed and updated as appropriate: allergies, current medications, past family history, past medical history, past social history, past surgical history and problem list.    Objective   Vital Signs:   /74   Pulse 76   Temp 97.8 °F (36.6 °C)   Resp 16   Ht 152.4 cm (60\")   Wt 71.2 kg (157 lb)   SpO2 98%   BMI " 30.66 kg/m²     Physical Exam  Vitals and nursing note reviewed.   Constitutional:       Appearance: She is well-developed.   HENT:      Head: Normocephalic and atraumatic.      Right Ear: External ear normal.      Left Ear: External ear normal.      Nose: Nose normal.   Eyes:      Conjunctiva/sclera: Conjunctivae normal.   Cardiovascular:      Rate and Rhythm: Normal rate and regular rhythm.      Heart sounds: Normal heart sounds. No murmur heard.      Pulmonary:      Effort: Pulmonary effort is normal.      Breath sounds: Normal breath sounds. No wheezing.   Musculoskeletal:         General: No deformity.      Cervical back: Normal range of motion and neck supple.   Skin:     General: Skin is warm and dry.   Neurological:      Mental Status: She is alert and oriented to person, place, and time.   Psychiatric:         Behavior: Behavior normal.        Result Review :                 Assessment and Plan    Diagnoses and all orders for this visit:    1. Acquired hypothyroidism (Primary)  -     CBC & Differential  -     Comprehensive Metabolic Panel  -     Iron  -     TSH+Free T4    2. RLS (restless legs syndrome)  -     CBC & Differential  -     Comprehensive Metabolic Panel  -     Iron  -     TSH+Free T4    3. Mixed hyperlipidemia  -     Lipid Panel With / Chol / HDL Ratio    4. Need for influenza vaccination  -     FluLaval/Fluarix/Fluzone >6 Months (5250-7704)    Labs completed today.  Chronic conditions are stable, does not need refills at this time.    Follow Up   Return in about 6 months (around 6/10/2022) for Recheck.  Patient was given instructions and counseling regarding her condition or for health maintenance advice. Please see specific information pulled into the AVS if appropriate.

## 2021-12-10 NOTE — PATIENT INSTRUCTIONS
Go to the nearest ER or return to clinic if symptoms worsen, fever/chill develop      Restless Legs Syndrome  Restless legs syndrome is a condition that causes uncomfortable feelings or sensations in the legs, especially while sitting or lying down. The sensations usually cause an overwhelming urge to move the legs. The arms can also sometimes be affected.  The condition can range from mild to severe. The symptoms often interfere with a person's ability to sleep.  What are the causes?  The cause of this condition is not known.  What increases the risk?  The following factors may make you more likely to develop this condition:  · Being older than 50.  · Pregnancy.  · Being a woman. In general, the condition is more common in women than in men.  · A family history of the condition.  · Having iron deficiency.  · Overuse of caffeine, nicotine, or alcohol.  · Certain medical conditions, such as kidney disease, Parkinson's disease, or nerve damage.  · Certain medicines, such as those for high blood pressure, nausea, colds, allergies, depression, and some heart conditions.  What are the signs or symptoms?  The main symptom of this condition is uncomfortable sensations in the legs, such as:  · Pulling.  · Tingling.  · Prickling.  · Throbbing.  · Crawling.  · Burning.  Usually, the sensations:  · Affect both sides of the body.  · Are worse when you sit or lie down.  · Are worse at night. These may wake you up or make it difficult to fall asleep.  · Make you have a strong urge to move your legs.  · Are temporarily relieved by moving your legs.  The arms can also be affected, but this is rare. People who have this condition often have tiredness during the day because of their lack of sleep at night.  How is this diagnosed?  This condition may be diagnosed based on:  · Your symptoms.  · Blood tests.  In some cases, you may be monitored in a sleep lab by a specialist (a sleep study). This can detect any disruptions in your  sleep.  How is this treated?  This condition is treated by managing the symptoms. This may include:  · Lifestyle changes, such as exercising, using relaxation techniques, and avoiding caffeine, alcohol, or tobacco.  · Medicines. Anti-seizure medicines may be tried first.  Follow these instructions at home:  General instructions  · Take over-the-counter and prescription medicines only as told by your health care provider.  · Use methods to help relieve the uncomfortable sensations, such as:  ? Massaging your legs.  ? Walking or stretching.  ? Taking a cold or hot bath.  · Keep all follow-up visits as told by your health care provider. This is important.  Lifestyle         · Practice good sleep habits. For example, go to bed and get up at the same time every day. Most adults should get 7-9 hours of sleep each night.  · Exercise regularly. Try to get at least 30 minutes of exercise most days of the week.  · Practice ways of relaxing, such as yoga or meditation.  · Avoid caffeine and alcohol.  · Do not use any products that contain nicotine or tobacco, such as cigarettes and e-cigarettes. If you need help quitting, ask your health care provider.  Contact a health care provider if:  · Your symptoms get worse or they do not improve with treatment.  Summary  · Restless legs syndrome is a condition that causes uncomfortable feelings or sensations in the legs, especially while sitting or lying down.  · The symptoms often interfere with a person's ability to sleep.  · This condition is treated by managing the symptoms. You may need to make lifestyle changes or take medicines.  This information is not intended to replace advice given to you by your health care provider. Make sure you discuss any questions you have with your health care provider.  Document Revised: 02/05/2021 Document Reviewed: 01/07/2019  Elsevier Patient Education © 2021 [employer]

## 2021-12-11 LAB
ALBUMIN SERPL-MCNC: 4.5 G/DL (ref 3.5–5.2)
ALBUMIN/GLOB SERPL: 1.7 G/DL
ALP SERPL-CCNC: 89 U/L (ref 39–117)
ALT SERPL-CCNC: 18 U/L (ref 1–33)
AST SERPL-CCNC: 18 U/L (ref 1–32)
BASOPHILS # BLD AUTO: 0.04 10*3/MM3 (ref 0–0.2)
BASOPHILS NFR BLD AUTO: 0.6 % (ref 0–1.5)
BILIRUB SERPL-MCNC: 0.6 MG/DL (ref 0–1.2)
BUN SERPL-MCNC: 23 MG/DL (ref 6–20)
BUN/CREAT SERPL: 27.7 (ref 7–25)
CALCIUM SERPL-MCNC: 9.8 MG/DL (ref 8.6–10.5)
CHLORIDE SERPL-SCNC: 106 MMOL/L (ref 98–107)
CHOLEST SERPL-MCNC: 195 MG/DL (ref 0–200)
CHOLEST/HDLC SERPL: 3.1 {RATIO}
CO2 SERPL-SCNC: 25.4 MMOL/L (ref 22–29)
CREAT SERPL-MCNC: 0.83 MG/DL (ref 0.57–1)
EOSINOPHIL # BLD AUTO: 0 10*3/MM3 (ref 0–0.4)
EOSINOPHIL NFR BLD AUTO: 0 % (ref 0.3–6.2)
ERYTHROCYTE [DISTWIDTH] IN BLOOD BY AUTOMATED COUNT: 14.7 % (ref 12.3–15.4)
GLOBULIN SER CALC-MCNC: 2.6 GM/DL
GLUCOSE SERPL-MCNC: 82 MG/DL (ref 65–99)
HCT VFR BLD AUTO: 40.3 % (ref 34–46.6)
HDLC SERPL-MCNC: 63 MG/DL (ref 40–60)
HGB BLD-MCNC: 13.3 G/DL (ref 12–15.9)
IMM GRANULOCYTES # BLD AUTO: 0.07 10*3/MM3 (ref 0–0.05)
IMM GRANULOCYTES NFR BLD AUTO: 1 % (ref 0–0.5)
IRON SERPL-MCNC: 50 MCG/DL (ref 37–145)
LDLC SERPL CALC-MCNC: 114 MG/DL (ref 0–100)
LYMPHOCYTES # BLD AUTO: 2.03 10*3/MM3 (ref 0.7–3.1)
LYMPHOCYTES NFR BLD AUTO: 30.3 % (ref 19.6–45.3)
MCH RBC QN AUTO: 29.8 PG (ref 26.6–33)
MCHC RBC AUTO-ENTMCNC: 33 G/DL (ref 31.5–35.7)
MCV RBC AUTO: 90.4 FL (ref 79–97)
MONOCYTES # BLD AUTO: 0.59 10*3/MM3 (ref 0.1–0.9)
MONOCYTES NFR BLD AUTO: 8.8 % (ref 5–12)
NEUTROPHILS # BLD AUTO: 3.96 10*3/MM3 (ref 1.7–7)
NEUTROPHILS NFR BLD AUTO: 59.3 % (ref 42.7–76)
NRBC BLD AUTO-RTO: 0.1 /100 WBC (ref 0–0.2)
PLATELET # BLD AUTO: 281 10*3/MM3 (ref 140–450)
POTASSIUM SERPL-SCNC: 4.5 MMOL/L (ref 3.5–5.2)
PROT SERPL-MCNC: 7.1 G/DL (ref 6–8.5)
RBC # BLD AUTO: 4.46 10*6/MM3 (ref 3.77–5.28)
SODIUM SERPL-SCNC: 144 MMOL/L (ref 136–145)
T4 FREE SERPL-MCNC: 0.97 NG/DL (ref 0.93–1.7)
TRIGL SERPL-MCNC: 98 MG/DL (ref 0–150)
TSH SERPL DL<=0.005 MIU/L-ACNC: 3.35 UIU/ML (ref 0.27–4.2)
VLDLC SERPL CALC-MCNC: 18 MG/DL (ref 5–40)
WBC # BLD AUTO: 6.69 10*3/MM3 (ref 3.4–10.8)

## 2021-12-12 DIAGNOSIS — G43.119 INTRACTABLE MIGRAINE WITH AURA WITHOUT STATUS MIGRAINOSUS: ICD-10-CM

## 2021-12-13 RX ORDER — UBROGEPANT 100 MG/1
TABLET ORAL
Qty: 10 TABLET | Refills: 2 | Status: SHIPPED | OUTPATIENT
Start: 2021-12-13 | End: 2022-02-21 | Stop reason: SDUPTHER

## 2021-12-27 DIAGNOSIS — E03.9 ACQUIRED HYPOTHYROIDISM: ICD-10-CM

## 2021-12-27 DIAGNOSIS — R45.4 IRRITABILITY: ICD-10-CM

## 2021-12-27 RX ORDER — ESCITALOPRAM OXALATE 20 MG/1
TABLET ORAL
Qty: 90 TABLET | Refills: 1 | Status: SHIPPED | OUTPATIENT
Start: 2021-12-27 | End: 2022-09-02

## 2021-12-27 RX ORDER — LEVOTHYROXINE SODIUM 0.03 MG/1
TABLET ORAL
Qty: 90 TABLET | Refills: 1 | Status: SHIPPED | OUTPATIENT
Start: 2021-12-27 | End: 2022-07-29

## 2022-01-28 ENCOUNTER — TELEPHONE (OUTPATIENT)
Dept: FAMILY MEDICINE CLINIC | Facility: CLINIC | Age: 57
End: 2022-01-28

## 2022-01-28 DIAGNOSIS — E66.9 OBESITY (BMI 30.0-34.9): Primary | ICD-10-CM

## 2022-01-28 RX ORDER — PHENTERMINE HYDROCHLORIDE 37.5 MG/1
TABLET ORAL
Qty: 30 TABLET | Refills: 0 | Status: ON HOLD | OUTPATIENT
Start: 2022-01-28 | End: 2022-03-30

## 2022-01-28 NOTE — TELEPHONE ENCOUNTER
Regarding: Medication   ----- Message from Lidia Berkowitz MA sent at 1/27/2022  8:48 AM EST -----       ----- Message from Astrid Sam to Beckie Murphy DO sent at 1/27/2022  8:28 AM -----   Good morning,   I was wondering if I could get that medication that you gave me awhile back for weight loss.  I know I can't take forever but I feel I've gained some weight back since I've not been able to do alot with my leg that's be bothering me. Thank you for your time and look forward to hearing from you.     Doris Sam   [phone]

## 2022-01-31 RX ORDER — HYDROXYZINE HYDROCHLORIDE 25 MG/1
25-50 TABLET, FILM COATED ORAL NIGHTLY PRN
Qty: 45 TABLET | Refills: 3 | Status: SHIPPED | OUTPATIENT
Start: 2022-01-31 | End: 2022-02-27 | Stop reason: SDUPTHER

## 2022-02-12 DIAGNOSIS — M54.50 LOW BACK PAIN WITHOUT SCIATICA, UNSPECIFIED BACK PAIN LATERALITY, UNSPECIFIED CHRONICITY: ICD-10-CM

## 2022-02-14 RX ORDER — BACLOFEN 10 MG/1
TABLET ORAL
Qty: 90 TABLET | Refills: 2 | OUTPATIENT
Start: 2022-02-14

## 2022-02-21 DIAGNOSIS — G43.119 INTRACTABLE MIGRAINE WITH AURA WITHOUT STATUS MIGRAINOSUS: ICD-10-CM

## 2022-02-22 ENCOUNTER — OFFICE VISIT (OUTPATIENT)
Dept: PULMONOLOGY | Facility: CLINIC | Age: 57
End: 2022-02-22

## 2022-02-22 VITALS
SYSTOLIC BLOOD PRESSURE: 128 MMHG | RESPIRATION RATE: 20 BRPM | HEIGHT: 60 IN | BODY MASS INDEX: 31.61 KG/M2 | WEIGHT: 161 LBS | DIASTOLIC BLOOD PRESSURE: 80 MMHG | TEMPERATURE: 98 F | HEART RATE: 101 BPM | OXYGEN SATURATION: 99 %

## 2022-02-22 DIAGNOSIS — B37.0 ORAL CANDIDA: ICD-10-CM

## 2022-02-22 DIAGNOSIS — J45.41 MODERATE PERSISTENT ASTHMA WITH ACUTE EXACERBATION: Primary | ICD-10-CM

## 2022-02-22 PROCEDURE — 99214 OFFICE O/P EST MOD 30 MIN: CPT | Performed by: NURSE PRACTITIONER

## 2022-02-22 RX ORDER — AZITHROMYCIN 250 MG/1
TABLET, FILM COATED ORAL
Qty: 6 TABLET | Refills: 0 | Status: ON HOLD | OUTPATIENT
Start: 2022-02-22 | End: 2022-03-30

## 2022-02-22 RX ORDER — PREDNISONE 10 MG/1
TABLET ORAL
Qty: 31 TABLET | Refills: 0 | Status: SHIPPED | OUTPATIENT
Start: 2022-02-22 | End: 2022-02-28 | Stop reason: SDUPTHER

## 2022-02-22 RX ORDER — OXYBUTYNIN CHLORIDE 15 MG/1
10 TABLET, EXTENDED RELEASE ORAL DAILY
COMMUNITY
Start: 2022-01-28

## 2022-02-22 NOTE — PROGRESS NOTES
[facility] Follow up    CHIEF COMPLAINT    Cough/mouth blisisters    HISTORY OF PRESENT ILLNESS    Astrid Sam is a 56 y.o.female here today for a sick visit.  She called yesterday requesting an antibiotic for a worsening cough.  She states that she has had this for about 5 days.  She also feels that she has a yeast infection in her mouth.  She has had this before.  She was last seen in the office in December by Dr. Alfaro.    She continues to use her Advair 500 twice a day.  She does rinse her mouth out thoroughly after each use.  She also uses her albuterol occasionally for shortness of breath.  She also has nebulizers to use as needed.  She is coughing up some tan thick sputum.    She also receives Fasenra injections and her last injection was about a week ago.    She is planning on having a left hip surgery at the end of March.    She denies fever, chills, hemoptysis, night sweats, weight loss, chest pain or palpitations.  She denies any lower extremity edema or calf tenderness.  She does have occasional sinus drainage.  She denies reflux symptoms and takes Protonix regularly.    She is up-to-date on her vaccinations.    She is a lifetime non-smoker.    Patient Active Problem List   Diagnosis   • Migraine   • Insomnia   • Iron deficiency anemia   • Left shoulder pain   • Cervicalgia   • Cervical radicular pain   • Diarrhea   • Irritability   • Acquired hypothyroidism   • Fever blister   • Right hip pain   • Arthritis of right hip   • Status post total replacement of right hip   • Acute postoperative pain   • Acute blood loss anemia   • Acute urinary retention   • Urge incontinence of urine   • Mixed hyperlipidemia   • Acute metabolic encephalopathy   • Hypokalemia   • History of osteomyelitis   • Dysphagia   • Seizure (HCC)   • Moderate persistent asthma   • Neil's esophagus with dysplasia       Allergies   Allergen Reactions   • Morphine Nausea And Vomiting       Current Outpatient Medications:    •  albuterol sulfate  (90 Base) MCG/ACT inhaler, Inhale 2 puffs Every 4 (Four) Hours As Needed for Wheezing., Disp: 6.7 g, Rfl: 11  •  aspirin 81 MG chewable tablet, Chew 1 tablet Daily., Disp: , Rfl:   •  EPINEPHrine (EpiPen 2-Sammy) 0.3 MG/0.3ML solution auto-injector injection, Inject 0.3 mL under the skin into the appropriate area as directed As Needed (allergic reaction)., Disp: 1 each, Rfl: 1  •  escitalopram (LEXAPRO) 20 MG tablet, TAKE 1 TABLET BY MOUTH EVERY DAY, Disp: 90 tablet, Rfl: 1  •  Fasenra Pen 30 MG/ML solution auto-injector, INJECT 1 PEN UNDER THE SKIN AT WEEK 0, 4 AND 8, THEN INJECT 1 PEN EVERY 8 WEEKS THEREAFTER., Disp: 1 pen, Rfl: 1  •  fluticasone-salmeterol (Advair Diskus) 500-50 MCG/DOSE DISKUS, Inhale 1 puff 2 (Two) Times a Day., Disp: 60 each, Rfl: 11  •  hydrOXYzine (ATARAX) 25 MG tablet, TAKE 1-2 TABLETS BY MOUTH AT NIGHT AS NEEDED (SLEEP)., Disp: 45 tablet, Rfl: 3  •  levothyroxine (SYNTHROID, LEVOTHROID) 25 MCG tablet, TAKE 1 TABLET BY MOUTH EVERY DAY, Disp: 90 tablet, Rfl: 1  •  oxybutynin XL (DITROPAN-XL) 10 MG 24 hr tablet, Take 10 mg by mouth Daily., Disp: , Rfl:   •  pantoprazole (PROTONIX) 40 MG EC tablet, Take 1 tablet by mouth 2 (Two) Times a Day., Disp: 180 tablet, Rfl: 3  •  phentermine (ADIPEX-P) 37.5 MG tablet, Start 1/2 tab po qAM x 4 days, then increase to 1 tab po qAM, Disp: 30 tablet, Rfl: 0  •  pramipexole (MIRAPEX) 0.25 MG tablet, Take 3 tablets by mouth Every Night. Take 2-3 hours prior to bedtime, Disp: 270 tablet, Rfl: 1  •  pravastatin (PRAVACHOL) 40 MG tablet, Take 1 tablet by mouth every night at bedtime., Disp: 90 tablet, Rfl: 1  •  ubrogepant (ubrogepant) 100 MG tablet, Take 1 tablet 1 (one) time prn migraine for up to one dose. Can repeat dose X 1 after 2 hours if headache not resolved, Disp: 10 tablet, Rfl: 0  •  azithromycin (ZITHROMAX) 250 MG tablet, Take 2 by mouth today then 1 daily for 4 days, Disp: 6 tablet, Rfl: 0  •  nystatin (MYCOSTATIN)  100,000 unit/mL suspension, Take 5 mL by mouth 4 (Four) Times a Day., Disp: 280 mL, Rfl: 0  •  predniSONE (DELTASONE) 10 MG tablet, Take 4 tabs daily x 3 days, then take 3 tabs daily x 3 days, then take 2 tabs daily x 3 days, then take 1 tab daily x 3 days, Disp: 31 tablet, Rfl: 0    Current Facility-Administered Medications:   •  Benralizumab solution prefilled syringe 30 mg, 30 mg, Subcutaneous, Once, Levi Alfaro DO  MEDICATION LIST AND ALLERGIES REVIEWED.    Social History     Tobacco Use   • Smoking status: Never Smoker   • Smokeless tobacco: Never Used   Vaping Use   • Vaping Use: Never used   Substance Use Topics   • Alcohol use: Not Currently     Alcohol/week: 0.0 standard drinks     Comment: rare   • Drug use: No       FAMILY AND SOCIAL HISTORY REVIEWED.    Review of Systems   Constitutional: Positive for fatigue. Negative for activity change, appetite change, fever and unexpected weight change.   HENT: Positive for congestion, postnasal drip and rhinorrhea. Negative for sinus pressure, sore throat and voice change.    Eyes: Negative for visual disturbance.   Respiratory: Positive for cough and shortness of breath. Negative for chest tightness and wheezing.    Cardiovascular: Negative for chest pain, palpitations and leg swelling.   Gastrointestinal: Negative for abdominal distention, abdominal pain, nausea and vomiting.   Endocrine: Negative for cold intolerance and heat intolerance.   Genitourinary: Negative for difficulty urinating and urgency.   Musculoskeletal: Negative for arthralgias, back pain and neck pain.   Skin: Negative for color change and pallor.   Allergic/Immunologic: Negative for environmental allergies and food allergies.   Neurological: Negative for dizziness, syncope, weakness and light-headedness.   Hematological: Negative for adenopathy. Does not bruise/bleed easily.   Psychiatric/Behavioral: Negative for agitation and behavioral problems.   .    /80 (BP Location:  "Left arm, Patient Position: Sitting, Cuff Size: Adult)   Pulse 101   Temp 98 °F (36.7 °C)   Resp 20   Ht 152.4 cm (60\")   Wt 73 kg (161 lb)   LMP  (LMP Unknown)   SpO2 99% Comment: room air at rest  BMI 31.44 kg/m²     Immunization History   Administered Date(s) Administered   • COVID-19 (PFIZER) PURPLE CAP 04/05/2021, 05/03/2021   • FluLaval/Fluarix/Fluzone >6 12/10/2021       Physical Exam  Vitals reviewed.   Constitutional:       Appearance: She is well-developed.   HENT:      Head: Normocephalic and atraumatic.   Eyes:      Pupils: Pupils are equal, round, and reactive to light.   Cardiovascular:      Rate and Rhythm: Normal rate and regular rhythm.      Heart sounds: Normal heart sounds.   Pulmonary:      Effort: Pulmonary effort is normal.      Breath sounds: Normal breath sounds. No wheezing or rales.   Chest:      Chest wall: No tenderness.   Abdominal:      General: Bowel sounds are normal.      Palpations: Abdomen is soft.   Musculoskeletal:         General: No swelling. Normal range of motion.      Cervical back: Normal range of motion and neck supple.   Skin:     General: Skin is warm and dry.      Capillary Refill: Capillary refill takes less than 2 seconds.   Neurological:      Mental Status: She is alert and oriented to person, place, and time.   Psychiatric:         Mood and Affect: Mood normal.         Behavior: Behavior normal.           RESULTS      PROBLEM LIST    Problem List Items Addressed This Visit        Pulmonary and Pneumonias    Moderate persistent asthma - Primary    Relevant Medications    azithromycin (ZITHROMAX) 250 MG tablet    predniSONE (DELTASONE) 10 MG tablet      Other Visit Diagnoses     Oral candida        Relevant Medications    nystatin (MYCOSTATIN) 100,000 unit/mL suspension            DISCUSSION    Ms. Sam was here for a sick visit.  She does seem to be having an asthma exacerbation.  We will treat with antibiotics and a prednisone taper.  She will continue her " Advair twice a day.  I did encourage her to rinse her mouth out after each use.  She continues to have thrush in the future we may need to change her to a Advair HFA with a spacer to avoid the recurrent thrush.    I am also sending in some nystatin for her to use for her oral Candida.    She will continue to receive her Fasenra injections as well.  She is due for another injection in April.    She will follow up with Dr. Alfaro in June or sooner if her symptoms worsen.  Advised her to call with any additional concerns or questions.    Level of service justified based on 36 minutes spent in patient care on this date of service including, but not limited to: preparing to see the patient, obtaining and/or reviewing history, performing medically appropriate examination, ordering tests/medicine/procedures, independently interpreting results, documenting clinical information in EHR, and counseling/education of patient/family/caregiver. (Level 4 30-39 minutes; Level 5 40-54 minutes)      Sherry Moya, JOHNATHAN  02/22/202212:41 EST  Electronically signed     Please note that portions of this note were completed with a voice recognition program.        CC: Beckie Murphy,

## 2022-02-28 DIAGNOSIS — J45.41 MODERATE PERSISTENT ASTHMA WITH ACUTE EXACERBATION: ICD-10-CM

## 2022-02-28 RX ORDER — HYDROXYZINE HYDROCHLORIDE 25 MG/1
25-50 TABLET, FILM COATED ORAL NIGHTLY PRN
Qty: 45 TABLET | Refills: 3 | Status: SHIPPED | OUTPATIENT
Start: 2022-02-28 | End: 2022-06-05

## 2022-02-28 RX ORDER — PREDNISONE 10 MG/1
TABLET ORAL
Qty: 30 TABLET | Refills: 0 | Status: ON HOLD | OUTPATIENT
Start: 2022-02-28 | End: 2022-03-30

## 2022-03-09 DIAGNOSIS — E66.9 OBESITY (BMI 30.0-34.9): ICD-10-CM

## 2022-03-09 RX ORDER — PHENTERMINE HYDROCHLORIDE 37.5 MG/1
TABLET ORAL
Qty: 30 TABLET | Refills: 0 | OUTPATIENT
Start: 2022-03-09

## 2022-03-11 DIAGNOSIS — K22.719 BARRETT'S ESOPHAGUS WITH DYSPLASIA: ICD-10-CM

## 2022-03-11 RX ORDER — PANTOPRAZOLE SODIUM 40 MG/1
40 TABLET, DELAYED RELEASE ORAL 2 TIMES DAILY
Qty: 180 TABLET | Refills: 3 | Status: SHIPPED | OUTPATIENT
Start: 2022-03-11 | End: 2022-06-02 | Stop reason: SDUPTHER

## 2022-03-16 ENCOUNTER — TELEPHONE (OUTPATIENT)
Dept: FAMILY MEDICINE CLINIC | Facility: CLINIC | Age: 57
End: 2022-03-16

## 2022-03-16 DIAGNOSIS — R39.89 SUSPECTED UTI: Primary | ICD-10-CM

## 2022-03-16 PROCEDURE — 81002 URINALYSIS NONAUTO W/O SCOPE: CPT | Performed by: FAMILY MEDICINE

## 2022-03-16 NOTE — TELEPHONE ENCOUNTER
Caller: Astrid Sam    Relationship: Self    Best call back number: [phone]    What orders are you requesting (i.e. lab or imaging): URIANALYSIS    In what timeframe would the patient need to come in: 3/16/22    Where will you receive your lab/imaging services: IN OFFICE     Additional notes: PATIENT SYMPTOMS: FREQUENCY, BURNING WHILE URINATING

## 2022-03-17 DIAGNOSIS — G43.119 INTRACTABLE MIGRAINE WITH AURA WITHOUT STATUS MIGRAINOSUS: ICD-10-CM

## 2022-03-17 LAB
BILIRUB BLD-MCNC: NEGATIVE MG/DL
CLARITY, POC: CLEAR
COLOR UR: YELLOW
GLUCOSE UR STRIP-MCNC: NEGATIVE MG/DL
KETONES UR QL: NEGATIVE
LEUKOCYTE EST, POC: NEGATIVE
NITRITE UR-MCNC: NEGATIVE MG/ML
PH UR: 6 [PH] (ref 5–8)
PROT UR STRIP-MCNC: NEGATIVE MG/DL
RBC # UR STRIP: NEGATIVE /UL
SP GR UR: 1.03 (ref 1–1.03)
UROBILINOGEN UR QL: NORMAL

## 2022-03-22 LAB
BACTERIA UR CULT: ABNORMAL
BACTERIA UR CULT: ABNORMAL
OTHER ANTIBIOTIC SUSC ISLT: ABNORMAL

## 2022-03-23 RX ORDER — SULFAMETHOXAZOLE AND TRIMETHOPRIM 800; 160 MG/1; MG/1
1 TABLET ORAL 2 TIMES DAILY
Qty: 14 TABLET | Refills: 0 | Status: ON HOLD | OUTPATIENT
Start: 2022-03-23 | End: 2022-03-30

## 2022-03-28 ENCOUNTER — PRE-ADMISSION TESTING (OUTPATIENT)
Dept: PREADMISSION TESTING | Facility: HOSPITAL | Age: 57
End: 2022-03-28

## 2022-03-28 VITALS — WEIGHT: 163.03 LBS | BODY MASS INDEX: 32.01 KG/M2 | HEIGHT: 60 IN

## 2022-03-28 DIAGNOSIS — Z96.642 HISTORY OF REVISION OF TOTAL REPLACEMENT OF LEFT HIP JOINT: Primary | ICD-10-CM

## 2022-03-28 LAB
25(OH)D3 SERPL-MCNC: 27.8 NG/ML (ref 30–100)
ABO GROUP BLD: NORMAL
ALBUMIN SERPL-MCNC: 4.8 G/DL (ref 3.5–5.2)
ALBUMIN/GLOB SERPL: 2.2 G/DL
ALP SERPL-CCNC: 103 U/L (ref 39–117)
ALT SERPL W P-5'-P-CCNC: 16 U/L (ref 1–33)
ANION GAP SERPL CALCULATED.3IONS-SCNC: 12 MMOL/L (ref 5–15)
APTT PPP: 33.9 SECONDS (ref 22–39)
AST SERPL-CCNC: 19 U/L (ref 1–32)
BASOPHILS # BLD AUTO: 0.04 10*3/MM3 (ref 0–0.2)
BASOPHILS NFR BLD AUTO: 0.6 % (ref 0–1.5)
BILIRUB SERPL-MCNC: 0.3 MG/DL (ref 0–1.2)
BILIRUB UR QL STRIP: NEGATIVE
BUN SERPL-MCNC: 16 MG/DL (ref 6–20)
BUN/CREAT SERPL: 15 (ref 7–25)
CALCIUM SPEC-SCNC: 9.7 MG/DL (ref 8.6–10.5)
CHLORIDE SERPL-SCNC: 100 MMOL/L (ref 98–107)
CLARITY UR: CLEAR
CO2 SERPL-SCNC: 23 MMOL/L (ref 22–29)
COLOR UR: YELLOW
CREAT SERPL-MCNC: 1.07 MG/DL (ref 0.57–1)
CRP SERPL-MCNC: <0.3 MG/DL (ref 0–0.5)
DEPRECATED RDW RBC AUTO: 41.9 FL (ref 37–54)
EGFRCR SERPLBLD CKD-EPI 2021: 60.7 ML/MIN/1.73
EOSINOPHIL # BLD AUTO: 0 10*3/MM3 (ref 0–0.4)
EOSINOPHIL NFR BLD AUTO: 0 % (ref 0.3–6.2)
ERYTHROCYTE [DISTWIDTH] IN BLOOD BY AUTOMATED COUNT: 13.5 % (ref 12.3–15.4)
ERYTHROCYTE [SEDIMENTATION RATE] IN BLOOD: 9 MM/HR (ref 0–30)
GLOBULIN UR ELPH-MCNC: 2.2 GM/DL
GLUCOSE SERPL-MCNC: 103 MG/DL (ref 65–99)
GLUCOSE UR STRIP-MCNC: NEGATIVE MG/DL
HBA1C MFR BLD: 5.4 % (ref 4.8–5.6)
HCT VFR BLD AUTO: 40.3 % (ref 34–46.6)
HGB BLD-MCNC: 13.2 G/DL (ref 12–15.9)
HGB UR QL STRIP.AUTO: NEGATIVE
IMM GRANULOCYTES # BLD AUTO: 0.04 10*3/MM3 (ref 0–0.05)
IMM GRANULOCYTES NFR BLD AUTO: 0.6 % (ref 0–0.5)
INR PPP: 1.09 (ref 0.85–1.16)
KETONES UR QL STRIP: NEGATIVE
LEUKOCYTE ESTERASE UR QL STRIP.AUTO: NEGATIVE
LYMPHOCYTES # BLD AUTO: 2.4 10*3/MM3 (ref 0.7–3.1)
LYMPHOCYTES NFR BLD AUTO: 35.3 % (ref 19.6–45.3)
MCH RBC QN AUTO: 27.8 PG (ref 26.6–33)
MCHC RBC AUTO-ENTMCNC: 32.8 G/DL (ref 31.5–35.7)
MCV RBC AUTO: 84.8 FL (ref 79–97)
MONOCYTES # BLD AUTO: 0.52 10*3/MM3 (ref 0.1–0.9)
MONOCYTES NFR BLD AUTO: 7.7 % (ref 5–12)
MRSA DNA SPEC QL NAA+PROBE: NEGATIVE
NEUTROPHILS NFR BLD AUTO: 3.79 10*3/MM3 (ref 1.7–7)
NEUTROPHILS NFR BLD AUTO: 55.8 % (ref 42.7–76)
NITRITE UR QL STRIP: NEGATIVE
NRBC BLD AUTO-RTO: 0 /100 WBC (ref 0–0.2)
PH UR STRIP.AUTO: 5.5 [PH] (ref 5–8)
PLATELET # BLD AUTO: 328 10*3/MM3 (ref 140–450)
PMV BLD AUTO: 8.6 FL (ref 6–12)
POTASSIUM SERPL-SCNC: 4.5 MMOL/L (ref 3.5–5.2)
PROT SERPL-MCNC: 7 G/DL (ref 6–8.5)
PROT UR QL STRIP: NEGATIVE
PROTHROMBIN TIME: 13.7 SECONDS (ref 11.4–14.4)
QT INTERVAL: 358 MS
QTC INTERVAL: 449 MS
RBC # BLD AUTO: 4.75 10*6/MM3 (ref 3.77–5.28)
RH BLD: POSITIVE
SARS-COV-2 RNA PNL SPEC NAA+PROBE: NOT DETECTED
SODIUM SERPL-SCNC: 135 MMOL/L (ref 136–145)
SP GR UR STRIP: 1.02 (ref 1–1.03)
UROBILINOGEN UR QL STRIP: NORMAL
WBC NRBC COR # BLD: 6.79 10*3/MM3 (ref 3.4–10.8)

## 2022-03-28 PROCEDURE — G0480 DRUG TEST DEF 1-7 CLASSES: HCPCS

## 2022-03-28 PROCEDURE — 93010 ELECTROCARDIOGRAM REPORT: CPT | Performed by: INTERNAL MEDICINE

## 2022-03-28 PROCEDURE — 81003 URINALYSIS AUTO W/O SCOPE: CPT

## 2022-03-28 PROCEDURE — 80053 COMPREHEN METABOLIC PANEL: CPT

## 2022-03-28 PROCEDURE — 93005 ELECTROCARDIOGRAM TRACING: CPT | Performed by: ORTHOPAEDIC SURGERY

## 2022-03-28 PROCEDURE — C9803 HOPD COVID-19 SPEC COLLECT: HCPCS

## 2022-03-28 PROCEDURE — 85652 RBC SED RATE AUTOMATED: CPT

## 2022-03-28 PROCEDURE — 85610 PROTHROMBIN TIME: CPT

## 2022-03-28 PROCEDURE — 82306 VITAMIN D 25 HYDROXY: CPT

## 2022-03-28 PROCEDURE — 86901 BLOOD TYPING SEROLOGIC RH(D): CPT

## 2022-03-28 PROCEDURE — 85025 COMPLETE CBC W/AUTO DIFF WBC: CPT

## 2022-03-28 PROCEDURE — 83036 HEMOGLOBIN GLYCOSYLATED A1C: CPT

## 2022-03-28 PROCEDURE — 87641 MR-STAPH DNA AMP PROBE: CPT

## 2022-03-28 PROCEDURE — 86140 C-REACTIVE PROTEIN: CPT

## 2022-03-28 PROCEDURE — 85730 THROMBOPLASTIN TIME PARTIAL: CPT

## 2022-03-28 PROCEDURE — U0004 COV-19 TEST NON-CDC HGH THRU: HCPCS

## 2022-03-28 PROCEDURE — 36415 COLL VENOUS BLD VENIPUNCTURE: CPT

## 2022-03-28 PROCEDURE — 82985 ASSAY OF GLYCATED PROTEIN: CPT

## 2022-03-28 PROCEDURE — 86900 BLOOD TYPING SEROLOGIC ABO: CPT

## 2022-03-28 ASSESSMENT — HOOS JR
HOOS JR SCORE: 23
HOOS JR SCORE: 8.104

## 2022-03-29 LAB — FRUCTOSAMINE SERPL-SCNC: 220 UMOL/L (ref 0–285)

## 2022-03-30 ENCOUNTER — ANESTHESIA (OUTPATIENT)
Dept: PERIOP | Facility: HOSPITAL | Age: 57
End: 2022-03-30

## 2022-03-30 ENCOUNTER — ANESTHESIA EVENT (OUTPATIENT)
Dept: PERIOP | Facility: HOSPITAL | Age: 57
End: 2022-03-30

## 2022-03-30 ENCOUNTER — APPOINTMENT (OUTPATIENT)
Dept: GENERAL RADIOLOGY | Facility: HOSPITAL | Age: 57
End: 2022-03-30

## 2022-03-30 ENCOUNTER — HOSPITAL ENCOUNTER (INPATIENT)
Facility: HOSPITAL | Age: 57
LOS: 3 days | Discharge: NURSING, REHAB OR LONG-TERM CARE | End: 2022-04-02
Attending: ORTHOPAEDIC SURGERY | Admitting: ORTHOPAEDIC SURGERY

## 2022-03-30 DIAGNOSIS — M25.552 LEFT HIP PAIN: ICD-10-CM

## 2022-03-30 DIAGNOSIS — Z96.642 STATUS POST TOTAL REPLACEMENT OF LEFT HIP: Primary | ICD-10-CM

## 2022-03-30 LAB
ABO GROUP BLD: NORMAL
BLD GP AB SCN SERPL QL: NEGATIVE
POTASSIUM SERPL-SCNC: 4.2 MMOL/L (ref 3.5–5.2)
RH BLD: POSITIVE
T&S EXPIRATION DATE: NORMAL

## 2022-03-30 PROCEDURE — 86900 BLOOD TYPING SEROLOGIC ABO: CPT | Performed by: ORTHOPAEDIC SURGERY

## 2022-03-30 PROCEDURE — 87116 MYCOBACTERIA CULTURE: CPT | Performed by: ORTHOPAEDIC SURGERY

## 2022-03-30 PROCEDURE — 87176 TISSUE HOMOGENIZATION CULTR: CPT | Performed by: ORTHOPAEDIC SURGERY

## 2022-03-30 PROCEDURE — C1776 JOINT DEVICE (IMPLANTABLE): HCPCS | Performed by: ORTHOPAEDIC SURGERY

## 2022-03-30 PROCEDURE — 27134 REVISE HIP JOINT REPLACEMENT: CPT

## 2022-03-30 PROCEDURE — 25010000002 MIDAZOLAM PER 1 MG: Performed by: ANESTHESIOLOGY

## 2022-03-30 PROCEDURE — 25010000002 DEXAMETHASONE PER 1 MG: Performed by: NURSE ANESTHETIST, CERTIFIED REGISTERED

## 2022-03-30 PROCEDURE — 84132 ASSAY OF SERUM POTASSIUM: CPT | Performed by: ANESTHESIOLOGY

## 2022-03-30 PROCEDURE — 25010000002 CEFAZOLIN IN DEXTROSE 2-4 GM/100ML-% SOLUTION: Performed by: ORTHOPAEDIC SURGERY

## 2022-03-30 PROCEDURE — 25010000002 CLONIDINE PER 1 MG: Performed by: ORTHOPAEDIC SURGERY

## 2022-03-30 PROCEDURE — 25010000002 VANCOMYCIN 1 G RECONSTITUTED SOLUTION: Performed by: ORTHOPAEDIC SURGERY

## 2022-03-30 PROCEDURE — 87102 FUNGUS ISOLATION CULTURE: CPT | Performed by: ORTHOPAEDIC SURGERY

## 2022-03-30 PROCEDURE — 87205 SMEAR GRAM STAIN: CPT | Performed by: ORTHOPAEDIC SURGERY

## 2022-03-30 PROCEDURE — 25010000002 FENTANYL CITRATE (PF) 50 MCG/ML SOLUTION

## 2022-03-30 PROCEDURE — 94799 UNLISTED PULMONARY SVC/PX: CPT

## 2022-03-30 PROCEDURE — C1713 ANCHOR/SCREW BN/BN,TIS/BN: HCPCS | Performed by: ORTHOPAEDIC SURGERY

## 2022-03-30 PROCEDURE — 87075 CULTR BACTERIA EXCEPT BLOOD: CPT | Performed by: ORTHOPAEDIC SURGERY

## 2022-03-30 PROCEDURE — 25010000002 KETOROLAC TROMETHAMINE PER 15 MG: Performed by: ORTHOPAEDIC SURGERY

## 2022-03-30 PROCEDURE — 0SRB0JZ REPLACEMENT OF LEFT HIP JOINT WITH SYNTHETIC SUBSTITUTE, OPEN APPROACH: ICD-10-PCS | Performed by: ORTHOPAEDIC SURGERY

## 2022-03-30 PROCEDURE — 25010000002 PROPOFOL 10 MG/ML EMULSION: Performed by: NURSE ANESTHETIST, CERTIFIED REGISTERED

## 2022-03-30 PROCEDURE — 72170 X-RAY EXAM OF PELVIS: CPT

## 2022-03-30 PROCEDURE — 87070 CULTURE OTHR SPECIMN AEROBIC: CPT | Performed by: ORTHOPAEDIC SURGERY

## 2022-03-30 PROCEDURE — 86850 RBC ANTIBODY SCREEN: CPT | Performed by: ORTHOPAEDIC SURGERY

## 2022-03-30 PROCEDURE — 86901 BLOOD TYPING SEROLOGIC RH(D): CPT | Performed by: ORTHOPAEDIC SURGERY

## 2022-03-30 PROCEDURE — 94640 AIRWAY INHALATION TREATMENT: CPT

## 2022-03-30 PROCEDURE — 87206 SMEAR FLUORESCENT/ACID STAI: CPT | Performed by: ORTHOPAEDIC SURGERY

## 2022-03-30 PROCEDURE — 25010000002 PHENYLEPHRINE 10 MG/ML SOLUTION 1 ML VIAL: Performed by: NURSE ANESTHETIST, CERTIFIED REGISTERED

## 2022-03-30 PROCEDURE — C1889 IMPLANT/INSERT DEVICE, NOC: HCPCS | Performed by: ORTHOPAEDIC SURGERY

## 2022-03-30 PROCEDURE — 25010000002 FENTANYL CITRATE (PF) 50 MCG/ML SOLUTION: Performed by: NURSE ANESTHETIST, CERTIFIED REGISTERED

## 2022-03-30 PROCEDURE — 0SPB0JZ REMOVAL OF SYNTHETIC SUBSTITUTE FROM LEFT HIP JOINT, OPEN APPROACH: ICD-10-PCS | Performed by: ORTHOPAEDIC SURGERY

## 2022-03-30 DEVICE — DEV CONTRL TISS STRATAFIX SPIRAL PDO BIDIR 1 36X36CM: Type: IMPLANTABLE DEVICE | Site: HIP | Status: FUNCTIONAL

## 2022-03-30 DEVICE — SCRW ACET CORT TRILOGY S/TAP 6.5X25: Type: IMPLANTABLE DEVICE | Site: HIP | Status: FUNCTIONAL

## 2022-03-30 DEVICE — IMPLANTABLE DEVICE
Type: IMPLANTABLE DEVICE | Site: HIP | Status: FUNCTIONAL
Brand: EQUIVABONE ®

## 2022-03-30 DEVICE — IMPLANTABLE DEVICE: Type: IMPLANTABLE DEVICE | Site: HIP | Status: FUNCTIONAL

## 2022-03-30 DEVICE — SUT NONABS MAXBRAID/PE NMBR2 HC5 38IN WHT 900333: Type: IMPLANTABLE DEVICE | Site: HIP | Status: FUNCTIONAL

## 2022-03-30 DEVICE — LINER G7 2MOBL SZD 40MM: Type: IMPLANTABLE DEVICE | Site: HIP | Status: FUNCTIONAL

## 2022-03-30 DEVICE — BEAR HIP VIVACI/TE 2MOBIL HXPE 28X42MM: Type: IMPLANTABLE DEVICE | Site: HIP | Status: FUNCTIONAL

## 2022-03-30 DEVICE — SCRW ACET CORT TRILOGY S/TAP 6.5X20: Type: IMPLANTABLE DEVICE | Site: HIP | Status: FUNCTIONAL

## 2022-03-30 DEVICE — SCRW ACET CORT TRILOGY S/TAP 6.5X40: Type: IMPLANTABLE DEVICE | Site: HIP | Status: FUNCTIONAL

## 2022-03-30 DEVICE — CONE FEM BDY PROX STDOFFST ARCOS A 60: Type: IMPLANTABLE DEVICE | Site: HIP | Status: FUNCTIONAL

## 2022-03-30 RX ORDER — OXYCODONE HYDROCHLORIDE 5 MG/1
5 TABLET ORAL EVERY 4 HOURS PRN
Status: DISCONTINUED | OUTPATIENT
Start: 2022-03-30 | End: 2022-04-02 | Stop reason: HOSPADM

## 2022-03-30 RX ORDER — ACETAMINOPHEN 500 MG
1000 TABLET ORAL ONCE
Status: COMPLETED | OUTPATIENT
Start: 2022-03-30 | End: 2022-03-30

## 2022-03-30 RX ORDER — FENTANYL CITRATE 50 UG/ML
INJECTION, SOLUTION INTRAMUSCULAR; INTRAVENOUS AS NEEDED
Status: DISCONTINUED | OUTPATIENT
Start: 2022-03-30 | End: 2022-03-30 | Stop reason: SURG

## 2022-03-30 RX ORDER — SODIUM CHLORIDE 0.9 % (FLUSH) 0.9 %
10 SYRINGE (ML) INJECTION EVERY 12 HOURS SCHEDULED
Status: DISCONTINUED | OUTPATIENT
Start: 2022-03-30 | End: 2022-03-30 | Stop reason: HOSPADM

## 2022-03-30 RX ORDER — OXYCODONE HYDROCHLORIDE 5 MG/1
10 TABLET ORAL EVERY 4 HOURS PRN
Status: DISCONTINUED | OUTPATIENT
Start: 2022-03-30 | End: 2022-04-02 | Stop reason: HOSPADM

## 2022-03-30 RX ORDER — SODIUM CHLORIDE 0.9 % (FLUSH) 0.9 %
3 SYRINGE (ML) INJECTION EVERY 12 HOURS SCHEDULED
Status: DISCONTINUED | OUTPATIENT
Start: 2022-03-30 | End: 2022-03-30 | Stop reason: HOSPADM

## 2022-03-30 RX ORDER — ONDANSETRON 2 MG/ML
4 INJECTION INTRAMUSCULAR; INTRAVENOUS ONCE AS NEEDED
Status: DISCONTINUED | OUTPATIENT
Start: 2022-03-30 | End: 2022-03-30 | Stop reason: HOSPADM

## 2022-03-30 RX ORDER — DROPERIDOL 2.5 MG/ML
0.62 INJECTION, SOLUTION INTRAMUSCULAR; INTRAVENOUS AS NEEDED
Status: DISCONTINUED | OUTPATIENT
Start: 2022-03-30 | End: 2022-03-30 | Stop reason: HOSPADM

## 2022-03-30 RX ORDER — VANCOMYCIN HYDROCHLORIDE 1 G/20ML
INJECTION, POWDER, LYOPHILIZED, FOR SOLUTION INTRAVENOUS AS NEEDED
Status: DISCONTINUED | OUTPATIENT
Start: 2022-03-30 | End: 2022-03-30 | Stop reason: HOSPADM

## 2022-03-30 RX ORDER — SODIUM CHLORIDE, SODIUM LACTATE, POTASSIUM CHLORIDE, CALCIUM CHLORIDE 600; 310; 30; 20 MG/100ML; MG/100ML; MG/100ML; MG/100ML
9 INJECTION, SOLUTION INTRAVENOUS CONTINUOUS
Status: DISCONTINUED | OUTPATIENT
Start: 2022-03-30 | End: 2022-03-30

## 2022-03-30 RX ORDER — PREGABALIN 75 MG/1
75 CAPSULE ORAL ONCE
Status: COMPLETED | OUTPATIENT
Start: 2022-03-30 | End: 2022-03-30

## 2022-03-30 RX ORDER — PROMETHAZINE HYDROCHLORIDE 25 MG/1
25 TABLET ORAL ONCE AS NEEDED
Status: DISCONTINUED | OUTPATIENT
Start: 2022-03-30 | End: 2022-03-30 | Stop reason: HOSPADM

## 2022-03-30 RX ORDER — ALBUTEROL SULFATE 2.5 MG/3ML
2.5 SOLUTION RESPIRATORY (INHALATION) EVERY 4 HOURS PRN
Status: DISCONTINUED | OUTPATIENT
Start: 2022-03-30 | End: 2022-04-02 | Stop reason: HOSPADM

## 2022-03-30 RX ORDER — LABETALOL HYDROCHLORIDE 5 MG/ML
5 INJECTION, SOLUTION INTRAVENOUS
Status: DISCONTINUED | OUTPATIENT
Start: 2022-03-30 | End: 2022-03-30 | Stop reason: HOSPADM

## 2022-03-30 RX ORDER — DEXAMETHASONE SODIUM PHOSPHATE 4 MG/ML
INJECTION, SOLUTION INTRA-ARTICULAR; INTRALESIONAL; INTRAMUSCULAR; INTRAVENOUS; SOFT TISSUE AS NEEDED
Status: DISCONTINUED | OUTPATIENT
Start: 2022-03-30 | End: 2022-03-30 | Stop reason: SURG

## 2022-03-30 RX ORDER — PRAMIPEXOLE DIHYDROCHLORIDE 0.25 MG/1
0.75 TABLET ORAL NIGHTLY
Status: DISCONTINUED | OUTPATIENT
Start: 2022-03-30 | End: 2022-04-02 | Stop reason: HOSPADM

## 2022-03-30 RX ORDER — MELOXICAM 7.5 MG/1
15 TABLET ORAL DAILY
Status: DISCONTINUED | OUTPATIENT
Start: 2022-03-31 | End: 2022-04-02 | Stop reason: HOSPADM

## 2022-03-30 RX ORDER — FAMOTIDINE 20 MG/1
20 TABLET, FILM COATED ORAL ONCE
Status: COMPLETED | OUTPATIENT
Start: 2022-03-30 | End: 2022-03-30

## 2022-03-30 RX ORDER — NALOXONE HCL 0.4 MG/ML
0.1 VIAL (ML) INJECTION
Status: DISCONTINUED | OUTPATIENT
Start: 2022-03-30 | End: 2022-04-02 | Stop reason: HOSPADM

## 2022-03-30 RX ORDER — FENTANYL CITRATE 50 UG/ML
50 INJECTION, SOLUTION INTRAMUSCULAR; INTRAVENOUS
Status: DISCONTINUED | OUTPATIENT
Start: 2022-03-30 | End: 2022-03-30 | Stop reason: HOSPADM

## 2022-03-30 RX ORDER — FENTANYL CITRATE 50 UG/ML
INJECTION, SOLUTION INTRAMUSCULAR; INTRAVENOUS
Status: COMPLETED
Start: 2022-03-30 | End: 2022-03-30

## 2022-03-30 RX ORDER — HYDROMORPHONE HYDROCHLORIDE 1 MG/ML
0.5 INJECTION, SOLUTION INTRAMUSCULAR; INTRAVENOUS; SUBCUTANEOUS
Status: DISCONTINUED | OUTPATIENT
Start: 2022-03-30 | End: 2022-03-30 | Stop reason: HOSPADM

## 2022-03-30 RX ORDER — TRANEXAMIC ACID 10 MG/ML
1000 INJECTION, SOLUTION INTRAVENOUS ONCE
Status: COMPLETED | OUTPATIENT
Start: 2022-03-30 | End: 2022-03-30

## 2022-03-30 RX ORDER — BUPIVACAINE HCL/0.9 % NACL/PF 0.125 %
PLASTIC BAG, INJECTION (ML) EPIDURAL AS NEEDED
Status: DISCONTINUED | OUTPATIENT
Start: 2022-03-30 | End: 2022-03-30 | Stop reason: SURG

## 2022-03-30 RX ORDER — PRAVASTATIN SODIUM 40 MG
40 TABLET ORAL NIGHTLY
Status: DISCONTINUED | OUTPATIENT
Start: 2022-03-30 | End: 2022-04-02 | Stop reason: HOSPADM

## 2022-03-30 RX ORDER — ASPIRIN 81 MG/1
81 TABLET ORAL EVERY 12 HOURS SCHEDULED
Status: DISCONTINUED | OUTPATIENT
Start: 2022-03-31 | End: 2022-04-02 | Stop reason: HOSPADM

## 2022-03-30 RX ORDER — PROMETHAZINE HYDROCHLORIDE 25 MG/1
25 TABLET ORAL ONCE AS NEEDED
Status: DISCONTINUED | OUTPATIENT
Start: 2022-03-30 | End: 2022-03-30 | Stop reason: SDUPTHER

## 2022-03-30 RX ORDER — FAMOTIDINE 10 MG/ML
20 INJECTION, SOLUTION INTRAVENOUS ONCE
Status: CANCELLED | OUTPATIENT
Start: 2022-03-30 | End: 2022-03-30

## 2022-03-30 RX ORDER — BUDESONIDE AND FORMOTEROL FUMARATE DIHYDRATE 160; 4.5 UG/1; UG/1
2 AEROSOL RESPIRATORY (INHALATION)
Refills: 11 | Status: DISCONTINUED | OUTPATIENT
Start: 2022-03-30 | End: 2022-04-02 | Stop reason: HOSPADM

## 2022-03-30 RX ORDER — MELOXICAM 15 MG/1
15 TABLET ORAL ONCE
Status: COMPLETED | OUTPATIENT
Start: 2022-03-30 | End: 2022-03-30

## 2022-03-30 RX ORDER — HYDROMORPHONE HYDROCHLORIDE 1 MG/ML
0.5 INJECTION, SOLUTION INTRAMUSCULAR; INTRAVENOUS; SUBCUTANEOUS
Status: DISCONTINUED | OUTPATIENT
Start: 2022-03-30 | End: 2022-04-02 | Stop reason: HOSPADM

## 2022-03-30 RX ORDER — LIDOCAINE HYDROCHLORIDE 10 MG/ML
0.5 INJECTION, SOLUTION EPIDURAL; INFILTRATION; INTRACAUDAL; PERINEURAL ONCE AS NEEDED
Status: COMPLETED | OUTPATIENT
Start: 2022-03-30 | End: 2022-03-30

## 2022-03-30 RX ORDER — SODIUM CHLORIDE 0.9 % (FLUSH) 0.9 %
3-10 SYRINGE (ML) INJECTION AS NEEDED
Status: DISCONTINUED | OUTPATIENT
Start: 2022-03-30 | End: 2022-03-30 | Stop reason: HOSPADM

## 2022-03-30 RX ORDER — ESCITALOPRAM OXALATE 20 MG/1
20 TABLET ORAL DAILY
Status: DISCONTINUED | OUTPATIENT
Start: 2022-03-30 | End: 2022-04-02 | Stop reason: HOSPADM

## 2022-03-30 RX ORDER — MIDAZOLAM HYDROCHLORIDE 1 MG/ML
1 INJECTION INTRAMUSCULAR; INTRAVENOUS
Status: COMPLETED | OUTPATIENT
Start: 2022-03-30 | End: 2022-03-30

## 2022-03-30 RX ORDER — MAGNESIUM HYDROXIDE 1200 MG/15ML
LIQUID ORAL AS NEEDED
Status: DISCONTINUED | OUTPATIENT
Start: 2022-03-30 | End: 2022-03-30 | Stop reason: HOSPADM

## 2022-03-30 RX ORDER — PANTOPRAZOLE SODIUM 40 MG/1
40 TABLET, DELAYED RELEASE ORAL 2 TIMES DAILY
Status: DISCONTINUED | OUTPATIENT
Start: 2022-03-30 | End: 2022-04-02 | Stop reason: HOSPADM

## 2022-03-30 RX ORDER — HYDRALAZINE HYDROCHLORIDE 20 MG/ML
5 INJECTION INTRAMUSCULAR; INTRAVENOUS
Status: DISCONTINUED | OUTPATIENT
Start: 2022-03-30 | End: 2022-03-30 | Stop reason: HOSPADM

## 2022-03-30 RX ORDER — OXYBUTYNIN CHLORIDE 10 MG/1
10 TABLET, EXTENDED RELEASE ORAL DAILY
Status: DISCONTINUED | OUTPATIENT
Start: 2022-03-30 | End: 2022-04-02 | Stop reason: HOSPADM

## 2022-03-30 RX ORDER — ACETAMINOPHEN 500 MG
1000 TABLET ORAL EVERY 8 HOURS SCHEDULED
Status: DISCONTINUED | OUTPATIENT
Start: 2022-03-30 | End: 2022-04-02 | Stop reason: HOSPADM

## 2022-03-30 RX ORDER — DROPERIDOL 2.5 MG/ML
0.62 INJECTION, SOLUTION INTRAMUSCULAR; INTRAVENOUS ONCE AS NEEDED
Status: DISCONTINUED | OUTPATIENT
Start: 2022-03-30 | End: 2022-03-30 | Stop reason: HOSPADM

## 2022-03-30 RX ORDER — NALOXONE HCL 0.4 MG/ML
0.4 VIAL (ML) INJECTION AS NEEDED
Status: DISCONTINUED | OUTPATIENT
Start: 2022-03-30 | End: 2022-03-30 | Stop reason: HOSPADM

## 2022-03-30 RX ORDER — SODIUM CHLORIDE, SODIUM LACTATE, POTASSIUM CHLORIDE, CALCIUM CHLORIDE 600; 310; 30; 20 MG/100ML; MG/100ML; MG/100ML; MG/100ML
100 INJECTION, SOLUTION INTRAVENOUS CONTINUOUS
Status: DISCONTINUED | OUTPATIENT
Start: 2022-03-30 | End: 2022-04-02 | Stop reason: HOSPADM

## 2022-03-30 RX ORDER — PROMETHAZINE HYDROCHLORIDE 25 MG/1
25 SUPPOSITORY RECTAL ONCE AS NEEDED
Status: DISCONTINUED | OUTPATIENT
Start: 2022-03-30 | End: 2022-03-30 | Stop reason: HOSPADM

## 2022-03-30 RX ORDER — HYDROCODONE BITARTRATE AND ACETAMINOPHEN 5; 325 MG/1; MG/1
1 TABLET ORAL ONCE AS NEEDED
Status: DISCONTINUED | OUTPATIENT
Start: 2022-03-30 | End: 2022-03-30 | Stop reason: HOSPADM

## 2022-03-30 RX ORDER — IPRATROPIUM BROMIDE AND ALBUTEROL SULFATE 2.5; .5 MG/3ML; MG/3ML
3 SOLUTION RESPIRATORY (INHALATION) ONCE AS NEEDED
Status: DISCONTINUED | OUTPATIENT
Start: 2022-03-30 | End: 2022-03-30 | Stop reason: HOSPADM

## 2022-03-30 RX ORDER — CEFAZOLIN SODIUM 2 G/100ML
2 INJECTION, SOLUTION INTRAVENOUS ONCE
Status: COMPLETED | OUTPATIENT
Start: 2022-03-30 | End: 2022-03-30

## 2022-03-30 RX ORDER — LEVOTHYROXINE SODIUM 0.03 MG/1
25 TABLET ORAL DAILY
Status: DISCONTINUED | OUTPATIENT
Start: 2022-03-30 | End: 2022-04-02 | Stop reason: HOSPADM

## 2022-03-30 RX ORDER — KETOROLAC TROMETHAMINE 15 MG/ML
15 INJECTION, SOLUTION INTRAMUSCULAR; INTRAVENOUS EVERY 6 HOURS PRN
Status: DISCONTINUED | OUTPATIENT
Start: 2022-03-30 | End: 2022-04-02 | Stop reason: HOSPADM

## 2022-03-30 RX ORDER — SODIUM CHLORIDE 0.9 % (FLUSH) 0.9 %
10 SYRINGE (ML) INJECTION AS NEEDED
Status: DISCONTINUED | OUTPATIENT
Start: 2022-03-30 | End: 2022-03-30 | Stop reason: HOSPADM

## 2022-03-30 RX ORDER — ONDANSETRON 2 MG/ML
4 INJECTION INTRAMUSCULAR; INTRAVENOUS EVERY 6 HOURS PRN
Status: DISCONTINUED | OUTPATIENT
Start: 2022-03-30 | End: 2022-04-02 | Stop reason: HOSPADM

## 2022-03-30 RX ORDER — TRAMADOL HYDROCHLORIDE 50 MG/1
50 TABLET ORAL EVERY 8 HOURS PRN
Status: DISCONTINUED | OUTPATIENT
Start: 2022-03-30 | End: 2022-04-02 | Stop reason: HOSPADM

## 2022-03-30 RX ORDER — BUPIVACAINE HYDROCHLORIDE 5 MG/ML
INJECTION, SOLUTION PERINEURAL
Status: COMPLETED | OUTPATIENT
Start: 2022-03-30 | End: 2022-03-30

## 2022-03-30 RX ORDER — CEFAZOLIN SODIUM 2 G/100ML
2 INJECTION, SOLUTION INTRAVENOUS EVERY 8 HOURS
Status: COMPLETED | OUTPATIENT
Start: 2022-03-30 | End: 2022-03-31

## 2022-03-30 RX ORDER — LABETALOL HYDROCHLORIDE 5 MG/ML
10 INJECTION, SOLUTION INTRAVENOUS EVERY 4 HOURS PRN
Status: DISCONTINUED | OUTPATIENT
Start: 2022-03-30 | End: 2022-04-02 | Stop reason: HOSPADM

## 2022-03-30 RX ORDER — PROPOFOL 10 MG/ML
VIAL (ML) INTRAVENOUS AS NEEDED
Status: DISCONTINUED | OUTPATIENT
Start: 2022-03-30 | End: 2022-03-30 | Stop reason: SURG

## 2022-03-30 RX ORDER — MEPERIDINE HYDROCHLORIDE 25 MG/ML
12.5 INJECTION INTRAMUSCULAR; INTRAVENOUS; SUBCUTANEOUS
Status: DISCONTINUED | OUTPATIENT
Start: 2022-03-30 | End: 2022-03-30 | Stop reason: HOSPADM

## 2022-03-30 RX ADMIN — Medication 100 MCG: at 13:16

## 2022-03-30 RX ADMIN — FENTANYL CITRATE 50 MCG: 50 INJECTION, SOLUTION INTRAMUSCULAR; INTRAVENOUS at 16:55

## 2022-03-30 RX ADMIN — PREGABALIN 75 MG: 75 CAPSULE ORAL at 10:28

## 2022-03-30 RX ADMIN — DEXAMETHASONE SODIUM PHOSPHATE 8 MG: 4 INJECTION, SOLUTION INTRA-ARTICULAR; INTRALESIONAL; INTRAMUSCULAR; INTRAVENOUS; SOFT TISSUE at 13:15

## 2022-03-30 RX ADMIN — Medication 100 MCG: at 13:17

## 2022-03-30 RX ADMIN — ACETAMINOPHEN 1000 MG: 500 TABLET ORAL at 10:28

## 2022-03-30 RX ADMIN — SODIUM CHLORIDE, POTASSIUM CHLORIDE, SODIUM LACTATE AND CALCIUM CHLORIDE: 600; 310; 30; 20 INJECTION, SOLUTION INTRAVENOUS at 14:30

## 2022-03-30 RX ADMIN — LIDOCAINE HYDROCHLORIDE 0.5 ML: 10 INJECTION, SOLUTION EPIDURAL; INFILTRATION; INTRACAUDAL; PERINEURAL at 10:46

## 2022-03-30 RX ADMIN — PRAVASTATIN SODIUM 40 MG: 40 TABLET ORAL at 19:56

## 2022-03-30 RX ADMIN — FENTANYL CITRATE 50 MCG: 50 INJECTION, SOLUTION INTRAMUSCULAR; INTRAVENOUS at 13:08

## 2022-03-30 RX ADMIN — CEFAZOLIN SODIUM 2 G: 2 INJECTION, SOLUTION INTRAVENOUS at 12:53

## 2022-03-30 RX ADMIN — PROPOFOL 100 MCG/KG/MIN: 10 INJECTION, EMULSION INTRAVENOUS at 12:56

## 2022-03-30 RX ADMIN — Medication 100 MCG: at 14:30

## 2022-03-30 RX ADMIN — MIDAZOLAM HYDROCHLORIDE 1 MG: 1 INJECTION, SOLUTION INTRAMUSCULAR; INTRAVENOUS at 13:01

## 2022-03-30 RX ADMIN — MELOXICAM 15 MG: 15 TABLET ORAL at 10:28

## 2022-03-30 RX ADMIN — PRAMIPEXOLE DIHYDROCHLORIDE 0.75 MG: 0.25 TABLET ORAL at 18:25

## 2022-03-30 RX ADMIN — MUPIROCIN 1 APPLICATION: 20 OINTMENT TOPICAL at 10:46

## 2022-03-30 RX ADMIN — FAMOTIDINE 20 MG: 20 TABLET, FILM COATED ORAL at 10:28

## 2022-03-30 RX ADMIN — MIDAZOLAM HYDROCHLORIDE 1 MG: 1 INJECTION, SOLUTION INTRAMUSCULAR; INTRAVENOUS at 12:57

## 2022-03-30 RX ADMIN — SODIUM CHLORIDE, POTASSIUM CHLORIDE, SODIUM LACTATE AND CALCIUM CHLORIDE 100 ML/HR: 600; 310; 30; 20 INJECTION, SOLUTION INTRAVENOUS at 17:50

## 2022-03-30 RX ADMIN — OXYCODONE 10 MG: 5 TABLET ORAL at 20:37

## 2022-03-30 RX ADMIN — PANTOPRAZOLE SODIUM 40 MG: 40 TABLET, DELAYED RELEASE ORAL at 19:56

## 2022-03-30 RX ADMIN — SODIUM CHLORIDE, POTASSIUM CHLORIDE, SODIUM LACTATE AND CALCIUM CHLORIDE 9 ML/HR: 600; 310; 30; 20 INJECTION, SOLUTION INTRAVENOUS at 10:46

## 2022-03-30 RX ADMIN — BUPIVACAINE HYDROCHLORIDE 3 ML: 5 INJECTION, SOLUTION PERINEURAL at 13:12

## 2022-03-30 RX ADMIN — Medication 100 MCG: at 13:22

## 2022-03-30 RX ADMIN — ACETAMINOPHEN 1000 MG: 500 TABLET ORAL at 19:56

## 2022-03-30 RX ADMIN — TRANEXAMIC ACID 1000 MG: 10 INJECTION, SOLUTION INTRAVENOUS at 13:17

## 2022-03-30 RX ADMIN — FENTANYL CITRATE 50 MCG: 50 INJECTION, SOLUTION INTRAMUSCULAR; INTRAVENOUS at 13:11

## 2022-03-30 RX ADMIN — TRANEXAMIC ACID 1000 MG: 10 INJECTION, SOLUTION INTRAVENOUS at 15:38

## 2022-03-30 RX ADMIN — BUDESONIDE AND FORMOTEROL FUMARATE DIHYDRATE 2 PUFF: 160; 4.5 AEROSOL RESPIRATORY (INHALATION) at 18:20

## 2022-03-30 RX ADMIN — CEFAZOLIN SODIUM 2 G: 2 INJECTION, SOLUTION INTRAVENOUS at 22:13

## 2022-03-30 RX ADMIN — Medication 200 MCG: at 13:23

## 2022-03-30 RX ADMIN — PHENYLEPHRINE HYDROCHLORIDE 0.5 MCG/KG/MIN: 10 INJECTION INTRAVENOUS at 13:27

## 2022-03-30 RX ADMIN — KETOROLAC TROMETHAMINE 15 MG: 15 INJECTION, SOLUTION INTRAMUSCULAR; INTRAVENOUS at 17:50

## 2022-03-30 RX ADMIN — FENTANYL CITRATE 50 MCG: 0.05 INJECTION, SOLUTION INTRAMUSCULAR; INTRAVENOUS at 16:55

## 2022-03-30 RX ADMIN — PROPOFOL 30 MG: 10 INJECTION, EMULSION INTRAVENOUS at 13:04

## 2022-03-30 NOTE — PLAN OF CARE
Problem: Patient Care Overview  Goal: Plan of Care Review  Outcome: Ongoing (interventions implemented as appropriate)  Flowsheets  Taken 12/13/2019 1437  Progress: improving  Taken 12/13/2019 1310  Plan of Care Reviewed With: patient  Outcome Summary: Pt able to amb 40' with step to gait mechanics with several standing rest breaks due to fatigue. Pt limited with gait by fatigue. Encouraged patient to ambulate later with nursing. Recommend patient home with assist and home health PT. PADD: 9      Psychiatric Consult Note  Consultation requested by: Dao Hoff MD    Source of history:  I based this report upon my review of Justin Kurtz's written electronic medical record, information gathered from staff and upon my interview and assessment of Justin Kurtz's clinical condition. I interviewed the patient through a telehealth video.      Identifying information:  Justin Kurtz is a 50 year old male admitted to [facility] on 3/29/2022.          Chief Complaint or Reason for Hospitalization :   Chief Complaint   Patient presents with   • Chest Pain Adult   • Suicidal       Reason for Consult: OD    Assessment:  Pt is a 50yr old with multiple psych hospitalizations and suicidal attempts though details were vague. He also has a hx of polysubstance abuse which in combination to taking more than prescribed doses of medications, landed him in the ED. He does admit to his OD being a suicide attempt and that he has been having thoughts for at least a year. He will need to be transferred to a dual diagnosis program ideally once medically stabilized.     Mental Status Exam:  Pt was laying in hospital bed in no acute distress. He was dressed in hospital clothing with appropriate grooming and hygiene. He was guarded with the interaction. He maintained poor eye contact. He was sedated and reserved. He displayed no abnormal involuntary movements. Gait was not assessed as he remained in bed. His speech was of low volume, slow and 1-2 words. His mood was \"I don't feel anything\" with a flat affect. His thought processing was slow. His thought content was free from delusions, paranoia, auditory and visual hallucinations. He was uncertain to SI or HI.  He was sedated. Cognition was impaired. Insight and judgement is poor.     Is patient willing and able to solve problems?  NO    Diagnosis:  Depression unspecified  R/o MDD  R/o substance induced mood disorder  Alcohol use disorder  Cocaine use disorder    Treatment  Plan:  1. Continue to observe for alcohol or cocaine withdrawal.   2. No need to restart psych medications. (overdose and unknown compliance and or benefit)  3. SW to look for inpatient psych bed.     History of Presenting Illness:  Pt is a 50yr old male on disability and living with his adult sons. He has a medical history including alcohol abuse, CHF, HIV, HTN, HLD, who presented to the ED c/o chest pain, intoxication and SI. He was found wandering the streets per EMS. He was initially combative. He stated that he had taken many of his medications, had alcohol and cocaine. He was not clear about whether the OD was a suicide attempt. He was admitted for further monitoring. Due to questionable suicide attempt, psych was asked to consult. He told ED nurse that he has had SI for over a year. He has cut his wrist in the past. He did admit to attempting day of presentation but was not clear on how or if the OD was part of the attempt. Upon assessment pt was sleeping and remained sedated throughout assessment. Pt said that he was depressed and angry. He admitted that the OD was a suicide attempt. When asked how long he had been suicidal he stated \"a long time\". He was vague with his responses stating or gesturing that he didn't know. He said that he did not know how he felt or whether he was still suicidal. I was unable to get a more detailed history from pt.     Psychiatric Review of Symptoms:  Vague responses.     Depression:  Anxiety:  Shirley:  Psychosis:  Cognition:      Past Psychiatric History:  Pt with hospital stay in 2019 for SI/SA. He said he had many suicide attempts. Unclear hospital stays. He was prescribed sertraline and hydroxyzine as well as other medications he did not remember. He did not feel that they were helpful.       Medical History:    Past Medical History:   Diagnosis Date   • Alcohol abuse    • CHF (congestive heart failure) (CMS/[facility])    • Depression    • Former smoker    • HIV (human  immunodeficiency virus infection) (CMS/[facility])    • HTN (hypertension)    • Hyperlipidemia         Substance Abuse History:  Social History     Tobacco Use   • Smoking status: Former Smoker     Types: Cigarettes   • Smokeless tobacco: Never Used   • Tobacco comment: Former smoker, quit years ago   Substance Use Topics   • Alcohol use: Yes     Comment: Reports hx of daily EtOH, and \"struggling with EtOH\"   • Drug use: Never   UTOX positive for alcohol and cocaine.     Family History:  Family History   Problem Relation Age of Onset   • Systemic Lupus Erythematosus Mother    • Patient is unaware of any medical problems Father      Family with no history of mental illness.     Social History:  Pt says that he lives with his grown sons and is on disability for \"everything\".     Relevant Labs:  Recent Results (from the past 24 hour(s))   Electrocardiogram 12-Lead    Collection Time: 03/29/22 11:51 PM   Result Value Ref Range    Ventricular Rate EKG/Min (BPM) 81     Atrial Rate (BPM) 81     NM-Interval (MSEC) 144     QRS-Interval (MSEC) 108     QT-Interval (MSEC) 480     QTc 557     P Axis (Degrees) 73     R Axis (Degrees) 26     T Axis (Degrees) -77     REPORT TEXT       Normal sinus rhythm  Possible  Left atrial enlargement  Left ventricular hypertrophy  T wave abnormality, consider inferior ischemia  T wave abnormality, consider anterolateral ischemia  Prolonged QT  Abnormal ECG  When compared with ECG of  29-MAR-2021 13:15,  premature ventricular complexes  are no longer  present  QT has lengthened     GLUCOSE, BEDSIDE - POINT OF CARE    Collection Time: 03/29/22 11:53 PM   Result Value Ref Range    GLUCOSE, BEDSIDE - POINT OF CARE 75 70 - 99 mg/dL   Comprehensive Metabolic Panel    Collection Time: 03/30/22 12:05 AM   Result Value Ref Range    Fasting Status      Sodium 137 135 - 145 mmol/L    Potassium 3.3 (L) 3.4 - 5.1 mmol/L    Chloride 98 98 - 107 mmol/L    Carbon Dioxide 25 21 - 32 mmol/L    Anion Gap 17 10 - 20  mmol/L    Glucose 81 70 - 99 mg/dL    BUN 13 6 - 20 mg/dL    Creatinine 0.95 0.67 - 1.17 mg/dL    Glomerular Filtration Rate >90 >=60    BUN/ Creatinine Ratio 14 7 - 25    Calcium 8.9 8.4 - 10.2 mg/dL    Bilirubin, Total 0.5 0.2 - 1.0 mg/dL    GOT/AST 42 (H) <=37 Units/L    GPT/ALT 44 <64 Units/L    Alkaline Phosphatase 108 45 - 117 Units/L    Albumin 4.4 3.6 - 5.1 g/dL    Protein, Total 9.1 (H) 6.4 - 8.2 g/dL    Globulin 4.7 (H) 2.0 - 4.0 g/dL    A/G Ratio 0.9 (L) 1.0 - 2.4   Alcohol    Collection Time: 03/30/22 12:05 AM   Result Value Ref Range    Alcohol 240 (H) <3 mg/dL   TROPONIN I, HIGH SENSITIVITY    Collection Time: 03/30/22 12:05 AM   Result Value Ref Range    Troponin I, High Sensitivity 23 <77 ng/L   NT proBNP    Collection Time: 03/30/22 12:05 AM   Result Value Ref Range    NT-proBNP 1,071 (H) <=125 pg/mL   CBC with Automated Differential (performable only)    Collection Time: 03/30/22 12:05 AM   Result Value Ref Range    WBC 7.9 4.2 - 11.0 K/mcL    RBC 5.43 4.50 - 5.90 mil/mcL    HGB 17.6 (H) 13.0 - 17.0 g/dL    HCT 52.4 (H) 39.0 - 51.0 %    MCV 96.5 78.0 - 100.0 fl    MCH 32.4 26.0 - 34.0 pg    MCHC 33.6 32.0 - 36.5 g/dL    RDW-CV 12.5 11.0 - 15.0 %    RDW-SD 45.0 39.0 - 50.0 fL     140 - 450 K/mcL    NRBC 0 <=0 /100 WBC    Neutrophil, Percent 70 %    Lymphocytes, Percent 21 %    Mono, Percent 7 %    Eosinophils, Percent 0 %    Basophils, Percent 1 %    Immature Granulocytes 1 %    Absolute Neutrophils 5.6 1.8 - 7.7 K/mcL    Absolute Lymphocytes 1.7 1.0 - 4.0 K/mcL    Absolute Monocytes 0.5 0.3 - 0.9 K/mcL    Absolute Eosinophils  0.0 0.0 - 0.5 K/mcL    Absolute Basophils 0.0 0.0 - 0.3 K/mcL    Absolute Immmature Granulocytes 0.1 0.0 - 0.2 K/mcL   Magnesium    Collection Time: 03/30/22 12:05 AM   Result Value Ref Range    Magnesium 1.9 1.7 - 2.4 mg/dL   Creatine Kinase    Collection Time: 03/30/22 12:05 AM   Result Value Ref Range     39 - 308 Units/L   COVID/Flu/RSV panel    Collection  Time: 03/30/22 12:06 AM   Result Value Ref Range    Rapid SARS-COV-2 by PCR Not Detected Not Detected / Detected / Presumptive Positive / Inhibitors present    Influenza A by PCR Not Detected Not Detected    Influenza B by PCR Not Detected Not Detected    RSV BY PCR Not Detected Not Detected    Isolation Guidelines      Procedural Comment     D Dimer, Quantitative    Collection Time: 03/30/22  1:29 AM   Result Value Ref Range    D Dimer, Quantitative 1.33 (H) <0.57 mg/L (FEU)   Salicylate Level    Collection Time: 03/30/22  1:44 AM   Result Value Ref Range    Salicylate <2.8 <=30.0 mg/dL   Acetaminophen Level    Collection Time: 03/30/22  1:44 AM   Result Value Ref Range    Acetaminophen <2 (L) 10 - 30 mcg/mL   Phosphorus    Collection Time: 03/30/22  1:44 AM   Result Value Ref Range    Phosphorus 4.4 2.4 - 4.7 mg/dL   Thyroid Stimulating Hormone Reflex    Collection Time: 03/30/22  1:44 AM   Result Value Ref Range    TSH 0.232 (L) 0.350 - 5.000 mcUnits/mL   Free T4    Collection Time: 03/30/22  1:44 AM   Result Value Ref Range    T4, Free 1.1 0.8 - 1.5 ng/dL   Free T3    Collection Time: 03/30/22  1:44 AM   Result Value Ref Range    T3, Free 2.5 2.2 - 4.0 pg/mL   Lipid Panel With Reflex    Collection Time: 03/30/22  9:54 AM   Result Value Ref Range    Fasting Status      Cholesterol 231 (H) <=199 mg/dL    Triglycerides 197 (H) <=149 mg/dL    HDL 51 >=40 mg/dL     (H) <=129 mg/dL    Non-HDL Cholesterol 180 mg/dL    Cholesterol/ HDL Ratio 4.5 (H) <=4.4   TROPONIN I, HIGH SENSITIVITY    Collection Time: 03/30/22  9:54 AM   Result Value Ref Range    Troponin I, High Sensitivity 17 <77 ng/L   Creatine Kinase    Collection Time: 03/30/22  9:54 AM   Result Value Ref Range     39 - 308 Units/L   Comprehensive Metabolic Panel    Collection Time: 03/30/22  9:54 AM   Result Value Ref Range    Fasting Status      Sodium 133 (L) 135 - 145 mmol/L    Potassium 3.4 3.4 - 5.1 mmol/L    Chloride 96 (L) 98 - 107 mmol/L     Carbon Dioxide 23 21 - 32 mmol/L    Anion Gap 17 10 - 20 mmol/L    Glucose 126 (H) 70 - 99 mg/dL    BUN 14 6 - 20 mg/dL    Creatinine 0.80 0.67 - 1.17 mg/dL    Glomerular Filtration Rate >90 >=60    BUN/ Creatinine Ratio 18 7 - 25    Calcium 8.2 (L) 8.4 - 10.2 mg/dL    Bilirubin, Total 0.6 0.2 - 1.0 mg/dL    GOT/AST 34 <=37 Units/L    GPT/ALT 34 <64 Units/L    Alkaline Phosphatase 92 45 - 117 Units/L    Albumin 3.5 (L) 3.6 - 5.1 g/dL    Protein, Total 7.3 6.4 - 8.2 g/dL    Globulin 3.8 2.0 - 4.0 g/dL    A/G Ratio 0.9 (L) 1.0 - 2.4   CBC No Differential    Collection Time: 22  9:54 AM   Result Value Ref Range    WBC 6.9 4.2 - 11.0 K/mcL    RBC 4.50 4.50 - 5.90 mil/mcL    HGB 14.5 13.0 - 17.0 g/dL    HCT 43.4 39.0 - 51.0 %    MCV 96.4 78.0 - 100.0 fl    MCH 32.2 26.0 - 34.0 pg    MCHC 33.4 32.0 - 36.5 g/dL     140 - 450 K/mcL    RDW-CV 12.4 11.0 - 15.0 %    RDW-SD 44.9 39.0 - 50.0 fL    NRBC 0 <=0 /100 WBC           Relevant Imaging:    TRANSTHORACIC ECHO (TTE) COMPLETE W/ W/O IMAGING AGENT  *[facility]*  [address] [place] [id number] [phone]  Transthoracic Echocardiogram (TTE)    Patient: Justin Trujillo   Study Date/Time:        Mar 30 2022 7:39AM  MRN:     [id number]         FIN#:                   [id number]  :     1971      Ht/Wt:                  182cm 90.7kg  Age:     50              BSA/BMI:                2.12m^2 27.4kg/m^2  Gender:  M               Baseline BP:            126 / 88  Ordering Physician:   Master Morgan     Referring Physician:  Master Morgan Julie V     Attending Physician:  aDo Hoff     Diagnostic Physician: Mily Orlando MD  Sonographer:          Analilia Trevino     --------------------------------------------------------------------------  INDICATIONS:   Acute coronary syndrome.    --------------------------------------------------------------------------  STUDY CONCLUSIONS  SUMMARY:    1. Left ventricle: The cavity size is  normal. Wall thickness is normal.     The ejection fraction was measured by visual estimation. Doppler     parameters are consistent with abnormal left ventricular relaxation     (grade 1 diastolic dysfunction). The ejection fraction is 44%.  2. Regional wall motion abnormalities: Moderate hypokinesis of the     mid-apical inferior myocardium.  3. Left atrium: The atrium is moderately dilated.    --------------------------------------------------------------------------  STUDY DATA:   Procedure:  Transthoracic echocardiography was performed.  Image quality was suboptimal.  M-mode, complete 2D, complete spectral  Doppler, and color Doppler.  Study status:  Routine.  Study completion:  There were no complications.    FINDINGS    LEFT VENTRICLE:  The cavity size is normal. Wall thickness is normal.  There is no hypertrophy. Systolic function is mildly reduced.    The  ejection fraction was measured by visual estimation. The ejection fraction  is 44%.  Regional wall motion abnormalities:  Moderate hypokinesis of the  mid-apical inferior myocardium. The tissue Doppler parameters are  abnormal. Doppler parameters are consistent with abnormal left ventricular  relaxation (grade 1 diastolic dysfunction).    AORTIC VALVE:  The annulus is normal. The valve is trileaflet. The  leaflets are normal thickness.  Doppler:  Transvalvular velocity is within  the normal range. There is no stenosis.  No significant regurgitation.  The LVOT to aortic valve VTI ratio is 0.45. The valve area by the  velocity-time integral method is 1.4cm^2. The valve area index by the  velocity-time integral method is 0.67cm^2/m^2. The ratio of LVOT to aortic  valve peak velocity is 0.47. The valve area by the peak velocity method is  1.5cm^2. The valve area index by the peak velocity method is 0.69cm^2/m^2.     The mean systolic gradient is 5mm Hg. The peak systolic gradient is  10mm Hg.    AORTA:  Aortic root: The aortic root is normal in  size.    MITRAL VALVE:  The annulus is normal.  Doppler:  Transvalvular velocity is  within the normal range. There is no evidence for stenosis.  Mild  regurgitation.    The valve area by pressure half-time is 2.7cm^2. The  valve area index by pressure half-time is 1.28cm^2/m^2. The valve area  (LVOT continuity) is 1.5cm^2. The valve area index (LVOT continuity) is  0.69cm^2/m^2.    The mean diastolic gradient is 2mm Hg.    ATRIAL SEPTUM:  The septum is normal.    LEFT ATRIUM:  The atrium is moderately dilated.    RIGHT VENTRICLE:  The cavity size is normal. Systolic function is normal.  The TAPSE is normal, suggestive of normal RV systolic function. Systolic  pressure is within the normal range. The estimated peak pressure is 25mm  Hg.    PULMONIC VALVE:   Not well visualized.  Structurally normal valve.  Doppler:  Transvalvular velocity is within the normal range. There is no  evidence for stenosis.  No significant regurgitation.    TRICUSPID VALVE:   Structurally normal valve.    Doppler:   Mild  regurgitation.    PULMONARY ARTERY:   The main pulmonary artery is normal-sized.    RIGHT ATRIUM:  The atrium is normal in size.    PERICARDIUM:  There is no pericardial effusion.    SYSTEMIC VEINS:  Inferior vena cava: The vessel is normal in size. The respirophasic  diameter changes are in the normal range (greater than or equal to 50%).    BASELINE ECG:   Normal sinus rhythm.    --------------------------------------------------------------------------  Measurements     Left ventricle        Value        03/22/2021 Ref        Left atrium continued   Value          03/22/2021 Ref   ATILIO, LAX      (H)     6.3   cm     6.6        4.2 - 5.8  Vol, ES, 2-p            70    ml       85         -----   chord                                                    Vol/bsa, ES,            33    ml/m^2   39         16 -   ESD, LAX      (H)     4.3   cm     5.2        2.5 - 4.0  2-p                                               34    chord   ATILIO/bsa, LAX          3.0   cm/m^2 3.0        2.2 - 3.0  Aortic valve            Value          03/22/2021 Ref   chord                                                    Peak v, S               1.6   m/sec    ---------- -----   ESD/bsa, LAX          2.0   cm/m^2 2.4        1.3 - 2.1  Mean v, S               1.04  m/sec    ---------- -----   chord                                                    Mean grad, S            5     mm Hg    ---------- -----   PW, ED, LAX           0.8   cm     1.2        0.6 - 1.0  Peak grad, S            10    mm Hg    ---------- -----   PW, ED                0.8   cm     1.2        0.6 - 1.0  LVOT/AV, VTI            0.45           ---------- -----   IVS/PW, ED            1.25         1.08       ---------  ratio   EDV           (H)     250   ml     287        62 - 150   PADMAJA, VTI                1.4   cm^2     ---------- -----   ESV           (H)     80    ml     141        21 - 61    PADMAJA/bsa, VTI            0.67  cm^2/m^2 ---------- -----   EF            (L)     44    %      31         52 - 72    LVOT/AV, Vpeak          0.47           ---------- -----   SV                    118   ml     94         ---------  ratio   EDV/bsa       (H)     118   ml/m^2 132        34 - 74    PADMAJA, Vmax               1.5   cm^2     ---------- -----   ESV/bsa       (H)     38    ml/m^2 65         11 - 31    PADMAJA/bsa, Vmax           0.69  cm^2/m^2 ---------- -----   SV/bsa                56    ml/m^2 43         ---------   E', lat caesar,  (L)     8.81  cm/sec 4.35       >=10       Mitral valve            Value          03/22/2021 Ref   TDI                                                      Peak E                  0.51  m/sec    0.44       -----   E/e', lat             6            10         ---------  Peak A                  0.88  m/sec    0.9        -----   PEPE maynardI                                                 Mean v, D               0.57  m/sec    ---------- -----   E', med caesar,  L)     3.94   cm/sec 4.9        >=7        VTI leaflet             25.8  cm       ---------- -----   TDI                                                      coapt   E/e', med             14           9          ---------  Decel time              277   ms       275        -----   caesar, TDI                                                 PHT                     81    ms       ---------- -----   E', avg, TDI          6.2   cm/sec 4.625      ---------  Mean grad, D            2     mm Hg    ---------- -----   E/e', avg,            8            10         <=14       Peak E/A ratio          0.6            0.5        -----   TDI                                                      A-VTI                   25.8  cm       ---------- -----                                                            MVA, PHT                2.7   cm^2     ---------- -----   LVOT                  Value        03/22/2021 Ref        MVA/bsa, PHT            1.28  cm^2/m^2 ---------- -----   Diam, S               2.0   cm     2.3        ---------  MVA, LVOT cont          1.5   cm^2     ---------- -----   Area                  3.1   cm^2   4.2        ---------  MVA/bsa, LVOT           0.69  cm^2/m^2 ---------- -----   Peak zoë, S           0.73  m/sec  ---------- ---------  cont   Peak grad, S          2     mm Hg  ---------- ---------  MR peak v               4.59  m/sec    ---------- -----                                                            Peak LV-LA grad         84    mm Hg    ---------- -----   Ventricular septum    Value        03/22/2021 Ref        S   IVS, ED               1.0   cm     1.3        0.6 - 1.0  Max MR v                5.66  m/sec    ---------- -----                                                            Regurg VTI              214.0 cm       ---------- -----   Right ventricle       Value        03/22/2021 Ref   ATILIO, LAX              3.4   cm     3.0        ---------  Tricuspid valve         Value          03/22/2021 Ref   TAPSE, MM              2.1   cm     2.0        1.7 - 3.1  TR peak v               1.9   m/sec    2.3        <=2.8   Pressure, S           17    mm Hg  ---------- ---------  Peak RV-RA              14    mm Hg    22         -----   S' lateral            10.7  cm/sec 10.1       6 - 13.4   grad, S                                                            Max TR zoë              1.87  m/sec    2.32       -----   Left atrium           Value        03/22/2021 Ref   AP dim, ES    (H)     4.4   cm     4.8        3.0 - 4.0  Aortic root             Value          03/22/2021 Ref   AP dim index          2.1   cm/m^2 2.2        1.5 - 2.3  Root diam, ED           3.7   cm       ---------- <4.2   Area ES, A4C  (H)     24    cm^2   25         <=20   Area ES, A2C          21    cm^2   24         ---------  Pulmonary artery        Value          03/22/2021 Ref   Vol, S        (H)     76    ml     85         18 - 58    Pressure, S             17    mm Hg    ---------- -----   Vol/bsa, S    (H)     36    ml/m^2 39         16 - 34   Vol, ES, 1-p  (H)     76    ml     85         18 - 58    Systemic veins          Value          03/22/2021 Ref   A4C                                                      Estimated CVP           3     mm Hg    ---------- -----   Vol/bsa, ES,          36    ml/m^2 39         12 - 37   1-p A4C   Vol, ES, 1-p  (H)     61    ml     84         18 - 58   A2C   Vol/bsa, ES,          29    ml/m^2 39         11 - 43   1-p A2C  Legend:  (L)  and  (H)  conchis values outside specified reference range.    Prepared and electronically signed by  Mily Orlando MD  03/30/2022 11:29  [facility] EXTREMITY LOWER VENOUS DUPLEX  Narrative: EXAM: [facility] LOWER EXTREMITY VENOUS DUPLEX BILATERAL 03/30/2022    CLINICAL INDICATION: Bilateral lower extremity pain.  Elevated d-dimer.    COMPARISON: 03/21/2021.    FINDINGS:  Duplex compression technique has been used to examine common femoral  through popliteal veins in bilateral lower extremities and deep  veins in  bilateral upper calves.  Complete compressibility of visualized deep veins  is documented.   Impression: No evidence of deep venous thrombosis within visualized bilateral lower  extremities.    If symptoms suggesting DVT persist, a follow-up ultrasound study is  recommended the next day.  If this, too, is negative and symptoms persist,  a final ultrasound is recommended at one week.  Two negative exams one week  apart is considered sufficient to exclude DVT. (The rationale for the  repeat exams is that an undetectable calf vein thrombus may propagate  superiorly and become a threat for clinically significant pulmonary  embolism, but will be sonographically detectable once it reaches the  popliteal vein.)    Report is provided at time of study.    Electronically Signed by: LUDIVINA SANTANA M.D.   Signed on: 3/30/2022 10:45 AM   CTA CHEST PULMONARY EMBOLISM W CONTRAST  Narrative: EXAM: CT CHEST PULMONARY ANGIOGRAM    CLINICAL INDICATION: 50-year-old male with positive d-dimer and chest pain    COMPARISON:  03/21/2021    TECHNIQUE:  Axial acquisitions were obtained through the chest utilizing a  standard pulmonary embolism protocol following intravenous infusion of  contrast material. Multiplanar reformatted imaging was performed.3-D  postprocessing is performed, including MIPS imaging by technologist under  direct supervision of radiologist. Automated exposure control was used.    CONTRAST: 75 mL of Omnipaque 350 was administered intravenously.    FINDINGS:    The contrast bolus is diagnostic in quality however evaluation is somewhat  suboptimal due to patient respiratory motion artifact. No filling defects  are seen to the level of the proximal segmental pulmonary arteries to  suggest pulmonary embolism.    Enlargement of the left ventricle/atrium is again noted.  There is no  pericardial effusion.  The thoracic aorta is not aneurysmal.  There is no  significant supraclavicular, mediastinal, hilar, axillary, or  internal  mammary lymph node enlargement.  There is mild esophageal thickening.   There is a partially visualized fatty mass in the soft tissues of the mid  upper back, this measures approximately 10.4 x 4.2 cm on axial images  images.  Visualized portions are most consistent with a lipoma.     There are no pleural effusions.  There is moderate pulmonary emphysema.   There is no pneumothorax.  Central tracheobronchial tree is patent.  Mild  patient motion artifact is noted throughout.  There is some biapical  scarring.  There is a 6 mm area of scarring in the left upper lobe, this  was present on previous exam (compare series 603B image 62 on the current  exam to series 603B image 75).     Limited images of the abdomen demonstrate hepatic hypoattenuation  suggesting fatty infiltration of the liver.      Bone windows demonstrate mild degenerative spurring of the visualized  spine.  Impression: 1.   No evidence of pulmonary embolism the level of the proximal segmental  pulmonary arteries, evaluation of more distal vasculature is suboptimal due  to patient respiratory motion artifact.  Follow-up as clinically warranted.  2.   Pulmonary emphysema.  3.   Mild esophageal thickening.  4.   Probable fatty liver.  5.   Probable lipoma in the left upper back.  6.   Suspected enlargement of the left ventricle/atrium.    A preliminary report was provided by the on-call teleradiology service.    FOR PHYSICIAN USE ONLY - Please note that this report was generated using  voice recognition software.  If you require clarification or feel that  there has been an error in this report please contact me through  [employer]  Thank you very much for allowing me to participate in the  care of your patient.     Electronically Signed by: ALEXA COVARRUBIAS M.D.   Signed on: 3/30/2022 7:59 AM   XR CHEST PA OR AP 1 VIEW  Narrative: EXAM: AP portable semiupright chest    INDICATION chest pain     Comparing x-ray from 3/28/2021  Impression:   FINDINGS with IMPRESSION:  FINDINGS:  The lungs are clear. The heart size and pulmonary vessel caliber are  normal. The mediastinal contour is unremarkable. EKG leads present.    IMPRESSION:  Normal view of the chest.    Electronically Signed by: PATRICIA MARIE M.D.   Signed on: 3/30/2022 12:25 AM       Vitals:  Temp:  [97.5 °F (36.4 °C)-98.1 °F (36.7 °C)] 98.1 °F (36.7 °C)  Heart Rate:  [60-90] 67  Resp:  [14-23] 16  BP: ()/() 130/74    ALLERGIES:  Clarithromycin, Azithromycin, Ibuprofen, and Sulfamethoxazole-trimethoprim    Medications at Admission:  Medications Prior to Admission   Medication Sig Dispense Refill   • sacubitril-valsartan (ENTRESTO) 24-26 MG per tablet Take 1 tablet by mouth 2 times daily. 60 tablet 0   • spironolactone (ALDACTONE) 25 MG tablet Take 0.5 tablets by mouth daily. Do not start before March 25, 2021. 15 tablet 0   • furosemide (LASIX) 20 MG tablet Take 1 tablet by mouth daily. 30 tablet 0   • bictegravir-emtricitab-tenofov (Biktarvy) -25 MG per tablet Take 1 tablet by mouth daily.     • melatonin 3 MG Take 3 mg by mouth at bedtime as needed.     • atorvastatin (LIPITOR) 20 MG tablet Take 10 mg by mouth daily.     • sertraline (ZOLOFT) 100 MG tablet Take 100 mg by mouth daily.         Current Medications:  Current Facility-Administered Medications Ordered in Epic   Medication Dose Route Frequency Provider Last Rate Last Admin   • bictegravir-emtricitab-tenofov (BIKTARVY) -25 MG per tablet 1 tablet  1 tablet Oral Daily Master Morgan MD       • hydrALAZINE (APRESOLINE) injection 10 mg  10 mg Intravenous Q4H PRN Master Morgan MD       • guaiFENesin-DM) (ROBITUSSIN DM) 100-10 MG/5ML syrup 10 mL  10 mL Oral Q4H PRN Master Morgan MD       • melatonin tablet 6 mg  6 mg Oral QHS PRN Master Morgan MD       • sodium chloride 0.9 % flush bag 25 mL  25 mL Intravenous PRN Master Morgan MD       • sodium chloride (PF) 0.9 % injection 2 mL  2 mL Intracatheter 2 times per day Master Morgan  MD   2 mL at 03/30/22 1000   • Potassium Standard Replacement Protocol   Does not apply See Admin Instructions Master Morgan MD       • Magnesium Standard Replacement Protocol   Does not apply See Admin Instructions Master Morgan MD       • ondansetron (ZOFRAN) injection 4 mg  4 mg Intravenous BID PRN Master Morgan MD       • acetaminophen (TYLENOL) tablet 650 mg  650 mg Oral Q4H PRN Master Morgan MD       • polyethylene glycol (MIRALAX) packet 17 g  17 g Oral Daily PRN Master Morgan MD       • docusate sodium-sennosides (SENOKOT S) 50-8.6 MG 2 tablet  2 tablet Oral Daily PRN Master Morgan MD       • bisacodyl (DULCOLAX) suppository 10 mg  10 mg Rectal Daily PRN Master Morgan MD       • magnesium hydroxide (MILK OF MAGNESIA) 400 MG/5ML suspension 30 mL  30 mL Oral Daily PRN Master Morgan MD       • aluminum-magnesium hydroxide-simethicone (MAALOX) 200-200-20 MG/5ML suspension 30 mL  30 mL Oral Q4H PRN Master Morgan MD       • sodium chloride 0.9 % flush bag 25 mL  25 mL Intravenous PRN Master Morgan MD       • enoxaparin (LOVENOX) injection 40 mg  40 mg Subcutaneous Daily Master Mrogan MD       • aspirin (ECOTRIN) enteric coated tablet 81 mg  81 mg Oral Daily Master Morgan MD   81 mg at 03/30/22 1000   • folic acid (FOLATE) tablet 1 mg  1 mg Oral Daily Dao Hoff MD   1 mg at 03/30/22 1000   • thiamine (VITAMIN B1) tablet 100 mg  100 mg Oral Daily Dao Hoff MD   100 mg at 03/30/22 1000   • LORazepam (ATIVAN) injection 2 mg  2 mg Intravenous Q1H PRN Dao Hoff MD        Or   • LORazepam (ATIVAN) injection 2 mg  2 mg Intramuscular Q1H PRN Dao Hoff MD       • pantoprazole (PROTONIX) EC tablet 40 mg  40 mg Oral QAM AC Dao Hoff MD       • aluminum-magnesium hydroxide-simethicone (MAALOX) 200-200-20 MG/5ML suspension 30 mL  30 mL Oral Once Dao Hoff MD         No current [facility]-ordered outpatient medications on file.               Thank you for the consult. For collaboration of care, please contact the [facility] at [phone].    I have interviewed and examined this patient and reviewed the available clinical record at this time.   Ana María Polanco MD

## 2022-03-30 NOTE — ANESTHESIA PROCEDURE NOTES
Spinal Block    Pre-sedation assessment completed: 3/30/2022 1:02 PM    Patient reassessed immediately prior to procedure    Patient location during procedure: OR  Start Time: 3/30/2022 1:02 PM  Indication:at surgeon's request  Performed By  CRNA: Toro Bonilla CRNA  Preanesthetic Checklist  Completed: patient identified, IV checked, site marked, risks and benefits discussed, surgical consent, monitors and equipment checked, pre-op evaluation and timeout performed  Spinal Block Prep:  Patient Position:right lateral decubitus  Sterile Tech:cap, gloves, sterile barriers and mask  Prep:Chloraprep  Patient Monitoring:blood pressure monitoring, continuous pulse oximetry and EKG  Spinal Block Procedure  Approach:midline  Guidance:landmark technique and palpation technique  Location:L4-L5  Needle Type:Sprotte (Introducer:   no)  Needle Gauge:25 G  Placement of Spinal needle event:cerebrospinal fluid aspirated  Paresthesia: no  Fluid Appearance:clear  Medications: bupivacaine (MARCAINE) 0.5 % injection, 3 mL  Med Administered at 3/30/2022 1:12 PM   Post Assessment  Patient Tolerance:patient tolerated the procedure well with no apparent complications  Complications no  Additional Notes  Procedure:  Pt assisted to sitting position, with legs in position of comfort over side of bed.  Pt. instructed in optimal spine presentation, the spine was prepped/ Draped and the skin at insertion site was anesthetized with 1% Lidocaine 2 ml.  The spinal needle was then advanced until CSF flow was obtained and LA was injected:           **Pt required high dose propofol infusion, versed, fent for spinal placement, pt very reactive to any touch, non procedural and very verbal, and very restless at all times.

## 2022-03-30 NOTE — ANESTHESIA POSTPROCEDURE EVALUATION
Patient: Astrid Sam    Procedure Summary     Date: 03/30/22 Room / Location:  [facility] /  [facility]    Anesthesia Start: 1253 Anesthesia Stop: 1625    Procedure: LEFT HIP REVISION - FEMORAL & LINER COMPONENT (Left Hip) Diagnosis:     Surgeons: Mele Chatterjee MD Provider: Teofilo Amezcua MD    Anesthesia Type: general ASA Status: 3          Anesthesia Type: general    Vitals  Vitals Value Taken Time   /72 03/30/22 1710   Temp 97.4 °F (36.3 °C) 03/30/22 1710   Pulse 80 03/30/22 1714   Resp 16 03/30/22 1710   SpO2 95 % 03/30/22 1714   Vitals shown include unvalidated device data.        Post Anesthesia Care and Evaluation    Patient location during evaluation: PACU  Patient participation: complete - patient participated  Level of consciousness: sleepy but conscious  Pain management: adequate  Airway patency: patent  Anesthetic complications: No anesthetic complications  PONV Status: none  Cardiovascular status: hemodynamically stable and acceptable  Respiratory status: nonlabored ventilation, acceptable and nasal cannula  Hydration status: acceptable

## 2022-03-30 NOTE — ANESTHESIA PREPROCEDURE EVALUATION
Anesthesia Evaluation     Patient summary reviewed and Nursing notes reviewed                Airway   Mallampati: II  Dental      Pulmonary    (+) asthma,  Cardiovascular - negative cardio ROS        Neuro/Psych  (+) seizures,    GI/Hepatic/Renal/Endo    (+)   thyroid problem     Musculoskeletal (-) negative ROS    Abdominal    Substance History - negative use     OB/GYN negative ob/gyn ROS         Other                        Anesthesia Plan    ASA 3     general     intravenous induction     Anesthetic plan, all risks, benefits, and alternatives have been provided, discussed and informed consent has been obtained with: patient.        CODE STATUS:

## 2022-03-31 PROBLEM — Z96.642 STATUS POST TOTAL REPLACEMENT OF LEFT HIP: Status: ACTIVE | Noted: 2022-03-31

## 2022-03-31 LAB
ANION GAP SERPL CALCULATED.3IONS-SCNC: 8 MMOL/L (ref 5–15)
BUN SERPL-MCNC: 14 MG/DL (ref 6–20)
BUN/CREAT SERPL: 14.3 (ref 7–25)
CALCIUM SPEC-SCNC: 8.5 MG/DL (ref 8.6–10.5)
CHLORIDE SERPL-SCNC: 104 MMOL/L (ref 98–107)
CO2 SERPL-SCNC: 24 MMOL/L (ref 22–29)
CREAT SERPL-MCNC: 0.98 MG/DL (ref 0.57–1)
DEPRECATED RDW RBC AUTO: 44.3 FL (ref 37–54)
EGFRCR SERPLBLD CKD-EPI 2021: 67.5 ML/MIN/1.73
ERYTHROCYTE [DISTWIDTH] IN BLOOD BY AUTOMATED COUNT: 13.8 % (ref 12.3–15.4)
GLUCOSE SERPL-MCNC: 177 MG/DL (ref 65–99)
HCT VFR BLD AUTO: 30.8 % (ref 34–46.6)
HGB BLD-MCNC: 9.8 G/DL (ref 12–15.9)
MCH RBC QN AUTO: 27.9 PG (ref 26.6–33)
MCHC RBC AUTO-ENTMCNC: 31.8 G/DL (ref 31.5–35.7)
MCV RBC AUTO: 87.7 FL (ref 79–97)
PLATELET # BLD AUTO: 262 10*3/MM3 (ref 140–450)
PMV BLD AUTO: 8.8 FL (ref 6–12)
POTASSIUM SERPL-SCNC: 4.6 MMOL/L (ref 3.5–5.2)
RBC # BLD AUTO: 3.51 10*6/MM3 (ref 3.77–5.28)
SODIUM SERPL-SCNC: 136 MMOL/L (ref 136–145)
WBC NRBC COR # BLD: 12.29 10*3/MM3 (ref 3.4–10.8)

## 2022-03-31 PROCEDURE — 25010000002 CEFAZOLIN IN DEXTROSE 2-4 GM/100ML-% SOLUTION: Performed by: ORTHOPAEDIC SURGERY

## 2022-03-31 PROCEDURE — 80048 BASIC METABOLIC PNL TOTAL CA: CPT | Performed by: ORTHOPAEDIC SURGERY

## 2022-03-31 PROCEDURE — 97530 THERAPEUTIC ACTIVITIES: CPT

## 2022-03-31 PROCEDURE — 97161 PT EVAL LOW COMPLEX 20 MIN: CPT

## 2022-03-31 PROCEDURE — 97116 GAIT TRAINING THERAPY: CPT

## 2022-03-31 PROCEDURE — 94761 N-INVAS EAR/PLS OXIMETRY MLT: CPT

## 2022-03-31 PROCEDURE — 97165 OT EVAL LOW COMPLEX 30 MIN: CPT | Performed by: OCCUPATIONAL THERAPIST

## 2022-03-31 PROCEDURE — 85027 COMPLETE CBC AUTOMATED: CPT | Performed by: NURSE PRACTITIONER

## 2022-03-31 PROCEDURE — 97110 THERAPEUTIC EXERCISES: CPT

## 2022-03-31 PROCEDURE — 94799 UNLISTED PULMONARY SVC/PX: CPT

## 2022-03-31 RX ORDER — CYCLOBENZAPRINE HCL 10 MG
5 TABLET ORAL 3 TIMES DAILY PRN
Status: DISCONTINUED | OUTPATIENT
Start: 2022-03-31 | End: 2022-04-02 | Stop reason: HOSPADM

## 2022-03-31 RX ORDER — DOXYCYCLINE 100 MG/1
100 CAPSULE ORAL EVERY 12 HOURS SCHEDULED
Status: DISCONTINUED | OUTPATIENT
Start: 2022-03-31 | End: 2022-04-02 | Stop reason: HOSPADM

## 2022-03-31 RX ADMIN — OXYCODONE 10 MG: 5 TABLET ORAL at 07:59

## 2022-03-31 RX ADMIN — OXYBUTYNIN CHLORIDE 10 MG: 10 TABLET, EXTENDED RELEASE ORAL at 08:01

## 2022-03-31 RX ADMIN — ACETAMINOPHEN 1000 MG: 500 TABLET ORAL at 14:18

## 2022-03-31 RX ADMIN — SODIUM CHLORIDE, POTASSIUM CHLORIDE, SODIUM LACTATE AND CALCIUM CHLORIDE 100 ML/HR: 600; 310; 30; 20 INJECTION, SOLUTION INTRAVENOUS at 14:10

## 2022-03-31 RX ADMIN — OXYCODONE 10 MG: 5 TABLET ORAL at 22:47

## 2022-03-31 RX ADMIN — ACETAMINOPHEN 1000 MG: 500 TABLET ORAL at 04:27

## 2022-03-31 RX ADMIN — LEVOTHYROXINE SODIUM 25 MCG: 25 TABLET ORAL at 08:01

## 2022-03-31 RX ADMIN — ASPIRIN 81 MG: 81 TABLET, COATED ORAL at 20:02

## 2022-03-31 RX ADMIN — PANTOPRAZOLE SODIUM 40 MG: 40 TABLET, DELAYED RELEASE ORAL at 20:02

## 2022-03-31 RX ADMIN — MELOXICAM 15 MG: 7.5 TABLET ORAL at 08:01

## 2022-03-31 RX ADMIN — ESCITALOPRAM OXALATE 20 MG: 20 TABLET ORAL at 08:01

## 2022-03-31 RX ADMIN — PRAVASTATIN SODIUM 40 MG: 40 TABLET ORAL at 20:02

## 2022-03-31 RX ADMIN — OXYCODONE 10 MG: 5 TABLET ORAL at 17:00

## 2022-03-31 RX ADMIN — DOXYCYCLINE 100 MG: 100 CAPSULE ORAL at 20:02

## 2022-03-31 RX ADMIN — DOXYCYCLINE 100 MG: 100 CAPSULE ORAL at 11:59

## 2022-03-31 RX ADMIN — PRAMIPEXOLE DIHYDROCHLORIDE 0.75 MG: 0.25 TABLET ORAL at 17:01

## 2022-03-31 RX ADMIN — BUDESONIDE AND FORMOTEROL FUMARATE DIHYDRATE 2 PUFF: 160; 4.5 AEROSOL RESPIRATORY (INHALATION) at 20:00

## 2022-03-31 RX ADMIN — OXYCODONE 10 MG: 5 TABLET ORAL at 11:59

## 2022-03-31 RX ADMIN — BUDESONIDE AND FORMOTEROL FUMARATE DIHYDRATE 2 PUFF: 160; 4.5 AEROSOL RESPIRATORY (INHALATION) at 08:50

## 2022-03-31 RX ADMIN — ASPIRIN 81 MG: 81 TABLET, COATED ORAL at 08:01

## 2022-03-31 RX ADMIN — PANTOPRAZOLE SODIUM 40 MG: 40 TABLET, DELAYED RELEASE ORAL at 08:01

## 2022-03-31 RX ADMIN — ACETAMINOPHEN 1000 MG: 500 TABLET ORAL at 21:00

## 2022-03-31 RX ADMIN — CEFAZOLIN SODIUM 2 G: 2 INJECTION, SOLUTION INTRAVENOUS at 04:26

## 2022-03-31 RX ADMIN — CYCLOBENZAPRINE 5 MG: 10 TABLET, FILM COATED ORAL at 11:59

## 2022-03-31 RX ADMIN — OXYCODONE 10 MG: 5 TABLET ORAL at 02:03

## 2022-04-01 LAB
ANION GAP SERPL CALCULATED.3IONS-SCNC: 6 MMOL/L (ref 5–15)
BUN SERPL-MCNC: 15 MG/DL (ref 6–20)
BUN/CREAT SERPL: 17.4 (ref 7–25)
CALCIUM SPEC-SCNC: 8.2 MG/DL (ref 8.6–10.5)
CHLORIDE SERPL-SCNC: 108 MMOL/L (ref 98–107)
CO2 SERPL-SCNC: 25 MMOL/L (ref 22–29)
CREAT SERPL-MCNC: 0.86 MG/DL (ref 0.57–1)
EGFRCR SERPLBLD CKD-EPI 2021: 78.9 ML/MIN/1.73
GLUCOSE SERPL-MCNC: 78 MG/DL (ref 65–99)
HCT VFR BLD AUTO: 29 % (ref 34–46.6)
HGB BLD-MCNC: 8.9 G/DL (ref 12–15.9)
POTASSIUM SERPL-SCNC: 4.2 MMOL/L (ref 3.5–5.2)
SODIUM SERPL-SCNC: 139 MMOL/L (ref 136–145)

## 2022-04-01 PROCEDURE — 85018 HEMOGLOBIN: CPT | Performed by: ORTHOPAEDIC SURGERY

## 2022-04-01 PROCEDURE — 94799 UNLISTED PULMONARY SVC/PX: CPT

## 2022-04-01 PROCEDURE — 80048 BASIC METABOLIC PNL TOTAL CA: CPT | Performed by: ORTHOPAEDIC SURGERY

## 2022-04-01 PROCEDURE — 85014 HEMATOCRIT: CPT | Performed by: ORTHOPAEDIC SURGERY

## 2022-04-01 PROCEDURE — 97530 THERAPEUTIC ACTIVITIES: CPT

## 2022-04-01 PROCEDURE — 97116 GAIT TRAINING THERAPY: CPT

## 2022-04-01 PROCEDURE — 97110 THERAPEUTIC EXERCISES: CPT

## 2022-04-01 RX ORDER — ACETAMINOPHEN 500 MG
1000 TABLET ORAL EVERY 8 HOURS
Qty: 42 TABLET | Refills: 0
Start: 2022-04-01 | End: 2022-04-08

## 2022-04-01 RX ORDER — DOCUSATE SODIUM 100 MG/1
100 CAPSULE, LIQUID FILLED ORAL 2 TIMES DAILY
Status: DISCONTINUED | OUTPATIENT
Start: 2022-04-01 | End: 2022-04-02 | Stop reason: HOSPADM

## 2022-04-01 RX ORDER — BISACODYL 5 MG/1
10 TABLET, DELAYED RELEASE ORAL DAILY PRN
Status: DISCONTINUED | OUTPATIENT
Start: 2022-04-01 | End: 2022-04-02 | Stop reason: HOSPADM

## 2022-04-01 RX ORDER — CEFADROXIL 500 MG/1
500 CAPSULE ORAL 2 TIMES DAILY
Qty: 28 CAPSULE | Refills: 0
Start: 2022-04-01 | End: 2022-04-15

## 2022-04-01 RX ORDER — CHOLECALCIFEROL (VITAMIN D3) 125 MCG
10 CAPSULE ORAL NIGHTLY PRN
Status: DISCONTINUED | OUTPATIENT
Start: 2022-04-01 | End: 2022-04-02 | Stop reason: HOSPADM

## 2022-04-01 RX ORDER — TRAMADOL HYDROCHLORIDE 50 MG/1
50 TABLET ORAL EVERY 6 HOURS PRN
Start: 2022-04-01 | End: 2022-06-02

## 2022-04-01 RX ORDER — BISACODYL 10 MG
10 SUPPOSITORY, RECTAL RECTAL DAILY PRN
Status: DISCONTINUED | OUTPATIENT
Start: 2022-04-01 | End: 2022-04-02 | Stop reason: HOSPADM

## 2022-04-01 RX ORDER — ASPIRIN 81 MG/1
81 TABLET, DELAYED RELEASE ORAL EVERY 12 HOURS
Qty: 84 TABLET | Refills: 0
Start: 2022-04-01 | End: 2022-05-13

## 2022-04-01 RX ORDER — DOCUSATE SODIUM 100 MG/1
100 CAPSULE, LIQUID FILLED ORAL 2 TIMES DAILY
Start: 2022-04-01 | End: 2022-06-02

## 2022-04-01 RX ORDER — OXYCODONE HYDROCHLORIDE 5 MG/1
5 TABLET ORAL EVERY 4 HOURS PRN
Refills: 0
Start: 2022-04-01 | End: 2022-06-02

## 2022-04-01 RX ORDER — ONDANSETRON 4 MG/1
4 TABLET, FILM COATED ORAL EVERY 8 HOURS PRN
Start: 2022-04-01 | End: 2022-06-02

## 2022-04-01 RX ORDER — MELOXICAM 15 MG/1
15 TABLET ORAL DAILY
Qty: 15 TABLET | Refills: 0
Start: 2022-04-01 | End: 2022-04-16

## 2022-04-01 RX ADMIN — DOXYCYCLINE 100 MG: 100 CAPSULE ORAL at 08:22

## 2022-04-01 RX ADMIN — OXYCODONE 5 MG: 5 TABLET ORAL at 08:30

## 2022-04-01 RX ADMIN — OXYCODONE 10 MG: 5 TABLET ORAL at 12:19

## 2022-04-01 RX ADMIN — DOCUSATE SODIUM 100 MG: 100 CAPSULE, LIQUID FILLED ORAL at 12:20

## 2022-04-01 RX ADMIN — PRAMIPEXOLE DIHYDROCHLORIDE 0.75 MG: 0.25 TABLET ORAL at 17:32

## 2022-04-01 RX ADMIN — ACETAMINOPHEN 1000 MG: 500 TABLET ORAL at 21:05

## 2022-04-01 RX ADMIN — PANTOPRAZOLE SODIUM 40 MG: 40 TABLET, DELAYED RELEASE ORAL at 08:22

## 2022-04-01 RX ADMIN — OXYBUTYNIN CHLORIDE 10 MG: 10 TABLET, EXTENDED RELEASE ORAL at 08:23

## 2022-04-01 RX ADMIN — ASPIRIN 81 MG: 81 TABLET, COATED ORAL at 21:05

## 2022-04-01 RX ADMIN — ESCITALOPRAM OXALATE 20 MG: 20 TABLET ORAL at 08:23

## 2022-04-01 RX ADMIN — DOXYCYCLINE 100 MG: 100 CAPSULE ORAL at 21:05

## 2022-04-01 RX ADMIN — ACETAMINOPHEN 1000 MG: 500 TABLET ORAL at 13:50

## 2022-04-01 RX ADMIN — OXYCODONE 5 MG: 5 TABLET ORAL at 16:01

## 2022-04-01 RX ADMIN — BUDESONIDE AND FORMOTEROL FUMARATE DIHYDRATE 2 PUFF: 160; 4.5 AEROSOL RESPIRATORY (INHALATION) at 08:25

## 2022-04-01 RX ADMIN — DOCUSATE SODIUM 100 MG: 100 CAPSULE, LIQUID FILLED ORAL at 21:05

## 2022-04-01 RX ADMIN — ASPIRIN 81 MG: 81 TABLET, COATED ORAL at 08:22

## 2022-04-01 RX ADMIN — ACETAMINOPHEN 1000 MG: 500 TABLET ORAL at 04:54

## 2022-04-01 RX ADMIN — LEVOTHYROXINE SODIUM 25 MCG: 25 TABLET ORAL at 08:23

## 2022-04-01 RX ADMIN — BUDESONIDE AND FORMOTEROL FUMARATE DIHYDRATE 2 PUFF: 160; 4.5 AEROSOL RESPIRATORY (INHALATION) at 19:39

## 2022-04-01 RX ADMIN — MELOXICAM 15 MG: 7.5 TABLET ORAL at 08:23

## 2022-04-01 RX ADMIN — PANTOPRAZOLE SODIUM 40 MG: 40 TABLET, DELAYED RELEASE ORAL at 21:05

## 2022-04-01 RX ADMIN — PRAVASTATIN SODIUM 40 MG: 40 TABLET ORAL at 21:05

## 2022-04-02 VITALS
HEART RATE: 100 BPM | WEIGHT: 163 LBS | DIASTOLIC BLOOD PRESSURE: 60 MMHG | TEMPERATURE: 98.4 F | SYSTOLIC BLOOD PRESSURE: 123 MMHG | OXYGEN SATURATION: 93 % | BODY MASS INDEX: 32 KG/M2 | HEIGHT: 60 IN | RESPIRATION RATE: 16 BRPM

## 2022-04-02 LAB
BACTERIA SPEC AEROBE CULT: NORMAL
GRAM STN SPEC: NORMAL

## 2022-04-02 PROCEDURE — 94799 UNLISTED PULMONARY SVC/PX: CPT

## 2022-04-02 PROCEDURE — 97530 THERAPEUTIC ACTIVITIES: CPT | Performed by: GENERAL ACUTE CARE HOSPITAL

## 2022-04-02 PROCEDURE — 97110 THERAPEUTIC EXERCISES: CPT | Performed by: GENERAL ACUTE CARE HOSPITAL

## 2022-04-02 RX ORDER — CYCLOBENZAPRINE HCL 5 MG
10 TABLET ORAL 3 TIMES DAILY PRN
Qty: 10 TABLET | Refills: 0
Start: 2022-04-02 | End: 2022-06-02

## 2022-04-02 RX ADMIN — DOXYCYCLINE 100 MG: 100 CAPSULE ORAL at 08:21

## 2022-04-02 RX ADMIN — OXYBUTYNIN CHLORIDE 10 MG: 10 TABLET, EXTENDED RELEASE ORAL at 08:21

## 2022-04-02 RX ADMIN — ACETAMINOPHEN 1000 MG: 500 TABLET ORAL at 03:10

## 2022-04-02 RX ADMIN — CYCLOBENZAPRINE 5 MG: 10 TABLET, FILM COATED ORAL at 12:50

## 2022-04-02 RX ADMIN — MELOXICAM 15 MG: 7.5 TABLET ORAL at 08:21

## 2022-04-02 RX ADMIN — BUDESONIDE AND FORMOTEROL FUMARATE DIHYDRATE 2 PUFF: 160; 4.5 AEROSOL RESPIRATORY (INHALATION) at 08:15

## 2022-04-02 RX ADMIN — OXYCODONE 5 MG: 5 TABLET ORAL at 12:50

## 2022-04-02 RX ADMIN — ASPIRIN 81 MG: 81 TABLET, COATED ORAL at 08:20

## 2022-04-02 RX ADMIN — OXYCODONE 5 MG: 5 TABLET ORAL at 08:20

## 2022-04-02 RX ADMIN — PANTOPRAZOLE SODIUM 40 MG: 40 TABLET, DELAYED RELEASE ORAL at 08:21

## 2022-04-02 RX ADMIN — LEVOTHYROXINE SODIUM 25 MCG: 25 TABLET ORAL at 08:21

## 2022-04-02 RX ADMIN — OXYCODONE 5 MG: 5 TABLET ORAL at 01:54

## 2022-04-02 RX ADMIN — TRAMADOL HYDROCHLORIDE 50 MG: 50 TABLET, COATED ORAL at 03:10

## 2022-04-02 RX ADMIN — ESCITALOPRAM OXALATE 20 MG: 20 TABLET ORAL at 08:21

## 2022-04-02 RX ADMIN — CYCLOBENZAPRINE 5 MG: 10 TABLET, FILM COATED ORAL at 02:05

## 2022-04-05 LAB
COTININE SERPL-MCNC: <1 NG/ML
NICOTINE SERPL-MCNC: <1 NG/ML

## 2022-04-09 LAB
BACTERIA SPEC ANAEROBE CULT: NORMAL

## 2022-04-11 ENCOUNTER — READMISSION MANAGEMENT (OUTPATIENT)
Dept: CALL CENTER | Facility: HOSPITAL | Age: 57
End: 2022-04-11

## 2022-04-11 NOTE — OUTREACH NOTE
Prep Survey    Flowsheet Row Responses   [facility] facility patient discharged from? Non-BH   Is LACE score < 7 ? Non-BH Discharge   Emergency Room discharge w/ pulse ox? No   Eligibility [facility] - Inpatient   Date of Discharge 04/11/22   [place] [facility] [place] Unavailable   Does the patient have one of the following disease processes/diagnoses(primary or secondary)? Other   Does the patient have Home health ordered? Yes   What is the Home health agency?  Home - with Home Health Services   Prep survey completed? Yes          NATALIIA ORDONEZ - Registered Nurse

## 2022-04-12 ENCOUNTER — TRANSITIONAL CARE MANAGEMENT TELEPHONE ENCOUNTER (OUTPATIENT)
Dept: CALL CENTER | Facility: HOSPITAL | Age: 57
End: 2022-04-12

## 2022-04-12 ENCOUNTER — TELEPHONE (OUTPATIENT)
Dept: FAMILY MEDICINE CLINIC | Facility: CLINIC | Age: 57
End: 2022-04-12

## 2022-04-12 NOTE — OUTREACH NOTE
Call Center TCM Note    Flowsheet Row Responses   [facility] patient discharged from? Non-   Does the patient have one of the following disease processes/diagnoses(primary or secondary)? Other   TCM attempt successful? Yes   Call start time 1553   Call end time 1559   Discharge diagnosis hip replacement    Meds reviewed with patient/caregiver? Yes   Is the patient having any side effects they believe may be caused by any medication additions or changes? No   Does the patient have all medications ordered at discharge? Yes   Is the patient taking all medications as directed (includes completed medication regime)? Yes   Does the patient have a primary care provider?  Yes   Does the patient have an appointment with their PCP within 7 days of discharge? Greater than 7 days   Comments regarding PCP She has a previously scheduled followup with Dr Murphy on 4/28/2022. She declines a sooner appt.    What is preventing the patient from scheduling follow up appointments within 7 days of discharge? --  [Patient's choice. ]   Nursing Interventions Verified appointment date/time/provider   Has the patient kept scheduled appointments due by today? N/A   What is the Home health agency?  Home - with Home Health Services   Has home health visited the patient within 72 hours of discharge? Yes   Psychosocial issues? No   Did the patient receive a copy of their discharge instructions? Yes   Nursing interventions Reviewed instructions with patient   What is the patient's perception of their health status since discharge? Improving   Is the patient/caregiver able to teach back signs and symptoms related to disease process for when to call PCP? Yes   Is the patient/caregiver able to teach back signs and symptoms related to disease process for when to call 911? Yes   Is the patient/caregiver able to teach back the hierarchy of who to call/visit for symptoms/problems? PCP, Specialist, Home health nurse, Urgent Care, ED, 911 Yes   If  the patient is a current smoker, are they able to teach back resources for cessation? Not a smoker   TCM call completed? Yes   Wrap up additional comments  Patient reports she is doing well. No needs or issues voiced.           Dante He RN    4/12/2022, 16:00 EDT

## 2022-04-12 NOTE — OUTREACH NOTE
Call Center TCM Note    Flowsheet Row Responses   [facility] patient discharged from? Non-BH   Does the patient have one of the following disease processes/diagnoses(primary or secondary)? Other   TCM attempt successful? No   Unsuccessful attempts Attempt 1  [no release]          Dante He RN    4/12/2022, 15:34 EDT

## 2022-04-12 NOTE — TELEPHONE ENCOUNTER
Caller: THANIA VICKERS  Relationship to Patient: SELF  Phone Number: [phone]  Reason for Call: RETURNING JUDE CALL FROM [facility]

## 2022-04-17 DIAGNOSIS — E78.2 MIXED HYPERLIPIDEMIA: ICD-10-CM

## 2022-04-19 RX ORDER — PRAVASTATIN SODIUM 40 MG
TABLET ORAL
Qty: 90 TABLET | Refills: 1 | Status: SHIPPED | OUTPATIENT
Start: 2022-04-19 | End: 2022-07-31 | Stop reason: SDUPTHER

## 2022-04-20 DIAGNOSIS — G43.119 INTRACTABLE MIGRAINE WITH AURA WITHOUT STATUS MIGRAINOSUS: ICD-10-CM

## 2022-04-26 DIAGNOSIS — J45.50 SEVERE PERSISTENT ASTHMA WITHOUT COMPLICATION: ICD-10-CM

## 2022-04-26 RX ORDER — BENRALIZUMAB 30 MG/ML
INJECTION, SOLUTION SUBCUTANEOUS
Qty: 1 PEN | Refills: 5 | Status: SHIPPED | OUTPATIENT
Start: 2022-04-26 | End: 2022-05-11

## 2022-05-11 DIAGNOSIS — J45.50 SEVERE PERSISTENT ASTHMA WITHOUT COMPLICATION: ICD-10-CM

## 2022-05-11 LAB
FUNGUS WND CULT: NORMAL
MYCOBACTERIUM SPEC CULT: NORMAL
NIGHT BLUE STAIN TISS: NORMAL

## 2022-05-11 RX ORDER — BENRALIZUMAB 30 MG/ML
INJECTION, SOLUTION SUBCUTANEOUS
Qty: 1 PEN | Refills: 1 | Status: SHIPPED | OUTPATIENT
Start: 2022-05-11 | End: 2022-10-26

## 2022-06-02 ENCOUNTER — TELEMEDICINE (OUTPATIENT)
Dept: GASTROENTEROLOGY | Facility: CLINIC | Age: 57
End: 2022-06-02

## 2022-06-02 DIAGNOSIS — K22.719 BARRETT'S ESOPHAGUS WITH DYSPLASIA: ICD-10-CM

## 2022-06-02 DIAGNOSIS — K21.00 GASTROESOPHAGEAL REFLUX DISEASE WITH ESOPHAGITIS WITHOUT HEMORRHAGE: Primary | ICD-10-CM

## 2022-06-02 PROCEDURE — 99212 OFFICE O/P EST SF 10 MIN: CPT | Performed by: INTERNAL MEDICINE

## 2022-06-02 RX ORDER — PANTOPRAZOLE SODIUM 40 MG/1
40 TABLET, DELAYED RELEASE ORAL 2 TIMES DAILY
Qty: 180 TABLET | Refills: 3 | Status: SHIPPED | OUTPATIENT
Start: 2022-06-02 | End: 2023-01-28 | Stop reason: SDUPTHER

## 2022-06-02 NOTE — PROGRESS NOTES
Patient Name: Astrid Sam  YOB: 1965   Medical Record number: [id number]     PCP: Beckie Murphy DO    No chief complaint on file.  Follow-up for reflux.    History of Present Illness:   HPI  Mrs. Sam presents for telehealth video visit.  She consented for telehealth video visit.  The patient had hip surgery a few months ago.  She is recovering at home and doing well.  The patient states her appetite is slowly improving.  She denies any jaspal heartburn.  There is no history of difficult swallowing solid food.  Mrs. Sam states she is back taking the acid reflux medication as scheduled.  There is no history of abdominal pain.  The patient denies any bowel irregularity or blood in the stool.  Past Medical History:   Diagnosis Date   • Acquired hypothyroidism    • Allergic    • Anemia     after hip replacement    • Arthritis    • Asthma, extrinsic 2021    Per lung doctor   • GERD (gastroesophageal reflux disease)    • History of transfusion    • Low back pain     BACK PAIN WERE I HAD HIP REPLANCED   • Lung nodule 2021   • Migraine     h/o   • Mixed hyperlipidemia 2020   • Moderate persistent asthma 2021   • PFO (patent foramen ovale)    • PONV (postoperative nausea and vomiting)     preprocedural meds help and pt will need them    • Rheumatoid arthritis (HCC) 2015   • Right shoulder pain    • Seizure ([facility]) 2020   • Stroke ([facility])    • Wears glasses        Past Surgical History:   Procedure Laterality Date   • CARDIAC CATHETERIZATION     •  SECTION     • CHOLECYSTECTOMY     • COLONOSCOPY     • ENDOSCOPY     • FRACTURE SURGERY      left leg- hardware in place    • JOINT REPLACEMENT      TOTAL HIP REPLACEMENT   • KNEE ARTHROSCOPY Right     done twice   • PATELLA SURGERY Left     removed left knee cap   • REPLACEMENT TOTAL KNEE Right 2018    revision 2019   • TOTAL HIP ARTHROPLASTY Left    • TOTAL HIP ARTHROPLASTY Right  12/12/2019    Procedure: DIRECT TOTAL HIP ARTHROPLASTY ANTERIOR RIGHT;  Surgeon: Mele Chatterjee MD;  Location:  [facility];  Service: Orthopedics   • TOTAL HIP ARTHROPLASTY REVISION Left 03/30/2022    Procedure: TOTAL HIP REVISION LEFT;  Surgeon: Mele Chatterjee MD;  Location:  [facility];  Service: Orthopedics;  Laterality: Left;   • TUBAL ABDOMINAL LIGATION           Current Outpatient Medications:   •  albuterol sulfate  (90 Base) MCG/ACT inhaler, Inhale 2 puffs Every 4 (Four) Hours As Needed for Wheezing., Disp: 6.7 g, Rfl: 11  •  escitalopram (LEXAPRO) 20 MG tablet, TAKE 1 TABLET BY MOUTH EVERY DAY, Disp: 90 tablet, Rfl: 1  •  Fasenra Pen 30 MG/ML solution auto-injector, INJECT 1 PEN UNDER THE SKIN EVERY 8 WEEKS., Disp: 1 pen, Rfl: 1  •  fluticasone-salmeterol (Advair Diskus) 500-50 MCG/DOSE DISKUS, Inhale 1 puff 2 (Two) Times a Day., Disp: 60 each, Rfl: 11  •  hydrOXYzine (ATARAX) 25 MG tablet, Take 1-2 tablets by mouth At Night As Needed (sleep)., Disp: 45 tablet, Rfl: 3  •  levothyroxine (SYNTHROID, LEVOTHROID) 25 MCG tablet, TAKE 1 TABLET BY MOUTH EVERY DAY, Disp: 90 tablet, Rfl: 1  •  oxybutynin XL (DITROPAN-XL) 10 MG 24 hr tablet, Take 10 mg by mouth Daily., Disp: , Rfl:   •  pantoprazole (PROTONIX) 40 MG EC tablet, Take 1 tablet by mouth 2 (Two) Times a Day., Disp: 180 tablet, Rfl: 3  •  pramipexole (MIRAPEX) 0.25 MG tablet, Take 3 tablets by mouth Every Night. Take 2-3 hours prior to bedtime, Disp: 270 tablet, Rfl: 1  •  pravastatin (PRAVACHOL) 40 MG tablet, TAKE 1 TABLET BY MOUTH EVERYDAY AT BEDTIME, Disp: 90 tablet, Rfl: 1  •  ubrogepant (ubrogepant) 100 MG tablet, TAKE 1 TABLET BY MOUTH FOR MIGRAINE FOR UP TO ONE DOSE MAY REPEAT DOSE AFTER 2 HOURS IF HEADACHE NOT RESOLVED, Disp: 10 tablet, Rfl: 0    Allergies   Allergen Reactions   • Morphine Nausea And Vomiting       Family History   Problem Relation Age of Onset   • Hypertension Mother    • Migraines Mother    • Thyroid disease Mother    •  Hypertension Father    • COPD Father    • Asthma Father    • Breast cancer Maternal Cousin         unknown   • Rectal cancer Sister    • Ovarian cancer Neg Hx        Social History     Socioeconomic History   • Marital status:    Tobacco Use   • Smoking status: Never Smoker   • Smokeless tobacco: Never Used   Vaping Use   • Vaping Use: Never used   Substance and Sexual Activity   • Alcohol use: Not Currently     Comment: rare   • Drug use: Never   • Sexual activity: Yes     Partners: Male     Birth control/protection: Post-menopausal       Review of Systems   Constitutional: Negative for appetite change, fatigue, fever and unexpected weight change.   HENT: Negative for dental problem, mouth sores, postnasal drip, sneezing, trouble swallowing and voice change.    Eyes: Negative for pain, redness and itching.   Respiratory: Negative for cough, shortness of breath and wheezing.    Cardiovascular: Negative for chest pain, palpitations and leg swelling.   Gastrointestinal: Negative for abdominal distention, abdominal pain, anal bleeding, blood in stool, constipation, diarrhea, nausea, rectal pain and vomiting.   Endocrine: Negative for cold intolerance, heat intolerance, polydipsia and polyuria.   Genitourinary: Negative for dysuria, enuresis, flank pain, hematuria and urgency.   Musculoskeletal: Negative for arthralgias, back pain, joint swelling and myalgias.   Skin: Negative for color change, pallor and rash.   Allergic/Immunologic: Negative for environmental allergies, food allergies and immunocompromised state.   Neurological: Negative for dizziness, tremors, seizures, facial asymmetry, numbness and headaches.   Psychiatric/Behavioral: Negative for behavioral problems, dysphoric mood, hallucinations and self-injury.       There were no vitals filed for this visit.    Physical Exam  Constitutional:       Appearance: Normal appearance.   HENT:      Head: Normocephalic and atraumatic.      Mouth/Throat:       Mouth: Mucous membranes are moist.      Pharynx: Oropharynx is clear.   Eyes:      General: No scleral icterus.     Extraocular Movements: Extraocular movements intact.   Skin:     General: Skin is dry.      Coloration: Skin is not jaundiced.   Neurological:      Mental Status: She is alert and oriented to person, place, and time.   Psychiatric:         Mood and Affect: Mood normal.         Thought Content: Thought content normal.         Judgment: Judgment normal.         Diagnoses and all orders for this visit:    1. Gastroesophageal reflux disease with esophagitis without hemorrhage (Primary)    The patient is doing well on Protonix.  There are no alarm symptoms of progressive dysphagia or weight loss.      Plan: Continue Protonix 40 mg daily.           Will follow-up in the office in 1 year or sooner if other issues develop.

## 2022-06-05 RX ORDER — QUETIAPINE FUMARATE 25 MG/1
25-50 TABLET, FILM COATED ORAL NIGHTLY
Qty: 90 TABLET | Refills: 1 | Status: SHIPPED | OUTPATIENT
Start: 2022-06-05 | End: 2022-06-21

## 2022-06-17 ENCOUNTER — APPOINTMENT (OUTPATIENT)
Dept: PREADMISSION TESTING | Facility: HOSPITAL | Age: 57
End: 2022-06-17

## 2022-06-21 ENCOUNTER — APPOINTMENT (OUTPATIENT)
Dept: PREADMISSION TESTING | Facility: HOSPITAL | Age: 57
End: 2022-06-21

## 2022-06-21 ENCOUNTER — PRE-ADMISSION TESTING (OUTPATIENT)
Dept: PREADMISSION TESTING | Facility: HOSPITAL | Age: 57
End: 2022-06-21

## 2022-06-21 VITALS — HEIGHT: 60 IN | BODY MASS INDEX: 30.88 KG/M2 | WEIGHT: 157.3 LBS

## 2022-06-21 LAB
25(OH)D3 SERPL-MCNC: 32.2 NG/ML (ref 30–100)
ALBUMIN SERPL-MCNC: 4.6 G/DL (ref 3.5–5.2)
ALBUMIN/GLOB SERPL: 1.6 G/DL
ALP SERPL-CCNC: 129 U/L (ref 39–117)
ALT SERPL W P-5'-P-CCNC: 14 U/L (ref 1–33)
ANION GAP SERPL CALCULATED.3IONS-SCNC: 13 MMOL/L (ref 5–15)
APTT PPP: 30.9 SECONDS (ref 22–39)
AST SERPL-CCNC: 25 U/L (ref 1–32)
BASOPHILS # BLD AUTO: 0.02 10*3/MM3 (ref 0–0.2)
BASOPHILS NFR BLD AUTO: 0.3 % (ref 0–1.5)
BILIRUB SERPL-MCNC: 0.7 MG/DL (ref 0–1.2)
BUN SERPL-MCNC: 14 MG/DL (ref 6–20)
BUN/CREAT SERPL: 16.7 (ref 7–25)
CALCIUM SPEC-SCNC: 9.7 MG/DL (ref 8.6–10.5)
CHLORIDE SERPL-SCNC: 103 MMOL/L (ref 98–107)
CO2 SERPL-SCNC: 24 MMOL/L (ref 22–29)
CREAT SERPL-MCNC: 0.84 MG/DL (ref 0.57–1)
CRP SERPL-MCNC: 2.85 MG/DL (ref 0–0.5)
DEPRECATED RDW RBC AUTO: 42.5 FL (ref 37–54)
EGFRCR SERPLBLD CKD-EPI 2021: 81.2 ML/MIN/1.73
EOSINOPHIL # BLD AUTO: 0 10*3/MM3 (ref 0–0.4)
EOSINOPHIL NFR BLD AUTO: 0 % (ref 0.3–6.2)
ERYTHROCYTE [DISTWIDTH] IN BLOOD BY AUTOMATED COUNT: 14.9 % (ref 12.3–15.4)
ERYTHROCYTE [SEDIMENTATION RATE] IN BLOOD: 14 MM/HR (ref 0–30)
GLOBULIN UR ELPH-MCNC: 2.9 GM/DL
GLUCOSE SERPL-MCNC: 151 MG/DL (ref 65–99)
HBA1C MFR BLD: 5 % (ref 4.8–5.6)
HCT VFR BLD AUTO: 35.9 % (ref 34–46.6)
HGB BLD-MCNC: 10.9 G/DL (ref 12–15.9)
IMM GRANULOCYTES # BLD AUTO: 0.03 10*3/MM3 (ref 0–0.05)
IMM GRANULOCYTES NFR BLD AUTO: 0.5 % (ref 0–0.5)
INR PPP: 1.07 (ref 0.84–1.13)
LYMPHOCYTES # BLD AUTO: 1.95 10*3/MM3 (ref 0.7–3.1)
LYMPHOCYTES NFR BLD AUTO: 29.6 % (ref 19.6–45.3)
MCH RBC QN AUTO: 24 PG (ref 26.6–33)
MCHC RBC AUTO-ENTMCNC: 30.4 G/DL (ref 31.5–35.7)
MCV RBC AUTO: 79.1 FL (ref 79–97)
MONOCYTES # BLD AUTO: 0.47 10*3/MM3 (ref 0.1–0.9)
MONOCYTES NFR BLD AUTO: 7.1 % (ref 5–12)
MRSA DNA SPEC QL NAA+PROBE: POSITIVE
NEUTROPHILS NFR BLD AUTO: 4.12 10*3/MM3 (ref 1.7–7)
NEUTROPHILS NFR BLD AUTO: 62.5 % (ref 42.7–76)
NRBC BLD AUTO-RTO: 0 /100 WBC (ref 0–0.2)
PLATELET # BLD AUTO: 342 10*3/MM3 (ref 140–450)
PMV BLD AUTO: 9.2 FL (ref 6–12)
POTASSIUM SERPL-SCNC: 4.1 MMOL/L (ref 3.5–5.2)
PROT SERPL-MCNC: 7.5 G/DL (ref 6–8.5)
PROTHROMBIN TIME: 13.8 SECONDS (ref 11.4–14.4)
RBC # BLD AUTO: 4.54 10*6/MM3 (ref 3.77–5.28)
SARS-COV-2 RNA PNL SPEC NAA+PROBE: NOT DETECTED
SODIUM SERPL-SCNC: 140 MMOL/L (ref 136–145)
WBC NRBC COR # BLD: 6.59 10*3/MM3 (ref 3.4–10.8)

## 2022-06-21 PROCEDURE — 85652 RBC SED RATE AUTOMATED: CPT

## 2022-06-21 PROCEDURE — 80053 COMPREHEN METABOLIC PANEL: CPT

## 2022-06-21 PROCEDURE — 85730 THROMBOPLASTIN TIME PARTIAL: CPT

## 2022-06-21 PROCEDURE — 82306 VITAMIN D 25 HYDROXY: CPT

## 2022-06-21 PROCEDURE — 36415 COLL VENOUS BLD VENIPUNCTURE: CPT

## 2022-06-21 PROCEDURE — 83036 HEMOGLOBIN GLYCOSYLATED A1C: CPT

## 2022-06-21 PROCEDURE — 85610 PROTHROMBIN TIME: CPT

## 2022-06-21 PROCEDURE — U0004 COV-19 TEST NON-CDC HGH THRU: HCPCS

## 2022-06-21 PROCEDURE — 82985 ASSAY OF GLYCATED PROTEIN: CPT

## 2022-06-21 PROCEDURE — 87641 MR-STAPH DNA AMP PROBE: CPT

## 2022-06-21 PROCEDURE — C9803 HOPD COVID-19 SPEC COLLECT: HCPCS

## 2022-06-21 PROCEDURE — 86140 C-REACTIVE PROTEIN: CPT

## 2022-06-21 PROCEDURE — 85025 COMPLETE CBC W/AUTO DIFF WBC: CPT

## 2022-06-21 PROCEDURE — G0480 DRUG TEST DEF 1-7 CLASSES: HCPCS

## 2022-06-21 RX ORDER — ASPIRIN 81 MG/1
81 TABLET ORAL DAILY
COMMUNITY
End: 2022-06-27 | Stop reason: HOSPADM

## 2022-06-21 RX ORDER — VANCOMYCIN HYDROCHLORIDE 1 G/200ML
15 INJECTION, SOLUTION INTRAVENOUS ONCE
Status: CANCELLED | OUTPATIENT
Start: 2022-06-23

## 2022-06-21 RX ORDER — MELOXICAM 7.5 MG/1
7.5 TABLET ORAL DAILY
COMMUNITY
End: 2022-09-08

## 2022-06-21 RX ORDER — TRAZODONE HYDROCHLORIDE 100 MG/1
100 TABLET ORAL NIGHTLY
COMMUNITY
End: 2022-10-21

## 2022-06-21 ASSESSMENT — HOOS JR
HOOS JR SCORE: 15.633
HOOS JR SCORE: 22

## 2022-06-22 ENCOUNTER — ANESTHESIA EVENT (OUTPATIENT)
Dept: PERIOP | Facility: HOSPITAL | Age: 57
End: 2022-06-22

## 2022-06-22 LAB — FRUCTOSAMINE SERPL-SCNC: 231 UMOL/L (ref 0–285)

## 2022-06-23 ENCOUNTER — HOSPITAL ENCOUNTER (INPATIENT)
Facility: HOSPITAL | Age: 57
LOS: 4 days | Discharge: NURSING, REHAB OR LONG-TERM CARE | End: 2022-06-27
Attending: ORTHOPAEDIC SURGERY | Admitting: ORTHOPAEDIC SURGERY

## 2022-06-23 ENCOUNTER — ANESTHESIA (OUTPATIENT)
Dept: PERIOP | Facility: HOSPITAL | Age: 57
End: 2022-06-23

## 2022-06-23 ENCOUNTER — APPOINTMENT (OUTPATIENT)
Dept: GENERAL RADIOLOGY | Facility: HOSPITAL | Age: 57
End: 2022-06-23

## 2022-06-23 DIAGNOSIS — Z96.649 S/P REVISION OF TOTAL HIP: Primary | ICD-10-CM

## 2022-06-23 DIAGNOSIS — M25.552 LEFT HIP PAIN: ICD-10-CM

## 2022-06-23 LAB
ABO GROUP BLD: NORMAL
APPEARANCE FLD: ABNORMAL
BLD GP AB SCN SERPL QL: NEGATIVE
COLOR FLD: ABNORMAL
HCT VFR BLD AUTO: 28.2 % (ref 34–46.6)
HGB BLD-MCNC: 8.8 G/DL (ref 12–15.9)
LYMPHOCYTES NFR FLD MANUAL: 7 %
MACROPHAGE FLUID: 1 %
MESOTHL CELL NFR FLD MANUAL: 2 %
MONOCYTES NFR FLD: 6 %
NEUTROPHILS NFR FLD MANUAL: 84 %
RBC # FLD AUTO: ABNORMAL /MM3
RH BLD: POSITIVE
T&S EXPIRATION DATE: NORMAL
WBC # FLD AUTO: 710 /MM3

## 2022-06-23 PROCEDURE — C1713 ANCHOR/SCREW BN/BN,TIS/BN: HCPCS | Performed by: ORTHOPAEDIC SURGERY

## 2022-06-23 PROCEDURE — 87116 MYCOBACTERIA CULTURE: CPT | Performed by: ORTHOPAEDIC SURGERY

## 2022-06-23 PROCEDURE — C1776 JOINT DEVICE (IMPLANTABLE): HCPCS | Performed by: ORTHOPAEDIC SURGERY

## 2022-06-23 PROCEDURE — 82330 ASSAY OF CALCIUM: CPT

## 2022-06-23 PROCEDURE — 84295 ASSAY OF SERUM SODIUM: CPT

## 2022-06-23 PROCEDURE — 0SPB0JZ REMOVAL OF SYNTHETIC SUBSTITUTE FROM LEFT HIP JOINT, OPEN APPROACH: ICD-10-PCS | Performed by: ORTHOPAEDIC SURGERY

## 2022-06-23 PROCEDURE — P9041 ALBUMIN (HUMAN),5%, 50ML: HCPCS | Performed by: ANESTHESIOLOGY

## 2022-06-23 PROCEDURE — 25010000002 ONDANSETRON PER 1 MG: Performed by: ANESTHESIOLOGY

## 2022-06-23 PROCEDURE — 73502 X-RAY EXAM HIP UNI 2-3 VIEWS: CPT

## 2022-06-23 PROCEDURE — C1755 CATHETER, INTRASPINAL: HCPCS | Performed by: ORTHOPAEDIC SURGERY

## 2022-06-23 PROCEDURE — 87075 CULTR BACTERIA EXCEPT BLOOD: CPT | Performed by: ORTHOPAEDIC SURGERY

## 2022-06-23 PROCEDURE — 86900 BLOOD TYPING SEROLOGIC ABO: CPT | Performed by: ORTHOPAEDIC SURGERY

## 2022-06-23 PROCEDURE — 25010000002 HYDROMORPHONE 1 MG/ML SOLUTION

## 2022-06-23 PROCEDURE — 25010000002 FENTANYL CITRATE (PF) 50 MCG/ML SOLUTION: Performed by: ANESTHESIOLOGY

## 2022-06-23 PROCEDURE — 25010000002 VANCOMYCIN 1 G RECONSTITUTED SOLUTION: Performed by: ORTHOPAEDIC SURGERY

## 2022-06-23 PROCEDURE — 0 MEPIVACAINE HCL (PF) 1.5 % SOLUTION: Performed by: ANESTHESIOLOGY

## 2022-06-23 PROCEDURE — 25010000002 CEFAZOLIN IN DEXTROSE 2-4 GM/100ML-% SOLUTION: Performed by: ORTHOPAEDIC SURGERY

## 2022-06-23 PROCEDURE — 87102 FUNGUS ISOLATION CULTURE: CPT | Performed by: ORTHOPAEDIC SURGERY

## 2022-06-23 PROCEDURE — 25010000002 CLONIDINE PER 1 MG: Performed by: ORTHOPAEDIC SURGERY

## 2022-06-23 PROCEDURE — 25010000002 PHENYLEPHRINE 10 MG/ML SOLUTION 1 ML VIAL: Performed by: ANESTHESIOLOGY

## 2022-06-23 PROCEDURE — 25010000002 FENTANYL CITRATE (PF) 50 MCG/ML SOLUTION

## 2022-06-23 PROCEDURE — 84132 ASSAY OF SERUM POTASSIUM: CPT

## 2022-06-23 PROCEDURE — 82947 ASSAY GLUCOSE BLOOD QUANT: CPT

## 2022-06-23 PROCEDURE — 87176 TISSUE HOMOGENIZATION CULTR: CPT | Performed by: ORTHOPAEDIC SURGERY

## 2022-06-23 PROCEDURE — 87206 SMEAR FLUORESCENT/ACID STAI: CPT | Performed by: ORTHOPAEDIC SURGERY

## 2022-06-23 PROCEDURE — 25010000002 KETOROLAC TROMETHAMINE PER 15 MG: Performed by: ORTHOPAEDIC SURGERY

## 2022-06-23 PROCEDURE — 25010000002 DEXAMETHASONE PER 1 MG: Performed by: ANESTHESIOLOGY

## 2022-06-23 PROCEDURE — 0SRB04A REPLACEMENT OF LEFT HIP JOINT WITH CERAMIC ON POLYETHYLENE SYNTHETIC SUBSTITUTE, UNCEMENTED, OPEN APPROACH: ICD-10-PCS | Performed by: ORTHOPAEDIC SURGERY

## 2022-06-23 PROCEDURE — 25010000002 VANCOMYCIN PER 500 MG: Performed by: ORTHOPAEDIC SURGERY

## 2022-06-23 PROCEDURE — 87070 CULTURE OTHR SPECIMN AEROBIC: CPT | Performed by: ORTHOPAEDIC SURGERY

## 2022-06-23 PROCEDURE — 86901 BLOOD TYPING SEROLOGIC RH(D): CPT | Performed by: ORTHOPAEDIC SURGERY

## 2022-06-23 PROCEDURE — 25010000002 PROPOFOL 10 MG/ML EMULSION: Performed by: ANESTHESIOLOGY

## 2022-06-23 PROCEDURE — 86850 RBC ANTIBODY SCREEN: CPT | Performed by: ORTHOPAEDIC SURGERY

## 2022-06-23 PROCEDURE — 97161 PT EVAL LOW COMPLEX 20 MIN: CPT

## 2022-06-23 PROCEDURE — 25010000002 ALBUMIN HUMAN 5% PER 50 ML: Performed by: ANESTHESIOLOGY

## 2022-06-23 PROCEDURE — 94640 AIRWAY INHALATION TREATMENT: CPT

## 2022-06-23 PROCEDURE — 89051 BODY FLUID CELL COUNT: CPT | Performed by: ORTHOPAEDIC SURGERY

## 2022-06-23 PROCEDURE — 85014 HEMATOCRIT: CPT | Performed by: ANESTHESIOLOGY

## 2022-06-23 PROCEDURE — 85014 HEMATOCRIT: CPT

## 2022-06-23 PROCEDURE — 97110 THERAPEUTIC EXERCISES: CPT

## 2022-06-23 PROCEDURE — 86923 COMPATIBILITY TEST ELECTRIC: CPT

## 2022-06-23 PROCEDURE — 82803 BLOOD GASES ANY COMBINATION: CPT

## 2022-06-23 PROCEDURE — 87205 SMEAR GRAM STAIN: CPT | Performed by: ORTHOPAEDIC SURGERY

## 2022-06-23 PROCEDURE — 85018 HEMOGLOBIN: CPT | Performed by: ANESTHESIOLOGY

## 2022-06-23 DEVICE — BEAR HIP VIVACI/TE 2MOBIL HXPE 28X44MM: Type: IMPLANTABLE DEVICE | Site: HIP | Status: FUNCTIONAL

## 2022-06-23 DEVICE — IMPLANTABLE DEVICE: Type: IMPLANTABLE DEVICE | Site: HIP | Status: FUNCTIONAL

## 2022-06-23 DEVICE — SUT NONABS MAXBRAID/PE NMBR2 HC5 38IN WHT 900333: Type: IMPLANTABLE DEVICE | Site: HIP | Status: FUNCTIONAL

## 2022-06-23 DEVICE — LINER G7 2MOBL SZE 44MM: Type: IMPLANTABLE DEVICE | Site: HIP | Status: FUNCTIONAL

## 2022-06-23 DEVICE — SCRW ACET CORT TRILOGY S/TAP 6.5X30: Type: IMPLANTABLE DEVICE | Site: HIP | Status: FUNCTIONAL

## 2022-06-23 DEVICE — DEV CONTRL TISS STRATAFIX SPIRAL PDO BIDIR 1 36X36CM: Type: IMPLANTABLE DEVICE | Site: HIP | Status: FUNCTIONAL

## 2022-06-23 DEVICE — SCRW ACET CORT TRILOGY S/TAP 6.5X20: Type: IMPLANTABLE DEVICE | Site: HIP | Status: FUNCTIONAL

## 2022-06-23 DEVICE — SHLL ACET OSSEOTI M/HL SZF 56MM: Type: IMPLANTABLE DEVICE | Site: HIP | Status: FUNCTIONAL

## 2022-06-23 RX ORDER — MELOXICAM 15 MG/1
15 TABLET ORAL ONCE
Status: COMPLETED | OUTPATIENT
Start: 2022-06-23 | End: 2022-06-23

## 2022-06-23 RX ORDER — TRANEXAMIC ACID 10 MG/ML
1000 INJECTION, SOLUTION INTRAVENOUS ONCE
Status: COMPLETED | OUTPATIENT
Start: 2022-06-23 | End: 2022-06-23

## 2022-06-23 RX ORDER — ESCITALOPRAM OXALATE 20 MG/1
20 TABLET ORAL DAILY
Status: DISCONTINUED | OUTPATIENT
Start: 2022-06-23 | End: 2022-06-27 | Stop reason: HOSPADM

## 2022-06-23 RX ORDER — SODIUM CHLORIDE 0.9 % (FLUSH) 0.9 %
10 SYRINGE (ML) INJECTION AS NEEDED
Status: DISCONTINUED | OUTPATIENT
Start: 2022-06-23 | End: 2022-06-23 | Stop reason: HOSPADM

## 2022-06-23 RX ORDER — SODIUM CHLORIDE 0.9 % (FLUSH) 0.9 %
10 SYRINGE (ML) INJECTION EVERY 12 HOURS SCHEDULED
Status: DISCONTINUED | OUTPATIENT
Start: 2022-06-23 | End: 2022-06-23 | Stop reason: HOSPADM

## 2022-06-23 RX ORDER — LIDOCAINE HYDROCHLORIDE 10 MG/ML
0.5 INJECTION, SOLUTION EPIDURAL; INFILTRATION; INTRACAUDAL; PERINEURAL ONCE AS NEEDED
Status: COMPLETED | OUTPATIENT
Start: 2022-06-23 | End: 2022-06-23

## 2022-06-23 RX ORDER — NALOXONE HCL 0.4 MG/ML
0.4 VIAL (ML) INJECTION AS NEEDED
Status: DISCONTINUED | OUTPATIENT
Start: 2022-06-23 | End: 2022-06-23 | Stop reason: HOSPADM

## 2022-06-23 RX ORDER — KETOROLAC TROMETHAMINE 15 MG/ML
15 INJECTION, SOLUTION INTRAMUSCULAR; INTRAVENOUS EVERY 6 HOURS PRN
Status: DISCONTINUED | OUTPATIENT
Start: 2022-06-23 | End: 2022-06-27 | Stop reason: HOSPADM

## 2022-06-23 RX ORDER — SODIUM CHLORIDE 0.9 % (FLUSH) 0.9 %
3-10 SYRINGE (ML) INJECTION AS NEEDED
Status: DISCONTINUED | OUTPATIENT
Start: 2022-06-23 | End: 2022-06-23 | Stop reason: HOSPADM

## 2022-06-23 RX ORDER — BUPIVACAINE HCL/0.9 % NACL/PF 0.125 %
PLASTIC BAG, INJECTION (ML) EPIDURAL AS NEEDED
Status: DISCONTINUED | OUTPATIENT
Start: 2022-06-23 | End: 2022-06-23 | Stop reason: SURG

## 2022-06-23 RX ORDER — PROMETHAZINE HYDROCHLORIDE 25 MG/1
25 SUPPOSITORY RECTAL ONCE AS NEEDED
Status: DISCONTINUED | OUTPATIENT
Start: 2022-06-23 | End: 2022-06-23 | Stop reason: HOSPADM

## 2022-06-23 RX ORDER — ASPIRIN 81 MG/1
81 TABLET ORAL EVERY 12 HOURS SCHEDULED
Status: DISCONTINUED | OUTPATIENT
Start: 2022-06-24 | End: 2022-06-27 | Stop reason: HOSPADM

## 2022-06-23 RX ORDER — CEFAZOLIN SODIUM 2 G/100ML
2 INJECTION, SOLUTION INTRAVENOUS EVERY 8 HOURS
Status: COMPLETED | OUTPATIENT
Start: 2022-06-23 | End: 2022-06-24

## 2022-06-23 RX ORDER — ONDANSETRON 2 MG/ML
4 INJECTION INTRAMUSCULAR; INTRAVENOUS EVERY 6 HOURS PRN
Status: DISCONTINUED | OUTPATIENT
Start: 2022-06-23 | End: 2022-06-27 | Stop reason: HOSPADM

## 2022-06-23 RX ORDER — SODIUM CHLORIDE, SODIUM LACTATE, POTASSIUM CHLORIDE, CALCIUM CHLORIDE 600; 310; 30; 20 MG/100ML; MG/100ML; MG/100ML; MG/100ML
9 INJECTION, SOLUTION INTRAVENOUS CONTINUOUS PRN
Status: DISCONTINUED | OUTPATIENT
Start: 2022-06-23 | End: 2022-06-27 | Stop reason: HOSPADM

## 2022-06-23 RX ORDER — FENTANYL CITRATE 50 UG/ML
INJECTION, SOLUTION INTRAMUSCULAR; INTRAVENOUS AS NEEDED
Status: DISCONTINUED | OUTPATIENT
Start: 2022-06-23 | End: 2022-06-23 | Stop reason: SURG

## 2022-06-23 RX ORDER — MIDAZOLAM HYDROCHLORIDE 1 MG/ML
1 INJECTION INTRAMUSCULAR; INTRAVENOUS
Status: DISCONTINUED | OUTPATIENT
Start: 2022-06-23 | End: 2022-06-23 | Stop reason: HOSPADM

## 2022-06-23 RX ORDER — LABETALOL HYDROCHLORIDE 5 MG/ML
5 INJECTION, SOLUTION INTRAVENOUS
Status: DISCONTINUED | OUTPATIENT
Start: 2022-06-23 | End: 2022-06-23 | Stop reason: HOSPADM

## 2022-06-23 RX ORDER — BUDESONIDE AND FORMOTEROL FUMARATE DIHYDRATE 80; 4.5 UG/1; UG/1
2 AEROSOL RESPIRATORY (INHALATION)
Status: DISCONTINUED | OUTPATIENT
Start: 2022-06-23 | End: 2022-06-27 | Stop reason: HOSPADM

## 2022-06-23 RX ORDER — OXYCODONE HYDROCHLORIDE 5 MG/1
5 TABLET ORAL EVERY 4 HOURS PRN
Status: DISCONTINUED | OUTPATIENT
Start: 2022-06-23 | End: 2022-06-27 | Stop reason: HOSPADM

## 2022-06-23 RX ORDER — TRAMADOL HYDROCHLORIDE 50 MG/1
50 TABLET ORAL EVERY 8 HOURS PRN
Status: DISCONTINUED | OUTPATIENT
Start: 2022-06-23 | End: 2022-06-23

## 2022-06-23 RX ORDER — MAGNESIUM HYDROXIDE 1200 MG/15ML
LIQUID ORAL AS NEEDED
Status: DISCONTINUED | OUTPATIENT
Start: 2022-06-23 | End: 2022-06-23 | Stop reason: HOSPADM

## 2022-06-23 RX ORDER — DROPERIDOL 2.5 MG/ML
0.62 INJECTION, SOLUTION INTRAMUSCULAR; INTRAVENOUS ONCE AS NEEDED
Status: DISCONTINUED | OUTPATIENT
Start: 2022-06-23 | End: 2022-06-23 | Stop reason: HOSPADM

## 2022-06-23 RX ORDER — MELOXICAM 7.5 MG/1
15 TABLET ORAL DAILY
Status: DISCONTINUED | OUTPATIENT
Start: 2022-06-23 | End: 2022-06-27 | Stop reason: HOSPADM

## 2022-06-23 RX ORDER — MEPERIDINE HYDROCHLORIDE 25 MG/ML
12.5 INJECTION INTRAMUSCULAR; INTRAVENOUS; SUBCUTANEOUS
Status: DISCONTINUED | OUTPATIENT
Start: 2022-06-23 | End: 2022-06-23 | Stop reason: HOSPADM

## 2022-06-23 RX ORDER — TRAZODONE HYDROCHLORIDE 100 MG/1
100 TABLET ORAL NIGHTLY PRN
Status: DISCONTINUED | OUTPATIENT
Start: 2022-06-23 | End: 2022-06-24

## 2022-06-23 RX ORDER — CEFAZOLIN SODIUM 2 G/100ML
2 INJECTION, SOLUTION INTRAVENOUS ONCE
Status: COMPLETED | OUTPATIENT
Start: 2022-06-23 | End: 2022-06-23

## 2022-06-23 RX ORDER — ACETAMINOPHEN 500 MG
1000 TABLET ORAL EVERY 8 HOURS
Status: DISCONTINUED | OUTPATIENT
Start: 2022-06-23 | End: 2022-06-27 | Stop reason: HOSPADM

## 2022-06-23 RX ORDER — PROMETHAZINE HYDROCHLORIDE 25 MG/1
25 TABLET ORAL ONCE AS NEEDED
Status: DISCONTINUED | OUTPATIENT
Start: 2022-06-23 | End: 2022-06-23 | Stop reason: HOSPADM

## 2022-06-23 RX ORDER — PRAVASTATIN SODIUM 40 MG
40 TABLET ORAL NIGHTLY
Status: DISCONTINUED | OUTPATIENT
Start: 2022-06-23 | End: 2022-06-27 | Stop reason: HOSPADM

## 2022-06-23 RX ORDER — EPHEDRINE SULFATE 50 MG/ML
INJECTION, SOLUTION INTRAVENOUS AS NEEDED
Status: DISCONTINUED | OUTPATIENT
Start: 2022-06-23 | End: 2022-06-23 | Stop reason: SURG

## 2022-06-23 RX ORDER — FENTANYL CITRATE 50 UG/ML
INJECTION, SOLUTION INTRAMUSCULAR; INTRAVENOUS
Status: COMPLETED
Start: 2022-06-23 | End: 2022-06-23

## 2022-06-23 RX ORDER — HYDROMORPHONE HYDROCHLORIDE 1 MG/ML
0.5 INJECTION, SOLUTION INTRAMUSCULAR; INTRAVENOUS; SUBCUTANEOUS
Status: DISCONTINUED | OUTPATIENT
Start: 2022-06-23 | End: 2022-06-23 | Stop reason: HOSPADM

## 2022-06-23 RX ORDER — DROPERIDOL 2.5 MG/ML
0.62 INJECTION, SOLUTION INTRAMUSCULAR; INTRAVENOUS
Status: DISCONTINUED | OUTPATIENT
Start: 2022-06-23 | End: 2022-06-23 | Stop reason: HOSPADM

## 2022-06-23 RX ORDER — OXYCODONE HYDROCHLORIDE 5 MG/1
10 TABLET ORAL EVERY 4 HOURS PRN
Status: DISCONTINUED | OUTPATIENT
Start: 2022-06-23 | End: 2022-06-27 | Stop reason: HOSPADM

## 2022-06-23 RX ORDER — ONDANSETRON 2 MG/ML
INJECTION INTRAMUSCULAR; INTRAVENOUS AS NEEDED
Status: DISCONTINUED | OUTPATIENT
Start: 2022-06-23 | End: 2022-06-23 | Stop reason: SURG

## 2022-06-23 RX ORDER — SODIUM CHLORIDE, SODIUM LACTATE, POTASSIUM CHLORIDE, CALCIUM CHLORIDE 600; 310; 30; 20 MG/100ML; MG/100ML; MG/100ML; MG/100ML
100 INJECTION, SOLUTION INTRAVENOUS CONTINUOUS
Status: DISCONTINUED | OUTPATIENT
Start: 2022-06-23 | End: 2022-06-27 | Stop reason: HOSPADM

## 2022-06-23 RX ORDER — VANCOMYCIN HYDROCHLORIDE 1 G/200ML
15 INJECTION, SOLUTION INTRAVENOUS ONCE
Status: COMPLETED | OUTPATIENT
Start: 2022-06-23 | End: 2022-06-23

## 2022-06-23 RX ORDER — SODIUM CHLORIDE 0.9 % (FLUSH) 0.9 %
3 SYRINGE (ML) INJECTION EVERY 12 HOURS SCHEDULED
Status: DISCONTINUED | OUTPATIENT
Start: 2022-06-23 | End: 2022-06-23 | Stop reason: HOSPADM

## 2022-06-23 RX ORDER — LABETALOL HYDROCHLORIDE 5 MG/ML
10 INJECTION, SOLUTION INTRAVENOUS EVERY 4 HOURS PRN
Status: DISCONTINUED | OUTPATIENT
Start: 2022-06-23 | End: 2022-06-27 | Stop reason: HOSPADM

## 2022-06-23 RX ORDER — ALBUMIN, HUMAN INJ 5% 5 %
SOLUTION INTRAVENOUS CONTINUOUS PRN
Status: DISCONTINUED | OUTPATIENT
Start: 2022-06-23 | End: 2022-06-23 | Stop reason: SURG

## 2022-06-23 RX ORDER — VANCOMYCIN HYDROCHLORIDE 1 G/20ML
INJECTION, POWDER, LYOPHILIZED, FOR SOLUTION INTRAVENOUS AS NEEDED
Status: DISCONTINUED | OUTPATIENT
Start: 2022-06-23 | End: 2022-06-23 | Stop reason: HOSPADM

## 2022-06-23 RX ORDER — ONDANSETRON 2 MG/ML
4 INJECTION INTRAMUSCULAR; INTRAVENOUS ONCE AS NEEDED
Status: DISCONTINUED | OUTPATIENT
Start: 2022-06-23 | End: 2022-06-23 | Stop reason: HOSPADM

## 2022-06-23 RX ORDER — NALOXONE HCL 0.4 MG/ML
0.1 VIAL (ML) INJECTION
Status: DISCONTINUED | OUTPATIENT
Start: 2022-06-23 | End: 2022-06-27 | Stop reason: HOSPADM

## 2022-06-23 RX ORDER — HYDROMORPHONE HYDROCHLORIDE 1 MG/ML
0.5 INJECTION, SOLUTION INTRAMUSCULAR; INTRAVENOUS; SUBCUTANEOUS
Status: DISCONTINUED | OUTPATIENT
Start: 2022-06-23 | End: 2022-06-27 | Stop reason: HOSPADM

## 2022-06-23 RX ORDER — LEVOTHYROXINE SODIUM 0.03 MG/1
25 TABLET ORAL
Status: DISCONTINUED | OUTPATIENT
Start: 2022-06-24 | End: 2022-06-27 | Stop reason: HOSPADM

## 2022-06-23 RX ORDER — PRAMIPEXOLE DIHYDROCHLORIDE 0.25 MG/1
0.75 TABLET ORAL NIGHTLY
Status: DISCONTINUED | OUTPATIENT
Start: 2022-06-23 | End: 2022-06-27 | Stop reason: HOSPADM

## 2022-06-23 RX ORDER — FAMOTIDINE 20 MG/1
20 TABLET, FILM COATED ORAL
Status: COMPLETED | OUTPATIENT
Start: 2022-06-23 | End: 2022-06-23

## 2022-06-23 RX ORDER — OXYCODONE HYDROCHLORIDE 5 MG/1
10 TABLET ORAL EVERY 4 HOURS PRN
Status: DISCONTINUED | OUTPATIENT
Start: 2022-06-23 | End: 2022-06-23

## 2022-06-23 RX ORDER — HYDROCODONE BITARTRATE AND ACETAMINOPHEN 5; 325 MG/1; MG/1
1 TABLET ORAL ONCE AS NEEDED
Status: DISCONTINUED | OUTPATIENT
Start: 2022-06-23 | End: 2022-06-23 | Stop reason: HOSPADM

## 2022-06-23 RX ORDER — FENTANYL CITRATE 50 UG/ML
50 INJECTION, SOLUTION INTRAMUSCULAR; INTRAVENOUS
Status: DISCONTINUED | OUTPATIENT
Start: 2022-06-23 | End: 2022-06-23 | Stop reason: HOSPADM

## 2022-06-23 RX ORDER — PREGABALIN 75 MG/1
75 CAPSULE ORAL ONCE
Status: COMPLETED | OUTPATIENT
Start: 2022-06-23 | End: 2022-06-23

## 2022-06-23 RX ORDER — IPRATROPIUM BROMIDE AND ALBUTEROL SULFATE 2.5; .5 MG/3ML; MG/3ML
3 SOLUTION RESPIRATORY (INHALATION) ONCE AS NEEDED
Status: DISCONTINUED | OUTPATIENT
Start: 2022-06-23 | End: 2022-06-23 | Stop reason: HOSPADM

## 2022-06-23 RX ORDER — ACETAMINOPHEN 500 MG
1000 TABLET ORAL ONCE
Status: COMPLETED | OUTPATIENT
Start: 2022-06-23 | End: 2022-06-23

## 2022-06-23 RX ORDER — DEXAMETHASONE SODIUM PHOSPHATE 4 MG/ML
INJECTION, SOLUTION INTRA-ARTICULAR; INTRALESIONAL; INTRAMUSCULAR; INTRAVENOUS; SOFT TISSUE AS NEEDED
Status: DISCONTINUED | OUTPATIENT
Start: 2022-06-23 | End: 2022-06-23 | Stop reason: SURG

## 2022-06-23 RX ORDER — PANTOPRAZOLE SODIUM 40 MG/1
40 TABLET, DELAYED RELEASE ORAL 2 TIMES DAILY
Status: DISCONTINUED | OUTPATIENT
Start: 2022-06-23 | End: 2022-06-27 | Stop reason: HOSPADM

## 2022-06-23 RX ADMIN — EPHEDRINE SULFATE 10 MG: 50 INJECTION INTRAVENOUS at 10:10

## 2022-06-23 RX ADMIN — BUDESONIDE AND FORMOTEROL FUMARATE DIHYDRATE 2 PUFF: 80; 4.5 AEROSOL RESPIRATORY (INHALATION) at 20:55

## 2022-06-23 RX ADMIN — ONDANSETRON 4 MG: 2 INJECTION INTRAMUSCULAR; INTRAVENOUS at 11:19

## 2022-06-23 RX ADMIN — FENTANYL CITRATE 100 MCG: 50 INJECTION, SOLUTION INTRAMUSCULAR; INTRAVENOUS at 09:35

## 2022-06-23 RX ADMIN — FENTANYL CITRATE 50 MCG: 50 INJECTION, SOLUTION INTRAMUSCULAR; INTRAVENOUS at 14:08

## 2022-06-23 RX ADMIN — PANTOPRAZOLE SODIUM 40 MG: 40 TABLET, DELAYED RELEASE ORAL at 20:13

## 2022-06-23 RX ADMIN — MELOXICAM 15 MG: 7.5 TABLET ORAL at 16:38

## 2022-06-23 RX ADMIN — HYDROMORPHONE HYDROCHLORIDE 0.5 MG: 1 INJECTION, SOLUTION INTRAMUSCULAR; INTRAVENOUS; SUBCUTANEOUS at 12:11

## 2022-06-23 RX ADMIN — ALBUMIN HUMAN: 0.05 INJECTION, SOLUTION INTRAVENOUS at 10:30

## 2022-06-23 RX ADMIN — EPHEDRINE SULFATE 5 MG: 50 INJECTION INTRAVENOUS at 10:03

## 2022-06-23 RX ADMIN — MUPIROCIN 1 APPLICATION: 20 OINTMENT TOPICAL at 08:05

## 2022-06-23 RX ADMIN — PHENYLEPHRINE HYDROCHLORIDE 0.5 MCG/KG/MIN: 10 INJECTION INTRAVENOUS at 10:21

## 2022-06-23 RX ADMIN — TRAZODONE HYDROCHLORIDE 100 MG: 100 TABLET ORAL at 20:29

## 2022-06-23 RX ADMIN — SODIUM CHLORIDE, POTASSIUM CHLORIDE, SODIUM LACTATE AND CALCIUM CHLORIDE 9 ML/HR: 600; 310; 30; 20 INJECTION, SOLUTION INTRAVENOUS at 08:06

## 2022-06-23 RX ADMIN — OXYCODONE 5 MG: 5 TABLET ORAL at 20:29

## 2022-06-23 RX ADMIN — Medication 200 MCG: at 10:06

## 2022-06-23 RX ADMIN — PREGABALIN 75 MG: 75 CAPSULE ORAL at 08:05

## 2022-06-23 RX ADMIN — HYDROMORPHONE HYDROCHLORIDE 0.5 MG: 1 INJECTION, SOLUTION INTRAMUSCULAR; INTRAVENOUS; SUBCUTANEOUS at 12:30

## 2022-06-23 RX ADMIN — DEXAMETHASONE SODIUM PHOSPHATE 4 MG: 4 INJECTION, SOLUTION INTRA-ARTICULAR; INTRALESIONAL; INTRAMUSCULAR; INTRAVENOUS; SOFT TISSUE at 09:40

## 2022-06-23 RX ADMIN — SODIUM CHLORIDE, POTASSIUM CHLORIDE, SODIUM LACTATE AND CALCIUM CHLORIDE 100 ML/HR: 600; 310; 30; 20 INJECTION, SOLUTION INTRAVENOUS at 14:52

## 2022-06-23 RX ADMIN — TRANEXAMIC ACID 1000 MG: 10 INJECTION, SOLUTION INTRAVENOUS at 09:25

## 2022-06-23 RX ADMIN — CEFAZOLIN SODIUM 2 G: 2 INJECTION, SOLUTION INTRAVENOUS at 18:14

## 2022-06-23 RX ADMIN — MELOXICAM 15 MG: 15 TABLET ORAL at 08:05

## 2022-06-23 RX ADMIN — PRAVASTATIN SODIUM 40 MG: 40 TABLET ORAL at 20:13

## 2022-06-23 RX ADMIN — ACETAMINOPHEN 1000 MG: 500 TABLET ORAL at 08:04

## 2022-06-23 RX ADMIN — SODIUM CHLORIDE, POTASSIUM CHLORIDE, SODIUM LACTATE AND CALCIUM CHLORIDE: 600; 310; 30; 20 INJECTION, SOLUTION INTRAVENOUS at 09:12

## 2022-06-23 RX ADMIN — Medication 100 MCG: at 09:53

## 2022-06-23 RX ADMIN — VANCOMYCIN HYDROCHLORIDE 1000 MG: 1 INJECTION, SOLUTION INTRAVENOUS at 08:10

## 2022-06-23 RX ADMIN — EPHEDRINE SULFATE 5 MG: 50 INJECTION INTRAVENOUS at 10:02

## 2022-06-23 RX ADMIN — OXYCODONE 10 MG: 5 TABLET ORAL at 16:38

## 2022-06-23 RX ADMIN — Medication 100 MCG: at 09:56

## 2022-06-23 RX ADMIN — MEPIVACAINE HYDROCHLORIDE 4 ML: 15 INJECTION, SOLUTION EPIDURAL; INFILTRATION at 09:20

## 2022-06-23 RX ADMIN — PRAMIPEXOLE DIHYDROCHLORIDE 0.75 MG: 0.25 TABLET ORAL at 18:20

## 2022-06-23 RX ADMIN — LIDOCAINE HYDROCHLORIDE 0.5 ML: 10 INJECTION, SOLUTION EPIDURAL; INFILTRATION; INTRACAUDAL; PERINEURAL at 08:06

## 2022-06-23 RX ADMIN — EPHEDRINE SULFATE 10 MG: 50 INJECTION INTRAVENOUS at 10:18

## 2022-06-23 RX ADMIN — PROPOFOL 75 MCG/KG/MIN: 10 INJECTION, EMULSION INTRAVENOUS at 09:15

## 2022-06-23 RX ADMIN — CEFAZOLIN SODIUM 2 G: 2 INJECTION, SOLUTION INTRAVENOUS at 09:25

## 2022-06-23 RX ADMIN — Medication 100 MCG: at 09:59

## 2022-06-23 RX ADMIN — ACETAMINOPHEN 1000 MG: 500 TABLET ORAL at 16:38

## 2022-06-23 RX ADMIN — TRANEXAMIC ACID 1000 MG: 10 INJECTION, SOLUTION INTRAVENOUS at 11:10

## 2022-06-23 RX ADMIN — FAMOTIDINE 20 MG: 20 TABLET ORAL at 08:04

## 2022-06-23 RX ADMIN — ACETAMINOPHEN 1000 MG: 500 TABLET ORAL at 23:29

## 2022-06-23 NOTE — ANESTHESIA PROCEDURE NOTES
Spinal Block      Patient reassessed immediately prior to procedure    Patient location during procedure: OR  Indication:at surgeon's request  Performed By  CRNA/ELSY: Joshua Peters CRNA  Preanesthetic Checklist  Completed: patient identified, IV checked, site marked, risks and benefits discussed, surgical consent, monitors and equipment checked, pre-op evaluation and timeout performed  Spinal Block Prep:  Patient Position:sitting  Sterile Tech:cap, gloves, sterile barriers and mask  Prep:Chloraprep  Patient Monitoring:blood pressure monitoring, continuous pulse oximetry and EKG  Spinal Block Procedure  Approach:midline  Guidance:landmark technique and palpation technique  Location:L4-L5  Needle Type:Sprotte  Needle Gauge:25 G  Placement of Spinal needle event:cerebrospinal fluid aspirated  Paresthesia: no  Fluid Appearance:clear  Medications: Mepivacaine HCl (PF) (CARBOCAINE) 1.5 % injection, 4 mL  Med Administered at 6/23/2022 9:20 AM   Post Assessment  Patient Tolerance:patient tolerated the procedure well with no apparent complications  Complications no  Additional Notes  Procedure:  Pt assisted to sitting position, with legs in position of comfort over side of bed.  Pt. instructed in optimal spine presentation, the spine was prepped/ Draped and the skin at insertion site was anesthetized with 1% Lidocaine 2 ml.  The spinal needle was then advanced until CSF flow was obtained and LA was injected:

## 2022-06-23 NOTE — ANESTHESIA POSTPROCEDURE EVALUATION
Patient: Astrid aSm    Procedure Summary     Date: 06/23/22 Room / Location:  [facility]  /  [facility]    Anesthesia Start: 0912 Anesthesia Stop:     Procedure: LEFT HIP REVISION: FEMORAL AND ACETABULAR COMPONENT (Left Hip) Diagnosis:     Surgeons: Mele Chatterjee MD Provider: Davie Solares MD    Anesthesia Type: spinal ASA Status: 3          Anesthesia Type: spinal    Vitals  Vitals Value Taken Time   BP     Temp     Pulse 103 06/23/22 1159   Resp     SpO2 99 % 06/23/22 1159   Vitals shown include unvalidated device data.    T 97.1   bp 128/80    Post Anesthesia Care and Evaluation    Patient location during evaluation: PACU  Patient participation: complete - patient participated  Level of consciousness: responsive to light touch    Airway patency: patent  Anesthetic complications: No anesthetic complications  PONV Status: none  Cardiovascular status: hemodynamically stable and acceptable  Respiratory status: nonlabored ventilation, acceptable, nasal cannula and oral airway  Hydration status: acceptable

## 2022-06-23 NOTE — ANESTHESIA PREPROCEDURE EVALUATION
Anesthesia Evaluation     Patient summary reviewed and Nursing notes reviewed   history of anesthetic complications: PONV  NPO Solid Status: > 8 hours  NPO Liquid Status: > 2 hours           Airway   Mallampati: III  TM distance: >3 FB  Neck ROM: full  Possible difficult intubation  Dental - normal exam     Pulmonary     breath sounds clear to auscultation  (+) asthma,  Cardiovascular   Exercise tolerance: good (4-7 METS)    ECG reviewed  Rhythm: regular  Rate: normal    (+) hyperlipidemia,     ROS comment: EF 60%    Neuro/Psych  (+) seizures well controlled, CVA, headaches,    GI/Hepatic/Renal/Endo    (+)  GERD,  thyroid problem hypothyroidism    Musculoskeletal     (+) neck pain,   Abdominal   (+) obese,     Abdomen: soft.   Substance History      OB/GYN          Other   arthritis,                      Anesthesia Plan    ASA 3     spinal     intravenous induction     Anesthetic plan, risks, benefits, and alternatives have been provided, discussed and informed consent has been obtained with: patient.    Plan discussed with CRNA.        CODE STATUS:

## 2022-06-23 NOTE — ANESTHESIA PROCEDURE NOTES
Airway  Urgency: elective    Date/Time: 6/23/2022 9:35 AM  Airway not difficult    General Information and Staff    Patient location during procedure: OR  CRNA/CAA: Joshua Peters CRNA    Indications and Patient Condition  Indications for airway management: airway protection    Preoxygenated: yes  Mask difficulty assessment: 1 - vent by mask    Final Airway Details  Final airway type: supraglottic airway      Successful airway: I-gel  Size 3    Number of attempts at approach: 1  Assessment: lips, teeth, and gum same as pre-op    Additional Comments  LMA placed without difficulty, ventilation with assist, equal breath sounds and symmetric chest rise and fall

## 2022-06-24 LAB
ANION GAP SERPL CALCULATED.3IONS-SCNC: 9 MMOL/L (ref 5–15)
BUN SERPL-MCNC: 12 MG/DL (ref 6–20)
BUN/CREAT SERPL: 13.6 (ref 7–25)
CALCIUM SPEC-SCNC: 8.8 MG/DL (ref 8.6–10.5)
CHLORIDE SERPL-SCNC: 108 MMOL/L (ref 98–107)
CO2 SERPL-SCNC: 26 MMOL/L (ref 22–29)
CREAT SERPL-MCNC: 0.88 MG/DL (ref 0.57–1)
DEPRECATED RDW RBC AUTO: 44.3 FL (ref 37–54)
EGFRCR SERPLBLD CKD-EPI 2021: 76.8 ML/MIN/1.73
ERYTHROCYTE [DISTWIDTH] IN BLOOD BY AUTOMATED COUNT: 15.2 % (ref 12.3–15.4)
GLUCOSE SERPL-MCNC: 96 MG/DL (ref 65–99)
HCT VFR BLD AUTO: 23.1 % (ref 34–46.6)
HGB BLD-MCNC: 7.2 G/DL (ref 12–15.9)
MCH RBC QN AUTO: 25 PG (ref 26.6–33)
MCHC RBC AUTO-ENTMCNC: 31.2 G/DL (ref 31.5–35.7)
MCV RBC AUTO: 80.2 FL (ref 79–97)
PLATELET # BLD AUTO: 247 10*3/MM3 (ref 140–450)
PMV BLD AUTO: 9.3 FL (ref 6–12)
POTASSIUM SERPL-SCNC: 3.7 MMOL/L (ref 3.5–5.2)
RBC # BLD AUTO: 2.88 10*6/MM3 (ref 3.77–5.28)
SODIUM SERPL-SCNC: 143 MMOL/L (ref 136–145)
WBC NRBC COR # BLD: 7.71 10*3/MM3 (ref 3.4–10.8)

## 2022-06-24 PROCEDURE — 80048 BASIC METABOLIC PNL TOTAL CA: CPT | Performed by: ORTHOPAEDIC SURGERY

## 2022-06-24 PROCEDURE — 97530 THERAPEUTIC ACTIVITIES: CPT

## 2022-06-24 PROCEDURE — 97166 OT EVAL MOD COMPLEX 45 MIN: CPT

## 2022-06-24 PROCEDURE — 25010000002 CEFAZOLIN IN DEXTROSE 2-4 GM/100ML-% SOLUTION: Performed by: ORTHOPAEDIC SURGERY

## 2022-06-24 PROCEDURE — 97535 SELF CARE MNGMENT TRAINING: CPT

## 2022-06-24 PROCEDURE — 97110 THERAPEUTIC EXERCISES: CPT

## 2022-06-24 PROCEDURE — 85027 COMPLETE CBC AUTOMATED: CPT | Performed by: ORTHOPAEDIC SURGERY

## 2022-06-24 PROCEDURE — 94799 UNLISTED PULMONARY SVC/PX: CPT

## 2022-06-24 RX ORDER — QUETIAPINE FUMARATE 25 MG/1
50 TABLET, FILM COATED ORAL NIGHTLY PRN
Status: DISCONTINUED | OUTPATIENT
Start: 2022-06-24 | End: 2022-06-27 | Stop reason: HOSPADM

## 2022-06-24 RX ORDER — DOXYCYCLINE 100 MG/1
100 CAPSULE ORAL EVERY 12 HOURS SCHEDULED
Status: COMPLETED | OUTPATIENT
Start: 2022-06-24 | End: 2022-06-26

## 2022-06-24 RX ADMIN — BUDESONIDE AND FORMOTEROL FUMARATE DIHYDRATE 2 PUFF: 80; 4.5 AEROSOL RESPIRATORY (INHALATION) at 21:36

## 2022-06-24 RX ADMIN — QUETIAPINE FUMARATE 50 MG: 25 TABLET ORAL at 21:46

## 2022-06-24 RX ADMIN — DOXYCYCLINE 100 MG: 100 CAPSULE ORAL at 21:47

## 2022-06-24 RX ADMIN — PANTOPRAZOLE SODIUM 40 MG: 40 TABLET, DELAYED RELEASE ORAL at 19:46

## 2022-06-24 RX ADMIN — OXYCODONE 5 MG: 5 TABLET ORAL at 06:22

## 2022-06-24 RX ADMIN — PRAVASTATIN SODIUM 40 MG: 40 TABLET ORAL at 19:46

## 2022-06-24 RX ADMIN — ESCITALOPRAM OXALATE 20 MG: 20 TABLET ORAL at 07:57

## 2022-06-24 RX ADMIN — ASPIRIN 81 MG: 81 TABLET, COATED ORAL at 07:57

## 2022-06-24 RX ADMIN — ASPIRIN 81 MG: 81 TABLET, COATED ORAL at 19:46

## 2022-06-24 RX ADMIN — DOXYCYCLINE 100 MG: 100 CAPSULE ORAL at 14:42

## 2022-06-24 RX ADMIN — ACETAMINOPHEN 1000 MG: 500 TABLET ORAL at 23:02

## 2022-06-24 RX ADMIN — MELOXICAM 15 MG: 7.5 TABLET ORAL at 07:58

## 2022-06-24 RX ADMIN — PRAMIPEXOLE DIHYDROCHLORIDE 0.75 MG: 0.25 TABLET ORAL at 16:56

## 2022-06-24 RX ADMIN — SODIUM CHLORIDE, POTASSIUM CHLORIDE, SODIUM LACTATE AND CALCIUM CHLORIDE 100 ML/HR: 600; 310; 30; 20 INJECTION, SOLUTION INTRAVENOUS at 01:02

## 2022-06-24 RX ADMIN — OXYCODONE 5 MG: 5 TABLET ORAL at 10:44

## 2022-06-24 RX ADMIN — SODIUM CHLORIDE 500 ML: 9 INJECTION, SOLUTION INTRAVENOUS at 19:03

## 2022-06-24 RX ADMIN — PANTOPRAZOLE SODIUM 40 MG: 40 TABLET, DELAYED RELEASE ORAL at 07:57

## 2022-06-24 RX ADMIN — ACETAMINOPHEN 1000 MG: 500 TABLET ORAL at 16:56

## 2022-06-24 RX ADMIN — LEVOTHYROXINE SODIUM 25 MCG: 25 TABLET ORAL at 06:22

## 2022-06-24 RX ADMIN — CEFAZOLIN SODIUM 2 G: 2 INJECTION, SOLUTION INTRAVENOUS at 01:02

## 2022-06-24 RX ADMIN — ACETAMINOPHEN 1000 MG: 500 TABLET ORAL at 07:57

## 2022-06-25 LAB
ANION GAP SERPL CALCULATED.3IONS-SCNC: 6 MMOL/L (ref 5–15)
BUN SERPL-MCNC: 11 MG/DL (ref 6–20)
BUN/CREAT SERPL: 14.3 (ref 7–25)
CALCIUM SPEC-SCNC: 8.5 MG/DL (ref 8.6–10.5)
CHLORIDE SERPL-SCNC: 109 MMOL/L (ref 98–107)
CO2 SERPL-SCNC: 27 MMOL/L (ref 22–29)
CREAT SERPL-MCNC: 0.77 MG/DL (ref 0.57–1)
EGFRCR SERPLBLD CKD-EPI 2021: 90.1 ML/MIN/1.73
GLUCOSE SERPL-MCNC: 96 MG/DL (ref 65–99)
HCT VFR BLD AUTO: 22.6 % (ref 34–46.6)
HGB BLD-MCNC: 6.7 G/DL (ref 12–15.9)
POTASSIUM SERPL-SCNC: 3.9 MMOL/L (ref 3.5–5.2)
SODIUM SERPL-SCNC: 142 MMOL/L (ref 136–145)

## 2022-06-25 PROCEDURE — 86900 BLOOD TYPING SEROLOGIC ABO: CPT

## 2022-06-25 PROCEDURE — 36430 TRANSFUSION BLD/BLD COMPNT: CPT

## 2022-06-25 PROCEDURE — 85018 HEMOGLOBIN: CPT | Performed by: ORTHOPAEDIC SURGERY

## 2022-06-25 PROCEDURE — 97110 THERAPEUTIC EXERCISES: CPT

## 2022-06-25 PROCEDURE — 94799 UNLISTED PULMONARY SVC/PX: CPT

## 2022-06-25 PROCEDURE — 80048 BASIC METABOLIC PNL TOTAL CA: CPT | Performed by: ORTHOPAEDIC SURGERY

## 2022-06-25 PROCEDURE — 25010000002 KETOROLAC TROMETHAMINE PER 15 MG: Performed by: ORTHOPAEDIC SURGERY

## 2022-06-25 PROCEDURE — 97530 THERAPEUTIC ACTIVITIES: CPT

## 2022-06-25 PROCEDURE — P9016 RBC LEUKOCYTES REDUCED: HCPCS

## 2022-06-25 PROCEDURE — 85014 HEMATOCRIT: CPT | Performed by: ORTHOPAEDIC SURGERY

## 2022-06-25 RX ORDER — DOCUSATE SODIUM 100 MG/1
100 CAPSULE, LIQUID FILLED ORAL 2 TIMES DAILY
Status: DISCONTINUED | OUTPATIENT
Start: 2022-06-25 | End: 2022-06-27 | Stop reason: HOSPADM

## 2022-06-25 RX ORDER — POLYETHYLENE GLYCOL 3350 17 G/17G
17 POWDER, FOR SOLUTION ORAL DAILY
Status: DISCONTINUED | OUTPATIENT
Start: 2022-06-25 | End: 2022-06-27 | Stop reason: HOSPADM

## 2022-06-25 RX ADMIN — ESCITALOPRAM OXALATE 20 MG: 20 TABLET ORAL at 08:59

## 2022-06-25 RX ADMIN — QUETIAPINE FUMARATE 50 MG: 25 TABLET ORAL at 23:27

## 2022-06-25 RX ADMIN — BUDESONIDE AND FORMOTEROL FUMARATE DIHYDRATE 2 PUFF: 80; 4.5 AEROSOL RESPIRATORY (INHALATION) at 08:54

## 2022-06-25 RX ADMIN — OXYCODONE 5 MG: 5 TABLET ORAL at 08:58

## 2022-06-25 RX ADMIN — DOXYCYCLINE 100 MG: 100 CAPSULE ORAL at 19:46

## 2022-06-25 RX ADMIN — ASPIRIN 81 MG: 81 TABLET, COATED ORAL at 08:59

## 2022-06-25 RX ADMIN — OXYCODONE 5 MG: 5 TABLET ORAL at 13:57

## 2022-06-25 RX ADMIN — ACETAMINOPHEN 1000 MG: 500 TABLET ORAL at 23:26

## 2022-06-25 RX ADMIN — BUDESONIDE AND FORMOTEROL FUMARATE DIHYDRATE 2 PUFF: 80; 4.5 AEROSOL RESPIRATORY (INHALATION) at 20:52

## 2022-06-25 RX ADMIN — PANTOPRAZOLE SODIUM 40 MG: 40 TABLET, DELAYED RELEASE ORAL at 08:59

## 2022-06-25 RX ADMIN — ASPIRIN 81 MG: 81 TABLET, COATED ORAL at 19:46

## 2022-06-25 RX ADMIN — ACETAMINOPHEN 1000 MG: 500 TABLET ORAL at 16:44

## 2022-06-25 RX ADMIN — LEVOTHYROXINE SODIUM 25 MCG: 25 TABLET ORAL at 04:45

## 2022-06-25 RX ADMIN — DOXYCYCLINE 100 MG: 100 CAPSULE ORAL at 08:58

## 2022-06-25 RX ADMIN — POLYETHYLENE GLYCOL 3350 17 G: 17 POWDER, FOR SOLUTION ORAL at 16:44

## 2022-06-25 RX ADMIN — PRAMIPEXOLE DIHYDROCHLORIDE 0.75 MG: 0.25 TABLET ORAL at 16:44

## 2022-06-25 RX ADMIN — KETOROLAC TROMETHAMINE 15 MG: 15 INJECTION, SOLUTION INTRAMUSCULAR; INTRAVENOUS at 16:45

## 2022-06-25 RX ADMIN — PRAVASTATIN SODIUM 40 MG: 40 TABLET ORAL at 19:46

## 2022-06-25 RX ADMIN — DOCUSATE SODIUM 100 MG: 100 CAPSULE, LIQUID FILLED ORAL at 19:46

## 2022-06-25 RX ADMIN — OXYCODONE 10 MG: 5 TABLET ORAL at 18:20

## 2022-06-25 RX ADMIN — OXYCODONE 5 MG: 5 TABLET ORAL at 02:30

## 2022-06-25 RX ADMIN — ACETAMINOPHEN 1000 MG: 500 TABLET ORAL at 08:58

## 2022-06-25 RX ADMIN — PANTOPRAZOLE SODIUM 40 MG: 40 TABLET, DELAYED RELEASE ORAL at 19:47

## 2022-06-25 RX ADMIN — MELOXICAM 15 MG: 7.5 TABLET ORAL at 08:59

## 2022-06-26 LAB
BACTERIA SPEC AEROBE CULT: NORMAL
BH BB BLOOD EXPIRATION DATE: NORMAL
BH BB BLOOD EXPIRATION DATE: NORMAL
BH BB BLOOD TYPE BARCODE: 5100
BH BB BLOOD TYPE BARCODE: 5100
BH BB DISPENSE STATUS: NORMAL
BH BB DISPENSE STATUS: NORMAL
BH BB PRODUCT CODE: NORMAL
BH BB PRODUCT CODE: NORMAL
BH BB UNIT NUMBER: NORMAL
BH BB UNIT NUMBER: NORMAL
CROSSMATCH INTERPRETATION: NORMAL
CROSSMATCH INTERPRETATION: NORMAL
GRAM STN SPEC: NORMAL
HCT VFR BLD AUTO: 27.4 % (ref 34–46.6)
HGB BLD-MCNC: 8.6 G/DL (ref 12–15.9)
UNIT  ABO: NORMAL
UNIT  ABO: NORMAL
UNIT  RH: NORMAL
UNIT  RH: NORMAL

## 2022-06-26 PROCEDURE — 85014 HEMATOCRIT: CPT | Performed by: INTERNAL MEDICINE

## 2022-06-26 PROCEDURE — 85018 HEMOGLOBIN: CPT | Performed by: INTERNAL MEDICINE

## 2022-06-26 PROCEDURE — 97110 THERAPEUTIC EXERCISES: CPT

## 2022-06-26 PROCEDURE — 94799 UNLISTED PULMONARY SVC/PX: CPT

## 2022-06-26 PROCEDURE — 97530 THERAPEUTIC ACTIVITIES: CPT

## 2022-06-26 PROCEDURE — 94761 N-INVAS EAR/PLS OXIMETRY MLT: CPT

## 2022-06-26 PROCEDURE — 94664 DEMO&/EVAL PT USE INHALER: CPT

## 2022-06-26 PROCEDURE — 63710000001 DIPHENHYDRAMINE PER 50 MG: Performed by: INTERNAL MEDICINE

## 2022-06-26 RX ORDER — DIPHENHYDRAMINE HCL 25 MG
25 CAPSULE ORAL EVERY 6 HOURS PRN
Status: DISCONTINUED | OUTPATIENT
Start: 2022-06-26 | End: 2022-06-27 | Stop reason: HOSPADM

## 2022-06-26 RX ADMIN — LEVOTHYROXINE SODIUM 25 MCG: 25 TABLET ORAL at 04:18

## 2022-06-26 RX ADMIN — BUDESONIDE AND FORMOTEROL FUMARATE DIHYDRATE 2 PUFF: 80; 4.5 AEROSOL RESPIRATORY (INHALATION) at 08:30

## 2022-06-26 RX ADMIN — DOXYCYCLINE 100 MG: 100 CAPSULE ORAL at 08:34

## 2022-06-26 RX ADMIN — PANTOPRAZOLE SODIUM 40 MG: 40 TABLET, DELAYED RELEASE ORAL at 08:34

## 2022-06-26 RX ADMIN — ASPIRIN 81 MG: 81 TABLET, COATED ORAL at 08:34

## 2022-06-26 RX ADMIN — QUETIAPINE FUMARATE 50 MG: 25 TABLET ORAL at 23:50

## 2022-06-26 RX ADMIN — OXYCODONE 10 MG: 5 TABLET ORAL at 18:05

## 2022-06-26 RX ADMIN — MELOXICAM 15 MG: 7.5 TABLET ORAL at 08:34

## 2022-06-26 RX ADMIN — ACETAMINOPHEN 1000 MG: 500 TABLET ORAL at 08:34

## 2022-06-26 RX ADMIN — ASPIRIN 81 MG: 81 TABLET, COATED ORAL at 21:19

## 2022-06-26 RX ADMIN — DOCUSATE SODIUM 100 MG: 100 CAPSULE, LIQUID FILLED ORAL at 08:34

## 2022-06-26 RX ADMIN — PANTOPRAZOLE SODIUM 40 MG: 40 TABLET, DELAYED RELEASE ORAL at 21:19

## 2022-06-26 RX ADMIN — ACETAMINOPHEN 1000 MG: 500 TABLET ORAL at 23:50

## 2022-06-26 RX ADMIN — OXYCODONE 5 MG: 5 TABLET ORAL at 23:50

## 2022-06-26 RX ADMIN — DIPHENHYDRAMINE HYDROCHLORIDE 25 MG: 25 CAPSULE ORAL at 15:55

## 2022-06-26 RX ADMIN — ESCITALOPRAM OXALATE 20 MG: 20 TABLET ORAL at 08:34

## 2022-06-26 RX ADMIN — DOXYCYCLINE 100 MG: 100 CAPSULE ORAL at 21:19

## 2022-06-26 RX ADMIN — ACETAMINOPHEN 1000 MG: 500 TABLET ORAL at 15:46

## 2022-06-26 RX ADMIN — OXYCODONE 10 MG: 5 TABLET ORAL at 12:57

## 2022-06-26 RX ADMIN — OXYCODONE 10 MG: 5 TABLET ORAL at 08:34

## 2022-06-26 RX ADMIN — PRAMIPEXOLE DIHYDROCHLORIDE 0.75 MG: 0.25 TABLET ORAL at 15:46

## 2022-06-26 RX ADMIN — PRAVASTATIN SODIUM 40 MG: 40 TABLET ORAL at 21:19

## 2022-06-26 RX ADMIN — BUDESONIDE AND FORMOTEROL FUMARATE DIHYDRATE 2 PUFF: 80; 4.5 AEROSOL RESPIRATORY (INHALATION) at 19:44

## 2022-06-27 ENCOUNTER — READMISSION MANAGEMENT (OUTPATIENT)
Dept: CALL CENTER | Facility: HOSPITAL | Age: 57
End: 2022-06-27

## 2022-06-27 VITALS
TEMPERATURE: 99.1 F | DIASTOLIC BLOOD PRESSURE: 78 MMHG | HEIGHT: 60 IN | HEART RATE: 67 BPM | RESPIRATION RATE: 16 BRPM | SYSTOLIC BLOOD PRESSURE: 125 MMHG | OXYGEN SATURATION: 95 % | BODY MASS INDEX: 30.82 KG/M2 | WEIGHT: 157 LBS

## 2022-06-27 PROBLEM — G25.81 RESTLESS LEG SYNDROME: Status: ACTIVE | Noted: 2022-06-27

## 2022-06-27 PROBLEM — D62 ACUTE BLOOD LOSS ANEMIA: Status: ACTIVE | Noted: 2022-06-27

## 2022-06-27 LAB
BASE EXCESS BLDA CALC-SCNC: -2 MMOL/L (ref -5–5)
CA-I BLDA-SCNC: 1.2 MMOL/L (ref 1.2–1.32)
CO2 BLDA-SCNC: 26 MMOL/L (ref 24–29)
GLUCOSE BLDC GLUCOMTR-MCNC: 126 MG/DL (ref 70–130)
HCO3 BLDA-SCNC: 24.4 MMOL/L (ref 22–26)
HCT VFR BLDA CALC: 23 % (ref 38–51)
HGB BLDA-MCNC: 7.8 G/DL (ref 12–17)
PCO2 BLDA: 52.4 MM HG (ref 35–45)
PH BLDA: 7.28 PH UNITS (ref 7.35–7.6)
PO2 BLDA: 32 MMHG (ref 80–105)
POTASSIUM BLDA-SCNC: 3.7 MMOL/L (ref 3.5–4.9)
SAO2 % BLDA: 52 % (ref 95–98)
SODIUM BLD-SCNC: 138 MMOL/L (ref 138–146)

## 2022-06-27 PROCEDURE — 97110 THERAPEUTIC EXERCISES: CPT

## 2022-06-27 PROCEDURE — 97530 THERAPEUTIC ACTIVITIES: CPT

## 2022-06-27 PROCEDURE — 63710000001 DIPHENHYDRAMINE PER 50 MG: Performed by: INTERNAL MEDICINE

## 2022-06-27 PROCEDURE — 97535 SELF CARE MNGMENT TRAINING: CPT | Performed by: OCCUPATIONAL THERAPIST

## 2022-06-27 RX ORDER — DOCUSATE SODIUM 100 MG/1
100 CAPSULE, LIQUID FILLED ORAL 2 TIMES DAILY
Start: 2022-06-27 | End: 2022-07-20

## 2022-06-27 RX ORDER — ASPIRIN 81 MG/1
81 TABLET, DELAYED RELEASE ORAL EVERY 12 HOURS
Qty: 84 TABLET | Refills: 0
Start: 2022-06-27 | End: 2022-08-08

## 2022-06-27 RX ORDER — POLYETHYLENE GLYCOL 3350 17 G/17G
17 POWDER, FOR SOLUTION ORAL DAILY
Start: 2022-06-28 | End: 2022-07-20

## 2022-06-27 RX ORDER — TRAMADOL HYDROCHLORIDE 50 MG/1
50 TABLET ORAL EVERY 6 HOURS PRN
Start: 2022-06-27 | End: 2022-09-08

## 2022-06-27 RX ORDER — OXYCODONE HYDROCHLORIDE 5 MG/1
5 TABLET ORAL EVERY 4 HOURS PRN
Refills: 0
Start: 2022-06-27 | End: 2022-09-08

## 2022-06-27 RX ORDER — CEFADROXIL 500 MG/1
500 CAPSULE ORAL 2 TIMES DAILY
Refills: 0
Start: 2022-06-27 | End: 2022-07-08

## 2022-06-27 RX ORDER — ONDANSETRON 4 MG/1
4 TABLET, FILM COATED ORAL EVERY 8 HOURS PRN
Start: 2022-06-27 | End: 2022-07-20

## 2022-06-27 RX ADMIN — MELOXICAM 15 MG: 7.5 TABLET ORAL at 08:49

## 2022-06-27 RX ADMIN — ESCITALOPRAM OXALATE 20 MG: 20 TABLET ORAL at 08:49

## 2022-06-27 RX ADMIN — ASPIRIN 81 MG: 81 TABLET, COATED ORAL at 08:49

## 2022-06-27 RX ADMIN — PANTOPRAZOLE SODIUM 40 MG: 40 TABLET, DELAYED RELEASE ORAL at 08:49

## 2022-06-27 RX ADMIN — DIPHENHYDRAMINE HYDROCHLORIDE 25 MG: 25 CAPSULE ORAL at 08:49

## 2022-06-27 RX ADMIN — ACETAMINOPHEN 1000 MG: 500 TABLET ORAL at 07:08

## 2022-06-27 RX ADMIN — LEVOTHYROXINE SODIUM 25 MCG: 25 TABLET ORAL at 07:08

## 2022-06-27 RX ADMIN — OXYCODONE 10 MG: 5 TABLET ORAL at 11:15

## 2022-06-27 RX ADMIN — OXYCODONE 10 MG: 5 TABLET ORAL at 15:30

## 2022-06-27 RX ADMIN — ACETAMINOPHEN 1000 MG: 500 TABLET ORAL at 15:31

## 2022-06-27 RX ADMIN — PRAMIPEXOLE DIHYDROCHLORIDE 0.75 MG: 0.25 TABLET ORAL at 15:31

## 2022-06-27 RX ADMIN — DOCUSATE SODIUM 100 MG: 100 CAPSULE, LIQUID FILLED ORAL at 08:49

## 2022-06-27 RX ADMIN — OXYCODONE 10 MG: 5 TABLET ORAL at 07:08

## 2022-06-27 NOTE — OUTREACH NOTE
Prep Survey    Flowsheet Row Responses   [facility] facility patient discharged from? [place]   Is LACE score < 7 ? No   Emergency Room discharge w/ pulse ox? No   Eligibility Not Eligible   What are the reasons patient is not eligible? [facility]   Does the patient have one of the following disease processes/diagnoses(primary or secondary)? Other   Prep survey completed? Yes          RICCI ORDONEZ - Registered Nurse

## 2022-06-28 LAB
BACTERIA FLD CULT: NORMAL
COTININE SERPL-MCNC: <1 NG/ML
GRAM STN SPEC: NORMAL
GRAM STN SPEC: NORMAL
NICOTINE SERPL-MCNC: <1 NG/ML

## 2022-07-03 LAB
BACTERIA SPEC ANAEROBE CULT: NORMAL

## 2022-07-07 ENCOUNTER — TRANSITIONAL CARE MANAGEMENT TELEPHONE ENCOUNTER (OUTPATIENT)
Dept: CALL CENTER | Facility: HOSPITAL | Age: 57
End: 2022-07-07

## 2022-07-07 ENCOUNTER — READMISSION MANAGEMENT (OUTPATIENT)
Dept: CALL CENTER | Facility: HOSPITAL | Age: 57
End: 2022-07-07

## 2022-07-07 NOTE — OUTREACH NOTE
Prep Survey    Flowsheet Row Responses   [facility] facility patient discharged from? Non-[facility]   Is LACE score < 7 ? Non-BH Discharge   Emergency Room discharge w/ pulse ox? No   Eligibility [facility]   Date of Discharge 07/06/22   Discharge Disposition Home-Health Care [facility]   Discharge diagnosis Unavailable   Does the patient have one of the following disease processes/diagnoses(primary or secondary)? Other   Does the patient have Home health ordered? Yes   What is the Home health agency?  home with [facility]   Prep survey completed? Yes          NATALIIA ORDONEZ - Registered Nurse

## 2022-07-07 NOTE — OUTREACH NOTE
Call Center TCM Note    Flowsheet Row Responses   [facility] patient discharged from? Non-   Does the patient have one of the following disease processes/diagnoses(primary or secondary)? Other   TCM attempt successful? Yes   Call start time 1204   Call end time 1207   Discharge diagnosis Unavailable   Meds reviewed with patient/caregiver? Yes   Is the patient having any side effects they believe may be caused by any medication additions or changes? No   Does the patient have all medications ordered at discharge? Yes   Is the patient taking all medications as directed (includes completed medication regime)? Yes   Does the patient have a primary care provider?  Yes   Does the patient have an appointment with their PCP within 7 days of discharge? Greater than 7 days   Comments regarding PCP [facility] on 7/20/22 with PCP    What is preventing the patient from scheduling follow up appointments within 7 days of discharge? Earlier appointment not available   Nursing Interventions Verified appointment date/time/provider   Has the patient kept scheduled appointments due by today? N/A   What is the Home health agency?  Home with [facility]   Home health comments Patient spoke with [facility] and will be using them    What DME was ordered? Wheelchair - sent home from [facility]    Has all DME been delivered? Yes   Psychosocial issues? No   Did the patient receive a copy of their discharge instructions? Yes   Nursing interventions Reviewed instructions with patient   What is the patient's perception of their health status since discharge? Improving   Is the patient/caregiver able to teach back signs and symptoms related to disease process for when to call PCP? Yes   Is the patient/caregiver able to teach back signs and symptoms related to disease process for when to call 911? Yes   Is the patient/caregiver able to teach back the hierarchy of who to call/visit for symptoms/problems? PCP, Specialist, Home health nurse, Urgent Care, ED,  911 Yes   If the patient is a current smoker, are they able to teach back resources for cessation? Not a smoker   TCM call completed? Yes          Moriah Briscoe RN    7/7/2022, 12:07 EDT

## 2022-07-10 DIAGNOSIS — D64.9 ANEMIA, UNSPECIFIED TYPE: ICD-10-CM

## 2022-07-10 DIAGNOSIS — G25.81 RLS (RESTLESS LEGS SYNDROME): Primary | ICD-10-CM

## 2022-07-11 DIAGNOSIS — G25.81 RLS (RESTLESS LEGS SYNDROME): ICD-10-CM

## 2022-07-11 RX ORDER — PRAMIPEXOLE DIHYDROCHLORIDE 0.25 MG/1
0.75 TABLET ORAL NIGHTLY
Qty: 90 TABLET | Refills: 0 | Status: SHIPPED | OUTPATIENT
Start: 2022-07-11 | End: 2022-07-11

## 2022-07-11 RX ORDER — PRAMIPEXOLE DIHYDROCHLORIDE 0.25 MG/1
0.75 TABLET ORAL NIGHTLY
Qty: 270 TABLET | Refills: 0 | Status: SHIPPED | OUTPATIENT
Start: 2022-07-11 | End: 2022-09-12 | Stop reason: SDUPTHER

## 2022-07-16 DIAGNOSIS — G43.119 INTRACTABLE MIGRAINE WITH AURA WITHOUT STATUS MIGRAINOSUS: ICD-10-CM

## 2022-07-20 ENCOUNTER — OFFICE VISIT (OUTPATIENT)
Dept: FAMILY MEDICINE CLINIC | Facility: CLINIC | Age: 57
End: 2022-07-20

## 2022-07-20 VITALS
HEIGHT: 60 IN | TEMPERATURE: 97.1 F | BODY MASS INDEX: 30.66 KG/M2 | DIASTOLIC BLOOD PRESSURE: 68 MMHG | HEART RATE: 100 BPM | SYSTOLIC BLOOD PRESSURE: 100 MMHG | OXYGEN SATURATION: 97 % | RESPIRATION RATE: 16 BRPM

## 2022-07-20 DIAGNOSIS — Z09 HOSPITAL DISCHARGE FOLLOW-UP: Primary | ICD-10-CM

## 2022-07-20 DIAGNOSIS — G25.81 RLS (RESTLESS LEGS SYNDROME): ICD-10-CM

## 2022-07-20 DIAGNOSIS — S69.92XA INJURY OF LEFT HAND, INITIAL ENCOUNTER: ICD-10-CM

## 2022-07-20 DIAGNOSIS — M25.512 ACUTE PAIN OF LEFT SHOULDER: ICD-10-CM

## 2022-07-20 DIAGNOSIS — D64.9 ANEMIA, UNSPECIFIED TYPE: ICD-10-CM

## 2022-07-20 DIAGNOSIS — S69.92XA INJURY OF LEFT WRIST, INITIAL ENCOUNTER: ICD-10-CM
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
## 2022-07-20 PROCEDURE — 99495 TRANSJ CARE MGMT MOD F2F 14D: CPT | Performed by: FAMILY MEDICINE

## 2022-07-20 RX ORDER — METHOCARBAMOL 500 MG/1
500 TABLET, FILM COATED ORAL 3 TIMES DAILY PRN
Qty: 60 TABLET | Refills: 1 | Status: SHIPPED | OUTPATIENT
Start: 2022-07-20 | End: 2022-08-23

## 2022-07-20 RX ORDER — GABAPENTIN 300 MG/1
300 CAPSULE ORAL NIGHTLY
COMMUNITY
Start: 2022-07-05 | End: 2022-09-08

## 2022-07-20 RX ORDER — GABAPENTIN 100 MG/1
100 CAPSULE ORAL EVERY MORNING
COMMUNITY
Start: 2022-07-05 | End: 2022-09-08

## 2022-07-20 NOTE — PROGRESS NOTES
Transitional Care Follow Up Visit  Subjective     Astrid Sam is a 56 y.o. female who presents for a transitional care management visit.    Within 48 business hours after discharge our office contacted her via telephone to coordinate her care and needs.      I reviewed and discussed the details of that call along with the discharge summary, hospital problems, inpatient lab results, inpatient diagnostic studies, and consultation reports with Astrid.     Current outpatient and discharge medications have been reconciled for the patient.  Reviewed by: Beckie Murphy DO      Date of TCM Phone Call 7/7/2022   [facility]   Date of Admission -   Date of Discharge 7/6/2022   Discharge Disposition Home-Health Care c     Risk for Readmission (LACE) No data recorded    History of Present Illness   Course During Hospital Stay:  She underwent left hip revision surgery with Dr. Chatterjee on 6/23/22. Discharged 6/27/22.   She was anemic while inpatient, provided 2 units of PRBC  Still non-weight bearing on left hip. She has follow up with orthopedics 8/2/22.   Getting physical therapy with home health  She has RLS, which worsened while she was at [facility]. They placed her on Gabapentin, but she is hesitantly to continue this. They also started her on methocarbamol, which has been effective for her.      She fell yesterday due to her knee giving out, landed on right hip.   She also injured her left hand and left shoulder, swelling and bruising are present.  Applying cold compresses on left wrist   States that her left hip isn't hurting any more than her baseline from surgery. Didn't land on her left hip.     The following portions of the patient's history were reviewed and updated as appropriate: allergies, current medications, past family history, past medical history, past social history, past surgical history and problem list.    Review of Systems   Constitutional: Negative for  activity change and appetite change.   Respiratory: Negative for cough and shortness of breath.    Cardiovascular: Negative for chest pain.   Musculoskeletal: Positive for arthralgias and gait problem.       Objective   Physical Exam  Vitals and nursing note reviewed.   Constitutional:       Appearance: She is well-developed.   HENT:      Head: Normocephalic and atraumatic.      Right Ear: External ear normal.      Left Ear: External ear normal.   Eyes:      Conjunctiva/sclera: Conjunctivae normal.   Cardiovascular:      Rate and Rhythm: Normal rate and regular rhythm.      Heart sounds: Normal heart sounds. No murmur heard.  Pulmonary:      Effort: Pulmonary effort is normal.      Breath sounds: Normal breath sounds.   Musculoskeletal:         General: No deformity.      Left shoulder: Tenderness present.      Left hand: Swelling (dorsal surface) and tenderness present. Normal range of motion.      Cervical back: Neck supple.      Comments: Non-weight bearing left hip  Bruising left shoulder    Skin:     General: Skin is warm.   Neurological:      Mental Status: She is alert and oriented to person, place, and time.   Psychiatric:         Mood and Affect: Mood normal.         Behavior: Behavior normal.         Assessment & Plan   Diagnoses and all orders for this visit:    1. Hospital discharge follow-up (Primary)    2. RLS (restless legs syndrome)  -     CBC (No Diff)  -     Comprehensive Metabolic Panel  -     Iron  -     Ferritin  -     TSH  -     Magnesium  -     methocarbamol (ROBAXIN) 500 MG tablet; Take 1 tablet by mouth 3 (Three) Times a Day As Needed for Muscle Spasms.  Dispense: 60 tablet; Refill: 1    3. Anemia, unspecified type  -     CBC (No Diff)  -     Comprehensive Metabolic Panel  -     Iron  -     Ferritin  -     TSH  -     Magnesium    4. Fall, initial encounter  -     CBC (No Diff)  -     Comprehensive Metabolic Panel    5. Injury of left wrist, initial encounter  -     CBC (No Diff)  -      Comprehensive Metabolic Panel  -     XR Wrist 3+ View Left; Future    6. Injury of left hand, initial encounter  -     CBC (No Diff)  -     Comprehensive Metabolic Panel  -     XR Hand 3+ View Left; Future    7. Acute pain of left shoulder  -     CBC (No Diff)  -     Comprehensive Metabolic Panel  -     XR Shoulder 2+ View Left; Future    Labs ordered, but unable to obtain today. Orders provided to her and she will complete at [facility]  Xray to evaluate left hand, wrist and shoulder after fall

## 2022-07-29 DIAGNOSIS — E03.9 ACQUIRED HYPOTHYROIDISM: ICD-10-CM

## 2022-07-29 RX ORDER — LEVOTHYROXINE SODIUM 0.03 MG/1
TABLET ORAL
Qty: 90 TABLET | Refills: 1 | Status: SHIPPED | OUTPATIENT
Start: 2022-07-29 | End: 2022-08-14 | Stop reason: SDUPTHER

## 2022-07-31 DIAGNOSIS — E78.2 MIXED HYPERLIPIDEMIA: ICD-10-CM

## 2022-08-01 RX ORDER — PRAVASTATIN SODIUM 40 MG
40 TABLET ORAL
Qty: 90 TABLET | Refills: 1 | Status: SHIPPED | OUTPATIENT
Start: 2022-08-01 | End: 2023-03-31

## 2022-08-04 LAB
FUNGUS WND CULT: NORMAL
MYCOBACTERIUM SPEC CULT: NORMAL
MYCOBACTERIUM SPEC CULT: NORMAL
NIGHT BLUE STAIN TISS: NORMAL
NIGHT BLUE STAIN TISS: NORMAL

## 2022-08-14 DIAGNOSIS — D50.9 IRON DEFICIENCY ANEMIA, UNSPECIFIED IRON DEFICIENCY ANEMIA TYPE: Primary | ICD-10-CM

## 2022-08-14 DIAGNOSIS — E03.9 ACQUIRED HYPOTHYROIDISM: ICD-10-CM

## 2022-08-14 RX ORDER — FERROUS SULFATE TAB EC 324 MG (65 MG FE EQUIVALENT) 324 (65 FE) MG
324 TABLET DELAYED RESPONSE ORAL
Qty: 90 TABLET | Refills: 0 | Status: SHIPPED | OUTPATIENT
Start: 2022-08-14 | End: 2022-10-21

## 2022-08-14 RX ORDER — LEVOTHYROXINE SODIUM 0.05 MG/1
50 TABLET ORAL
Qty: 90 TABLET | Refills: 0 | Status: SHIPPED | OUTPATIENT
Start: 2022-08-14 | End: 2022-10-25 | Stop reason: SDUPTHER

## 2022-08-15 ENCOUNTER — TRANSCRIBE ORDERS (OUTPATIENT)
Dept: ADMINISTRATIVE | Facility: HOSPITAL | Age: 57
End: 2022-08-15

## 2022-08-15 DIAGNOSIS — G43.119 INTRACTABLE MIGRAINE WITH AURA WITHOUT STATUS MIGRAINOSUS: ICD-10-CM

## 2022-08-15 DIAGNOSIS — Z12.31 VISIT FOR SCREENING MAMMOGRAM: Primary | ICD-10-CM

## 2022-08-22 DIAGNOSIS — G25.81 RLS (RESTLESS LEGS SYNDROME): ICD-10-CM

## 2022-08-23 DIAGNOSIS — G25.81 RLS (RESTLESS LEGS SYNDROME): ICD-10-CM

## 2022-08-23 RX ORDER — METHOCARBAMOL 500 MG/1
TABLET, FILM COATED ORAL
Qty: 60 TABLET | Refills: 1 | Status: SHIPPED | OUTPATIENT
Start: 2022-08-23 | End: 2022-09-08

## 2022-08-30 RX ORDER — QUETIAPINE FUMARATE 25 MG/1
25-50 TABLET, FILM COATED ORAL NIGHTLY
Qty: 90 TABLET | Refills: 1 | OUTPATIENT
Start: 2022-08-30

## 2022-09-02 DIAGNOSIS — R45.4 IRRITABILITY: ICD-10-CM

## 2022-09-02 RX ORDER — ESCITALOPRAM OXALATE 20 MG/1
TABLET ORAL
Qty: 90 TABLET | Refills: 0 | Status: SHIPPED | OUTPATIENT
Start: 2022-09-02 | End: 2022-11-17

## 2022-09-06 RX ORDER — METHOCARBAMOL 500 MG/1
500 TABLET, FILM COATED ORAL 3 TIMES DAILY PRN
Qty: 60 TABLET | Refills: 1 | Status: SHIPPED | OUTPATIENT
Start: 2022-09-06 | End: 2022-09-08

## 2022-09-07 ENCOUNTER — PATIENT MESSAGE (OUTPATIENT)
Dept: FAMILY MEDICINE CLINIC | Facility: CLINIC | Age: 57
End: 2022-09-07

## 2022-09-08 ENCOUNTER — TELEPHONE (OUTPATIENT)
Dept: FAMILY MEDICINE CLINIC | Facility: CLINIC | Age: 57
End: 2022-09-08

## 2022-09-08 DIAGNOSIS — G25.81 RLS (RESTLESS LEGS SYNDROME): Primary | ICD-10-CM

## 2022-09-08 RX ORDER — BACLOFEN 10 MG/1
10 TABLET ORAL 3 TIMES DAILY PRN
Qty: 30 TABLET | Refills: 2 | Status: SHIPPED | OUTPATIENT
Start: 2022-09-08 | End: 2022-10-21

## 2022-09-08 NOTE — TELEPHONE ENCOUNTER
From: Asrtid Sam  To: Beckie Murphy DO  Sent: 9/7/2022 3:35 PM EDT  Subject: Restless legs    Doctor Maciel I had a 2 point schedule with you today and I couldn't make them because my car broke down and then it just here's just been a crazy day what I'm trying to get to you about is this restless legs my legs are just absolutely killing me and that 1 muscle relaxers that im taking is not working. Is there something I can get for it because it's hard and so bad it's making my whole left leg hurting. My knee and my whole left leg is just killing me. and everything else. Is there something I can get in there to see you I would greatly appreciate it again sorry edit again sorry I couldn't make it to the appointments but I just need something appreciate it thank you

## 2022-09-08 NOTE — TELEPHONE ENCOUNTER
Regarding: Restless legs  ----- Message from Lidia Berkowitz MA sent at 9/8/2022  5:49 PM EDT -----       ----- Message from Astrid Sam to Beckie Murphy DO sent at 9/8/2022  4:30 PM -----   Good evening,     I'm sorry I was not able to get to my appointment.  My car broke down on me and I had to get it towed. Thank you    Dorsi sam      ----- Message -----       From:Astrid Sam       Sent:9/7/2022  3:35 PM EDT         To:Beckie Murphy DO    Subject:Restless legs    Doctor St I had a 2 point schedule with you today and I couldn't make them because my car broke down and then it just here's just been a crazy day what I'm trying to get to you about is this restless legs my legs are just absolutely killing me and that 1  muscle relaxers that im taking is not working. Is there something  I can get  for  it because it's hard and so bad it's making my whole left leg hurting. My knee  and my whole left leg is just killing me. and everything else. Is there something I can get in there to see you I would greatly appreciate it again sorry edit again sorry I couldn't make it to the appointments but I just need something appreciate it thank you

## 2022-09-08 NOTE — TELEPHONE ENCOUNTER
Recommend that she see neurology for additional evaluation for RLS. She has tried and failed Requip and Mirapex.   Looks like she stopped taking Gabapentin, which can sometimes help with RLS, too.   Will change muscle relaxer to baclofen

## 2022-09-10 DIAGNOSIS — G43.119 INTRACTABLE MIGRAINE WITH AURA WITHOUT STATUS MIGRAINOSUS: ICD-10-CM

## 2022-09-12 DIAGNOSIS — G25.81 RLS (RESTLESS LEGS SYNDROME): ICD-10-CM

## 2022-09-12 RX ORDER — PRAMIPEXOLE DIHYDROCHLORIDE 0.25 MG/1
0.75 TABLET ORAL NIGHTLY
Qty: 270 TABLET | Refills: 0 | OUTPATIENT
Start: 2022-09-12

## 2022-09-12 RX ORDER — PRAMIPEXOLE DIHYDROCHLORIDE 0.25 MG/1
0.75 TABLET ORAL NIGHTLY
Qty: 270 TABLET | Refills: 0 | Status: SHIPPED | OUTPATIENT
Start: 2022-09-12 | End: 2022-10-17

## 2022-09-12 NOTE — TELEPHONE ENCOUNTER
Called pt to clarify, rf pending is Pramipexole but Gabapentin is listed in the comments?    Per pt, she spilt her bottle of Pramipexole and is requesting the Gabapentin. Pt does not take both but takes one or the other.     Informed her Dr Murphy is out today and tmrw. Suggested she check w/ pharm on cash price. If Pramipexole is too expensive, she's to c/b and we'll forward Gabapentin rf request to another Provider. Pt agreed and understood.

## 2022-09-12 NOTE — TELEPHONE ENCOUNTER
Caller: Astrid Sam    Relationship: Self    Best call back number: [phone]    What medication are you requesting: GABAPENTIN 400 MG    What are your current symptoms: RESTLESS LEGS    How long have you been experiencing symptoms:  2 YEARS     Have you had these symptoms before:    [x] Yes  [] No    Have you been treated for these symptoms before:   [x] Yes  [] No    If a prescription is needed, what is your preferred pharmacy and phone number: [id number] - [place], [place] - [address] [place] [address] 25 - [phone] [facility] [phone] FX     Additional notes:  CALLING FOR REFILLS

## 2022-09-14 DIAGNOSIS — G25.81 RLS (RESTLESS LEGS SYNDROME): ICD-10-CM

## 2022-09-14 RX ORDER — PRAMIPEXOLE DIHYDROCHLORIDE 0.25 MG/1
0.75 TABLET ORAL NIGHTLY
Qty: 270 TABLET | Refills: 0 | OUTPATIENT
Start: 2022-09-14

## 2022-09-15 ENCOUNTER — APPOINTMENT (OUTPATIENT)
Dept: MAMMOGRAPHY | Facility: HOSPITAL | Age: 57
End: 2022-09-15

## 2022-09-15 DIAGNOSIS — G25.81 RLS (RESTLESS LEGS SYNDROME): Primary | ICD-10-CM

## 2022-09-15 RX ORDER — GABAPENTIN 100 MG/1
100 CAPSULE ORAL NIGHTLY
Qty: 30 CAPSULE | Refills: 1 | Status: SHIPPED | OUTPATIENT
Start: 2022-09-15 | End: 2022-10-02 | Stop reason: SDUPTHER

## 2022-10-02 ENCOUNTER — TELEPHONE (OUTPATIENT)
Dept: FAMILY MEDICINE CLINIC | Facility: CLINIC | Age: 57
End: 2022-10-02

## 2022-10-02 DIAGNOSIS — G25.81 RLS (RESTLESS LEGS SYNDROME): ICD-10-CM

## 2022-10-02 NOTE — TELEPHONE ENCOUNTER
Regarding: FW: Restless legs    ----- Message -----  From: Astrid Sam  Sent: 9/30/2022   2:04 PM EDT  To: [facility] Clinical Pool  Subject: Restless legs                                    gabapentin 100 MG capsule. This is helping with my legs. I'm having nerve pain in my hands is there anyway that we can up this medicine please and thank you.  Doris

## 2022-10-03 RX ORDER — GABAPENTIN 300 MG/1
300 CAPSULE ORAL NIGHTLY
Qty: 30 CAPSULE | Refills: 2 | Status: SHIPPED | OUTPATIENT
Start: 2022-10-03 | End: 2022-12-21 | Stop reason: SDUPTHER

## 2022-10-04 DIAGNOSIS — J45.40 MODERATE PERSISTENT ASTHMA, UNSPECIFIED WHETHER COMPLICATED: ICD-10-CM

## 2022-10-05 RX ORDER — ALBUTEROL SULFATE 90 UG/1
AEROSOL, METERED RESPIRATORY (INHALATION)
Qty: 6.7 G | Refills: 0 | Status: SHIPPED | OUTPATIENT
Start: 2022-10-05 | End: 2022-11-17

## 2022-10-15 DIAGNOSIS — G25.81 RLS (RESTLESS LEGS SYNDROME): ICD-10-CM

## 2022-10-17 RX ORDER — PRAMIPEXOLE DIHYDROCHLORIDE 0.25 MG/1
0.75 TABLET ORAL NIGHTLY
Qty: 270 TABLET | Refills: 0 | Status: SHIPPED | OUTPATIENT
Start: 2022-10-17 | End: 2022-10-21

## 2022-10-19 DIAGNOSIS — G43.119 INTRACTABLE MIGRAINE WITH AURA WITHOUT STATUS MIGRAINOSUS: ICD-10-CM

## 2022-10-20 DIAGNOSIS — G43.119 INTRACTABLE MIGRAINE WITH AURA WITHOUT STATUS MIGRAINOSUS: ICD-10-CM

## 2022-10-21 ENCOUNTER — OFFICE VISIT (OUTPATIENT)
Dept: NEUROLOGY | Facility: CLINIC | Age: 57
End: 2022-10-21

## 2022-10-21 VITALS
SYSTOLIC BLOOD PRESSURE: 124 MMHG | OXYGEN SATURATION: 98 % | HEART RATE: 97 BPM | DIASTOLIC BLOOD PRESSURE: 68 MMHG | WEIGHT: 158 LBS | BODY MASS INDEX: 30.86 KG/M2

## 2022-10-21 DIAGNOSIS — G25.81 RESTLESS LEGS SYNDROME (RLS): Primary | ICD-10-CM

## 2022-10-21 PROCEDURE — 99214 OFFICE O/P EST MOD 30 MIN: CPT | Performed by: PSYCHIATRY & NEUROLOGY

## 2022-10-21 RX ORDER — PRAMIPEXOLE 1.5 MG/1
1 TABLET, EXTENDED RELEASE ORAL NIGHTLY
Qty: 30 TABLET | Refills: 5 | Status: SHIPPED | OUTPATIENT
Start: 2022-10-21 | End: 2022-12-20

## 2022-10-21 RX ORDER — ASPIRIN 81 MG/1
81 TABLET ORAL DAILY
COMMUNITY

## 2022-10-21 RX ORDER — BENZONATATE 200 MG/1
200 CAPSULE ORAL 3 TIMES DAILY PRN
Qty: 15 CAPSULE | Refills: 0 | Status: SHIPPED | OUTPATIENT
Start: 2022-10-21 | End: 2022-10-26 | Stop reason: SDUPTHER

## 2022-10-21 NOTE — PROGRESS NOTES
Subjective:    CC: Astrid Sam is seen today for seizures    HPI:  Current visit- she denies any episodes of confusion or seizure-like activity since her last visit.  However she tells me today that her restless leg symptoms have been bothering her a lot as she has to get up and walk.  They were previously only occurring at night but have recently started to occur during the daytime as well.  She takes Mirapex 0.75 mg around 4:30 but for the past few weeks has also been taking an additional dose at bedtime.  Her PCP restarted her back on gabapentin 300 mg nightly that helps some.  She was severely anemic while she was in the hospital in June during hip surgery but since then she has not been taking any iron supplements.    Last visit-patient states that she tapered and stopped Keppra after her last visit and has not had any more episodes of confusion, shaking or loss of awareness.  She occasionally has word finding problems.  With regards to her RLS she continues to take Mirapex 0.75 mg at night and baclofen 10 mg nightly for leg jerks that are now well controlled.    Last visit-patient had an EEG off medications which showed bifrontal slowing as well as excessive beta activity but no epileptiform activity.  She states she resumed her Keppra 500 mg twice a day a few days after stopping it as her  noted that patient was having leg jerks during sleep.  She does have RLS and takes Mirapex 0.75 mg at night.  Also takes trazodone for sleep in addition to Flexeril 10 mg 3 times a day for muscle spasms.  Denies having any episodes of staring or whole-body shaking.  On a few occasions her  has also mentioned to her that she speaks something but does not make sense although the patient herself does not realize she is doing it.    Initial [id number]old female with a past medical history of migraine headaches, hyperlipidemia, TIA status post PFO closure in 2011, bilateral hip replacement presents as  a hospital follow-up for encephalopathy/new onset seizure.  As per patient she had an ACDF procedure on C5-6 in June.  After that she also developed osteomyelitis of the right sternoclavicular joint for which he underwent debridement and was placed on IV antibiotics (Rocephin and daptomycin).  Then in August she was placed on p.o. doxycycline.  A few days before her hospitalization she went out in the sun and developed a diffuse rash.  Took Benadryl which made her slightly confused.  2 days later she was found at home by her daughter extremely confused having thrown up all over herself.  She took her to [facility] initially where she had a CT head that was unremarkable.  After that she developed left arm jerking with blinking and staring ahead and space.  Patient states that she does not remember the next 3 days.  After she was transferred to [facility] her white count was elevated at 17.2.  Her CRP was also mildly elevated and she had a T-max of 99.7.  Subsequent blood cultures were negative.  Initially her EEG showed moderate generalized slowing with frequent generalized sharp/slow wave complexes with triphasic morphology.  She was given Ativan and IV Keppra.  CT head and CT perfusion were  unremarkable.  CT angiogram of the head showed narrowing of the right distal M2/M3.  CTA of the neck did not show any significant stenosis.  MRI of the brain also did not show any acute intracranial abnormalities or severe chronic ischemic changes.  She also had a lumbar puncture that failed to show any evidence of a CNS infection.  EEG was repeated in 2 days and showed just mild generalized slowing with no epileptiform activity.  It was presumed that patient could have had seizures/encephalopathy in the presence of sepsis versus due to high doses of Valtrex that she had been presumably taking at home however patient denies that as she states that she had not taken that medication in over 1 year.  She was discharged home  on Keppra 500 mg twice a day.  Has not had any more episodes since.  She continues to have generalized weakness.  Had been getting PT up until last week.  She denies having any febrile seizures as a child, abnormal birth history or severe concussions.  There is a family history of seizures in her brother who is also a diabetic  Of note-I personally reviewed her MRI brain and Dr. Guerrier's notes from the hospital    The following portions of the patient's history were reviewed today and updated as of 11/04/2020  : allergies, current medications, past family history, past medical history, past social history, past surgical history and problem list  These document will be scanned to patient's chart.      Current Outpatient Medications:   •  albuterol sulfate  (90 Base) MCG/ACT inhaler, TAKE 2 PUFFS BY MOUTH EVERY 4 HOURS AS NEEDED FOR WHEEZE, Disp: 6.7 g, Rfl: 0  •  aspirin 81 MG EC tablet, Take 1 tablet by mouth Daily., Disp: , Rfl:   •  escitalopram (LEXAPRO) 20 MG tablet, TAKE 1 TABLET BY MOUTH EVERY DAY, Disp: 90 tablet, Rfl: 0  •  Fasenra Pen 30 MG/ML solution auto-injector, INJECT 1 PEN UNDER THE SKIN EVERY 8 WEEKS. (Patient taking differently: Take As Directed.), Disp: 1 pen, Rfl: 1  •  Fluticasone Furoate-Vilanterol (BREO ELLIPTA) 100-25 MCG/INH inhaler, Inhale 1 puff Daily., Disp: , Rfl:   •  gabapentin (NEURONTIN) 300 MG capsule, Take 1 capsule by mouth Every Night., Disp: 30 capsule, Rfl: 2  •  levothyroxine (SYNTHROID, LEVOTHROID) 50 MCG tablet, Take 1 tablet by mouth Every Morning., Disp: 90 tablet, Rfl: 0  •  oxybutynin XL (DITROPAN XL) 15 MG 24 hr tablet, Take 10 mg by mouth Daily., Disp: , Rfl:   •  pantoprazole (PROTONIX) 40 MG EC tablet, Take 1 tablet by mouth 2 (Two) Times a Day., Disp: 180 tablet, Rfl: 3  •  pravastatin (PRAVACHOL) 40 MG tablet, Take 1 tablet by mouth every night at bedtime., Disp: 90 tablet, Rfl: 1  •  ubrogepant 100 MG tablet, TAKE 1 TABLET BY MOUTH FOR MIGRAINE FOR UP TO  ONE DOSE MAY REPEAT DOSE AFTER 2 HOURS IF HEADACHE NOT RESOLVED, Disp: 10 tablet, Rfl: 0  •  Pramipexole Dihydrochloride ER 1.5 MG tablet sustained-release 24 hour, Take 1 capsule by mouth Every Night. Take  A few hours before bedtime, Disp: 30 tablet, Rfl: 5   Past Medical History:   Diagnosis Date   • Acquired hypothyroidism    • Anemia     after hip replacement    • Arthritis     hips and knees   • Asthma, extrinsic 2021    Per lung doctor   • Difficulty walking    • GERD (gastroesophageal reflux disease)    • History of sepsis 2021    hospitalized x1 week;   • History of transfusion     2 units of autologus blood after 1st hip surgery-[facility]   • Low back pain     BACK PAIN WERE I HAD HIP REPLANCED   • Lung nodule 2021   • Memory loss    • Migraine     h/o   • Mixed hyperlipidemia 2020   • Moderate persistent asthma 2021   • PONV (postoperative nausea and vomiting)     preprocedural meds help and pt will need them    • Rheumatoid arthritis (formerly [facility]) 2015   • Seizure (formerly [facility]) 2020   • Spinal headache     pt states after hip surgery 2022-longer bedrest   • Stroke (formerly [facility])     no residual effects   • Wears glasses       Past Surgical History:   Procedure Laterality Date   • CARDIAC CATHETERIZATION     • CARPAL TUNNEL RELEASE Bilateral    •  SECTION     • CHOLECYSTECTOMY     • COLONOSCOPY     • ENDOSCOPY     • FRACTURE SURGERY      left leg- hardware in place    • JOINT REPLACEMENT      TOTAL HIP REPLACEMENT   • KNEE ARTHROSCOPY Right     done twice   • PATELLA SURGERY Left     removed left knee cap   • PATENT FORAMEN OVALE CLOSURE     • QUADRICEPS TENDON REPAIR Right 2018   • REPLACEMENT TOTAL KNEE Right 2018    revision    • TOTAL HIP ARTHROPLASTY Left    • TOTAL HIP ARTHROPLASTY Right 2019    Procedure: DIRECT TOTAL HIP ARTHROPLASTY ANTERIOR RIGHT;  Surgeon: Mele Chatterjee MD;  Location: [facility] OR;  Service:  Orthopedics   • TOTAL HIP ARTHROPLASTY REVISION Left 03/30/2022    Procedure: TOTAL HIP REVISION LEFT;  Surgeon: Mele Chatterjee MD;  Location:  [facility] OR;  Service: Orthopedics;  Laterality: Left;   • TOTAL HIP ARTHROPLASTY REVISION Left 06/23/2022    Procedure: HIP REVISION LEFT, FEMORAL AND ACETABULAR COMPONENT;  Surgeon: Mele Chatterjee MD;  Location:  [facility] OR;  Service: Orthopedics;  Laterality: Left;   • TUBAL ABDOMINAL LIGATION        Family History   Problem Relation Age of Onset   • Hypertension Mother    • Migraines Mother    • Thyroid disease Mother    • Hypertension Father    • COPD Father    • Asthma Father    • Breast cancer Maternal Cousin         unknown   • Rectal cancer Sister    • Ovarian cancer Neg Hx       Social History     Socioeconomic History   • Marital status:    Tobacco Use   • Smoking status: Never   • Smokeless tobacco: Never   Vaping Use   • Vaping Use: Never used   Substance and Sexual Activity   • Alcohol use: Not Currently     Comment: rare   • Drug use: No   • Sexual activity: Yes     Partners: Male     Birth control/protection: Post-menopausal, None, Tubal ligation     Comment: TUBES TIDE     Review of Systems   Musculoskeletal: Positive for arthralgias.   All other systems reviewed and are negative.      Objective:    /68   Pulse 97   Wt 71.7 kg (158 lb)   LMP  (LMP Unknown)   SpO2 98%   BMI 30.86 kg/m²     Neurology Exam: Reviewed and remains unchanged    General apperance: NAD.     Mental status: Alert, awake and oriented to time place and person.    Recent and Remote memory: Intact.    Attention span and Concentration: Normal.     Language and Speech: Intact- No dysarthria.    Fluency, Naming , Repitition and Comprehension:  Intact    Cranial Nerves:   CN II: Visual fields are full. Intact. Fundi - Normal, No papillederma, Pupils - KAYLYN  CN III, IV and VI: Extraocular movements are intact. Normal saccades.   CN V: Facial sensation is intact.   CN VII:  Muscles of facial expression reveal no asymmetry. Intact.   CN VIII: Hearing is intact. Whispered voice intact.   CN IX and X: Palate elevates symmetrically. Intact  CN XI: Shoulder shrug is intact.   CN XII: Tongue is midline without evidence of atrophy or fasciculation.       Motor:  Right UE muscle strength 5/5. Normal tone.     Left UE muscle strength 5/5. Normal tone.      Right LE muscle strength5-/5. Normal tone.     Left LE muscle strength 5-/5. Normal tone.      Sensory: Normal light touch, vibration and pinprick sensation bilaterally.    DTRs: 2+ bilaterally in upper and lower extremities.    Babinski: Negative bilaterally.    Co-ordination: Normal finger-to-nose, heel to shin B/L.    Rhomberg: Negative.    Gait: Walks with a limp due to limb length discrepancy     Cardiovascular: Regular rate and rhythm without murmur, gallop or rub.      Assessment and Plan:    1. RLS  The symptoms that she is having during daytime could be due to augmentation.    I will switch her to extended release Mirapex and increase her dose slightly to 1.5 mg that she can take a few hours before bedtime.  She can also continue gabapentin 300 mg nightly for now prescribed by her PCP  I have told her to start taking iron supplements  She should also avoid taking complex sugars/carbohydrates in the evening.    2.Seizure (CMS/HCC)  Patient could have had a seizure in the setting of sepsis.  Repeat EEG showed mild frontal slowing but no epileptiform activity.   She has now tapered and stopped Keppra        Return in about 3 months (around 1/21/2023).     I spent 25 minutes with the patient out of which 15 minutes was spent face to face  Sarai Chirinos MD

## 2022-10-25 DIAGNOSIS — E03.9 ACQUIRED HYPOTHYROIDISM: ICD-10-CM

## 2022-10-26 ENCOUNTER — OFFICE VISIT (OUTPATIENT)
Dept: FAMILY MEDICINE CLINIC | Facility: CLINIC | Age: 57
End: 2022-10-26

## 2022-10-26 VITALS
WEIGHT: 157 LBS | SYSTOLIC BLOOD PRESSURE: 106 MMHG | HEIGHT: 60 IN | HEART RATE: 84 BPM | OXYGEN SATURATION: 98 % | BODY MASS INDEX: 30.82 KG/M2 | RESPIRATION RATE: 18 BRPM | DIASTOLIC BLOOD PRESSURE: 72 MMHG | TEMPERATURE: 97.3 F

## 2022-10-26 DIAGNOSIS — R05.1 ACUTE COUGH: ICD-10-CM

## 2022-10-26 DIAGNOSIS — J45.50 SEVERE PERSISTENT ASTHMA WITHOUT COMPLICATION: ICD-10-CM

## 2022-10-26 DIAGNOSIS — J45.31 MILD PERSISTENT ASTHMA WITH EXACERBATION: Primary | ICD-10-CM

## 2022-10-26 PROCEDURE — 99213 OFFICE O/P EST LOW 20 MIN: CPT | Performed by: FAMILY MEDICINE

## 2022-10-26 RX ORDER — AZITHROMYCIN 250 MG/1
TABLET, FILM COATED ORAL
Qty: 6 TABLET | Refills: 0 | Status: SHIPPED | OUTPATIENT
Start: 2022-10-26 | End: 2022-11-21

## 2022-10-26 RX ORDER — BENRALIZUMAB 30 MG/ML
INJECTION, SOLUTION SUBCUTANEOUS
Qty: 1 ML | Refills: 0 | Status: SHIPPED | OUTPATIENT
Start: 2022-10-26 | End: 2023-01-06 | Stop reason: SDUPTHER

## 2022-10-26 RX ORDER — BENZONATATE 200 MG/1
200 CAPSULE ORAL 3 TIMES DAILY PRN
Qty: 15 CAPSULE | Refills: 0 | Status: SHIPPED | OUTPATIENT
Start: 2022-10-26 | End: 2022-11-21

## 2022-10-26 RX ORDER — PREDNISONE 10 MG/1
TABLET ORAL
Qty: 21 TABLET | Refills: 0 | Status: SHIPPED | OUTPATIENT
Start: 2022-10-26 | End: 2022-11-01

## 2022-10-26 NOTE — PROGRESS NOTES
Chief Complaint  Cough    Subjective          Astrid Sam presents to [facility]  Cough  This is a new problem. The current episode started 1 to 4 weeks ago. The problem has been unchanged. The problem occurs every few minutes. The cough is non-productive. Pertinent negatives include no ear congestion, ear pain, fever, nasal congestion, postnasal drip, sore throat, shortness of breath or wheezing. The symptoms are aggravated by lying down. She has tried a beta-agonist inhaler and steroid inhaler (Tessalon, ) for the symptoms. The treatment provided no relief. Her past medical history is significant for asthma.       The following portions of the patient's history were reviewed and updated as appropriate: allergies, current medications, past family history, past medical history, past social history, past surgical history and problem list.    Objective      Physical Exam  Vitals and nursing note reviewed.   Constitutional:       Appearance: She is well-developed.   HENT:      Head: Normocephalic and atraumatic.   Cardiovascular:      Rate and Rhythm: Normal rate and regular rhythm.      Heart sounds: Normal heart sounds.   Pulmonary:      Effort: Pulmonary effort is normal.      Breath sounds: Normal breath sounds. No wheezing or rhonchi.      Comments: Non-productive cough  Skin:     General: Skin is dry.   Neurological:      Mental Status: She is alert and oriented to person, place, and time.        Result Review :                Assessment and Plan    Diagnoses and all orders for this visit:    1. Mild persistent asthma with exacerbation (Primary)  -     predniSONE (DELTASONE) 10 MG tablet; Take 60 mg po day 1, 50 mg day 2, 40 mg day 3, 30 mg day 4, 20 mg day 5, 10 mg day 6  Dispense: 21 tablet; Refill: 0  -     azithromycin (Zithromax Z-Sammy) 250 MG tablet; Take 2 tablets by mouth on day 1, then 1 tablet daily on days 2-5  Dispense: 6 tablet; Refill: 0    2. Acute cough  -      benzonatate (TESSALON) 200 MG capsule; Take 1 capsule by mouth 3 (Three) Times a Day As Needed for Cough.  Dispense: 15 capsule; Refill: 0    Follow up if no improvement       Follow Up   Return if symptoms worsen or fail to improve.  Patient was given instructions and counseling regarding her condition or for health maintenance advice. Please see specific information pulled into the AVS if appropriate.

## 2022-10-27 DIAGNOSIS — E03.9 ACQUIRED HYPOTHYROIDISM: ICD-10-CM

## 2022-11-01 DIAGNOSIS — J45.31 MILD PERSISTENT ASTHMA WITH EXACERBATION: Primary | ICD-10-CM

## 2022-11-01 DIAGNOSIS — R05.1 ACUTE COUGH: ICD-10-CM

## 2022-11-01 RX ORDER — LEVOTHYROXINE SODIUM 0.05 MG/1
TABLET ORAL
Qty: 90 TABLET | Refills: 0 | OUTPATIENT
Start: 2022-11-01

## 2022-11-01 RX ORDER — LEVOTHYROXINE SODIUM 0.05 MG/1
50 TABLET ORAL
Qty: 30 TABLET | Refills: 0 | Status: SHIPPED | OUTPATIENT
Start: 2022-11-01 | End: 2022-11-06 | Stop reason: SDUPTHER

## 2022-11-01 RX ORDER — METHYLPREDNISOLONE 4 MG/1
TABLET ORAL
Qty: 21 TABLET | Refills: 0 | Status: SHIPPED | OUTPATIENT
Start: 2022-11-01 | End: 2022-11-30

## 2022-11-01 RX ORDER — DOXYCYCLINE 100 MG/1
100 CAPSULE ORAL EVERY 12 HOURS SCHEDULED
Qty: 20 CAPSULE | Refills: 0 | Status: SHIPPED | OUTPATIENT
Start: 2022-11-01 | End: 2022-11-11

## 2022-11-02 ENCOUNTER — HOSPITAL ENCOUNTER (OUTPATIENT)
Dept: GENERAL RADIOLOGY | Facility: HOSPITAL | Age: 57
Discharge: HOME | End: 2022-11-02

## 2022-11-02 ENCOUNTER — LAB (OUTPATIENT)
Dept: LAB | Facility: HOSPITAL | Age: 57
End: 2022-11-02

## 2022-11-02 DIAGNOSIS — R05.1 ACUTE COUGH: ICD-10-CM

## 2022-11-02 DIAGNOSIS — E03.9 ACQUIRED HYPOTHYROIDISM: ICD-10-CM

## 2022-11-02 DIAGNOSIS — D50.9 IRON DEFICIENCY ANEMIA, UNSPECIFIED IRON DEFICIENCY ANEMIA TYPE: ICD-10-CM

## 2022-11-02 DIAGNOSIS — J45.31 MILD PERSISTENT ASTHMA WITH EXACERBATION: ICD-10-CM

## 2022-11-02 PROCEDURE — 83540 ASSAY OF IRON: CPT

## 2022-11-02 PROCEDURE — 36415 COLL VENOUS BLD VENIPUNCTURE: CPT

## 2022-11-02 PROCEDURE — 85027 COMPLETE CBC AUTOMATED: CPT

## 2022-11-02 PROCEDURE — 84439 ASSAY OF FREE THYROXINE: CPT

## 2022-11-02 PROCEDURE — 71046 X-RAY EXAM CHEST 2 VIEWS: CPT

## 2022-11-02 PROCEDURE — 84443 ASSAY THYROID STIM HORMONE: CPT

## 2022-11-03 LAB
DEPRECATED RDW RBC AUTO: 45.8 FL (ref 37–54)
ERYTHROCYTE [DISTWIDTH] IN BLOOD BY AUTOMATED COUNT: 15.6 % (ref 12.3–15.4)
HCT VFR BLD AUTO: 42.5 % (ref 34–46.6)
HGB BLD-MCNC: 13.5 G/DL (ref 12–15.9)
IRON 24H UR-MRATE: 59 MCG/DL (ref 37–145)
MCH RBC QN AUTO: 25.5 PG (ref 26.6–33)
MCHC RBC AUTO-ENTMCNC: 31.8 G/DL (ref 31.5–35.7)
MCV RBC AUTO: 80.3 FL (ref 79–97)
PLATELET # BLD AUTO: 355 10*3/MM3 (ref 140–450)
PMV BLD AUTO: 9.6 FL (ref 6–12)
RBC # BLD AUTO: 5.29 10*6/MM3 (ref 3.77–5.28)
T4 FREE SERPL-MCNC: 1.25 NG/DL (ref 0.93–1.7)
TSH SERPL DL<=0.05 MIU/L-ACNC: 4.77 UIU/ML (ref 0.27–4.2)
WBC NRBC COR # BLD: 9.83 10*3/MM3 (ref 3.4–10.8)

## 2022-11-06 DIAGNOSIS — E03.9 ACQUIRED HYPOTHYROIDISM: ICD-10-CM

## 2022-11-06 RX ORDER — LEVOTHYROXINE SODIUM 0.07 MG/1
75 TABLET ORAL
Qty: 30 TABLET | Refills: 1 | Status: SHIPPED | OUTPATIENT
Start: 2022-11-06 | End: 2022-11-29

## 2022-11-17 DIAGNOSIS — G43.119 INTRACTABLE MIGRAINE WITH AURA WITHOUT STATUS MIGRAINOSUS: ICD-10-CM

## 2022-11-17 DIAGNOSIS — J45.40 MODERATE PERSISTENT ASTHMA, UNSPECIFIED WHETHER COMPLICATED: ICD-10-CM

## 2022-11-17 DIAGNOSIS — R45.4 IRRITABILITY: ICD-10-CM

## 2022-11-17 RX ORDER — ESCITALOPRAM OXALATE 20 MG/1
TABLET ORAL
Qty: 90 TABLET | Refills: 0 | Status: SHIPPED | OUTPATIENT
Start: 2022-11-17 | End: 2023-01-31

## 2022-11-17 RX ORDER — ALBUTEROL SULFATE 90 UG/1
AEROSOL, METERED RESPIRATORY (INHALATION)
Qty: 18 G | Refills: 5 | Status: SHIPPED | OUTPATIENT
Start: 2022-11-17 | End: 2022-11-30 | Stop reason: SDUPTHER

## 2022-11-21 ENCOUNTER — OFFICE VISIT (OUTPATIENT)
Dept: FAMILY MEDICINE CLINIC | Facility: CLINIC | Age: 57
End: 2022-11-21

## 2022-11-21 VITALS
BODY MASS INDEX: 31.48 KG/M2 | SYSTOLIC BLOOD PRESSURE: 116 MMHG | OXYGEN SATURATION: 98 % | TEMPERATURE: 97.8 F | WEIGHT: 161.2 LBS | DIASTOLIC BLOOD PRESSURE: 60 MMHG | HEART RATE: 77 BPM

## 2022-11-21 DIAGNOSIS — M16.11 ARTHRITIS OF RIGHT HIP: ICD-10-CM

## 2022-11-21 DIAGNOSIS — M25.551 RIGHT HIP PAIN: Primary | ICD-10-CM

## 2022-11-21 DIAGNOSIS — Z96.641 STATUS POST TOTAL REPLACEMENT OF RIGHT HIP: ICD-10-CM

## 2022-11-21 PROBLEM — E66.9 OBESITY (BMI 30.0-34.9): Status: ACTIVE | Noted: 2022-11-21

## 2022-11-21 PROBLEM — G93.41 ACUTE METABOLIC ENCEPHALOPATHY: Status: RESOLVED | Noted: 2020-08-22 | Resolved: 2022-11-21

## 2022-11-21 PROBLEM — M54.2 CERVICALGIA: Status: RESOLVED | Noted: 2017-08-23 | Resolved: 2022-11-21

## 2022-11-21 PROBLEM — M25.552 LEFT HIP PAIN: Status: RESOLVED | Noted: 2022-03-30 | Resolved: 2022-11-21

## 2022-11-21 PROBLEM — E66.811 OBESITY (BMI 30.0-34.9): Status: ACTIVE | Noted: 2022-11-21

## 2022-11-21 PROBLEM — G89.18 ACUTE POSTOPERATIVE PAIN: Status: RESOLVED | Noted: 2019-12-13 | Resolved: 2022-11-21

## 2022-11-21 PROCEDURE — 99214 OFFICE O/P EST MOD 30 MIN: CPT | Performed by: FAMILY MEDICINE

## 2022-11-21 RX ORDER — CYCLOBENZAPRINE HCL 10 MG
TABLET ORAL
Qty: 30 TABLET | Refills: 0 | Status: SHIPPED | OUTPATIENT
Start: 2022-11-21 | End: 2023-02-23

## 2022-11-21 RX ORDER — NAPROXEN 500 MG/1
500 TABLET ORAL 2 TIMES DAILY WITH MEALS
Qty: 60 TABLET | Refills: 1 | Status: SHIPPED | OUTPATIENT
Start: 2022-11-21 | End: 2023-01-16

## 2022-11-21 RX ORDER — HYDROCODONE BITARTRATE AND ACETAMINOPHEN 5; 325 MG/1; MG/1
1 TABLET ORAL EVERY 8 HOURS PRN
Qty: 20 TABLET | Refills: 0 | Status: SHIPPED | OUTPATIENT
Start: 2022-11-21 | End: 2023-01-23

## 2022-11-21 NOTE — PROGRESS NOTES
Subjective   Astrid Sam is a 57 y.o. female.     History of Present Illness     Scute onset of pain yesterday while walking in [employer] above her R posterior hip  No fall, no injury  Pain does not radiate  Any movement makes the pain very bad    She has had both hips replaced but never had pain like this before    The following portions of the patient's history were reviewed and updated as appropriate: allergies, current medications, past family history, past medical history, past social history, past surgical history and problem list.    Review of Systems   Constitutional: Negative for fever.   Cardiovascular: Negative for chest pain.   Gastrointestinal: Negative for abdominal pain.   Genitourinary: Negative for dysuria and pelvic pain.   Musculoskeletal: Positive for back pain.   Neurological: Positive for weakness and headaches. Negative for numbness.       Objective   Physical Exam  Vitals and nursing note reviewed.   Constitutional:       General: She is not in acute distress.     Appearance: Normal appearance. She is well-developed.   Cardiovascular:      Rate and Rhythm: Normal rate and regular rhythm.      Heart sounds: Normal heart sounds.   Pulmonary:      Effort: Pulmonary effort is normal.      Breath sounds: Normal breath sounds.   Musculoskeletal:        Back:    Neurological:      Mental Status: She is alert and oriented to person, place, and time.   Psychiatric:         Mood and Affect: Mood normal.         Behavior: Behavior normal.         Thought Content: Thought content normal.         Judgment: Judgment normal.         Assessment & Plan   Diagnoses and all orders for this visit:    1. Right hip pain (Primary)  -     HYDROcodone-acetaminophen (NORCO) 5-325 MG per tablet; Take 1 tablet by mouth Every 8 (Eight) Hours As Needed for Severe Pain.  Dispense: 20 tablet; Refill: 0  -     cyclobenzaprine (FLEXERIL) 10 MG tablet; 1/2-1 PO Q 8 hours PRN  Dispense: 30 tablet; Refill: 0  -      naproxen (Naprosyn) 500 MG tablet; Take 1 tablet by mouth 2 (Two) Times a Day With Meals.  Dispense: 60 tablet; Refill: 1  -     XR Hip With or Without Pelvis 2 - 3 View Right; Future    2. Arthritis of right hip  -     HYDROcodone-acetaminophen (NORCO) 5-325 MG per tablet; Take 1 tablet by mouth Every 8 (Eight) Hours As Needed for Severe Pain.  Dispense: 20 tablet; Refill: 0  -     cyclobenzaprine (FLEXERIL) 10 MG tablet; 1/2-1 PO Q 8 hours PRN  Dispense: 30 tablet; Refill: 0  -     naproxen (Naprosyn) 500 MG tablet; Take 1 tablet by mouth 2 (Two) Times a Day With Meals.  Dispense: 60 tablet; Refill: 1  -     XR Hip With or Without Pelvis 2 - 3 View Right; Future    3. Status post total replacement of right hip  -     HYDROcodone-acetaminophen (NORCO) 5-325 MG per tablet; Take 1 tablet by mouth Every 8 (Eight) Hours As Needed for Severe Pain.  Dispense: 20 tablet; Refill: 0  -     XR Hip With or Without Pelvis 2 - 3 View Right; Future    acute worsening of pain without clear explanation noted.  XR hip, treat with shot term lortab, muscle relaxer, and NSAID.  Will f/u pending images and pt aware to call back if pain is not better

## 2022-11-22 ENCOUNTER — HOSPITAL ENCOUNTER (OUTPATIENT)
Dept: GENERAL RADIOLOGY | Facility: HOSPITAL | Age: 57
Discharge: HOME | End: 2022-11-22
Admitting: FAMILY MEDICINE

## 2022-11-22 DIAGNOSIS — M25.551 RIGHT HIP PAIN: ICD-10-CM

## 2022-11-22 DIAGNOSIS — Z96.641 STATUS POST TOTAL REPLACEMENT OF RIGHT HIP: ICD-10-CM

## 2022-11-22 DIAGNOSIS — M16.11 ARTHRITIS OF RIGHT HIP: ICD-10-CM

## 2022-11-22 PROCEDURE — 73502 X-RAY EXAM HIP UNI 2-3 VIEWS: CPT

## 2022-11-29 DIAGNOSIS — E03.9 ACQUIRED HYPOTHYROIDISM: ICD-10-CM

## 2022-11-29 RX ORDER — LEVOTHYROXINE SODIUM 0.07 MG/1
TABLET ORAL
Qty: 30 TABLET | Refills: 1 | Status: SHIPPED | OUTPATIENT
Start: 2022-11-29 | End: 2023-01-18

## 2022-11-30 ENCOUNTER — OFFICE VISIT (OUTPATIENT)
Dept: PULMONOLOGY | Facility: CLINIC | Age: 57
End: 2022-11-30

## 2022-11-30 VITALS
SYSTOLIC BLOOD PRESSURE: 142 MMHG | DIASTOLIC BLOOD PRESSURE: 80 MMHG | HEIGHT: 60 IN | BODY MASS INDEX: 32.43 KG/M2 | WEIGHT: 165.2 LBS | TEMPERATURE: 97.5 F | OXYGEN SATURATION: 97 % | HEART RATE: 91 BPM

## 2022-11-30 DIAGNOSIS — K22.719 BARRETT'S ESOPHAGUS WITH DYSPLASIA: ICD-10-CM

## 2022-11-30 DIAGNOSIS — B37.0 THRUSH: ICD-10-CM

## 2022-11-30 DIAGNOSIS — J45.40 MODERATE PERSISTENT ASTHMA, UNSPECIFIED WHETHER COMPLICATED: Primary | ICD-10-CM

## 2022-11-30 PROBLEM — D62 ACUTE BLOOD LOSS ANEMIA: Status: RESOLVED | Noted: 2022-06-27 | Resolved: 2022-11-30

## 2022-11-30 PROBLEM — R33.8 ACUTE URINARY RETENTION: Status: RESOLVED | Noted: 2019-12-13 | Resolved: 2022-11-30

## 2022-11-30 PROBLEM — R13.10 DYSPHAGIA: Status: RESOLVED | Noted: 2020-08-26 | Resolved: 2022-11-30

## 2022-11-30 PROBLEM — D62 ACUTE BLOOD LOSS ANEMIA: Status: RESOLVED | Noted: 2019-12-13 | Resolved: 2022-11-30

## 2022-11-30 PROCEDURE — 94729 DIFFUSING CAPACITY: CPT | Performed by: INTERNAL MEDICINE

## 2022-11-30 PROCEDURE — 94726 PLETHYSMOGRAPHY LUNG VOLUMES: CPT | Performed by: INTERNAL MEDICINE

## 2022-11-30 PROCEDURE — 94010 BREATHING CAPACITY TEST: CPT | Performed by: INTERNAL MEDICINE

## 2022-11-30 PROCEDURE — 99214 OFFICE O/P EST MOD 30 MIN: CPT | Performed by: INTERNAL MEDICINE

## 2022-11-30 RX ORDER — ALBUTEROL SULFATE 90 UG/1
2 AEROSOL, METERED RESPIRATORY (INHALATION) EVERY 4 HOURS PRN
Qty: 18 G | Refills: 5 | Status: SHIPPED | OUTPATIENT
Start: 2022-11-30

## 2022-11-30 RX ORDER — FLUTICASONE FUROATE AND VILANTEROL 100; 25 UG/1; UG/1
1 POWDER RESPIRATORY (INHALATION)
Qty: 60 EACH | Refills: 11 | Status: SHIPPED | OUTPATIENT
Start: 2022-11-30

## 2022-11-30 RX ORDER — AZITHROMYCIN 250 MG/1
TABLET, FILM COATED ORAL
Qty: 6 TABLET | Refills: 0 | Status: SHIPPED | OUTPATIENT
Start: 2022-11-30 | End: 2022-12-20

## 2022-11-30 RX ORDER — PREDNISONE 10 MG/1
TABLET ORAL
Qty: 31 TABLET | Refills: 0 | Status: SHIPPED | OUTPATIENT
Start: 2022-11-30 | End: 2023-01-23

## 2022-11-30 NOTE — PROGRESS NOTES
"Follow Up Office Note       Patient Name: Astrid Sam    Referring Physician: No ref. provider found    Chief Complaint:    Chief Complaint   Patient presents with   • Asthma     F/U       History of Present Illness: Astrid Sam is a 57 y.o. female who is here today to follow-up care with Pulmonary.   Patient has a past medical history significant for hypothyroidism, allergic rhinitis, hyperlipidemia, PFO status post anterior atrial closure, CVA, asthma, Neil's esophagus and seizures.  Patient denies any chest pain, nausea, fever, or chills.  She is having ongoing cough.  Reflux she states is fairly well controlled right now.  She is taking the Breo and albuterol.  She is also on Fasenra.    Review of Systems:   Review of Systems   Constitutional: Negative for chills, fatigue and fever.   HENT: Negative for congestion and voice change.    Eyes: Negative for blurred vision.   Respiratory: Positive for cough. Negative for shortness of breath and wheezing.    Cardiovascular: Negative for chest pain.   Skin: Negative for dry skin.   Hematological: Negative for adenopathy.   Psychiatric/Behavioral: Negative for agitation and depressed mood.       The following portions of the patient's history were reviewed and updated as appropriate: allergies, current medications, past family history, past medical history, past social history, past surgical history and problem list.    Physical Exam:  Vital Signs:   Vitals:    11/30/22 1137   BP: 142/80   BP Location: Right arm   Patient Position: Sitting   Cuff Size: Adult   Pulse: 91   Temp: 97.5 °F (36.4 °C)   TempSrc: Infrared   SpO2: 97%  Comment: resting, room air   Weight: 74.9 kg (165 lb 3.2 oz)   Height: 152.4 cm (60\")       Physical Exam  Vitals and nursing note reviewed.   Constitutional:       General: She is not in acute distress.     Appearance: She is well-developed and normal weight. She is not ill-appearing or toxic-appearing.   HENT:      Head: " Normocephalic and atraumatic.   Cardiovascular:      Rate and Rhythm: Normal rate and regular rhythm.      Pulses: Normal pulses.      Heart sounds: Normal heart sounds. No murmur heard.    No friction rub. No gallop.   Pulmonary:      Effort: Pulmonary effort is normal. No respiratory distress.      Breath sounds: Normal breath sounds. No wheezing, rhonchi or rales.   Musculoskeletal:      Right lower leg: No edema.      Left lower leg: No edema.   Skin:     General: Skin is warm and dry.   Neurological:      Mental Status: She is alert and oriented to person, place, and time.         Immunization History   Administered Date(s) Administered   • COVID-19 (PFIZER) PURPLE CAP 04/05/2021, 05/03/2021   • FluLaval/Fluzone >6mos 12/10/2021       Results Review:   -CT scan from October 2021 which showed the nodules in the right lower lobe is now resolved, with no other acute infiltrates              - CT scan from May 2021 which showed significant improvement in the upper lobe infiltrates compared to August 2020, she was noted to have a small nodule in the right lower lobe.              - imaging from chest x-ray from November 4, 2020 shows no acute cardiopulmonary process, CT scan of the chest from August 22, 2020 showed some very mild groundglass interstitial changes in the upper lobes bilaterally, and otherwise unremarkable.  - Echo from 8/22/2020 showed an EF of 60%, with normal left ventricular systolic function, and no signs of a shunt.  - barium swallow report, which showed that the patient had mild gastroesophageal reflux to the thoracic inlet, with mild smooth luminal narrowing of the distal esophagus which may represent a stricture or spasm.  - pulmonary function testing from 12/21/2020 which showed no obstruction or restriction with a normal DLCO, although notable for significant air trapping  - EGD pathology report from February 2021 which was negative for H. pylori, showed signs of chronic inflammation, most  notably in the distal esophagus there was signs of reflux esophagitis that was compatible with Neil's.    Assessment / Plan:   Diagnoses and all orders for this visit:    1. Moderate persistent asthma, unspecified whether complicated (Primary)  -I will go and treat the patient for an exacerbation at this time.  Prednisone taper, and then also 5 days of azithromycin.  -Continue Breo 100 mcg 1 puff once daily  -Continue albuterol every 4 hours as needed  -Continue Fasenra    2. Neil's esophagus with dysplasia  -Continue Protonix 40 mg twice daily 30 minutes prior to eating, elevate head of the bed 2 to 6 inches, and do not eat or drink 2 hours prior to going to sleep.    3. Thrush  -Nystatin ordered for swish and spit for the next 7 days.      Follow Up:   Return in about 6 months (around 5/30/2023).       IWONA Alfaro,   Pulmonary and Critical Care Medicine  Note Electronically Signed    Part of this note may be an electronic transcription/translation of spoken language to printed text using the Dragon Dictation System.

## 2022-12-19 ENCOUNTER — LAB (OUTPATIENT)
Dept: LAB | Facility: HOSPITAL | Age: 57
End: 2022-12-19

## 2022-12-19 DIAGNOSIS — E03.9 ACQUIRED HYPOTHYROIDISM: ICD-10-CM

## 2022-12-19 PROCEDURE — 36415 COLL VENOUS BLD VENIPUNCTURE: CPT

## 2022-12-19 PROCEDURE — 84443 ASSAY THYROID STIM HORMONE: CPT

## 2022-12-20 ENCOUNTER — TELEPHONE (OUTPATIENT)
Dept: NEUROLOGY | Facility: CLINIC | Age: 57
End: 2022-12-20

## 2022-12-20 LAB — TSH SERPL DL<=0.05 MIU/L-ACNC: 0.5 UIU/ML (ref 0.27–4.2)

## 2022-12-20 RX ORDER — PRAMIPEXOLE 2.25 MG/1
2.25 TABLET, EXTENDED RELEASE ORAL NIGHTLY
Qty: 30 TABLET | Refills: 5 | Status: SHIPPED | OUTPATIENT
Start: 2022-12-20

## 2022-12-20 NOTE — TELEPHONE ENCOUNTER
MEDICATION CONCERNS    Caller: Astrid Sam    Relationship: Self    Best call back number: [phone]- CAN LVM IF UNABLE TO REACH DIRECTLY OR CAN SEND [employer] MESSAGE.    Preferred pharmacy: [id number] [address] AT [facility] - [phone]  - [phone] FX    What medications are you currently taking:   Current Outpatient Medications on File Prior to Visit   Medication Sig Dispense Refill   • albuterol sulfate  (90 Base) MCG/ACT inhaler Inhale 2 puffs Every 4 (Four) Hours As Needed for Wheezing. 18 g 5   • aspirin 81 MG EC tablet Take 1 tablet by mouth Daily.     • azithromycin (ZITHROMAX) 250 MG tablet Take 2 by mouth today then 1 daily for 4 days 6 tablet 0   • cyclobenzaprine (FLEXERIL) 10 MG tablet 1/2-1 PO Q 8 hours PRN 30 tablet 0   • escitalopram (LEXAPRO) 20 MG tablet TAKE 1 TABLET BY MOUTH EVERY DAY 90 tablet 0   • Fasenra Pen 30 MG/ML solution auto-injector INJECT 1 PEN UNDER THE SKIN EVERY 8 WEEKS. 1 mL 0   • Fluticasone Furoate-Vilanterol (Breo Ellipta) 100-25 MCG/ACT aerosol powder  Inhale 1 puff Daily. 60 each 11   • gabapentin (NEURONTIN) 300 MG capsule Take 1 capsule by mouth Every Night. 30 capsule 2   • HYDROcodone-acetaminophen (NORCO) 5-325 MG per tablet Take 1 tablet by mouth Every 8 (Eight) Hours As Needed for Severe Pain. 20 tablet 0   • levothyroxine (SYNTHROID, LEVOTHROID) 75 MCG tablet TAKE 1 TABLET BY MOUTH EVERY DAY IN THE MORNING 30 tablet 1   • naproxen (Naprosyn) 500 MG tablet Take 1 tablet by mouth 2 (Two) Times a Day With Meals. 60 tablet 1   • nystatin (MYCOSTATIN) 100,000 unit/mL suspension Take 5 mL by mouth 4 (Four) Times a Day. 280 mL 0   • oxybutynin XL (DITROPAN XL) 15 MG 24 hr tablet Take 10 mg by mouth Daily.     • pantoprazole (PROTONIX) 40 MG EC tablet Take 1 tablet by mouth 2 (Two) Times a Day. 180 tablet 3   • Pramipexole Dihydrochloride ER 1.5 MG tablet sustained-release 24 hour Take 1 capsule by  mouth Every Night. Take  A few hours before bedtime 30 tablet 5   • pravastatin (PRAVACHOL) 40 MG tablet Take 1 tablet by mouth every night at bedtime. 90 tablet 1   • predniSONE (DELTASONE) 10 MG tablet Take 4 tabs daily x 4 days, then take 3 daily x 3 days, then take 2 daily for 2 days, then take 1 daily x 2 days 31 tablet 0   • ubrogepant 100 MG tablet TAKE 1 TABLET BY MOUTH FOR MIGRAINE FOR UP TO ONE DOSE MAY REPEAT DOSE AFTER 2 HOURS IF HEADACHE NOT RESOLVED 10 tablet 1     No current facility-administered medications on file prior to visit.     Which medication are you concerned about: Pramipexole Dihydrochloride ER 1.5 MG tablet sustained-release 24 hour    Who prescribed you this medication: DR. BANEGAS    When did you start taking these medications: MEDICATION INCREASED TO 1.5MG TABLET ON 10/21/22    What are your concerns: PT STATES THE INCREASED MEDICATION DOSAGE HAS NOT SEEMED TO HELP WITH HER RESTLESS LEG SYNDROME. PT REPORTS SHE IS STILL UP THROUGHOUT WITH WORSENING SYMPTOMS. PT STATES THAT RESTLESS LEG SYNDROME HAS BEEN BOTHERING HER DURING THE DAY AS WELL. PT STATES SHE HAS BEEN TAKING THE MIRAPEX ER MEDICATION AS PRESCRIBED AND HAS BEEN LIMITING HER SUGAR INTAKE BEFORE BEDTIME.    **PT STILL TAKING GABAPENTIN MEDICATION AS PRESCRIBED.    PT ASKS WHAT DR. BANEGAS WOULD RECOMMEND AT THIS TIME.    PLEASE REVIEW AND ADVISE.

## 2022-12-21 DIAGNOSIS — G25.81 RLS (RESTLESS LEGS SYNDROME): ICD-10-CM

## 2022-12-21 RX ORDER — GABAPENTIN 300 MG/1
300 CAPSULE ORAL NIGHTLY
Qty: 30 CAPSULE | Refills: 2 | Status: SHIPPED | OUTPATIENT
Start: 2022-12-21 | End: 2023-02-28

## 2022-12-21 NOTE — TELEPHONE ENCOUNTER
Spoke with patient, let her know that Dr. Chirinos stated she should take mirapex 2.25mg. If this doesn't work speak with PCP about increasing gabapentin to 300mg

## 2022-12-22 DIAGNOSIS — E03.9 ACQUIRED HYPOTHYROIDISM: ICD-10-CM

## 2022-12-23 RX ORDER — LEVOTHYROXINE SODIUM 0.07 MG/1
TABLET ORAL
Qty: 30 TABLET | Refills: 1 | OUTPATIENT
Start: 2022-12-23

## 2023-01-05 ENCOUNTER — TELEPHONE (OUTPATIENT)
Dept: PULMONOLOGY | Facility: CLINIC | Age: 58
End: 2023-01-05
Payer: COMMERCIAL

## 2023-01-05 NOTE — TELEPHONE ENCOUNTER
Pt called stating she still has a cough and Angelina told her to callback if it persists. Please advise

## 2023-01-06 DIAGNOSIS — J45.50 SEVERE PERSISTENT ASTHMA WITHOUT COMPLICATION: ICD-10-CM

## 2023-01-06 RX ORDER — BENRALIZUMAB 30 MG/ML
30 INJECTION, SOLUTION SUBCUTANEOUS
Qty: 1 ML | Refills: 5 | Status: SHIPPED | OUTPATIENT
Start: 2023-01-06

## 2023-01-14 DIAGNOSIS — M25.551 RIGHT HIP PAIN: ICD-10-CM

## 2023-01-14 DIAGNOSIS — M16.11 ARTHRITIS OF RIGHT HIP: ICD-10-CM

## 2023-01-16 RX ORDER — NAPROXEN 500 MG/1
TABLET ORAL
Qty: 60 TABLET | Refills: 1 | Status: SHIPPED | OUTPATIENT
Start: 2023-01-16 | End: 2023-03-10

## 2023-01-17 DIAGNOSIS — E03.9 ACQUIRED HYPOTHYROIDISM: ICD-10-CM

## 2023-01-18 RX ORDER — LEVOTHYROXINE SODIUM 0.07 MG/1
TABLET ORAL
Qty: 30 TABLET | Refills: 1 | Status: SHIPPED | OUTPATIENT
Start: 2023-01-18 | End: 2023-02-13

## 2023-01-23 ENCOUNTER — OFFICE VISIT (OUTPATIENT)
Dept: PULMONOLOGY | Facility: CLINIC | Age: 58
End: 2023-01-23
Payer: COMMERCIAL

## 2023-01-23 VITALS
OXYGEN SATURATION: 96 % | DIASTOLIC BLOOD PRESSURE: 84 MMHG | HEART RATE: 90 BPM | WEIGHT: 170 LBS | HEIGHT: 60 IN | TEMPERATURE: 98 F | SYSTOLIC BLOOD PRESSURE: 122 MMHG | BODY MASS INDEX: 33.38 KG/M2

## 2023-01-23 DIAGNOSIS — R05.3 CHRONIC COUGH: ICD-10-CM

## 2023-01-23 DIAGNOSIS — K22.719 BARRETT'S ESOPHAGUS WITH DYSPLASIA: ICD-10-CM

## 2023-01-23 DIAGNOSIS — J45.40 MODERATE PERSISTENT ASTHMA, UNSPECIFIED WHETHER COMPLICATED: Primary | ICD-10-CM

## 2023-01-23 DIAGNOSIS — R05.9 COUGH, UNSPECIFIED TYPE: ICD-10-CM

## 2023-01-23 PROCEDURE — 86003 ALLG SPEC IGE CRUDE XTRC EA: CPT | Performed by: NURSE PRACTITIONER

## 2023-01-23 PROCEDURE — 94010 BREATHING CAPACITY TEST: CPT | Performed by: NURSE PRACTITIONER

## 2023-01-23 PROCEDURE — 82785 ASSAY OF IGE: CPT | Performed by: NURSE PRACTITIONER

## 2023-01-23 PROCEDURE — 99214 OFFICE O/P EST MOD 30 MIN: CPT | Performed by: NURSE PRACTITIONER

## 2023-01-23 RX ORDER — PREDNISONE 10 MG/1
TABLET ORAL
Qty: 31 TABLET | Refills: 0 | Status: SHIPPED | OUTPATIENT
Start: 2023-01-23 | End: 2023-02-23

## 2023-01-23 NOTE — PROGRESS NOTES
[facility] Pulmonary Follow up    CHIEF COMPLAINT    Cough    HISTORY OF PRESENT ILLNESS    Astrid Sam is a 57 y.o.female here today for follow-up of her cough.  She was last seen in the office by Dr. Alfaro in November.  She states she has noticed more coughing since her last appointment.    She denies any sputum production or hemoptysis.  She denies any fever chills or night sweats.    She continues to use her Breo daily.  She uses her albuterol rescue inhaler 1-2 times per week.    She also receives her Fasenra every 8 weeks.    She has not noticed any overt postnasal drainage but does clear her throat on a regular basis.    She also has a history of Neil's esophagus and has a follow-up due soon.    She denies chest pain or palpitations.  She denies any lower extremity edema or calf tenderness.    Patient Active Problem List   Diagnosis   • Migraine   • Insomnia   • Iron deficiency anemia   • Cervical radicular pain   • Diarrhea   • Irritability   • Acquired hypothyroidism   • Fever blister   • Arthritis of right hip   • Urge incontinence of urine   • Mixed hyperlipidemia   • Hypokalemia   • History of osteomyelitis   • Seizure (HCC)   • Moderate persistent asthma   • Neil's esophagus with dysplasia   • status post total replacement revision of left hip   • S/P hip revision: Femoral and and acetabular component   • Restless leg syndrome   • Obesity (BMI 30.0-34.9)   • Chronic cough       Allergies   Allergen Reactions   • Morphine Nausea And Vomiting       Current Outpatient Medications:   •  albuterol sulfate  (90 Base) MCG/ACT inhaler, Inhale 2 puffs Every 4 (Four) Hours As Needed for Wheezing., Disp: 18 g, Rfl: 5  •  aspirin 81 MG EC tablet, Take 1 tablet by mouth Daily., Disp: , Rfl:   •  Benralizumab (Fasenra Pen) 30 MG/ML solution auto-injector, Inject 30 mg under the skin into the appropriate area as directed Every 2 (Two) Months., Disp: 1 mL, Rfl: 5  •  cyclobenzaprine (FLEXERIL) 10  MG tablet, 1/2-1 PO Q 8 hours PRN, Disp: 30 tablet, Rfl: 0  •  escitalopram (LEXAPRO) 20 MG tablet, TAKE 1 TABLET BY MOUTH EVERY DAY, Disp: 90 tablet, Rfl: 0  •  Fluticasone Furoate-Vilanterol (Breo Ellipta) 100-25 MCG/ACT aerosol powder , Inhale 1 puff Daily., Disp: 60 each, Rfl: 11  •  gabapentin (NEURONTIN) 300 MG capsule, Take 1 capsule by mouth Every Night., Disp: 30 capsule, Rfl: 2  •  levothyroxine (SYNTHROID, LEVOTHROID) 75 MCG tablet, TAKE 1 TABLET BY MOUTH EVERY DAY IN THE MORNING, Disp: 30 tablet, Rfl: 1  •  naproxen (NAPROSYN) 500 MG tablet, TAKE 1 TABLET BY MOUTH TWICE A DAY WITH MEALS, Disp: 60 tablet, Rfl: 1  •  oxybutynin XL (DITROPAN XL) 15 MG 24 hr tablet, Take 10 mg by mouth Daily., Disp: , Rfl:   •  pantoprazole (PROTONIX) 40 MG EC tablet, Take 1 tablet by mouth 2 (Two) Times a Day., Disp: 180 tablet, Rfl: 3  •  Pramipexole Dihydrochloride ER (Mirapex ER) 2.25 MG extended-release tablet, Take 1 tablet by mouth Every Night., Disp: 30 tablet, Rfl: 5  •  pravastatin (PRAVACHOL) 40 MG tablet, Take 1 tablet by mouth every night at bedtime., Disp: 90 tablet, Rfl: 1  •  ubrogepant 100 MG tablet, TAKE 1 TABLET BY MOUTH FOR MIGRAINE FOR UP TO ONE DOSE MAY REPEAT DOSE AFTER 2 HOURS IF HEADACHE NOT RESOLVED, Disp: 10 tablet, Rfl: 1  •  predniSONE (DELTASONE) 10 MG tablet, Take 4 tabs daily x 3 days, then take 3 tabs daily x 3 days, then take 2 tabs daily x 3 days, then take 1 tab daily x 3 days, Disp: 31 tablet, Rfl: 0  MEDICATION LIST AND ALLERGIES REVIEWED.    Social History     Tobacco Use   • Smoking status: Never   • Smokeless tobacco: Never   Vaping Use   • Vaping Use: Never used   Substance Use Topics   • Alcohol use: Not Currently     Comment: rare   • Drug use: No       FAMILY AND SOCIAL HISTORY REVIEWED.    Review of Systems   Constitutional: Negative for activity change, appetite change, fatigue, fever and unexpected weight change.   HENT: Negative for congestion, postnasal drip, rhinorrhea,  "sinus pressure, sore throat and voice change.    Eyes: Negative for visual disturbance.   Respiratory: Positive for cough. Negative for chest tightness, shortness of breath and wheezing.    Cardiovascular: Negative for chest pain, palpitations and leg swelling.   Gastrointestinal: Negative for abdominal distention, abdominal pain, nausea and vomiting.   Endocrine: Negative for cold intolerance and heat intolerance.   Genitourinary: Negative for difficulty urinating and urgency.   Musculoskeletal: Negative for arthralgias, back pain and neck pain.   Skin: Negative for color change and pallor.   Allergic/Immunologic: Negative for environmental allergies and food allergies.   Neurological: Negative for dizziness, syncope, weakness and light-headedness.   Hematological: Negative for adenopathy. Does not bruise/bleed easily.   Psychiatric/Behavioral: Negative for agitation and behavioral problems.   .    /84   Pulse 90   Temp 98 °F (36.7 °C)   Ht 152.4 cm (60\")   Wt 77.1 kg (170 lb)   LMP  (LMP Unknown)   SpO2 96% Comment: resting, room air  BMI 33.20 kg/m²     Immunization History   Administered Date(s) Administered   • COVID-19 (PFIZER) PURPLE CAP 04/05/2021, 05/03/2021   • FluLaval/Fluzone >6mos 12/10/2021       Physical Exam  Vitals and nursing note reviewed.   Constitutional:       Appearance: She is well-developed. She is not diaphoretic.   HENT:      Head: Normocephalic and atraumatic.   Eyes:      Pupils: Pupils are equal, round, and reactive to light.   Neck:      Thyroid: No thyromegaly.   Cardiovascular:      Rate and Rhythm: Normal rate and regular rhythm.      Heart sounds: Normal heart sounds. No murmur heard.    No friction rub. No gallop.   Pulmonary:      Effort: Pulmonary effort is normal. No respiratory distress.      Breath sounds: Normal breath sounds. No wheezing or rales.   Chest:      Chest wall: No tenderness.   Abdominal:      General: Bowel sounds are normal.      Palpations: " Abdomen is soft.      Tenderness: There is no abdominal tenderness.   Musculoskeletal:         General: No swelling. Normal range of motion.      Cervical back: Normal range of motion and neck supple.   Lymphadenopathy:      Cervical: No cervical adenopathy.   Skin:     General: Skin is warm and dry.      Capillary Refill: Capillary refill takes less than 2 seconds.   Neurological:      Mental Status: She is alert and oriented to person, place, and time.   Psychiatric:         Mood and Affect: Mood normal.         Behavior: Behavior normal.           RESULTS    PFTS in the office today, read by me, FVC 2.59 90% predicted, FEV1 2.30 130% predicted, FEV1/FVC 89% predicted, no obstruction, cannot rule out restriction.    XR Chest PA & Lateral    Result Date: 1/23/2023  No change from the previous study with no evidence for acute cardiopulmonary process. Electronically Signed: Navdeep Guerrero  1/23/2023 12:43 PM EST  Workstation ID: [id number]    PROBLEM LIST    Problem List Items Addressed This Visit        Gastrointestinal Abdominal     Neil's esophagus with dysplasia       Pulmonary and Pneumonias    Moderate persistent asthma - Primary    Relevant Medications    predniSONE (DELTASONE) 10 MG tablet    Other Relevant Orders    Walking Oximetry (Completed)    XR Chest PA & Lateral (Completed)    Regional Allergen Zone 8 / With IgE    Chronic cough   Other Visit Diagnoses     Cough, unspecified type        Relevant Medications    predniSONE (DELTASONE) 10 MG tablet    Other Relevant Orders    XR Chest PA & Lateral (Completed)            DISCUSSION    Ms. Sam was here for a cough.  We did review her PFTs in the office today and she has no obstruction.  We also reviewed her chest x-ray in the office today that shows no acute pulmonary process.    I do suspect that her cough could be multifactorial.  It does appear that she has some sinus drainage that could be contributing to her cough and also some silent reflux.  She  does have follow-up to be scheduled soon with her gastroenterologist for her history of Neil's esophagus.  We did discuss strict reflux precautions as well.    I ordered an allergy panel to rule out allergies as a cause of her cough and if these are elevated we may need to refer her to an allergist.  I also advised her to try Claritin or Allegra daily to help with her allergies.    Organ to try round of prednisone to see if her cough improves.  I did advise her that steroids is not a permanent solution.    She will continue to take Breo daily for her asthma and Fasenra injections.    She will follow-up in May with Dr. Alfaro or sooner if her symptoms worsen.  Advised her to call with any additional concerns or questions.    I personally spent a total of 34 minutes on patient visit today including chart review, face to face with the patient obtaining the history and physical exam, review of pertinent images and tests, counseling and discussion and/or coordination of care as described above, and documentation.  Total time excludes time spent on other separate services such as performing procedures or test interpretation, if applicable.        Sherry Moya, APRN  01/23/202312:28 EST  Electronically signed     Please note that portions of this note were completed with a voice recognition program.        CC: Beckie Murphy DO

## 2023-01-27 LAB
A ALTERNATA IGE QN: <0.1 KU/L
A FUMIGATUS IGE QN: <0.1 KU/L
AMER ROACH IGE QN: <0.1 KU/L
BAHIA GRASS IGE QN: <0.1 KU/L
BERMUDA GRASS IGE QN: <0.1 KU/L
BOXELDER IGE QN: <0.1 KU/L
C HERBARUM IGE QN: <0.1 KU/L
CAT DANDER IGE QN: <0.1 KU/L
CMN PIGWEED IGE QN: <0.1 KU/L
COMMON RAGWEED IGE QN: <0.1 KU/L
CONV CLASS DESCRIPTION: ABNORMAL
D FARINAE IGE QN: <0.1 KU/L
D PTERONYSS IGE QN: <0.1 KU/L
DOG DANDER IGE QN: <0.1 KU/L
ENGL PLANTAIN IGE QN: <0.1 KU/L
HAZELNUT POLN IGE QN: <0.1 KU/L
IGE SERPL-ACNC: 4 IU/ML (ref 6–495)
JOHNSON GRASS IGE QN: <0.1 KU/L
KENT BLUE GRASS IGE QN: <0.1 KU/L
LONDON PLANE IGE QN: <0.1 KU/L
M RACEMOSUS IGE QN: <0.1 KU/L
MT JUNIPER IGE QN: <0.1 KU/L
MUGWORT IGE QN: <0.1 KU/L
NETTLE IGE QN: <0.1 KU/L
P NOTATUM IGE QN: <0.1 KU/L
S BOTRYOSUM IGE QN: <0.1 KU/L
SHEEP SORREL IGE QN: <0.1 KU/L
SWEET GUM IGE QN: <0.1 KU/L
WHITE ELM IGE QN: <0.1 KU/L
WHITE HICKORY IGE QN: <0.1 KU/L
WHITE MULBERRY IGE QN: <0.1 KU/L
WHITE OAK IGE QN: <0.1 KU/L

## 2023-01-28 DIAGNOSIS — K21.00 GASTROESOPHAGEAL REFLUX DISEASE WITH ESOPHAGITIS WITHOUT HEMORRHAGE: ICD-10-CM

## 2023-01-30 RX ORDER — PANTOPRAZOLE SODIUM 40 MG/1
40 TABLET, DELAYED RELEASE ORAL 2 TIMES DAILY
Qty: 180 TABLET | Refills: 3 | OUTPATIENT
Start: 2023-01-30

## 2023-01-31 DIAGNOSIS — G43.119 INTRACTABLE MIGRAINE WITH AURA WITHOUT STATUS MIGRAINOSUS: ICD-10-CM

## 2023-01-31 DIAGNOSIS — R45.4 IRRITABILITY: ICD-10-CM

## 2023-01-31 RX ORDER — ESCITALOPRAM OXALATE 20 MG/1
TABLET ORAL
Qty: 90 TABLET | Refills: 0 | Status: SHIPPED | OUTPATIENT
Start: 2023-01-31

## 2023-01-31 RX ORDER — PANTOPRAZOLE SODIUM 40 MG/1
40 TABLET, DELAYED RELEASE ORAL 2 TIMES DAILY
Qty: 180 TABLET | Refills: 0 | Status: SHIPPED | OUTPATIENT
Start: 2023-01-31

## 2023-01-31 NOTE — TELEPHONE ENCOUNTER
Rx Refill Note  Requested Prescriptions     Pending Prescriptions Disp Refills   • ubrogepant 100 MG tablet [Pharmacy Med Name: UBRELVY 100 MG TABLET] 10 tablet 1     Sig: TAKE 1 TABLET BY MOUTH FOR MIGRAINE FOR UP TO ONE DOSE MAY REPEAT DOSE AFTER 2 HOURS IF HEADACHE NOT RESOLVED      Last office visit with prescribing clinician: 10/26/2022     Next office visit with prescribing clinician: 5/3/2023     Bettina Michelle MA  01/31/23, 08:58 EST

## 2023-02-12 DIAGNOSIS — E03.9 ACQUIRED HYPOTHYROIDISM: ICD-10-CM

## 2023-02-13 RX ORDER — LEVOTHYROXINE SODIUM 0.07 MG/1
TABLET ORAL
Qty: 30 TABLET | Refills: 1 | Status: SHIPPED | OUTPATIENT
Start: 2023-02-13 | End: 2023-03-10

## 2023-02-23 RX ORDER — QUETIAPINE FUMARATE 25 MG/1
25 TABLET, FILM COATED ORAL NIGHTLY
Qty: 90 TABLET | Refills: 1 | Status: SHIPPED | OUTPATIENT
Start: 2023-02-23

## 2023-02-28 ENCOUNTER — OFFICE VISIT (OUTPATIENT)
Dept: NEUROLOGY | Facility: CLINIC | Age: 58
End: 2023-02-28
Payer: COMMERCIAL

## 2023-02-28 VITALS
OXYGEN SATURATION: 96 % | SYSTOLIC BLOOD PRESSURE: 114 MMHG | WEIGHT: 170 LBS | HEIGHT: 60 IN | DIASTOLIC BLOOD PRESSURE: 68 MMHG | BODY MASS INDEX: 33.38 KG/M2 | HEART RATE: 88 BPM

## 2023-02-28 DIAGNOSIS — G25.81 RESTLESS LEGS SYNDROME (RLS): Primary | ICD-10-CM

## 2023-02-28 DIAGNOSIS — G62.9 POLYNEUROPATHY: ICD-10-CM

## 2023-02-28 PROCEDURE — 99214 OFFICE O/P EST MOD 30 MIN: CPT | Performed by: PSYCHIATRY & NEUROLOGY

## 2023-02-28 RX ORDER — PREGABALIN 75 MG/1
75 CAPSULE ORAL NIGHTLY
Qty: 30 CAPSULE | Refills: 3 | Status: SHIPPED | OUTPATIENT
Start: 2023-02-28 | End: 2024-02-28

## 2023-02-28 RX ORDER — CELECOXIB 200 MG/1
1 CAPSULE ORAL DAILY
COMMUNITY
Start: 2023-02-16

## 2023-02-28 NOTE — PROGRESS NOTES
Subjective:    CC: Astrid Sam is seen today for RLS/neuropathy    HPI:  Current visit- patient states that since she switched from regular pramipexole to Mirapex ER and increased her dose to 2.25 mg nightly her symptoms of RLS have improved.  She is also taking iron supplements.  She continues to take gabapentin 300 mg nightly that was started for her RLS but does not think it is helping much.  She also has skin breakdown with it.  Today she also complains of a new symptom of numbness in her fingertips as well as burning in her feet.  She has previously had carpal tunnel surgery in both wrists several years ago.  Denies any history of diabetes or heavy alcohol intake.    she denies any episodes of confusion or seizure-like activity since her last visit.  However she tells me today that her restless leg symptoms have been bothering her a lot as she has to get up and walk.  They were previously only occurring at night but have recently started to occur during the daytime as well.  She takes Mirapex 0.75 mg around 4:30 but for the past few weeks has also been taking an additional dose at bedtime.  Her PCP restarted her back on gabapentin 300 mg nightly that helps some.  She was severely anemic while she was in the hospital in June during hip surgery but since then she has not been taking any iron supplements.    Last visit-patient states that she tapered and stopped Keppra after her last visit and has not had any more episodes of confusion, shaking or loss of awareness.  She occasionally has word finding problems.  With regards to her RLS she continues to take Mirapex 0.75 mg at night and baclofen 10 mg nightly for leg jerks that are now well controlled.    Last visit-patient had an EEG off medications which showed bifrontal slowing as well as excessive beta activity but no epileptiform activity.  She states she resumed her Keppra 500 mg twice a day a few days after stopping it as her  noted that  patient was having leg jerks during sleep.  She does have RLS and takes Mirapex 0.75 mg at night.  Also takes trazodone for sleep in addition to Flexeril 10 mg 3 times a day for muscle spasms.  Denies having any episodes of staring or whole-body shaking.  On a few occasions her  has also mentioned to her that she speaks something but does not make sense although the patient herself does not realize she is doing it.    Initial [id number]old female with a past medical history of migraine headaches, hyperlipidemia, TIA status post PFO closure in 2011, bilateral hip replacement presents as a hospital follow-up for encephalopathy/new onset seizure.  As per patient she had an ACDF procedure on C5-6 in June.  After that she also developed osteomyelitis of the right sternoclavicular joint for which he underwent debridement and was placed on IV antibiotics (Rocephin and daptomycin).  Then in August she was placed on p.o. doxycycline.  A few days before her hospitalization she went out in the sun and developed a diffuse rash.  Took Benadryl which made her slightly confused.  2 days later she was found at home by her daughter extremely confused having thrown up all over herself.  She took her to [facility] initially where she had a CT head that was unremarkable.  After that she developed left arm jerking with blinking and staring ahead and space.  Patient states that she does not remember the next 3 days.  After she was transferred to [facility] her white count was elevated at 17.2.  Her CRP was also mildly elevated and she had a T-max of 99.7.  Subsequent blood cultures were negative.  Initially her EEG showed moderate generalized slowing with frequent generalized sharp/slow wave complexes with triphasic morphology.  She was given Ativan and IV Keppra.  CT head and CT perfusion were  unremarkable.  CT angiogram of the head showed narrowing of the right distal M2/M3.  CTA of the neck did not show any  significant stenosis.  MRI of the brain also did not show any acute intracranial abnormalities or severe chronic ischemic changes.  She also had a lumbar puncture that failed to show any evidence of a CNS infection.  EEG was repeated in 2 days and showed just mild generalized slowing with no epileptiform activity.  It was presumed that patient could have had seizures/encephalopathy in the presence of sepsis versus due to high doses of Valtrex that she had been presumably taking at home however patient denies that as she states that she had not taken that medication in over 1 year.  She was discharged home on Keppra 500 mg twice a day.  Has not had any more episodes since.  She continues to have generalized weakness.  Had been getting PT up until last week.  She denies having any febrile seizures as a child, abnormal birth history or severe concussions.  There is a family history of seizures in her brother who is also a diabetic  Of note-I personally reviewed her MRI brain and Dr. Guerrier's notes from the hospital    The following portions of the patient's history were reviewed today and updated as of 11/04/2020  : allergies, current medications, past family history, past medical history, past social history, past surgical history and problem list  These document will be scanned to patient's chart.      Current Outpatient Medications:   •  albuterol sulfate  (90 Base) MCG/ACT inhaler, Inhale 2 puffs Every 4 (Four) Hours As Needed for Wheezing., Disp: 18 g, Rfl: 5  •  aspirin 81 MG EC tablet, Take 1 tablet by mouth Daily., Disp: , Rfl:   •  Benralizumab (Fasenra Pen) 30 MG/ML solution auto-injector, Inject 30 mg under the skin into the appropriate area as directed Every 2 (Two) Months., Disp: 1 mL, Rfl: 5  •  celecoxib (CeleBREX) 200 MG capsule, Take 1 capsule by mouth Daily., Disp: , Rfl:   •  escitalopram (LEXAPRO) 20 MG tablet, TAKE 1 TABLET BY MOUTH EVERY DAY, Disp: 90 tablet, Rfl: 0  •  Fluticasone  Furoate-Vilanterol (Breo Ellipta) 100-25 MCG/ACT aerosol powder , Inhale 1 puff Daily., Disp: 60 each, Rfl: 11  •  levothyroxine (SYNTHROID, LEVOTHROID) 75 MCG tablet, TAKE 1 TABLET BY MOUTH EVERY DAY IN THE MORNING, Disp: 30 tablet, Rfl: 1  •  naproxen (NAPROSYN) 500 MG tablet, TAKE 1 TABLET BY MOUTH TWICE A DAY WITH MEALS, Disp: 60 tablet, Rfl: 1  •  oxybutynin XL (DITROPAN XL) 15 MG 24 hr tablet, Take 10 mg by mouth Daily., Disp: , Rfl:   •  pantoprazole (PROTONIX) 40 MG EC tablet, Take 1 tablet by mouth 2 (Two) Times a Day., Disp: 180 tablet, Rfl: 0  •  Pramipexole Dihydrochloride ER (Mirapex ER) 2.25 MG extended-release tablet, Take 1 tablet by mouth Every Night., Disp: 30 tablet, Rfl: 5  •  pravastatin (PRAVACHOL) 40 MG tablet, Take 1 tablet by mouth every night at bedtime., Disp: 90 tablet, Rfl: 1  •  QUEtiapine (SEROquel) 25 MG tablet, Take 1 tablet by mouth Every Night., Disp: 90 tablet, Rfl: 1  •  ubrogepant 100 MG tablet, TAKE 1 TABLET BY MOUTH FOR MIGRAINE FOR UP TO ONE DOSE MAY REPEAT DOSE AFTER 2 HOURS IF HEADACHE NOT RESOLVED, Disp: 10 tablet, Rfl: 1  •  pregabalin (Lyrica) 75 MG capsule, Take 1 capsule by mouth Every Night., Disp: 30 capsule, Rfl: 3   Past Medical History:   Diagnosis Date   • Acquired hypothyroidism    • Anemia     after hip replacement    • Arthritis     hips and knees   • Asthma, extrinsic 01/01/2021    Per lung doctor   • CTS (carpal tunnel syndrome)     Had both wrist done   • Difficulty walking    • GERD (gastroesophageal reflux disease) 2021   • History of sepsis 08/2021    hospitalized x1 week;   • History of transfusion     2 units of autologus blood after 1st hip surgery-[facility]   • Low back pain     BACK PAIN WERE I HAD HIP REPLANCED   • Lung nodule 01/01/2021   • Memory loss    • Migraine     h/o   • Mixed hyperlipidemia 07/20/2020   • Moderate persistent asthma 12/02/2021   • PONV (postoperative nausea and vomiting)     preprocedural meds help and pt will need them    •  Rheumatoid arthritis (HCC) 2015   • Seizure (HCC) 2020   • Spinal headache     pt states after hip surgery 2022-longer bedrest   • Stroke (HCC)     no residual effects   • Wears glasses       Past Surgical History:   Procedure Laterality Date   • CARDIAC CATHETERIZATION     • CARDIAC VALVE REPLACEMENT  2011    PFO   • CARPAL TUNNEL RELEASE Bilateral 2017   •  SECTION     • CHOLECYSTECTOMY     • COLONOSCOPY     • ENDOSCOPY     • FRACTURE SURGERY      left leg- hardware in place    • JOINT REPLACEMENT      TOTAL HIP REPLACEMENT   • KNEE ARTHROSCOPY Right     done twice   • PATELLA SURGERY Left     removed left knee cap   • PATENT FORAMEN OVALE CLOSURE     • QUADRICEPS TENDON REPAIR Right 2018   • REPLACEMENT TOTAL KNEE Right 2018    revision    • TOTAL HIP ARTHROPLASTY Left    • TOTAL HIP ARTHROPLASTY Right 2019    Procedure: DIRECT TOTAL HIP ARTHROPLASTY ANTERIOR RIGHT;  Surgeon: Mele Chatterjee MD;  Location:  [facility];  Service: Orthopedics   • TOTAL HIP ARTHROPLASTY REVISION Left 2022    Procedure: TOTAL HIP REVISION LEFT;  Surgeon: Mele Chatterjee MD;  Location:  [facility];  Service: Orthopedics;  Laterality: Left;   • TOTAL HIP ARTHROPLASTY REVISION Left 2022    Procedure: HIP REVISION LEFT, FEMORAL AND ACETABULAR COMPONENT;  Surgeon: Mele Chatterjee MD;  Location:  [facility];  Service: Orthopedics;  Laterality: Left;   • TUBAL ABDOMINAL LIGATION        Family History   Problem Relation Age of Onset   • Hypertension Mother    • Migraines Mother    • Thyroid disease Mother    • Hypertension Father    • COPD Father    • Asthma Father    • Breast cancer Maternal Cousin         unknown   • Rectal cancer Sister    • Ovarian cancer Neg Hx       Social History     Socioeconomic History   • Marital status:    Tobacco Use   • Smoking status: Never     Passive exposure: Never   • Smokeless tobacco: Never   Vaping Use   • Vaping  "Use: Never used   Substance and Sexual Activity   • Alcohol use: Not Currently     Comment: rare   • Drug use: No   • Sexual activity: Yes     Partners: Male     Birth control/protection: Post-menopausal, None, Tubal ligation     Comment: TUBES TIDE     Review of Systems   Musculoskeletal: Positive for arthralgias and gait problem.   Neurological: Positive for numbness.   All other systems reviewed and are negative.      Objective:    /68   Pulse 88   Ht 152.4 cm (60\")   Wt 77.1 kg (170 lb)   LMP  (LMP Unknown)   SpO2 96%   BMI 33.20 kg/m²     Neurology Exam: Reviewed and remains unchanged    General apperance: NAD.     Mental status: Alert, awake and oriented to time place and person.    Recent and Remote memory: Intact.    Attention span and Concentration: Normal.     Language and Speech: Intact- No dysarthria.    Fluency, Naming , Repitition and Comprehension:  Intact    Cranial Nerves:   CN II: Visual fields are full. Intact. Fundi - Normal, No papillederma, Pupils - KAYLYN  CN III, IV and VI: Extraocular movements are intact. Normal saccades.   CN V: Facial sensation is intact.   CN VII: Muscles of facial expression reveal no asymmetry. Intact.   CN VIII: Hearing is intact. Whispered voice intact.   CN IX and X: Palate elevates symmetrically. Intact  CN XI: Shoulder shrug is intact.   CN XII: Tongue is midline without evidence of atrophy or fasciculation.       Motor:  Right UE muscle strength 5/5. Normal tone.     Left UE muscle strength 5/5. Normal tone.      Right LE muscle strength5-/5. Normal tone.     Left LE muscle strength 5-/5. Normal tone.      Sensory: Mildly reduced to pinprick in the first and last digit of the left hand as well as reduced to pinprick in feet to level of ankle with markedly reduced vibration    DTRs: 2+ bilaterally in upper and lower extremities.    Babinski: Negative bilaterally.    Co-ordination: Normal finger-to-nose, heel to shin B/L.    Rhomberg: Negative.    Gait: " Walks with a limp due to limb length discrepancy.  Uses a cane    Cardiovascular: Regular rate and rhythm without murmur, gallop or rub.      Assessment and Plan:    1. RLS  The symptoms that she is having during daytime could be due to augmentation.    She should continue Mirapex ER 2.25 mg nightly and iron supplements  I will switch her from gabapentin to Lyrica  She should also avoid taking complex sugars/carbohydrates in the evening.    2. Peripheral neuropathy   Based on her symptoms and signs I feel she does have peripheral neuropathy.  I will check a neuropathy panel today  Since gabapentin is not helping I have told her to stop taking it.  Instead I will start her on Lyrica 75 mg nightly  I discussed getting an EMG/NCS but she wants to wait as it was very painful the last time she had it    3.  Seizure (CMS/HCC)  Patient could have had a seizure in the setting of sepsis.  Repeat EEG showed mild frontal slowing but no epileptiform activity.   She has now tapered and stopped Keppra.  Has had no more seizures      Return in about 3 months (around 5/28/2023).     I spent 25 minutes with the patient out of which 20 minutes was spent face to face  Sarai Chirinos MD

## 2023-03-06 ENCOUNTER — TELEPHONE (OUTPATIENT)
Dept: NEUROLOGY | Facility: CLINIC | Age: 58
End: 2023-03-06
Payer: COMMERCIAL

## 2023-03-06 NOTE — TELEPHONE ENCOUNTER
Caller: THANIA Rutledge call back number: [phone]    What orders are you requesting (i.e. lab or imaging): LAB     In what timeframe would the patient need to come in: ASAP    Where will you receive your lab/imaging services: [facility]     Additional notes: PT NEEDS LABS SENT TO [facility] [phone]    PLEASE ADVISE

## 2023-03-09 DIAGNOSIS — E03.9 ACQUIRED HYPOTHYROIDISM: ICD-10-CM

## 2023-03-10 DIAGNOSIS — M16.11 ARTHRITIS OF RIGHT HIP: ICD-10-CM

## 2023-03-10 DIAGNOSIS — M25.551 RIGHT HIP PAIN: ICD-10-CM

## 2023-03-10 RX ORDER — LEVOTHYROXINE SODIUM 0.07 MG/1
TABLET ORAL
Qty: 30 TABLET | Refills: 1 | Status: SHIPPED | OUTPATIENT
Start: 2023-03-10 | End: 2023-04-07

## 2023-03-10 RX ORDER — NAPROXEN 500 MG/1
TABLET ORAL
Qty: 60 TABLET | Refills: 1 | Status: SHIPPED | OUTPATIENT
Start: 2023-03-10

## 2023-03-31 DIAGNOSIS — E78.2 MIXED HYPERLIPIDEMIA: ICD-10-CM

## 2023-03-31 RX ORDER — PRAVASTATIN SODIUM 40 MG
TABLET ORAL
Qty: 90 TABLET | Refills: 1 | Status: SHIPPED | OUTPATIENT
Start: 2023-03-31

## 2023-04-07 DIAGNOSIS — E03.9 ACQUIRED HYPOTHYROIDISM: ICD-10-CM

## 2023-04-07 RX ORDER — LEVOTHYROXINE SODIUM 0.07 MG/1
75 TABLET ORAL EVERY MORNING
Qty: 30 TABLET | Refills: 0 | Status: SHIPPED | OUTPATIENT
Start: 2023-04-07

## 2023-04-17 DIAGNOSIS — R45.4 IRRITABILITY: ICD-10-CM

## 2023-04-17 DIAGNOSIS — G43.119 INTRACTABLE MIGRAINE WITH AURA WITHOUT STATUS MIGRAINOSUS: ICD-10-CM

## 2023-04-17 RX ORDER — ESCITALOPRAM OXALATE 20 MG/1
TABLET ORAL
Qty: 90 TABLET | Refills: 0 | Status: SHIPPED | OUTPATIENT
Start: 2023-04-17

## 2023-04-18 ENCOUNTER — PRIOR AUTHORIZATION (OUTPATIENT)
Dept: FAMILY MEDICINE CLINIC | Facility: CLINIC | Age: 58
End: 2023-04-18
Payer: COMMERCIAL

## 2023-05-04 DIAGNOSIS — E03.9 ACQUIRED HYPOTHYROIDISM: ICD-10-CM

## 2023-05-04 RX ORDER — LEVOTHYROXINE SODIUM 0.07 MG/1
75 TABLET ORAL EVERY MORNING
Qty: 30 TABLET | Refills: 0 | Status: SHIPPED | OUTPATIENT
Start: 2023-05-04

## 2023-05-13 ENCOUNTER — READMISSION MANAGEMENT (OUTPATIENT)
Dept: CALL CENTER | Facility: HOSPITAL | Age: 58
End: 2023-05-13
Payer: COMMERCIAL

## 2023-05-14 NOTE — OUTREACH NOTE
Prep Survey    Flowsheet Row Responses   [facility] facility patient discharged from? Non-BH   Is LACE score < 7 ? Non-BH Discharge   Eligibility [facility]   Date of Discharge 05/13/23   [place] [facility]   Discharge diagnosis Encounter for other orthopedic aftercare   Does the patient have one of the following disease processes/diagnoses(primary or secondary)? Other   Prep survey completed? Yes          Syeda SOLANO - Registered Nurse

## 2023-05-15 ENCOUNTER — TRANSITIONAL CARE MANAGEMENT TELEPHONE ENCOUNTER (OUTPATIENT)
Dept: CALL CENTER | Facility: HOSPITAL | Age: 58
End: 2023-05-15
Payer: COMMERCIAL

## 2023-05-15 NOTE — OUTREACH NOTE
Call Center TCM Note    Flowsheet Row Responses   [facility] patient discharged from? Non-   Does the patient have one of the following disease processes/diagnoses(primary or secondary)? Other   TCM attempt successful? Yes   Call start time 0936   Call end time 0938   Discharge diagnosis Encounter for other orthopedic aftercare   Person spoke with today (if not patient) and relationship    Meds reviewed with patient/caregiver? Yes   Is the patient having any side effects they believe may be caused by any medication additions or changes? No   Does the patient have all medications ordered at discharge? Yes   Is the patient taking all medications as directed (includes completed medication regime)? Yes   Does the patient have an appointment with their PCP within 7 days of discharge? No   Nursing Interventions Patient declined scheduling/rescheduling appointment at this time   Psychosocial issues? No   Did the patient receive a copy of their discharge instructions? Yes   Nursing interventions Reviewed instructions with patient   What is the patient's perception of their health status since discharge? Improving   Is the patient/caregiver able to teach back signs and symptoms related to disease process for when to call PCP? Yes   Is the patient/caregiver able to teach back signs and symptoms related to disease process for when to call 911? Yes   Is the patient/caregiver able to teach back the hierarchy of who to call/visit for symptoms/problems? PCP, Specialist, Home health nurse, Urgent Care, ED, 911 Yes   If the patient is a current smoker, are they able to teach back resources for cessation? Not a smoker   TCM call completed? Yes   Wrap up additional comments Spoke with . Reports patient is home from [facility] now and doing well.    Call end time 0938   Would this patient benefit from a Referral to [facility] Social Work? No   Is the patient interested in additional calls from an ambulatory case  manager?  NOTE:  applies to high risk patients requiring additional follow-up. No          Dante He RN    5/15/2023, 09:38 EDT

## 2023-05-28 DIAGNOSIS — E03.9 ACQUIRED HYPOTHYROIDISM: ICD-10-CM

## 2023-05-30 RX ORDER — LEVOTHYROXINE SODIUM 0.07 MG/1
75 TABLET ORAL EVERY MORNING
Qty: 30 TABLET | Refills: 0 | Status: SHIPPED | OUTPATIENT
Start: 2023-05-30

## 2023-06-01 ENCOUNTER — OFFICE VISIT (OUTPATIENT)
Dept: FAMILY MEDICINE CLINIC | Facility: CLINIC | Age: 58
End: 2023-06-01

## 2023-06-01 VITALS
HEART RATE: 92 BPM | DIASTOLIC BLOOD PRESSURE: 68 MMHG | TEMPERATURE: 97.7 F | RESPIRATION RATE: 18 BRPM | OXYGEN SATURATION: 98 % | WEIGHT: 171 LBS | BODY MASS INDEX: 33.57 KG/M2 | HEIGHT: 60 IN | SYSTOLIC BLOOD PRESSURE: 122 MMHG

## 2023-06-01 DIAGNOSIS — M19.019 SHOULDER ARTHRITIS: ICD-10-CM

## 2023-06-01 DIAGNOSIS — Z09 HOSPITAL DISCHARGE FOLLOW-UP: Primary | ICD-10-CM

## 2023-06-01 DIAGNOSIS — M43.16 SPONDYLOLISTHESIS OF LUMBAR REGION: ICD-10-CM

## 2023-06-01 RX ORDER — OXYCODONE HYDROCHLORIDE AND ACETAMINOPHEN 5; 325 MG/1; MG/1
TABLET ORAL
COMMUNITY
Start: 2023-05-23

## 2023-06-01 RX ORDER — PRAMIPEXOLE 2.25 MG/1
TABLET, EXTENDED RELEASE ORAL
Qty: 90 TABLET | Refills: 1 | Status: SHIPPED | OUTPATIENT
Start: 2023-06-01

## 2023-06-01 RX ORDER — CYCLOBENZAPRINE HCL 10 MG
10 TABLET ORAL 3 TIMES DAILY PRN
Qty: 30 TABLET | Refills: 0 | Status: SHIPPED | OUTPATIENT
Start: 2023-06-01

## 2023-06-01 RX ORDER — LIDOCAINE 50 MG/G
1 PATCH TOPICAL EVERY 24 HOURS
Qty: 30 EACH | Refills: 1 | Status: SHIPPED | OUTPATIENT
Start: 2023-06-01

## 2023-06-01 NOTE — TELEPHONE ENCOUNTER
Rx Refill Note  Requested Prescriptions     Pending Prescriptions Disp Refills   • Pramipexole Dihydrochloride ER 2.25 MG extended-release tablet [Pharmacy Med Name: PRAMIPEXOLE ER 2.25 MG TABLET] 90 tablet 1     Sig: TAKE 1 TABLET BY MOUTH EVERY DAY AT NIGHT      Last filled: 12/20/2022 w/5  Last office visit with prescribing clinician: 2/28/2023      Next office visit with prescribing clinician: 6/14/2023     Matheus Hernandez Rep  06/01/23, 11:26 EDT

## 2023-06-01 NOTE — PROGRESS NOTES
Chief Complaint  Hospital f/u (Surgery done to put anil in back. Records requested)    Subjective          Astrid Sam presents to [facility]  History of Present Illness    Astrid Sam presents today for hospital follow up.     The patient had lumbar surgery in 04/26/23, she is unsure of exact procedure and records are not available for review at the time of her appointment. She was admitted 04/26/23 at [facility] and discharged on 05/13/23 after her stay in [facility]. Her surgery was performed by Dr. Presley. She followed up to have her staples removed and has another follow up appointment on 06/08/23 with Dr. Presley. She notes that her right side has improved but she is still experiencing some pain and soreness on her left side. She is receiving physical therapy at home, hoping to transition to outpatient physical therapy soon.     She is currently taking Percocet for pain. She was taking Flexeril and would like to have her prescription refilled. The patient is also interested in Lidocaine patches for pain.     She is seeing Dr. Alfaro in pulmonary on 06/07/23 and Dr. Chirinos in neurology on 06/14/23. She is seeing neurology for pain and burning in her feet.     The following portions of the patient's history were reviewed and updated as appropriate: allergies, current medications, past family history, past medical history, past social history, past surgical history and problem list.    Objective      Physical Exam  Vitals reviewed.   Cardiovascular:      Rate and Rhythm: Normal rate.      Heart sounds: Normal heart sounds.   Pulmonary:      Effort: Pulmonary effort is normal.      Breath sounds: Normal breath sounds. No wheezing.   Musculoskeletal:      Comments: Ambulating with walker   Neurological:      Mental Status: She is alert.        Result Review :                Assessment and Plan    Diagnoses and all orders for this visit:    1. Hospital  discharge follow-up (Primary)    2. Spondylolisthesis of lumbar region  -     cyclobenzaprine (FLEXERIL) 10 MG tablet; Take 1 tablet by mouth 3 (Three) Times a Day As Needed for Muscle Spasms.  Dispense: 30 tablet; Refill: 0    3. Shoulder arthritis  -     lidocaine (LIDODERM) 5 %; Place 1 patch on the skin as directed by provider Daily. Remove & Discard patch within 12 hours or as directed by MD  Dispense: 30 each; Refill: 1    Continue care with orthopedics and physical therapy.     Transcribed from ambient dictation for Beckie Murphy DO by Jenny Michaud  06/01/23  12:29 EDT      Follow Up   No follow-ups on file.  Patient was given instructions and counseling regarding her condition or for health maintenance advice. Please see specific information pulled into the AVS if appropriate.

## 2023-06-10 DIAGNOSIS — G43.119 INTRACTABLE MIGRAINE WITH AURA WITHOUT STATUS MIGRAINOSUS: ICD-10-CM

## 2023-06-10 DIAGNOSIS — M43.16 SPONDYLOLISTHESIS OF LUMBAR REGION: ICD-10-CM

## 2023-06-12 ENCOUNTER — TELEMEDICINE (OUTPATIENT)
Dept: PULMONOLOGY | Facility: CLINIC | Age: 58
End: 2023-06-12
Payer: MEDICARE

## 2023-06-12 DIAGNOSIS — J45.40 MODERATE PERSISTENT ASTHMA, UNSPECIFIED WHETHER COMPLICATED: Primary | ICD-10-CM

## 2023-06-12 DIAGNOSIS — G62.9 POLYNEUROPATHY: ICD-10-CM

## 2023-06-12 DIAGNOSIS — M43.16 SPONDYLOLISTHESIS OF LUMBAR REGION: ICD-10-CM

## 2023-06-12 DIAGNOSIS — R05.3 CHRONIC COUGH: ICD-10-CM

## 2023-06-12 DIAGNOSIS — M19.019 SHOULDER ARTHRITIS: ICD-10-CM

## 2023-06-12 DIAGNOSIS — R45.4 IRRITABILITY: ICD-10-CM

## 2023-06-12 PROCEDURE — 99213 OFFICE O/P EST LOW 20 MIN: CPT | Performed by: NURSE PRACTITIONER

## 2023-06-12 PROCEDURE — 1159F MED LIST DOCD IN RCRD: CPT | Performed by: NURSE PRACTITIONER

## 2023-06-12 PROCEDURE — 1160F RVW MEDS BY RX/DR IN RCRD: CPT | Performed by: NURSE PRACTITIONER

## 2023-06-12 RX ORDER — CYCLOBENZAPRINE HCL 10 MG
10 TABLET ORAL 3 TIMES DAILY PRN
Qty: 30 TABLET | Refills: 0 | OUTPATIENT
Start: 2023-06-12

## 2023-06-12 RX ORDER — CYCLOBENZAPRINE HCL 10 MG
TABLET ORAL
Qty: 30 TABLET | Refills: 0 | OUTPATIENT
Start: 2023-06-12

## 2023-06-12 RX ORDER — LIDOCAINE 50 MG/G
1 PATCH TOPICAL EVERY 24 HOURS
Qty: 30 EACH | Refills: 1 | OUTPATIENT
Start: 2023-06-12

## 2023-06-12 RX ORDER — PREGABALIN 75 MG/1
CAPSULE ORAL
Qty: 30 CAPSULE | Refills: 3 | Status: SHIPPED | OUTPATIENT
Start: 2023-06-12 | End: 2023-06-14 | Stop reason: SDUPTHER

## 2023-06-12 RX ORDER — ESCITALOPRAM OXALATE 20 MG/1
TABLET ORAL
Qty: 90 TABLET | Refills: 0 | OUTPATIENT
Start: 2023-06-12

## 2023-06-12 NOTE — PROGRESS NOTES
[facility]    CHIEF COMPLAINT    cough    HISTORY OF PRESENT ILLNESS    Astrid Sam is a 57 y.o.female here today for follow-up.  She was last seen in the office by me in January.  She denies any respiratory illnesses since her last appointment.    She continues to use the Breo inhaler daily.  She does not notice if it helps that much.  She uses the albuterol at the most once a day.  She does not use this every day.  She denies any sputum production or hemoptysis.    She continues to get Fasenra injections and her next injection is due in July.    She denies any chest pain or palpitations.  She denies any lower extremity edema or calf tenderness.    She did have a surgery and went to [facility] for rehabilitation and has been home for about a month.  She had the surgery at [facility].    She denies any reflux symptoms.    She is a lifetime non-smoker.    Patient Active Problem List   Diagnosis    Migraine    Insomnia    Iron deficiency anemia    Cervical radicular pain    Diarrhea    Irritability    Acquired hypothyroidism    Fever blister    Arthritis of right hip    Urge incontinence of urine    Mixed hyperlipidemia    Hypokalemia    History of osteomyelitis    Seizure    Moderate persistent asthma    Neil's esophagus with dysplasia    status post total replacement revision of left hip    S/P hip revision: Femoral and and acetabular component    Restless leg syndrome    Obesity (BMI 30.0-34.9)    Chronic cough       Allergies   Allergen Reactions    Morphine Nausea And Vomiting       Current Outpatient Medications:     albuterol sulfate  (90 Base) MCG/ACT inhaler, Inhale 2 puffs Every 4 (Four) Hours As Needed for Wheezing., Disp: 18 g, Rfl: 5    aspirin 81 MG EC tablet, Take 1 tablet by mouth Daily., Disp: , Rfl:     Benralizumab (Fasenra Pen) 30 MG/ML solution auto-injector, Inject 30 mg under the skin into the appropriate area as directed Every 2 (Two) Months.,  Disp: 1 mL, Rfl: 5    cyclobenzaprine (FLEXERIL) 10 MG tablet, Take 1 tablet by mouth 3 (Three) Times a Day As Needed for Muscle Spasms., Disp: 30 tablet, Rfl: 0    escitalopram (LEXAPRO) 20 MG tablet, TAKE 1 TABLET BY MOUTH EVERY DAY, Disp: 90 tablet, Rfl: 0    Fluticasone Furoate-Vilanterol (Breo Ellipta) 100-25 MCG/ACT aerosol powder , Inhale 1 puff Daily., Disp: 60 each, Rfl: 11    levothyroxine (SYNTHROID, LEVOTHROID) 75 MCG tablet, TAKE 1 TABLET BY MOUTH EVERY MORNING. PATIENT HAS LABS TO COMPLETE FOR FURTHER REFILLS, Disp: 30 tablet, Rfl: 0    lidocaine (LIDODERM) 5 %, Place 1 patch on the skin as directed by provider Daily. Remove & Discard patch within 12 hours or as directed by MD, Disp: 30 each, Rfl: 1    pantoprazole (PROTONIX) 40 MG EC tablet, Take 1 tablet by mouth 2 (Two) Times a Day., Disp: 180 tablet, Rfl: 0    Pramipexole Dihydrochloride ER 2.25 MG extended-release tablet, TAKE 1 TABLET BY MOUTH EVERY DAY AT NIGHT, Disp: 90 tablet, Rfl: 1    pravastatin (PRAVACHOL) 40 MG tablet, TAKE 1 TABLET BY MOUTH EVERYDAY AT BEDTIME, Disp: 90 tablet, Rfl: 1    pregabalin (Lyrica) 75 MG capsule, Take 1 capsule by mouth Every Night., Disp: 30 capsule, Rfl: 3    QUEtiapine (SEROquel) 25 MG tablet, Take 1 tablet by mouth Every Night., Disp: 90 tablet, Rfl: 1    ubrogepant (Ubrelvy) 100 MG tablet, TAKE 1 TABLET BY MOUTH FOR MIGRAINE FOR UP TO ONE DOSE MAY REPEAT DOSE AFTER 2 HOURS IF HEADACHE NOT RESOLVED, Disp: 10 tablet, Rfl: 1  MEDICATION LIST AND ALLERGIES REVIEWED.    Social History     Tobacco Use    Smoking status: Never     Passive exposure: Never    Smokeless tobacco: Never   Vaping Use    Vaping Use: Never used   Substance Use Topics    Alcohol use: Not Currently     Comment: rare    Drug use: No       FAMILY AND SOCIAL HISTORY REVIEWED.    Review of Systems   Constitutional:  Negative for activity change, appetite change, fatigue, fever and unexpected weight change.   HENT:  Negative for congestion,  postnasal drip, rhinorrhea, sinus pressure, sore throat and voice change.    Eyes:  Negative for visual disturbance.   Respiratory:  Positive for cough. Negative for chest tightness, shortness of breath and wheezing.    Cardiovascular:  Negative for chest pain, palpitations and leg swelling.   Gastrointestinal:  Negative for abdominal distention, abdominal pain, nausea and vomiting.   Endocrine: Negative for cold intolerance and heat intolerance.   Genitourinary:  Negative for difficulty urinating and urgency.   Musculoskeletal:  Negative for arthralgias, back pain and neck pain.   Skin:  Negative for color change and pallor.   Allergic/Immunologic: Negative for environmental allergies and food allergies.   Neurological:  Negative for dizziness, syncope, weakness and light-headedness.   Hematological:  Negative for adenopathy. Does not bruise/bleed easily.   Psychiatric/Behavioral:  Negative for agitation and behavioral problems.  .    LMP  (LMP Unknown)     Immunization History   Administered Date(s) Administered    COVID-19 (PFIZER) Purple Cap Monovalent 04/05/2021, 05/03/2021    FluLaval/Fluzone >6mos 12/10/2021       Physical Exam    Unable to do physical exam due to telemedicine visit, patient was in no respiratory distress throughout the entire visit.    RESULTS      PROBLEM LIST    Problem List Items Addressed This Visit          Pulmonary and Pneumonias    Moderate persistent asthma - Primary    Chronic cough         DISCUSSION    You have chosen to receive care through a telehealth visit.  Do you consent to use a video/audio connection for your medical care today? Yes    Ms. Sam was here for follow-up of her asthma.  She seems to doing fairly well from pulmonary standpoint.  She was unable to come to the office today due to transportation issues.    She will continue the Breo for now.  I advised if she wanted to stop the inhaler she could to see how her symptoms were.  If her symptoms worsen she needs  started back.  I did encourage her to use the albuterol for shortness of breath.  I did advise her if she is using the albuterol 2-3 times per day she needs to go back to the [facility].    She will continue to receive the Fasenra injections every 8 weeks.  She is due for her injection in July.    She will follow-up in 6 months.  I personally spent a total of 25 minutes on patient visit today including chart review, face to face with the patient obtaining the history and physical exam, review of pertinent images and tests, counseling and discussion and/or coordination of care as described above, and documentation.  Total time excludes time spent on other separate services such as performing procedures or test interpretation, if applicable.        Sherry Moya, JOHNATHAN  06/12/202314:04 EDT  Electronically signed     Please note that portions of this note were completed with a voice recognition program.        CC: Beckie Murphy, DO

## 2023-06-12 NOTE — TELEPHONE ENCOUNTER
Rx Refill Note  Requested Prescriptions     Pending Prescriptions Disp Refills    pregabalin (LYRICA) 75 MG capsule [Pharmacy Med Name: PREGABALIN 75 MG CAPSULE] 30 capsule 3     Sig: TAKE 1 CAPSULE BY MOUTH EVERY DAY AT NIGHT      Last filled:02/28/23  Last office visit  02/28/23 with prescribing clinician: 2/28/2023      Next office visit with prescribing clinician: 6/12/2023     Lissette Calloway MA  06/12/23, 15:06 EDT

## 2023-06-14 ENCOUNTER — OFFICE VISIT (OUTPATIENT)
Dept: NEUROLOGY | Facility: CLINIC | Age: 58
End: 2023-06-14
Payer: MEDICARE

## 2023-06-14 VITALS
DIASTOLIC BLOOD PRESSURE: 78 MMHG | SYSTOLIC BLOOD PRESSURE: 124 MMHG | HEART RATE: 113 BPM | OXYGEN SATURATION: 95 % | HEIGHT: 60 IN | BODY MASS INDEX: 33.57 KG/M2 | WEIGHT: 171 LBS

## 2023-06-14 DIAGNOSIS — G62.9 POLYNEUROPATHY: ICD-10-CM

## 2023-06-14 DIAGNOSIS — G25.81 RESTLESS LEGS SYNDROME (RLS): Primary | ICD-10-CM

## 2023-06-14 PROCEDURE — 1160F RVW MEDS BY RX/DR IN RCRD: CPT | Performed by: PSYCHIATRY & NEUROLOGY

## 2023-06-14 PROCEDURE — 1159F MED LIST DOCD IN RCRD: CPT | Performed by: PSYCHIATRY & NEUROLOGY

## 2023-06-14 PROCEDURE — 99214 OFFICE O/P EST MOD 30 MIN: CPT | Performed by: PSYCHIATRY & NEUROLOGY

## 2023-06-14 RX ORDER — PRAMIPEXOLE 2.25 MG/1
2.25 TABLET, EXTENDED RELEASE ORAL
Qty: 90 TABLET | Refills: 2 | Status: SHIPPED | OUTPATIENT
Start: 2023-06-14

## 2023-06-14 RX ORDER — OXYBUTYNIN CHLORIDE 15 MG/1
1 TABLET, EXTENDED RELEASE ORAL DAILY
COMMUNITY
Start: 2023-06-11

## 2023-06-14 RX ORDER — PRAMIPEXOLE DIHYDROCHLORIDE 0.5 MG/1
TABLET ORAL
Qty: 90 TABLET | Refills: 0 | Status: SHIPPED | OUTPATIENT
Start: 2023-06-14

## 2023-06-14 RX ORDER — PREGABALIN 75 MG/1
75 CAPSULE ORAL NIGHTLY
Qty: 30 CAPSULE | Refills: 5 | Status: SHIPPED | OUTPATIENT
Start: 2023-06-14

## 2023-06-14 NOTE — PROGRESS NOTES
Subjective:    CC: Astrid Sam is seen today for RLS/neuropathy    HPI:  Current visit-patient states that her RLS symptoms are well controlled most nights with Mirapex ER 2.25 mg but sometimes she has flareups where she has to move her legs and is unable to sleep.  Also switched from gabapentin to Lyrica 75 mg nightly which has helped with the burning in her feet.  She had her neuropathy panel at an outside facility but we never received the results.  Denies having any seizures.    Last visit-patient states that since she switched from regular pramipexole to Mirapex ER and increased her dose to 2.25 mg nightly her symptoms of RLS have improved.  She is also taking iron supplements.  She continues to take gabapentin 300 mg nightly that was started for her RLS but does not think it is helping much.  She also has skin breakdown with it.  Today she also complains of a new symptom of numbness in her fingertips as well as burning in her feet.  She has previously had carpal tunnel surgery in both wrists several years ago.  Denies any history of diabetes or heavy alcohol intake.    she denies any episodes of confusion or seizure-like activity since her last visit.  However she tells me today that her restless leg symptoms have been bothering her a lot as she has to get up and walk.  They were previously only occurring at night but have recently started to occur during the daytime as well.  She takes Mirapex 0.75 mg around 4:30 but for the past few weeks has also been taking an additional dose at bedtime.  Her PCP restarted her back on gabapentin 300 mg nightly that helps some.  She was severely anemic while she was in the hospital in June during hip surgery but since then she has not been taking any iron supplements.    Last visit-patient states that she tapered and stopped Keppra after her last visit and has not had any more episodes of confusion, shaking or loss of awareness.  She occasionally has word finding  problems.  With regards to her RLS she continues to take Mirapex 0.75 mg at night and baclofen 10 mg nightly for leg jerks that are now well controlled.    Last visit-patient had an EEG off medications which showed bifrontal slowing as well as excessive beta activity but no epileptiform activity.  She states she resumed her Keppra 500 mg twice a day a few days after stopping it as her  noted that patient was having leg jerks during sleep.  She does have RLS and takes Mirapex 0.75 mg at night.  Also takes trazodone for sleep in addition to Flexeril 10 mg 3 times a day for muscle spasms.  Denies having any episodes of staring or whole-body shaking.  On a few occasions her  has also mentioned to her that she speaks something but does not make sense although the patient herself does not realize she is doing it.    Initial [id number]-old female with a past medical history of migraine headaches, hyperlipidemia, TIA status post PFO closure in 2011, bilateral hip replacement presents as a hospital follow-up for encephalopathy/new onset seizure.  As per patient she had an ACDF procedure on C5-6 in June.  After that she also developed osteomyelitis of the right sternoclavicular joint for which he underwent debridement and was placed on IV antibiotics (Rocephin and daptomycin).  Then in August she was placed on p.o. doxycycline.  A few days before her hospitalization she went out in the sun and developed a diffuse rash.  Took Benadryl which made her slightly confused.  2 days later she was found at home by her daughter extremely confused having thrown up all over herself.  She took her to [facility] initially where she had a CT head that was unremarkable.  After that she developed left arm jerking with blinking and staring ahead and space.  Patient states that she does not remember the next 3 days.  After she was transferred to [facility] her white count was elevated at 17.2.  Her CRP was also mildly  elevated and she had a T-max of 99.7.  Subsequent blood cultures were negative.  Initially her EEG showed moderate generalized slowing with frequent generalized sharp/slow wave complexes with triphasic morphology.  She was given Ativan and IV Keppra.  CT head and CT perfusion were  unremarkable.  CT angiogram of the head showed narrowing of the right distal M2/M3.  CTA of the neck did not show any significant stenosis.  MRI of the brain also did not show any acute intracranial abnormalities or severe chronic ischemic changes.  She also had a lumbar puncture that failed to show any evidence of a CNS infection.  EEG was repeated in 2 days and showed just mild generalized slowing with no epileptiform activity.  It was presumed that patient could have had seizures/encephalopathy in the presence of sepsis versus due to high doses of Valtrex that she had been presumably taking at home however patient denies that as she states that she had not taken that medication in over 1 year.  She was discharged home on Keppra 500 mg twice a day.  Has not had any more episodes since.  She continues to have generalized weakness.  Had been getting PT up until last week.  She denies having any febrile seizures as a child, abnormal birth history or severe concussions.  There is a family history of seizures in her brother who is also a diabetic  Of note-I personally reviewed her MRI brain and Dr. Guerrier's notes from the hospital    The following portions of the patient's history were reviewed today and updated as of 11/04/2020  : allergies, current medications, past family history, past medical history, past social history, past surgical history and problem list  These document will be scanned to patient's chart.      Current Outpatient Medications:     albuterol sulfate  (90 Base) MCG/ACT inhaler, Inhale 2 puffs Every 4 (Four) Hours As Needed for Wheezing., Disp: 18 g, Rfl: 5    aspirin 81 MG EC tablet, Take 1 tablet by mouth  Daily., Disp: , Rfl:     Benralizumab (Fasenra Pen) 30 MG/ML solution auto-injector, Inject 30 mg under the skin into the appropriate area as directed Every 2 (Two) Months., Disp: 1 mL, Rfl: 5    cyclobenzaprine (FLEXERIL) 10 MG tablet, Take 1 tablet by mouth 3 (Three) Times a Day As Needed for Muscle Spasms., Disp: 30 tablet, Rfl: 0    escitalopram (LEXAPRO) 20 MG tablet, TAKE 1 TABLET BY MOUTH EVERY DAY, Disp: 90 tablet, Rfl: 0    Fluticasone Furoate-Vilanterol (Breo Ellipta) 100-25 MCG/ACT aerosol powder , Inhale 1 puff Daily., Disp: 60 each, Rfl: 11    levothyroxine (SYNTHROID, LEVOTHROID) 75 MCG tablet, TAKE 1 TABLET BY MOUTH EVERY MORNING. PATIENT HAS LABS TO COMPLETE FOR FURTHER REFILLS, Disp: 30 tablet, Rfl: 0    lidocaine (LIDODERM) 5 %, Place 1 patch on the skin as directed by provider Daily. Remove & Discard patch within 12 hours or as directed by MD, Disp: 30 each, Rfl: 1    oxybutynin XL (DITROPAN XL) 15 MG 24 hr tablet, Take 1 tablet by mouth Daily., Disp: , Rfl:     pantoprazole (PROTONIX) 40 MG EC tablet, Take 1 tablet by mouth 2 (Two) Times a Day., Disp: 180 tablet, Rfl: 0    Pramipexole Dihydrochloride ER 2.25 MG extended-release tablet, Take 1 tablet by mouth every night at bedtime., Disp: 90 tablet, Rfl: 2    pravastatin (PRAVACHOL) 40 MG tablet, TAKE 1 TABLET BY MOUTH EVERYDAY AT BEDTIME, Disp: 90 tablet, Rfl: 1    pregabalin (LYRICA) 75 MG capsule, Take 1 capsule by mouth Every Night., Disp: 30 capsule, Rfl: 5    QUEtiapine (SEROquel) 25 MG tablet, Take 1 tablet by mouth Every Night., Disp: 90 tablet, Rfl: 1    ubrogepant (Ubrelvy) 100 MG tablet, TAKE 1 TABLET BY MOUTH FOR MIGRAINE FOR UP TO ONE DOSE MAY REPEAT DOSE AFTER 2 HOURS IF HEADACHE NOT RESOLVED, Disp: 10 tablet, Rfl: 1    pramipexole (Mirapex) 0.5 MG tablet, Take 1 tablet as needed at night for symptoms of restless leg syndrome, Disp: 90 tablet, Rfl: 0   Past Medical History:   Diagnosis Date    Acquired hypothyroidism     Anemia      after hip replacement     Arthritis     hips and knees    Asthma, extrinsic 2021    Per lung doctor    CTS (carpal tunnel syndrome)     Had both wrist done    Difficulty walking     GERD (gastroesophageal reflux disease)     History of sepsis 2021    hospitalized x1 week;    History of transfusion     2 units of autologus blood after 1st hip surgery-[facility]    Low back pain     BACK PAIN WERE I HAD HIP REPLANCED    Lung nodule 2021    Memory loss     Migraine     h/o    Mixed hyperlipidemia 2020    Moderate persistent asthma 2021    PONV (postoperative nausea and vomiting)     preprocedural meds help and pt will need them     Rheumatoid arthritis 2015    Seizure 2020    Spinal headache     pt states after hip surgery 2022-longer bedrest    Stroke     no residual effects    Wears glasses       Past Surgical History:   Procedure Laterality Date    CARDIAC CATHETERIZATION      CARDIAC VALVE REPLACEMENT  2011    PFO    CARPAL TUNNEL RELEASE Bilateral      SECTION      CHOLECYSTECTOMY      COLONOSCOPY      ENDOSCOPY      FRACTURE SURGERY      left leg- hardware in place     JOINT REPLACEMENT      TOTAL HIP REPLACEMENT    KNEE ARTHROSCOPY Right     done twice    PATELLA SURGERY Left     removed left knee cap    PATENT FORAMEN OVALE CLOSURE      QUADRICEPS TENDON REPAIR Right 2018    REPLACEMENT TOTAL KNEE Right 2018    revision 2019    SPINE SURGERY  2020    TOTAL HIP ARTHROPLASTY Left     TOTAL HIP ARTHROPLASTY Right 2019    Procedure: DIRECT TOTAL HIP ARTHROPLASTY ANTERIOR RIGHT;  Surgeon: Mele Chatterjee MD;  Location:  [facility];  Service: Orthopedics    TOTAL HIP ARTHROPLASTY REVISION Left 2022    Procedure: TOTAL HIP REVISION LEFT;  Surgeon: Mele Chatterjee MD;  Location:  [facility];  Service: Orthopedics;  Laterality: Left;    TOTAL HIP ARTHROPLASTY REVISION Left 2022    Procedure: HIP REVISION LEFT,  "FEMORAL AND ACETABULAR COMPONENT;  Surgeon: Mele Chatterjee MD;  Location: [facility];  Service: Orthopedics;  Laterality: Left;    TUBAL ABDOMINAL LIGATION        Family History   Problem Relation Age of Onset    Hypertension Mother     Migraines Mother     Thyroid disease Mother     Arthritis Mother     Hypertension Father     COPD Father     Asthma Father     Arthritis Father     Hearing loss Father     Breast cancer Maternal Cousin         unknown    Rectal cancer Sister     Ovarian cancer Neg Hx       Social History     Socioeconomic History    Marital status:    Tobacco Use    Smoking status: Never     Passive exposure: Never    Smokeless tobacco: Never   Vaping Use    Vaping Use: Never used   Substance and Sexual Activity    Alcohol use: Not Currently     Comment: rare    Drug use: No    Sexual activity: Yes     Partners: Male     Birth control/protection: Post-menopausal, None, Tubal ligation     Comment: TUBES TIDE     Review of Systems   Musculoskeletal:  Positive for arthralgias and gait problem.   Neurological:  Positive for numbness.   All other systems reviewed and are negative.    Objective:    /78   Pulse 113   Ht 152.4 cm (60\")   Wt 77.6 kg (171 lb)   LMP  (LMP Unknown)   SpO2 95%   BMI 33.40 kg/m²     Neurology Exam: Reviewed and remains unchanged    General apperance: NAD.     Mental status: Alert, awake and oriented to time place and person.    Recent and Remote memory: Intact.    Attention span and Concentration: Normal.     Language and Speech: Intact- No dysarthria.    Fluency, Naming , Repitition and Comprehension:  Intact    Cranial Nerves:   CN II: Visual fields are full. Intact. Fundi - Normal, No papillederma, Pupils - KAYLYN  CN III, IV and VI: Extraocular movements are intact. Normal saccades.   CN V: Facial sensation is intact.   CN VII: Muscles of facial expression reveal no asymmetry. Intact.   CN VIII: Hearing is intact. Whispered voice intact.   CN IX and X: " Palate elevates symmetrically. Intact  CN XI: Shoulder shrug is intact.   CN XII: Tongue is midline without evidence of atrophy or fasciculation.       Motor:  Right UE muscle strength 5/5. Normal tone.     Left UE muscle strength 5/5. Normal tone.      Right LE muscle strength5-/5. Normal tone.     Left LE muscle strength 5-/5. Normal tone.      Sensory: Mildly reduced to pinprick in the first and last digit of the left hand as well as reduced to pinprick in feet to level of ankle with markedly reduced vibration    DTRs: 2+ bilaterally in upper and lower extremities.    Babinski: Negative bilaterally.    Co-ordination: Normal finger-to-nose, heel to shin B/L.    Rhomberg: Negative.    Gait: Walks with a limp due to limb length discrepancy.  Uses a cane or walker    Cardiovascular: Regular rate and rhythm without murmur, gallop or rub.      Assessment and Plan:    1. RLS  The symptoms that she is having during daytime could be due to augmentation.    She should continue Mirapex ER 2.25 mg nightly and iron supplements  She can take an extra dose of Mirapex 0.5 mg at bedtime as needed when she has flareups  Also on Lyrica  She should continue avoiding complex sugars/carbohydrates in the evening.    2. Peripheral neuropathy   Based on her symptoms and signs I feel she does have peripheral neuropathy.  She will fax us the results of her neuropathy panel  She should continue Lyrica 75 mg nightly  I had discussed getting an EMG/NCS but she wants to wait as it was very painful the last time she had it    3.  Seizure (CMS/HCC)  Patient could have had a seizure in the setting of sepsis.  Repeat EEG showed mild frontal slowing but no epileptiform activity.   She has now tapered and stopped Keppra.  Has had no more seizures      Return in about 6 months (around 12/14/2023).     I spent 25 minutes with the patient out of which 15 minutes was spent face to face  Sarai Chirinos MD

## 2023-06-14 NOTE — PROGRESS NOTES
Subjective:    CC: Astrid Sam is seen today for RLS/neuropathy    HPI:  Current visit- patient states that since she switched from regular pramipexole to Mirapex ER and increased her dose to 2.25 mg nightly her symptoms of RLS have improved.  She is also taking iron supplements.  She continues to take gabapentin 300 mg nightly that was started for her RLS but does not think it is helping much.  She also has skin breakdown with it.  Today she also complains of a new symptom of numbness in her fingertips as well as burning in her feet.  She has previously had carpal tunnel surgery in both wrists several years ago.  Denies any history of diabetes or heavy alcohol intake.    she denies any episodes of confusion or seizure-like activity since her last visit.  However she tells me today that her restless leg symptoms have been bothering her a lot as she has to get up and walk.  They were previously only occurring at night but have recently started to occur during the daytime as well.  She takes Mirapex 0.75 mg around 4:30 but for the past few weeks has also been taking an additional dose at bedtime.  Her PCP restarted her back on gabapentin 300 mg nightly that helps some.  She was severely anemic while she was in the hospital in June during hip surgery but since then she has not been taking any iron supplements.    Last visit-patient states that she tapered and stopped Keppra after her last visit and has not had any more episodes of confusion, shaking or loss of awareness.  She occasionally has word finding problems.  With regards to her RLS she continues to take Mirapex 0.75 mg at night and baclofen 10 mg nightly for leg jerks that are now well controlled.    Last visit-patient had an EEG off medications which showed bifrontal slowing as well as excessive beta activity but no epileptiform activity.  She states she resumed her Keppra 500 mg twice a day a few days after stopping it as her  noted that  patient was having leg jerks during sleep.  She does have RLS and takes Mirapex 0.75 mg at night.  Also takes trazodone for sleep in addition to Flexeril 10 mg 3 times a day for muscle spasms.  Denies having any episodes of staring or whole-body shaking.  On a few occasions her  has also mentioned to her that she speaks something but does not make sense although the patient herself does not realize she is doing it.    Initial [id number] female with a past medical history of migraine headaches, hyperlipidemia, TIA status post PFO closure in 2011, bilateral hip replacement presents as a hospital follow-up for encephalopathy/new onset seizure.  As per patient she had an ACDF procedure on C5-6 in June.  After that she also developed osteomyelitis of the right sternoclavicular joint for which he underwent debridement and was placed on IV antibiotics (Rocephin and daptomycin).  Then in August she was placed on p.o. doxycycline.  A few days before her hospitalization she went out in the sun and developed a diffuse rash.  Took Benadryl which made her slightly confused.  2 days later she was found at home by her daughter extremely confused having thrown up all over herself.  She took her to [facility] initially where she had a CT head that was unremarkable.  After that she developed left arm jerking with blinking and staring ahead and space.  Patient states that she does not remember the next 3 days.  After she was transferred to [facility] her white count was elevated at 17.2.  Her CRP was also mildly elevated and she had a T-max of 99.7.  Subsequent blood cultures were negative.  Initially her EEG showed moderate generalized slowing with frequent generalized sharp/slow wave complexes with triphasic morphology.  She was given Ativan and IV Keppra.  CT head and CT perfusion were  unremarkable.  CT angiogram of the head showed narrowing of the right distal M2/M3.  CTA of the neck did not show any  significant stenosis.  MRI of the brain also did not show any acute intracranial abnormalities or severe chronic ischemic changes.  She also had a lumbar puncture that failed to show any evidence of a CNS infection.  EEG was repeated in 2 days and showed just mild generalized slowing with no epileptiform activity.  It was presumed that patient could have had seizures/encephalopathy in the presence of sepsis versus due to high doses of Valtrex that she had been presumably taking at home however patient denies that as she states that she had not taken that medication in over 1 year.  She was discharged home on Keppra 500 mg twice a day.  Has not had any more episodes since.  She continues to have generalized weakness.  Had been getting PT up until last week.  She denies having any febrile seizures as a child, abnormal birth history or severe concussions.  There is a family history of seizures in her brother who is also a diabetic  Of note-I personally reviewed her MRI brain and Dr. Guerrier's notes from the hospital    The following portions of the patient's history were reviewed today and updated as of 11/04/2020  : allergies, current medications, past family history, past medical history, past social history, past surgical history and problem list  These document will be scanned to patient's chart.      Current Outpatient Medications:   •  albuterol sulfate  (90 Base) MCG/ACT inhaler, Inhale 2 puffs Every 4 (Four) Hours As Needed for Wheezing., Disp: 18 g, Rfl: 5  •  aspirin 81 MG EC tablet, Take 1 tablet by mouth Daily., Disp: , Rfl:   •  Benralizumab (Fasenra Pen) 30 MG/ML solution auto-injector, Inject 30 mg under the skin into the appropriate area as directed Every 2 (Two) Months., Disp: 1 mL, Rfl: 5  •  cyclobenzaprine (FLEXERIL) 10 MG tablet, Take 1 tablet by mouth 3 (Three) Times a Day As Needed for Muscle Spasms., Disp: 30 tablet, Rfl: 0  •  escitalopram (LEXAPRO) 20 MG tablet, TAKE 1 TABLET BY MOUTH  EVERY DAY, Disp: 90 tablet, Rfl: 0  •  Fluticasone Furoate-Vilanterol (Breo Ellipta) 100-25 MCG/ACT aerosol powder , Inhale 1 puff Daily., Disp: 60 each, Rfl: 11  •  levothyroxine (SYNTHROID, LEVOTHROID) 75 MCG tablet, TAKE 1 TABLET BY MOUTH EVERY MORNING. PATIENT HAS LABS TO COMPLETE FOR FURTHER REFILLS, Disp: 30 tablet, Rfl: 0  •  lidocaine (LIDODERM) 5 %, Place 1 patch on the skin as directed by provider Daily. Remove & Discard patch within 12 hours or as directed by MD, Disp: 30 each, Rfl: 1  •  oxybutynin XL (DITROPAN XL) 15 MG 24 hr tablet, Take 1 tablet by mouth Daily., Disp: , Rfl:   •  pantoprazole (PROTONIX) 40 MG EC tablet, Take 1 tablet by mouth 2 (Two) Times a Day., Disp: 180 tablet, Rfl: 0  •  Pramipexole Dihydrochloride ER 2.25 MG extended-release tablet, TAKE 1 TABLET BY MOUTH EVERY DAY AT NIGHT, Disp: 90 tablet, Rfl: 1  •  pravastatin (PRAVACHOL) 40 MG tablet, TAKE 1 TABLET BY MOUTH EVERYDAY AT BEDTIME, Disp: 90 tablet, Rfl: 1  •  pregabalin (LYRICA) 75 MG capsule, TAKE 1 CAPSULE BY MOUTH EVERY DAY AT NIGHT, Disp: 30 capsule, Rfl: 3  •  QUEtiapine (SEROquel) 25 MG tablet, Take 1 tablet by mouth Every Night., Disp: 90 tablet, Rfl: 1  •  ubrogepant (Ubrelvy) 100 MG tablet, TAKE 1 TABLET BY MOUTH FOR MIGRAINE FOR UP TO ONE DOSE MAY REPEAT DOSE AFTER 2 HOURS IF HEADACHE NOT RESOLVED, Disp: 10 tablet, Rfl: 1   Past Medical History:   Diagnosis Date   • Acquired hypothyroidism    • Anemia     after hip replacement    • Arthritis     hips and knees   • Asthma, extrinsic 01/01/2021    Per lung doctor   • CTS (carpal tunnel syndrome)     Had both wrist done   • Difficulty walking    • GERD (gastroesophageal reflux disease) 2021   • History of sepsis 08/2021    hospitalized x1 week;   • History of transfusion     2 units of autologus blood after 1st hip surgery-[facility]   • Low back pain     BACK PAIN WERE I HAD HIP REPLANCED   • Lung nodule 01/01/2021   • Memory loss    • Migraine     h/o   • Mixed  hyperlipidemia 2020   • Moderate persistent asthma 2021   • PONV (postoperative nausea and vomiting)     preprocedural meds help and pt will need them    • Rheumatoid arthritis 2015   • Seizure 2020   • Spinal headache     pt states after hip surgery 2022-longer bedrest   • Stroke     no residual effects   • Wears glasses       Past Surgical History:   Procedure Laterality Date   • CARDIAC CATHETERIZATION     • CARDIAC VALVE REPLACEMENT  2011    PFO   • CARPAL TUNNEL RELEASE Bilateral    •  SECTION     • CHOLECYSTECTOMY     • COLONOSCOPY     • ENDOSCOPY     • FRACTURE SURGERY      left leg- hardware in place    • JOINT REPLACEMENT      TOTAL HIP REPLACEMENT   • KNEE ARTHROSCOPY Right     done twice   • PATELLA SURGERY Left     removed left knee cap   • PATENT FORAMEN OVALE CLOSURE     • QUADRICEPS TENDON REPAIR Right    • REPLACEMENT TOTAL KNEE Right 2018    revision    • SPINE SURGERY     • TOTAL HIP ARTHROPLASTY Left    • TOTAL HIP ARTHROPLASTY Right 2019    Procedure: DIRECT TOTAL HIP ARTHROPLASTY ANTERIOR RIGHT;  Surgeon: Mele Chatterjee MD;  Location:  [facility];  Service: Orthopedics   • TOTAL HIP ARTHROPLASTY REVISION Left 2022    Procedure: TOTAL HIP REVISION LEFT;  Surgeon: Mele Chatterjee MD;  Location:  [facility];  Service: Orthopedics;  Laterality: Left;   • TOTAL HIP ARTHROPLASTY REVISION Left 2022    Procedure: HIP REVISION LEFT, FEMORAL AND ACETABULAR COMPONENT;  Surgeon: Mele Chatterjee MD;  Location:  [facility];  Service: Orthopedics;  Laterality: Left;   • TUBAL ABDOMINAL LIGATION        Family History   Problem Relation Age of Onset   • Hypertension Mother    • Migraines Mother    • Thyroid disease Mother    • Arthritis Mother    • Hypertension Father    • COPD Father    • Asthma Father    • Arthritis Father    • Hearing loss Father    • Breast cancer Maternal Cousin         unknown   •  "Rectal cancer Sister    • Ovarian cancer Neg Hx       Social History     Socioeconomic History   • Marital status:    Tobacco Use   • Smoking status: Never     Passive exposure: Never   • Smokeless tobacco: Never   Vaping Use   • Vaping Use: Never used   Substance and Sexual Activity   • Alcohol use: Not Currently     Comment: rare   • Drug use: No   • Sexual activity: Yes     Partners: Male     Birth control/protection: Post-menopausal, None, Tubal ligation     Comment: TUBES TIDE     Review of Systems   Musculoskeletal:  Positive for arthralgias and gait problem.   Neurological:  Positive for numbness.   All other systems reviewed and are negative.      Objective:    /78   Pulse 113   Ht 152.4 cm (60\")   Wt 77.6 kg (171 lb)   LMP  (LMP Unknown)   SpO2 95%   BMI 33.40 kg/m²     Neurology Exam: Reviewed and remains unchanged    General apperance: NAD.     Mental status: Alert, awake and oriented to time place and person.    Recent and Remote memory: Intact.    Attention span and Concentration: Normal.     Language and Speech: Intact- No dysarthria.    Fluency, Naming , Repitition and Comprehension:  Intact    Cranial Nerves:   CN II: Visual fields are full. Intact. Fundi - Normal, No papillederma, Pupils - KAYLYN  CN III, IV and VI: Extraocular movements are intact. Normal saccades.   CN V: Facial sensation is intact.   CN VII: Muscles of facial expression reveal no asymmetry. Intact.   CN VIII: Hearing is intact. Whispered voice intact.   CN IX and X: Palate elevates symmetrically. Intact  CN XI: Shoulder shrug is intact.   CN XII: Tongue is midline without evidence of atrophy or fasciculation.       Motor:  Right UE muscle strength 5/5. Normal tone.     Left UE muscle strength 5/5. Normal tone.      Right LE muscle strength5-/5. Normal tone.     Left LE muscle strength 5-/5. Normal tone.      Sensory: Mildly reduced to pinprick in the first and last digit of the left hand as well as reduced to " pinprick in feet to level of ankle with markedly reduced vibration    DTRs: 2+ bilaterally in upper and lower extremities.    Babinski: Negative bilaterally.    Co-ordination: Normal finger-to-nose, heel to shin B/L.    Rhomberg: Negative.    Gait: Walks with a limp due to limb length discrepancy.  Uses a cane    Cardiovascular: Regular rate and rhythm without murmur, gallop or rub.      Assessment and Plan:    1. RLS  The symptoms that she is having during daytime could be due to augmentation.    She should continue Mirapex ER 2.25 mg nightly and iron supplements  I will switch her from gabapentin to Lyrica  She should also avoid taking complex sugars/carbohydrates in the evening.    2. Peripheral neuropathy   Based on her symptoms and signs I feel she does have peripheral neuropathy.  I will check a neuropathy panel today  Since gabapentin is not helping I have told her to stop taking it.  Instead I will start her on Lyrica 75 mg nightly  I discussed getting an EMG/NCS but she wants to wait as it was very painful the last time she had it    3.  Seizure (CMS/HCC)  Patient could have had a seizure in the setting of sepsis.  Repeat EEG showed mild frontal slowing but no epileptiform activity.   She has now tapered and stopped Keppra.  Has had no more seizures      No follow-ups on file.     I spent 25 minutes with the patient out of which 20 minutes was spent face to face  Sarai Chirinos MD

## 2023-07-22 DIAGNOSIS — M19.019 SHOULDER ARTHRITIS: ICD-10-CM

## 2023-07-24 RX ORDER — LIDOCAINE 50 MG/G
PATCH TOPICAL
Qty: 30 PATCH | Refills: 1 | Status: SHIPPED | OUTPATIENT
Start: 2023-07-24

## 2023-07-27 RX ORDER — AZITHROMYCIN 250 MG/1
TABLET, FILM COATED ORAL
Qty: 6 TABLET | Refills: 0 | Status: SHIPPED | OUTPATIENT
Start: 2023-07-27

## 2023-08-12 DIAGNOSIS — E03.9 ACQUIRED HYPOTHYROIDISM: ICD-10-CM

## 2023-08-14 RX ORDER — LEVOTHYROXINE SODIUM 0.07 MG/1
TABLET ORAL
Qty: 30 TABLET | Refills: 0 | Status: SHIPPED | OUTPATIENT
Start: 2023-08-14

## 2023-08-16 ENCOUNTER — TELEPHONE (OUTPATIENT)
Dept: PULMONOLOGY | Facility: CLINIC | Age: 58
End: 2023-08-16
Payer: COMMERCIAL

## 2023-08-16 NOTE — TELEPHONE ENCOUNTER
Patient calls in stating she has been having a very bad cough for 2 months now, has been taking inhaler nebs and otc medication and nothing has been working

## 2023-08-22 ENCOUNTER — TELEPHONE (OUTPATIENT)
Dept: GASTROENTEROLOGY | Facility: CLINIC | Age: 58
End: 2023-08-22

## 2023-08-22 ENCOUNTER — TELEMEDICINE (OUTPATIENT)
Dept: GASTROENTEROLOGY | Facility: CLINIC | Age: 58
End: 2023-08-22
Payer: COMMERCIAL

## 2023-08-22 ENCOUNTER — PRIOR AUTHORIZATION (OUTPATIENT)
Dept: GASTROENTEROLOGY | Facility: CLINIC | Age: 58
End: 2023-08-22
Payer: COMMERCIAL

## 2023-08-22 DIAGNOSIS — K21.9 GASTROESOPHAGEAL REFLUX DISEASE, UNSPECIFIED WHETHER ESOPHAGITIS PRESENT: Primary | ICD-10-CM

## 2023-08-22 DIAGNOSIS — K22.70 BARRETT'S ESOPHAGUS WITHOUT DYSPLASIA: ICD-10-CM

## 2023-08-22 PROBLEM — M54.50 LOWER BACK PAIN: Status: ACTIVE | Noted: 2021-10-14

## 2023-08-22 RX ORDER — PREDNISONE 10 MG/1
TABLET ORAL
COMMUNITY
Start: 2023-06-30

## 2023-08-22 RX ORDER — DEXLANSOPRAZOLE 30 MG/1
30 CAPSULE, DELAYED RELEASE ORAL DAILY
Qty: 30 CAPSULE | Refills: 0 | Status: SHIPPED | OUTPATIENT
Start: 2023-08-22 | End: 2023-09-21

## 2023-08-22 RX ORDER — NAPROXEN 500 MG/1
500 TABLET ORAL 2 TIMES DAILY WITH MEALS
COMMUNITY
Start: 2023-06-06 | End: 2023-08-26 | Stop reason: SDUPTHER

## 2023-08-22 RX ORDER — RIZATRIPTAN BENZOATE 10 MG/1
TABLET ORAL
COMMUNITY

## 2023-08-22 RX ORDER — BENZONATATE 100 MG/1
CAPSULE ORAL
COMMUNITY
Start: 2023-06-30

## 2023-08-22 NOTE — TELEPHONE ENCOUNTER
LEFT VOICE MAIL TO CALL BACK TO SCHEDULE EGD WITH  AND A 2 WEEK FOLLOW UP AFTER WITH KIRA WHITNEY.

## 2023-08-22 NOTE — PROGRESS NOTES
Follow Up      Patient Name: Astrid Sam  : 1965   MRN: [id number]     Chief Complaint:  No chief complaint on file.      History of Present Illness: Astrid Sam is a 57 y.o. female who is here today for follow up on GERD, Neil's esophagus.     Pt continues to take pantoprazole 40mg BID. She notes persistent nonproductive cough over the last few months, no relief with OTC cough medication. Infectious workup via PCP has been unrevealing. Patient denies associated fever, chills, abdominal pain, indigestion, nausea, vomiting, diarrhea, constipation, hematemesis, dysphagia, hematochezia, melena, bloating, weight loss or gain, dysuria, jaundice or bruising.    EGD 2021 with Dr. Davis. LA Grade A esophagitis. TTS Balloon dilation to 18mm in distal esophagus. Small hiatal hernia. Diffuse gastritis. Normal duodenum. Bx negative for EoE, H Pylori, dysplasia. 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     Subjective      Review of Systems:   Review of Systems   Constitutional:  Negative for appetite change, chills, diaphoresis, fatigue, fever, unexpected weight gain and unexpected weight loss.   HENT:  Negative for drooling, facial swelling, mouth sores, rhinorrhea, sore throat, tinnitus, trouble swallowing and voice change.    Eyes: Negative.    Respiratory:  Positive for cough. Negative for chest tightness and shortness of breath.    Cardiovascular:  Negative for chest pain.   Gastrointestinal:  Negative for abdominal pain, blood in stool, constipation, diarrhea, nausea, vomiting, GERD and indigestion.   Genitourinary:  Negative for dysuria, flank pain, hematuria and pelvic pain.   Musculoskeletal:  Negative for arthralgias and myalgias.   Skin:  Negative for color change, pallor and rash.   Neurological:  Negative for dizziness, tremors, syncope, weakness and numbness.   Psychiatric/Behavioral:  Negative for  hallucinations and sleep disturbance. The patient is not nervous/anxious.    All other systems reviewed and are negative.    Medications:     Current Outpatient Medications:     benzonatate (TESSALON) 100 MG capsule, TAKE 1 CAPSULE 3 TIMES A DAY AS NEEDED, Disp: , Rfl:     naproxen (NAPROSYN) 500 MG tablet, Take 1 tablet by mouth 2 (Two) Times a Day With Meals., Disp: , Rfl:     predniSONE (DELTASONE) 10 MG tablet, PLEASE SEE ATTACHED FOR DETAILED DIRECTIONS, Disp: , Rfl:     albuterol sulfate  (90 Base) MCG/ACT inhaler, Inhale 2 puffs Every 4 (Four) Hours As Needed for Wheezing., Disp: 18 g, Rfl: 5    aspirin 81 MG EC tablet, Take 1 tablet by mouth Daily., Disp: , Rfl:     Benralizumab (Fasenra Pen) 30 MG/ML solution auto-injector, Inject 30 mg under the skin into the appropriate area as directed Every 2 (Two) Months., Disp: 1 mL, Rfl: 5    escitalopram (LEXAPRO) 20 MG tablet, TAKE 1 TABLET BY MOUTH EVERY DAY, Disp: 90 tablet, Rfl: 0    Fluticasone Furoate-Vilanterol (Breo Ellipta) 100-25 MCG/ACT aerosol powder , Inhale 1 puff Daily., Disp: 60 each, Rfl: 11    levothyroxine (SYNTHROID, LEVOTHROID) 75 MCG tablet, TAKE 1 TABLET BY MOUTH EVERY DAY IN THE MORNING, Disp: 30 tablet, Rfl: 0    lidocaine (LIDODERM) 5 %, REPLACE & DISCARD 1 PATCH WITHIN 12 HOURS OR AS DIRECTED BY MD, Disp: 30 patch, Rfl: 1    oxybutynin XL (DITROPAN XL) 15 MG 24 hr tablet, Take 1 tablet by mouth Daily., Disp: , Rfl:     pantoprazole (PROTONIX) 40 MG EC tablet, TAKE 1 TABLET BY MOUTH TWICE A DAY, Disp: 180 tablet, Rfl: 3    pramipexole (Mirapex) 0.5 MG tablet, Take 1 tablet as needed at night for symptoms of restless leg syndrome (Patient not taking: Reported on 7/21/2023), Disp: 90 tablet, Rfl: 0    Pramipexole Dihydrochloride ER 2.25 MG extended-release tablet, Take 1 tablet by mouth every night at bedtime., Disp: 90 tablet, Rfl: 2    pravastatin (PRAVACHOL) 40 MG tablet, TAKE 1 TABLET BY MOUTH EVERYDAY AT BEDTIME, Disp: 90 tablet,  Rfl: 1    pregabalin (LYRICA) 75 MG capsule, Take 1 capsule by mouth Every Night., Disp: 30 capsule, Rfl: 5    promethazine-dextromethorphan (PROMETHAZINE-DM) 6.25-15 MG/5ML syrup, Take 5 mL by mouth 4 (Four) Times a Day As Needed for Cough., Disp: 180 mL, Rfl: 0    rizatriptan (MAXALT) 10 MG tablet, Take by oral route., Disp: , Rfl:     TRAZODONE HCL PO, , Disp: , Rfl:     ubrogepant (Ubrelvy) 100 MG tablet, TAKE 1 TABLET BY MOUTH FOR MIGRAINE FOR UP TO ONE DOSE MAY REPEAT DOSE AFTER 2 HOURS IF HEADACHE NOT RESOLVED, Disp: 10 tablet, Rfl: 1    Allergies:   Allergies   Allergen Reactions    Morphine Nausea And Vomiting       Social History:   Social History     Socioeconomic History    Marital status:    Tobacco Use    Smoking status: Never     Passive exposure: Never    Smokeless tobacco: Never   Vaping Use    Vaping Use: Never used   Substance and Sexual Activity    Alcohol use: Not Currently     Comment: rare    Drug use: Never    Sexual activity: Yes     Partners: Male     Birth control/protection: Post-menopausal, None     Comment: TUBES TIDE        Surgical History:   Past Surgical History:   Procedure Laterality Date    CARDIAC CATHETERIZATION      CARDIAC VALVE REPLACEMENT  2011    PFO    CARPAL TUNNEL RELEASE Bilateral 2017     SECTION  1993    CHOLECYSTECTOMY  2017    COLONOSCOPY      ENDOSCOPY      FRACTURE SURGERY      left leg- hardware in place     JOINT REPLACEMENT  2000    TOTAL HIP REPLACEMENT    KNEE ARTHROSCOPY Right     done twice    PATELLA SURGERY Left     removed left knee cap    PATENT FORAMEN OVALE CLOSURE      QUADRICEPS TENDON REPAIR Right 2018    REPLACEMENT TOTAL KNEE Right 2018    revision 2019    SPINE SURGERY  2020    TOTAL HIP ARTHROPLASTY Left 2000    TOTAL HIP ARTHROPLASTY Right 2019    Procedure: DIRECT TOTAL HIP ARTHROPLASTY ANTERIOR RIGHT;  Surgeon: Mele Chatterjee MD;  Location: [facility];  Service: Orthopedics    TOTAL HIP ARTHROPLASTY  REVISION Left 03/30/2022    Procedure: TOTAL HIP REVISION LEFT;  Surgeon: Mele Chatterjee MD;  Location:  [facility];  Service: Orthopedics;  Laterality: Left;    TOTAL HIP ARTHROPLASTY REVISION Left 06/23/2022    Procedure: HIP REVISION LEFT, FEMORAL AND ACETABULAR COMPONENT;  Surgeon: Mele Chatterjee MD;  Location:  [facility];  Service: Orthopedics;  Laterality: Left;    TUBAL ABDOMINAL LIGATION          Medical History:   Past Medical History:   Diagnosis Date    Acquired hypothyroidism     Anemia     after hip replacement     Arthritis     hips and knees    Asthma, extrinsic 01/01/2021    Per lung doctor    CTS (carpal tunnel syndrome)     Had both wrist done    Difficulty walking     GERD (gastroesophageal reflux disease) 2021    History of sepsis 08/2021    hospitalized x1 week;    History of transfusion     2 units of autologus blood after 1st hip surgery-[facility]    Low back pain     BACK PAIN WERE I HAD HIP REPLANCED    Lung nodule 01/01/2021    Memory loss     Migraine     h/o    Mixed hyperlipidemia 07/20/2020    Moderate persistent asthma 12/02/2021    PONV (postoperative nausea and vomiting)     preprocedural meds help and pt will need them     Rheumatoid arthritis 05/2015    Seizure 11/04/2020    Spinal headache 2022    pt states after hip surgery march 2022-longer bedrest    Stroke 2011    no residual effects    Wears glasses         Objective     Physical Exam:  Vital Signs: There were no vitals filed for this visit.  There is no height or weight on file to calculate BMI.     Physical Exam  Constitutional:       Appearance: Normal appearance. She is normal weight. She is not ill-appearing or diaphoretic.      Comments: Persistent dry cough   HENT:      Head: Normocephalic and atraumatic.      Right Ear: External ear normal.      Left Ear: External ear normal.      Nose: Nose normal. No rhinorrhea.      Mouth/Throat:      Mouth: Mucous membranes are moist.   Eyes:      General:         Right eye: No  discharge.         Left eye: No discharge.      Conjunctiva/sclera: Conjunctivae normal.      Pupils: Pupils are equal, round, and reactive to light.   Neck:      Thyroid: No thyromegaly.   Pulmonary:      Effort: Pulmonary effort is normal. No respiratory distress.   Abdominal:      General: Abdomen is flat. There is no distension.   Musculoskeletal:         General: Normal range of motion.      Cervical back: Normal range of motion.   Skin:     Coloration: Skin is not jaundiced or pale.      Findings: No bruising.   Neurological:      Mental Status: She is oriented to person, place, and time.      Cranial Nerves: No cranial nerve deficit.   Psychiatric:         Mood and Affect: Mood normal.         Thought Content: Thought content normal.         Judgment: Judgment normal.       Assessment / Plan      Assessment/Plan:   There are no diagnoses linked to this encounter.     You have chosen to receive care through a telephone visit. Do you consent to use a telephone visit for your medical care today? Yes    GERD  Neil's esophagus without dysplasia   - discontinue pantoprazole 40mg BID   - rx for Dexilant 30mg daily sent to pharmacy   - pt given GERD diet instructions, advised to avoid GI irritants such as caffeine, carbonation, EtOH, tobacco, chocolate, peppermint, acid-based foods   - previous endoscopy and pathology reports reviewed   - schedule for EGD   - follow up in clinic after completion of above studies   - call clinic at any time for questions or new / worsened sx    Follow Up:   Return in about 6 weeks (around 10/3/2023).    Plan of care reviewed with the patient at the conclusion of today's visit.  Education was provided regarding diagnosis, management, and any prescribed or recommended OTC medications.  Patient verbalized understanding of and agreement with management plan.     NOTE TO PATIENT: The 21st Century Cures Act makes medical notes like these available to patients in the interest of  transparency. However, be advised this is a medical document. It is intended as peer to peer communication. It is written in medical language and may contain abbreviations or verbiage that are unfamiliar. It may appear blunt or direct. Medical documents are intended to carry relevant information, facts as evident, and the clinical opinion of the practitioner.     Time Statement:   Discussed plan of care in detail with patient today. Patient verbally understands and agrees. I have spent 30 minutes reviewing available diagnostics, obtaining history, examining the patient, developing a treatment plan, and educating the patient on disease process and plan of care.     Wendi Franklin PA-C   [facility]

## 2023-08-25 DIAGNOSIS — M16.11 UNILATERAL PRIMARY OSTEOARTHRITIS, RIGHT HIP: ICD-10-CM

## 2023-08-25 DIAGNOSIS — M25.551 PAIN IN RIGHT HIP: ICD-10-CM

## 2023-08-25 DIAGNOSIS — G43.119 INTRACTABLE MIGRAINE WITH AURA WITHOUT STATUS MIGRAINOSUS: ICD-10-CM

## 2023-08-25 DIAGNOSIS — M43.16 SPONDYLOLISTHESIS OF LUMBAR REGION: ICD-10-CM

## 2023-08-25 RX ORDER — CYCLOBENZAPRINE HCL 10 MG
TABLET ORAL
Qty: 30 TABLET | Refills: 0 | OUTPATIENT
Start: 2023-08-25

## 2023-08-25 RX ORDER — NAPROXEN 500 MG/1
TABLET ORAL
Qty: 60 TABLET | Refills: 1 | OUTPATIENT
Start: 2023-08-25

## 2023-08-26 RX ORDER — NAPROXEN 500 MG/1
500 TABLET ORAL 2 TIMES DAILY PRN
Qty: 60 TABLET | Refills: 0 | Status: SHIPPED | OUTPATIENT
Start: 2023-08-26

## 2023-08-28 RX ORDER — PRAMIPEXOLE DIHYDROCHLORIDE 0.5 MG/1
TABLET ORAL
Qty: 90 TABLET | Refills: 0 | OUTPATIENT
Start: 2023-08-28

## 2023-08-29 NOTE — PATIENT INSTRUCTIONS
Go to the nearest ER or return to clinic if symptoms worsen, fever/chill develop    Acute Bronchitis, Adult  Acute bronchitis is when air tubes (bronchi) in the lungs suddenly get swollen. The condition can make it hard to breathe. It can also cause these symptoms:  · A cough.  · Coughing up clear, yellow, or green mucus.  · Wheezing.  · Chest congestion.  · Shortness of breath.  · A fever.  · Body aches.  · Chills.  · A sore throat.  Follow these instructions at home:    Medicines  · Take over-the-counter and prescription medicines only as told by your doctor.  · If you were prescribed an antibiotic medicine, take it as told by your doctor. Do not stop taking the antibiotic even if you start to feel better.  General instructions  · Rest.  · Drink enough fluids to keep your pee (urine) pale yellow.  · Avoid smoking and secondhand smoke. If you smoke and you need help quitting, ask your doctor. Quitting will help your lungs heal faster.  · Use an inhaler, cool mist vaporizer, or humidifier as told by your doctor.  · Keep all follow-up visits as told by your doctor. This is important.  How is this prevented?  To lower your risk of getting this condition again:  · Wash your hands often with soap and water. If you cannot use soap and water, use hand .  · Avoid contact with people who have cold symptoms.  · Try not to touch your hands to your mouth, nose, or eyes.  · Make sure to get the flu shot every year.  Contact a doctor if:  · Your symptoms do not get better in 2 weeks.  Get help right away if:  · You cough up blood.  · You have chest pain.  · You have very bad shortness of breath.  · You become dehydrated.  · You faint (pass out) or keep feeling like you are going to pass out.  · You keep throwing up (vomiting).  · You have a very bad headache.  · Your fever or chills gets worse.  This information is not intended to replace advice given to you by your health care provider. Make sure you discuss any  questions you have with your health care provider.  Document Released: 06/05/2009 Document Revised: 08/01/2018 Document Reviewed: 06/07/2017  Elsevier Interactive Patient Education © 2020 [employer]     No

## 2023-08-30 ENCOUNTER — OUTSIDE FACILITY SERVICE (OUTPATIENT)
Dept: GASTROENTEROLOGY | Facility: CLINIC | Age: 58
End: 2023-08-30
Payer: COMMERCIAL

## 2023-08-30 ENCOUNTER — LAB REQUISITION (OUTPATIENT)
Dept: LAB | Facility: HOSPITAL | Age: 58
End: 2023-08-30
Payer: COMMERCIAL

## 2023-08-30 DIAGNOSIS — K21.00 GASTRO-ESOPHAGEAL REFLUX DISEASE WITH ESOPHAGITIS, WITHOUT BLEEDING: ICD-10-CM

## 2023-08-30 DIAGNOSIS — K44.9 DIAPHRAGMATIC HERNIA WITHOUT OBSTRUCTION OR GANGRENE: ICD-10-CM

## 2023-08-30 DIAGNOSIS — K21.9 GASTRO-ESOPHAGEAL REFLUX DISEASE WITHOUT ESOPHAGITIS: ICD-10-CM

## 2023-08-30 PROCEDURE — 43239 EGD BIOPSY SINGLE/MULTIPLE: CPT | Performed by: INTERNAL MEDICINE

## 2023-08-30 PROCEDURE — 88305 TISSUE EXAM BY PATHOLOGIST: CPT

## 2023-08-31 LAB — REF LAB TEST METHOD: NORMAL

## 2023-09-01 ENCOUNTER — TELEPHONE (OUTPATIENT)
Dept: GASTROENTEROLOGY | Facility: CLINIC | Age: 58
End: 2023-09-01
Payer: COMMERCIAL

## 2023-09-01 NOTE — PROGRESS NOTES
Per Dr. Davis:  There is a short segment of Neil's esophagus. The patient can have repeat EGD in 3 years.

## 2023-09-07 RX ORDER — PREDNISONE 10 MG/1
TABLET ORAL
Qty: 21 TABLET | Refills: 0 | Status: SHIPPED | OUTPATIENT
Start: 2023-09-07

## 2023-09-09 DIAGNOSIS — E03.9 ACQUIRED HYPOTHYROIDISM: ICD-10-CM

## 2023-09-11 RX ORDER — LEVOTHYROXINE SODIUM 0.07 MG/1
TABLET ORAL
Qty: 30 TABLET | Refills: 0 | Status: SHIPPED | OUTPATIENT
Start: 2023-09-11

## 2023-09-12 DIAGNOSIS — E03.9 ACQUIRED HYPOTHYROIDISM: Primary | ICD-10-CM

## 2023-09-12 DIAGNOSIS — E78.2 MIXED HYPERLIPIDEMIA: ICD-10-CM

## 2023-09-12 DIAGNOSIS — E55.9 VITAMIN D DEFICIENCY: ICD-10-CM

## 2023-09-13 RX ORDER — PRAVASTATIN SODIUM 40 MG
TABLET ORAL
Qty: 90 TABLET | Refills: 0 | Status: SHIPPED | OUTPATIENT
Start: 2023-09-13

## 2023-09-13 NOTE — TELEPHONE ENCOUNTER
Pt informed she needs to have fasting labs completed. She stated she is out of town, but can come in on Monday.

## 2023-09-14 RX ORDER — PRAMIPEXOLE DIHYDROCHLORIDE 0.5 MG/1
TABLET ORAL
Qty: 90 TABLET | Refills: 0 | OUTPATIENT
Start: 2023-09-14

## 2023-09-14 NOTE — TELEPHONE ENCOUNTER
Rx Refill Note  Requested Prescriptions     Pending Prescriptions Disp Refills    pramipexole (MIRAPEX) 0.5 MG tablet [Pharmacy Med Name: PRAMIPEXOLE 0.5 MG TABLET] 90 tablet 0     Sig: TAKE 1 TABLET AS NEEDED AT NIGHT FOR SYMPTOMS OF RESTLESS LEG SYNDROME      Last filled:  Last office visit with prescribing clinician: 6/14/2023      Next office visit with prescribing clinician: 12/14/2023     Lissette Calloway MA  09/14/23, 14:09 EDT  Chart states patient no longer taking medication Declined refill

## 2023-09-19 ENCOUNTER — PRIOR AUTHORIZATION (OUTPATIENT)
Dept: GASTROENTEROLOGY | Facility: CLINIC | Age: 58
End: 2023-09-19
Payer: COMMERCIAL

## 2023-09-19 ENCOUNTER — TELEPHONE (OUTPATIENT)
Dept: GASTROENTEROLOGY | Facility: CLINIC | Age: 58
End: 2023-09-19

## 2023-09-19 ENCOUNTER — TELEMEDICINE (OUTPATIENT)
Dept: GASTROENTEROLOGY | Facility: CLINIC | Age: 58
End: 2023-09-19
Payer: COMMERCIAL

## 2023-09-19 DIAGNOSIS — K21.9 GASTROESOPHAGEAL REFLUX DISEASE, UNSPECIFIED WHETHER ESOPHAGITIS PRESENT: Primary | ICD-10-CM

## 2023-09-19 DIAGNOSIS — K22.70 BARRETT'S ESOPHAGUS WITHOUT DYSPLASIA: ICD-10-CM

## 2023-09-19 DIAGNOSIS — R05.9 COUGH, UNSPECIFIED TYPE: ICD-10-CM

## 2023-09-19 RX ORDER — DEXLANSOPRAZOLE 30 MG/1
30 CAPSULE, DELAYED RELEASE ORAL DAILY
Qty: 90 CAPSULE | Refills: 3 | Status: SHIPPED | OUTPATIENT
Start: 2023-09-19 | End: 2023-12-18

## 2023-09-19 NOTE — TELEPHONE ENCOUNTER
"Hub staff attempted to follow warm transfer process and was unsuccessful     Caller: Astrid Sam \"Doris\"    Relationship to patient: Self    Best call back number: [phone]    Patient is needing: PT CALLED STATING THAT SHE WOULD LIKE TO DO A VIDEO VISIT FOR HER APPT ON 9/19/2023// PLEASE CALL PT BACK    "

## 2023-09-19 NOTE — PROGRESS NOTES
Follow Up      Patient Name: Astrid Sam  : 1965   MRN: [id number]     Chief Complaint:  No chief complaint on file.      History of Present Illness: Astrid Sam is a 57 y.o. female who is here today for post procedure follow up.    Pt has done well with Dexilant 30mg daily. She states that reflux sx are well controlled. She has esophageal dysphagia only occasionally to breads, potatoes. She continues to have persistent nonproductive cough, unchanged with PPI usage. She has an appt to see pulmonology in the near future. Patient denies associated fever, chills, abdominal pain, nausea, vomiting, diarrhea, constipation, hematemesis, hematochezia, melena, bloating, weight loss or gain, dysuria, jaundice or bruising.    EGD 23 with Dr. Davis. Normal upper / middle third of esophagus. Small hiatal hernia. LA Grade A esophagitis. Normal stomach and duodenum. Bx negative for EoE. Mid esophageal bx consistent with intestinal metaplasia without dysplasia. Recommend repeat EGD in 3 years.     Subjective      Review of Systems:   Review of Systems   Constitutional:  Negative for appetite change, chills, diaphoresis, fatigue, fever, unexpected weight gain and unexpected weight loss.   HENT:  Positive for trouble swallowing (occasional). Negative for drooling, facial swelling, mouth sores, rhinorrhea, sore throat, tinnitus and voice change.    Eyes: Negative.    Respiratory:  Positive for cough. Negative for chest tightness and shortness of breath.    Cardiovascular:  Negative for chest pain.   Gastrointestinal:  Negative for abdominal pain, blood in stool, constipation, diarrhea, nausea, vomiting, GERD and indigestion.   Genitourinary:  Negative for dysuria, flank pain, hematuria and pelvic pain.   Musculoskeletal:  Negative for arthralgias and myalgias.   Skin:  Negative for color change, pallor and rash.   Neurological:  Negative for dizziness, tremors, syncope, weakness and numbness.    Psychiatric/Behavioral:  Negative for hallucinations and sleep disturbance. The patient is not nervous/anxious.    All other systems reviewed and are negative.    Medications:     Current Outpatient Medications:     albuterol sulfate  (90 Base) MCG/ACT inhaler, Inhale 2 puffs Every 4 (Four) Hours As Needed for Wheezing., Disp: 18 g, Rfl: 5    aspirin 81 MG EC tablet, Take 1 tablet by mouth Daily., Disp: , Rfl:     Benralizumab (Fasenra Pen) 30 MG/ML solution auto-injector, Inject 30 mg under the skin into the appropriate area as directed Every 2 (Two) Months., Disp: 1 mL, Rfl: 5    dexlansoprazole (Dexilant) 30 MG capsule, Take 1 capsule by mouth Daily for 30 days., Disp: 30 capsule, Rfl: 0    escitalopram (LEXAPRO) 20 MG tablet, TAKE 1 TABLET BY MOUTH EVERY DAY, Disp: 90 tablet, Rfl: 0    Fluticasone Furoate-Vilanterol (Breo Ellipta) 100-25 MCG/ACT aerosol powder , Inhale 1 puff Daily., Disp: 60 each, Rfl: 11    levothyroxine (SYNTHROID, LEVOTHROID) 75 MCG tablet, TAKE 1 TABLET BY MOUTH EVERY DAY IN THE MORNING, Disp: 30 tablet, Rfl: 0    lidocaine (LIDODERM) 5 %, REPLACE & DISCARD 1 PATCH WITHIN 12 HOURS OR AS DIRECTED BY MD, Disp: 30 patch, Rfl: 1    naproxen (NAPROSYN) 500 MG tablet, Take 1 tablet by mouth 2 (Two) Times a Day As Needed for Moderate Pain. Take with food, Disp: 60 tablet, Rfl: 0    oxybutynin XL (DITROPAN XL) 15 MG 24 hr tablet, Take 1 tablet by mouth Daily., Disp: , Rfl:     pramipexole (Mirapex) 0.5 MG tablet, Take 1 tablet as needed at night for symptoms of restless leg syndrome (Patient not taking: Reported on 7/21/2023), Disp: 90 tablet, Rfl: 0    Pramipexole Dihydrochloride ER 2.25 MG extended-release tablet, Take 1 tablet by mouth every night at bedtime., Disp: 90 tablet, Rfl: 2    pravastatin (PRAVACHOL) 40 MG tablet, TAKE 1 TABLET BY MOUTH EVERYDAY AT BEDTIME, Disp: 90 tablet, Rfl: 0    predniSONE (DELTASONE) 10 MG (21) dose pack, Use as directed on package, Disp: 21 tablet, Rfl:  0    pregabalin (LYRICA) 75 MG capsule, Take 1 capsule by mouth Every Night., Disp: 30 capsule, Rfl: 5    promethazine-dextromethorphan (PROMETHAZINE-DM) 6.25-15 MG/5ML syrup, Take 5 mL by mouth 4 (Four) Times a Day As Needed for Cough., Disp: 180 mL, Rfl: 0    ubrogepant (Ubrelvy) 100 MG tablet, TAKE 1 TABLET BY MOUTH FOR MIGRAINE FOR UP TO ONE DOSE MAY REPEAT DOSE AFTER 2 HOURS IF HEADACHE NOT RESOLVED, Disp: 10 tablet, Rfl: 1    Allergies:   Allergies   Allergen Reactions    Morphine Nausea And Vomiting       Social History:   Social History     Socioeconomic History    Marital status:    Tobacco Use    Smoking status: Never     Passive exposure: Never    Smokeless tobacco: Never   Vaping Use    Vaping Use: Never used   Substance and Sexual Activity    Alcohol use: Not Currently     Comment: rare    Drug use: Never    Sexual activity: Yes     Partners: Male     Birth control/protection: Post-menopausal, None     Comment: TUBES TIDE        Surgical History:   Past Surgical History:   Procedure Laterality Date    CARDIAC CATHETERIZATION      CARDIAC VALVE REPLACEMENT  2011    PFO    CARPAL TUNNEL RELEASE Bilateral 2017     SECTION      CHOLECYSTECTOMY  2017    COLONOSCOPY      ENDOSCOPY      FRACTURE SURGERY  2015    left leg- hardware in place     JOINT REPLACEMENT  2000    TOTAL HIP REPLACEMENT    KNEE ARTHROSCOPY Right     done twice    PATELLA SURGERY Left     removed left knee cap    PATENT FORAMEN OVALE CLOSURE      QUADRICEPS TENDON REPAIR Right 2018    REPLACEMENT TOTAL KNEE Right 2018    revision 2019    SPINE SURGERY      TOTAL HIP ARTHROPLASTY Left     TOTAL HIP ARTHROPLASTY Right 2019    Procedure: DIRECT TOTAL HIP ARTHROPLASTY ANTERIOR RIGHT;  Surgeon: Mele Chatterjee MD;  Location:  [facility];  Service: Orthopedics    TOTAL HIP ARTHROPLASTY REVISION Left 2022    Procedure: TOTAL HIP REVISION LEFT;  Surgeon: Mele Chatterjee MD;  Location:  [facility];   Service: Orthopedics;  Laterality: Left;    TOTAL HIP ARTHROPLASTY REVISION Left 06/23/2022    Procedure: HIP REVISION LEFT, FEMORAL AND ACETABULAR COMPONENT;  Surgeon: Mele Chatterjee MD;  [facility];  Service: Orthopedics;  Laterality: Left;    TUBAL ABDOMINAL LIGATION          Medical History:   Past Medical History:   Diagnosis Date    Acquired hypothyroidism     Anemia     after hip replacement     Arthritis     hips and knees    Asthma, extrinsic 01/01/2021    Per lung doctor    CTS (carpal tunnel syndrome)     Had both wrist done    Difficulty walking     GERD (gastroesophageal reflux disease) 2021    History of sepsis 08/2021    hospitalized x1 week;    History of transfusion     2 units of autologus blood after 1st hip surgery-[facility]    Low back pain     BACK PAIN WERE I HAD HIP REPLANCED    Lung nodule 01/01/2021    Memory loss     Migraine     h/o    Mixed hyperlipidemia 07/20/2020    Moderate persistent asthma 12/02/2021    PONV (postoperative nausea and vomiting)     preprocedural meds help and pt will need them     Rheumatoid arthritis 05/2015    Seizure 11/04/2020    Spinal headache 2022    pt states after hip surgery march 2022-longer bedrest    Stroke 2011    no residual effects    Wears glasses         Objective     Physical Exam:  Vital Signs: There were no vitals filed for this visit.  There is no height or weight on file to calculate BMI.     Physical Exam  Vitals and nursing note reviewed.   Constitutional:       Appearance: Normal appearance. She is normal weight. She is not ill-appearing or diaphoretic.   HENT:      Head: Normocephalic and atraumatic.      Right Ear: External ear normal.      Left Ear: External ear normal.      Nose: Nose normal.      Mouth/Throat:      Mouth: Mucous membranes are moist.      Pharynx: Oropharynx is clear.   Eyes:      Conjunctiva/sclera: Conjunctivae normal.      Pupils: Pupils are equal, round, and reactive to light.   Neck:      Thyroid: No  thyromegaly.   Cardiovascular:      Rate and Rhythm: Normal rate and regular rhythm.      Pulses: Normal pulses.      Heart sounds: Normal heart sounds.   Pulmonary:      Effort: Pulmonary effort is normal.      Breath sounds: Normal breath sounds.   Abdominal:      General: Abdomen is flat. Bowel sounds are normal. There is no distension.      Tenderness: There is no abdominal tenderness.   Musculoskeletal:         General: Normal range of motion.      Cervical back: Normal range of motion and neck supple.      Right lower leg: No edema.      Left lower leg: No edema.   Skin:     General: Skin is warm and dry.   Neurological:      General: No focal deficit present.      Mental Status: She is oriented to person, place, and time.   Psychiatric:         Mood and Affect: Mood normal.       Assessment / Plan      Assessment/Plan:   There are no diagnoses linked to this encounter.     You have chosen to receive care through a telephone visit. Do you consent to use a telephone visit for your medical care today? Yes    GERD  Neil's esophagus without dysplasia  Cough   - continue Dexilant 30mg daily, refills sent to pharmacy   - pt given GERD diet instructions, advised to avoid GI irritants such as caffeine, carbonation, EtOH, tobacco, chocolate, peppermint, acid-based foods   - previous endoscopy and pathology reports reviewed   - follow up with pulmonology +/- ENT as scheduled to address persistent nonproductive cough   - follow up in clinic in 1 year, or after completion of above studies   - call clinic at any time for questions or new / worsened sx    Follow Up:   No follow-ups on file.    Plan of care reviewed with the patient at the conclusion of today's visit.  Education was provided regarding diagnosis, management, and any prescribed or recommended OTC medications.  Patient verbalized understanding of and agreement with management plan.     NOTE TO PATIENT: The 21st Century Cures Act makes medical notes like  these available to patients in the interest of transparency. However, be advised this is a medical document. It is intended as peer to peer communication. It is written in medical language and may contain abbreviations or verbiage that are unfamiliar. It may appear blunt or direct. Medical documents are intended to carry relevant information, facts as evident, and the clinical opinion of the practitioner.     Time Statement:   Discussed plan of care in detail with patient today. Patient verbally understands and agrees. I have spent 30 minutes reviewing available diagnostics, obtaining history, examining the patient, developing a treatment plan, and educating the patient on disease process and plan of care.     Wendi Franklin PA-C   [facility]

## 2023-09-22 RX ORDER — QUETIAPINE FUMARATE 25 MG/1
TABLET, FILM COATED ORAL
Qty: 90 TABLET | Refills: 1 | OUTPATIENT
Start: 2023-09-22

## 2023-09-29 ENCOUNTER — TELEPHONE (OUTPATIENT)
Dept: NEUROLOGY | Facility: CLINIC | Age: 58
End: 2023-09-29
Payer: COMMERCIAL

## 2023-09-29 NOTE — TELEPHONE ENCOUNTER
PATIENT CALLING TO ADVISE, SHE HAD ORDERED LABS DONE AT HER PCP OFFICE YESTERDAY AND THEY HAVE COME UP IN HER MYCHART.  SHE WANTED PROVIDER TO KNOW RESULTS ARE THERE.    ALSO, PATIENT STATES SHE IS TAKING   PRAMIPEXOLE 0.5 MG TABLET.  WHEN SHE GOT NOTIFICATION FROM PHARMACY FOR PICK-UP, THE PRESCRIPTION IS FOR 1.5 MG.  HAS HER MG BEEN INCREASED?      PLEASE ADVISE PATIENT    THANK YOU

## 2023-10-02 ENCOUNTER — PATIENT MESSAGE (OUTPATIENT)
Dept: NEUROLOGY | Facility: CLINIC | Age: 58
End: 2023-10-02
Payer: COMMERCIAL

## 2023-10-05 ENCOUNTER — PATIENT MESSAGE (OUTPATIENT)
Dept: NEUROLOGY | Facility: CLINIC | Age: 58
End: 2023-10-05
Payer: COMMERCIAL

## 2023-10-05 NOTE — TELEPHONE ENCOUNTER
From: Astrid Sam  To: Sarai Chirinos  Sent: 10/2/2023 4:54 PM EDT  Subject: Test blood work     When you please get back to work can you please call me on my blood work. Thank you.    Doris sam

## 2023-10-06 DIAGNOSIS — E03.9 ACQUIRED HYPOTHYROIDISM: ICD-10-CM

## 2023-10-06 DIAGNOSIS — R45.4 IRRITABILITY: ICD-10-CM

## 2023-10-06 RX ORDER — ESCITALOPRAM OXALATE 20 MG/1
TABLET ORAL
Qty: 90 TABLET | Refills: 0 | Status: SHIPPED | OUTPATIENT
Start: 2023-10-06

## 2023-10-06 RX ORDER — LEVOTHYROXINE SODIUM 0.07 MG/1
TABLET ORAL
Qty: 30 TABLET | Refills: 0 | Status: SHIPPED | OUTPATIENT
Start: 2023-10-06

## 2023-10-10 RX ORDER — DICLOFENAC SODIUM 75 MG/1
75 TABLET, DELAYED RELEASE ORAL 2 TIMES DAILY PRN
Qty: 60 TABLET | Refills: 0 | Status: SHIPPED | OUTPATIENT
Start: 2023-10-10

## 2023-10-21 DIAGNOSIS — G43.119 INTRACTABLE MIGRAINE WITH AURA WITHOUT STATUS MIGRAINOSUS: ICD-10-CM

## 2023-10-24 ENCOUNTER — OFFICE VISIT (OUTPATIENT)
Dept: PULMONOLOGY | Facility: CLINIC | Age: 58
End: 2023-10-24
Payer: COMMERCIAL

## 2023-10-24 ENCOUNTER — PREP FOR SURGERY (OUTPATIENT)
Dept: OTHER | Facility: HOSPITAL | Age: 58
End: 2023-10-24
Payer: COMMERCIAL

## 2023-10-24 VITALS
WEIGHT: 163 LBS | BODY MASS INDEX: 32 KG/M2 | HEIGHT: 60 IN | DIASTOLIC BLOOD PRESSURE: 76 MMHG | SYSTOLIC BLOOD PRESSURE: 114 MMHG | TEMPERATURE: 98 F | OXYGEN SATURATION: 97 % | HEART RATE: 77 BPM

## 2023-10-24 DIAGNOSIS — J45.40 MODERATE PERSISTENT ASTHMA, UNSPECIFIED WHETHER COMPLICATED: Primary | ICD-10-CM

## 2023-10-24 DIAGNOSIS — R05.3 CHRONIC COUGH: ICD-10-CM

## 2023-10-24 DIAGNOSIS — R91.1 LUNG NODULE: Primary | ICD-10-CM

## 2023-10-24 DIAGNOSIS — K22.719 BARRETT'S ESOPHAGUS WITH DYSPLASIA: ICD-10-CM

## 2023-10-24 RX ORDER — LIDOCAINE HYDROCHLORIDE 40 MG/ML
4 INJECTION, SOLUTION RETROBULBAR; TOPICAL ONCE
OUTPATIENT
Start: 2023-10-24 | End: 2023-10-24

## 2023-10-24 RX ORDER — AMOXICILLIN 500 MG/1
CAPSULE ORAL
COMMUNITY
Start: 2023-10-11

## 2023-10-24 NOTE — PROGRESS NOTES
"Follow Up Office Note       Patient Name: Astrid Sam    Referring Physician: No ref. provider found    Chief Complaint:    Chief Complaint   Patient presents with    Moderate persistent asthma, unspecified whether complicated     Follow up       History of Present Illness: Astrid Sam is a 57 y.o. female who is here today to follow-up care with Pulmonary.  Patient has a past medical history significant for hypothyroidism, allergic rhinitis, hyperlipidemia, PFO status post anterior atrial closure, CVA, asthma, Neil's esophagus and seizures.  Patient is still having significant coughing.  Has seen no benefit in inhalers with the Fasenra.  Does have reflux now on Dexilant.  No other acute complaints.    Review of Systems:   Review of Systems   Constitutional:  Negative for chills, fatigue and fever.   HENT:  Negative for congestion and voice change.    Eyes:  Negative for blurred vision.   Respiratory:  Negative for cough, shortness of breath and wheezing.    Cardiovascular:  Negative for chest pain.   Skin:  Negative for dry skin.   Hematological:  Negative for adenopathy.   Psychiatric/Behavioral:  Negative for agitation and depressed mood.        The following portions of the patient's history were reviewed and updated as appropriate: allergies, current medications, past family history, past medical history, past social history, past surgical history and problem list.    Physical Exam:  Vital Signs:   Vitals:    10/24/23 1040   BP: 114/76   BP Location: Left arm   Patient Position: Sitting   Cuff Size: Adult   Pulse: 77   Temp: 98 °F (36.7 °C)   SpO2: 97%  Comment: Room air at rest   Weight: 73.9 kg (163 lb)   Height: 152.4 cm (60\")       Physical Exam  Vitals and nursing note reviewed.   Constitutional:       General: She is not in acute distress.     Appearance: She is well-developed and normal weight. She is not ill-appearing or toxic-appearing.   HENT:      Head: Normocephalic and " atraumatic.   Cardiovascular:      Rate and Rhythm: Normal rate and regular rhythm.      Pulses: Normal pulses.      Heart sounds: Normal heart sounds. No murmur heard.     No friction rub. No gallop.   Pulmonary:      Effort: Pulmonary effort is normal. No respiratory distress.      Breath sounds: Normal breath sounds. No wheezing, rhonchi or rales.   Musculoskeletal:      Right lower leg: No edema.      Left lower leg: No edema.   Skin:     General: Skin is warm and dry.   Neurological:      Mental Status: She is alert and oriented to person, place, and time.         Immunization History   Administered Date(s) Administered    COVID-19 (PFIZER) Purple Cap Monovalent 04/05/2021, 05/03/2021    Fluzone (or Fluarix & Flulaval for VFC) >6mos 12/10/2021       Results Review:   -Personally reviewed the patient's chest x-ray from 6/30/2023 which showed no acute cardiopulmonary process.  -CT scan from October 2021 which showed the nodules in the right lower lobe is now resolved, with no other acute infiltrates              - CT scan from May 2021 which showed significant improvement in the upper lobe infiltrates compared to August 2020, she was noted to have a small nodule in the right lower lobe.              - imaging from chest x-ray from November 4, 2020 shows no acute cardiopulmonary process, CT scan of the chest from August 22, 2020 showed some very mild groundglass interstitial changes in the upper lobes bilaterally, and otherwise unremarkable.  - Echo from 8/22/2020 showed an EF of 60%, with normal left ventricular systolic function, and no signs of a shunt.  - barium swallow report, which showed that the patient had mild gastroesophageal reflux to the thoracic inlet, with mild smooth luminal narrowing of the distal esophagus which may represent a stricture or spasm.  -I personally viewed the patient's PFTs from 10/24/2023 which showed no obstruction or restriction with a normal DLCO.  -I personally viewed the  patient's spirometry from May 2023 which shows no obstruction.  - pulmonary function testing from 12/21/2020 which showed no obstruction or restriction with a normal DLCO, although notable for significant air trapping  - EGD pathology report from February 2021 which was negative for H. pylori, showed signs of chronic inflammation, most notably in the distal esophagus there was signs of reflux esophagitis that was compatible with Neil's.    Assessment / Plan:   Diagnoses and all orders for this visit:    1. Moderate persistent asthma, unspecified whether complicated (Primary)  2. Neil's esophagus with dysplasia  3. Chronic cough  -Patient continues to cough despite optimizing asthma and reflux therapy.  At this point I do not know that she even has asthma.  I want her to stop the Breo she can use the albuterol on as-needed basis.  I do not want her to get any more shots of Fasenra either until we have finished up with the work-up further to be sure that there is some benefit from the medications.  She is already doing all she can for her reflux with Dexilant, elevating the head of the bed 2 to 6 inches and not eating and drinking 2 hours prior to going to sleep.  I want to get a CT of the sinuses and a CT of the chest to be sure that we are not missing anything previous work-up or CT of the chest were unremarkable.  Then we will plan for bronchoscopy with BAL and transbronchial biopsies.  If all this is negative then I suspect that we are dealing with a neurogenic cough.  We may have to go back on gabapentin in the future or can just use codeine to suppress the cough at night.      Follow Up:   Return in about 6 weeks (around 12/5/2023) for CT Chest with next visit, CT sinus, and BAL.       IWONA Alfaro, DO  Pulmonary and Critical Care Medicine  Note Electronically Signed    Part of this note may be an electronic transcription/translation of spoken language to printed text using the Dragon Dictation  System.

## 2023-10-27 ENCOUNTER — PRE-ADMISSION TESTING (OUTPATIENT)
Dept: PREADMISSION TESTING | Facility: HOSPITAL | Age: 58
End: 2023-10-27
Payer: COMMERCIAL

## 2023-10-27 VITALS — BODY MASS INDEX: 32.55 KG/M2 | WEIGHT: 165.79 LBS | HEIGHT: 60 IN

## 2023-10-27 DIAGNOSIS — R91.1 LUNG NODULE: ICD-10-CM

## 2023-10-27 LAB
ALBUMIN SERPL-MCNC: 4.1 G/DL (ref 3.5–5.2)
ALBUMIN/GLOB SERPL: 1.5 G/DL
ALP SERPL-CCNC: 101 U/L (ref 39–117)
ALT SERPL W P-5'-P-CCNC: 20 U/L (ref 1–33)
ANION GAP SERPL CALCULATED.3IONS-SCNC: 10 MMOL/L (ref 5–15)
APTT PPP: 29.8 SECONDS (ref 22–39)
AST SERPL-CCNC: 15 U/L (ref 1–32)
BASOPHILS # BLD AUTO: 0.05 10*3/MM3 (ref 0–0.2)
BASOPHILS NFR BLD AUTO: 0.4 % (ref 0–1.5)
BILIRUB SERPL-MCNC: 0.4 MG/DL (ref 0–1.2)
BUN SERPL-MCNC: 23 MG/DL (ref 6–20)
BUN/CREAT SERPL: 32.4 (ref 7–25)
CALCIUM SPEC-SCNC: 9.3 MG/DL (ref 8.6–10.5)
CHLORIDE SERPL-SCNC: 105 MMOL/L (ref 98–107)
CO2 SERPL-SCNC: 26 MMOL/L (ref 22–29)
CREAT SERPL-MCNC: 0.71 MG/DL (ref 0.57–1)
DEPRECATED RDW RBC AUTO: 43.9 FL (ref 37–54)
EGFRCR SERPLBLD CKD-EPI 2021: 98.7 ML/MIN/1.73
EOSINOPHIL # BLD AUTO: 0 10*3/MM3 (ref 0–0.4)
EOSINOPHIL NFR BLD AUTO: 0 % (ref 0.3–6.2)
ERYTHROCYTE [DISTWIDTH] IN BLOOD BY AUTOMATED COUNT: 14.6 % (ref 12.3–15.4)
GLOBULIN UR ELPH-MCNC: 2.7 GM/DL
GLUCOSE SERPL-MCNC: 104 MG/DL (ref 65–99)
HCT VFR BLD AUTO: 39.2 % (ref 34–46.6)
HGB BLD-MCNC: 12.1 G/DL (ref 12–15.9)
IMM GRANULOCYTES # BLD AUTO: 0.1 10*3/MM3 (ref 0–0.05)
IMM GRANULOCYTES NFR BLD AUTO: 0.9 % (ref 0–0.5)
INR PPP: 1.01 (ref 0.89–1.12)
LYMPHOCYTES # BLD AUTO: 2.49 10*3/MM3 (ref 0.7–3.1)
LYMPHOCYTES NFR BLD AUTO: 21.6 % (ref 19.6–45.3)
MCH RBC QN AUTO: 25.8 PG (ref 26.6–33)
MCHC RBC AUTO-ENTMCNC: 30.9 G/DL (ref 31.5–35.7)
MCV RBC AUTO: 83.6 FL (ref 79–97)
MONOCYTES # BLD AUTO: 0.75 10*3/MM3 (ref 0.1–0.9)
MONOCYTES NFR BLD AUTO: 6.5 % (ref 5–12)
NEUTROPHILS NFR BLD AUTO: 70.6 % (ref 42.7–76)
NEUTROPHILS NFR BLD AUTO: 8.13 10*3/MM3 (ref 1.7–7)
NRBC BLD AUTO-RTO: 0 /100 WBC (ref 0–0.2)
PLATELET # BLD AUTO: 354 10*3/MM3 (ref 140–450)
PMV BLD AUTO: 9.2 FL (ref 6–12)
POTASSIUM SERPL-SCNC: 4.1 MMOL/L (ref 3.5–5.2)
PROT SERPL-MCNC: 6.8 G/DL (ref 6–8.5)
PROTHROMBIN TIME: 13.4 SECONDS (ref 12.2–14.5)
QT INTERVAL: 360 MS
QTC INTERVAL: 410 MS
RBC # BLD AUTO: 4.69 10*6/MM3 (ref 3.77–5.28)
SODIUM SERPL-SCNC: 141 MMOL/L (ref 136–145)
WBC NRBC COR # BLD: 11.52 10*3/MM3 (ref 3.4–10.8)

## 2023-10-27 PROCEDURE — 93010 ELECTROCARDIOGRAM REPORT: CPT | Performed by: INTERNAL MEDICINE

## 2023-10-27 PROCEDURE — 36415 COLL VENOUS BLD VENIPUNCTURE: CPT

## 2023-10-27 PROCEDURE — 85610 PROTHROMBIN TIME: CPT

## 2023-10-27 PROCEDURE — 80053 COMPREHEN METABOLIC PANEL: CPT

## 2023-10-27 PROCEDURE — 93005 ELECTROCARDIOGRAM TRACING: CPT

## 2023-10-27 PROCEDURE — 85730 THROMBOPLASTIN TIME PARTIAL: CPT

## 2023-10-27 PROCEDURE — 85025 COMPLETE CBC W/AUTO DIFF WBC: CPT

## 2023-10-27 NOTE — DISCHARGE INSTRUCTIONS
The following information and instructions were given:    Do not eat, drink, smoke or chew gum after midnight the night before surgery. This includes no mints.  Take all routine, prescribed medications including heart and blood pressure medicines with a sip of water unless otherwise instructed by your physician.   Do NOT take diabetic medication unless instructed by your physician.    DO NOT shave for two days before your procedure.  Do not wear makeup.      DO NOT wear fingernail polish (gel/regular) and/or acrylic/artificial nails on the day of surgery.   If you had a recent manicure and would rather not remove polish or artificial nails, the minimum requirement is that the polish/artificial nails must be removed from the middle finger on each hand.      Remove all jewelry (advise to go to jeweler if unable to remove).  Jewelry, especially rings, can no longer be taped for surgery.    Leave anything you consider valuable at home.    Bring the following with you the day of your procedure (when applicable):       -Picture ID and insurance cards       -Co-pay/deductible required by insurance       -Medications in the original bottles or detailed list (must include name of medication, dosage, and frequency).  This includes all over-the-counter medications if not brought to PAT       -Copy of advance directive, living will or power of  documents if not brought to PAT       -PAT Pass    Educational handout related to procedure given to patient.  Educational handout also includes general information related to their recovery that mentions signs and symptoms of infection and when to call the doctor.

## 2023-10-27 NOTE — PAT
An arrival time for procedure was not provided during PAT visit. If patient had any questions or concerns about their arrival time, they were instructed to contact their surgeon/physician.  Additionally, if the patient referred to an arrival time that was acquired from their my chart account, patient was encouraged to verify that time with their surgeon/physician. Arrival times are NOT provided in Pre Admission Testing Department.       Patient denies any current skin issues.      Bronchosopy pt education handout provided and reviewed with pt.

## 2023-10-31 DIAGNOSIS — E03.9 ACQUIRED HYPOTHYROIDISM: ICD-10-CM

## 2023-10-31 RX ORDER — LEVOTHYROXINE SODIUM 0.07 MG/1
TABLET ORAL
Qty: 30 TABLET | Refills: 0 | Status: SHIPPED | OUTPATIENT
Start: 2023-10-31

## 2023-11-01 ENCOUNTER — HOSPITAL ENCOUNTER (OUTPATIENT)
Facility: HOSPITAL | Age: 58
Setting detail: HOSPITAL OUTPATIENT SURGERY
Discharge: HOME | End: 2023-11-01
Attending: INTERNAL MEDICINE | Admitting: INTERNAL MEDICINE
Payer: COMMERCIAL

## 2023-11-01 ENCOUNTER — ANESTHESIA EVENT (OUTPATIENT)
Dept: GASTROENTEROLOGY | Facility: HOSPITAL | Age: 58
End: 2023-11-01
Payer: COMMERCIAL

## 2023-11-01 ENCOUNTER — APPOINTMENT (OUTPATIENT)
Dept: GENERAL RADIOLOGY | Facility: HOSPITAL | Age: 58
End: 2023-11-01
Payer: COMMERCIAL

## 2023-11-01 ENCOUNTER — ANESTHESIA (OUTPATIENT)
Dept: GASTROENTEROLOGY | Facility: HOSPITAL | Age: 58
End: 2023-11-01
Payer: COMMERCIAL

## 2023-11-01 VITALS
RESPIRATION RATE: 16 BRPM | TEMPERATURE: 97.3 F | HEART RATE: 85 BPM | OXYGEN SATURATION: 93 % | DIASTOLIC BLOOD PRESSURE: 76 MMHG | SYSTOLIC BLOOD PRESSURE: 111 MMHG

## 2023-11-01 DIAGNOSIS — R91.1 LUNG NODULE: ICD-10-CM

## 2023-11-01 LAB
LYMPHOCYTES NFR FLD MANUAL: 1 %
MACROPHAGE FLUID: 11 %
MESOTHL CELL NFR FLD MANUAL: 78 %
MONOCYTES NFR FLD: 1 %
NEUTROPHILS NFR FLD MANUAL: 9 %

## 2023-11-01 PROCEDURE — 25010000002 NEOSTIGMINE 10 MG/10ML SOLUTION: Performed by: NURSE ANESTHETIST, CERTIFIED REGISTERED

## 2023-11-01 PROCEDURE — 76000 FLUOROSCOPY <1 HR PHYS/QHP: CPT

## 2023-11-01 PROCEDURE — 87206 SMEAR FLUORESCENT/ACID STAI: CPT | Performed by: INTERNAL MEDICINE

## 2023-11-01 PROCEDURE — 25010000002 PROPOFOL 10 MG/ML EMULSION: Performed by: NURSE ANESTHETIST, CERTIFIED REGISTERED

## 2023-11-01 PROCEDURE — 25010000002 DEXAMETHASONE PER 1 MG: Performed by: NURSE ANESTHETIST, CERTIFIED REGISTERED

## 2023-11-01 PROCEDURE — 87116 MYCOBACTERIA CULTURE: CPT | Performed by: INTERNAL MEDICINE

## 2023-11-01 PROCEDURE — 87252 VIRUS INOCULATION TISSUE: CPT | Performed by: INTERNAL MEDICINE

## 2023-11-01 PROCEDURE — 25010000002 ONDANSETRON PER 1 MG: Performed by: NURSE ANESTHETIST, CERTIFIED REGISTERED

## 2023-11-01 PROCEDURE — 71045 X-RAY EXAM CHEST 1 VIEW: CPT

## 2023-11-01 PROCEDURE — 87102 FUNGUS ISOLATION CULTURE: CPT | Performed by: INTERNAL MEDICINE

## 2023-11-01 PROCEDURE — 87070 CULTURE OTHR SPECIMN AEROBIC: CPT | Performed by: INTERNAL MEDICINE

## 2023-11-01 PROCEDURE — 88305 TISSUE EXAM BY PATHOLOGIST: CPT | Performed by: INTERNAL MEDICINE

## 2023-11-01 PROCEDURE — 89051 BODY FLUID CELL COUNT: CPT | Performed by: INTERNAL MEDICINE

## 2023-11-01 PROCEDURE — 87205 SMEAR GRAM STAIN: CPT | Performed by: INTERNAL MEDICINE

## 2023-11-01 RX ORDER — SUCCINYLCHOLINE/SOD CL,ISO/PF 200MG/10ML
SYRINGE (ML) INTRAVENOUS AS NEEDED
Status: DISCONTINUED | OUTPATIENT
Start: 2023-11-01 | End: 2023-11-01 | Stop reason: SURG

## 2023-11-01 RX ORDER — SODIUM CHLORIDE, SODIUM LACTATE, POTASSIUM CHLORIDE, CALCIUM CHLORIDE 600; 310; 30; 20 MG/100ML; MG/100ML; MG/100ML; MG/100ML
20 INJECTION, SOLUTION INTRAVENOUS CONTINUOUS
Status: DISCONTINUED | OUTPATIENT
Start: 2023-11-01 | End: 2023-11-01 | Stop reason: HOSPADM

## 2023-11-01 RX ORDER — PROPOFOL 10 MG/ML
VIAL (ML) INTRAVENOUS AS NEEDED
Status: DISCONTINUED | OUTPATIENT
Start: 2023-11-01 | End: 2023-11-01 | Stop reason: SURG

## 2023-11-01 RX ORDER — ROCURONIUM BROMIDE 10 MG/ML
INJECTION, SOLUTION INTRAVENOUS AS NEEDED
Status: DISCONTINUED | OUTPATIENT
Start: 2023-11-01 | End: 2023-11-01 | Stop reason: SURG

## 2023-11-01 RX ORDER — GLYCOPYRROLATE 0.2 MG/ML
INJECTION INTRAMUSCULAR; INTRAVENOUS AS NEEDED
Status: DISCONTINUED | OUTPATIENT
Start: 2023-11-01 | End: 2023-11-01 | Stop reason: SURG

## 2023-11-01 RX ORDER — DEXAMETHASONE SODIUM PHOSPHATE 4 MG/ML
INJECTION, SOLUTION INTRA-ARTICULAR; INTRALESIONAL; INTRAMUSCULAR; INTRAVENOUS; SOFT TISSUE AS NEEDED
Status: DISCONTINUED | OUTPATIENT
Start: 2023-11-01 | End: 2023-11-01 | Stop reason: SURG

## 2023-11-01 RX ORDER — DEXAMETHASONE SODIUM PHOSPHATE 4 MG/ML
8 INJECTION, SOLUTION INTRA-ARTICULAR; INTRALESIONAL; INTRAMUSCULAR; INTRAVENOUS; SOFT TISSUE ONCE AS NEEDED
Status: DISCONTINUED | OUTPATIENT
Start: 2023-11-01 | End: 2023-11-01 | Stop reason: HOSPADM

## 2023-11-01 RX ORDER — NEOSTIGMINE METHYLSULFATE 1 MG/ML
INJECTION, SOLUTION INTRAVENOUS AS NEEDED
Status: DISCONTINUED | OUTPATIENT
Start: 2023-11-01 | End: 2023-11-01 | Stop reason: SURG

## 2023-11-01 RX ORDER — LIDOCAINE HYDROCHLORIDE 10 MG/ML
INJECTION, SOLUTION EPIDURAL; INFILTRATION; INTRACAUDAL; PERINEURAL AS NEEDED
Status: DISCONTINUED | OUTPATIENT
Start: 2023-11-01 | End: 2023-11-01 | Stop reason: SURG

## 2023-11-01 RX ORDER — LIDOCAINE HYDROCHLORIDE 40 MG/ML
4 INJECTION, SOLUTION RETROBULBAR; TOPICAL ONCE
Status: COMPLETED | OUTPATIENT
Start: 2023-11-01 | End: 2023-11-01

## 2023-11-01 RX ORDER — ONDANSETRON 2 MG/ML
INJECTION INTRAMUSCULAR; INTRAVENOUS AS NEEDED
Status: DISCONTINUED | OUTPATIENT
Start: 2023-11-01 | End: 2023-11-01 | Stop reason: SURG

## 2023-11-01 RX ORDER — FENTANYL CITRATE 50 UG/ML
50 INJECTION, SOLUTION INTRAMUSCULAR; INTRAVENOUS
Status: DISCONTINUED | OUTPATIENT
Start: 2023-11-01 | End: 2023-11-01 | Stop reason: HOSPADM

## 2023-11-01 RX ORDER — ONDANSETRON 2 MG/ML
4 INJECTION INTRAMUSCULAR; INTRAVENOUS ONCE AS NEEDED
Status: DISCONTINUED | OUTPATIENT
Start: 2023-11-01 | End: 2023-11-01 | Stop reason: HOSPADM

## 2023-11-01 RX ORDER — HYDROMORPHONE HYDROCHLORIDE 1 MG/ML
0.5 INJECTION, SOLUTION INTRAMUSCULAR; INTRAVENOUS; SUBCUTANEOUS
Status: DISCONTINUED | OUTPATIENT
Start: 2023-11-01 | End: 2023-11-01 | Stop reason: HOSPADM

## 2023-11-01 RX ADMIN — SODIUM CHLORIDE, POTASSIUM CHLORIDE, SODIUM LACTATE AND CALCIUM CHLORIDE 20 ML/HR: 600; 310; 30; 20 INJECTION, SOLUTION INTRAVENOUS at 10:26

## 2023-11-01 RX ADMIN — LIDOCAINE HYDROCHLORIDE 4 ML: 40 INJECTION, SOLUTION RETROBULBAR; TOPICAL at 12:00

## 2023-11-01 RX ADMIN — NEOSTIGMINE 1 MG: 1 INJECTION INTRAVENOUS at 11:28

## 2023-11-01 RX ADMIN — Medication 140 MG: at 11:07

## 2023-11-01 RX ADMIN — ROCURONIUM BROMIDE 10 MG: 10 SOLUTION INTRAVENOUS at 11:07

## 2023-11-01 RX ADMIN — DEXAMETHASONE SODIUM PHOSPHATE 4 MG: 4 INJECTION INTRA-ARTICULAR; INTRALESIONAL; INTRAMUSCULAR; INTRAVENOUS; SOFT TISSUE at 11:16

## 2023-11-01 RX ADMIN — GLYCOPYRROLATE 0.2 MG: 0.4 INJECTION INTRAMUSCULAR; INTRAVENOUS at 11:28

## 2023-11-01 RX ADMIN — LIDOCAINE HYDROCHLORIDE 50 MG: 10 INJECTION, SOLUTION EPIDURAL; INFILTRATION; INTRACAUDAL; PERINEURAL at 11:07

## 2023-11-01 RX ADMIN — PROPOFOL 200 MG: 10 INJECTION, EMULSION INTRAVENOUS at 11:07

## 2023-11-01 RX ADMIN — ONDANSETRON 4 MG: 2 INJECTION INTRAMUSCULAR; INTRAVENOUS at 11:16

## 2023-11-01 NOTE — ANESTHESIA PROCEDURE NOTES
Airway  Urgency: elective    Date/Time: 11/1/2023 11:13 AM  Airway not difficult    General Information and Staff    Patient location during procedure: OR    Indications and Patient Condition  Indications for airway management: airway protection    Preoxygenated: yes  MILS not maintained throughout  Mask difficulty assessment: 1 - vent by mask    Final Airway Details  Final airway type: endotracheal airway      Successful airway: ETT  Cuffed: yes   Successful intubation technique: direct laryngoscopy  Endotracheal tube insertion site: oral  Blade: Anita  Blade size: 3  ETT size (mm): 8.5  Cormack-Lehane Classification: grade I - full view of glottis  Placement verified by: chest auscultation and capnometry   Measured from: lips  ETT/EBT  to lips (cm): 20  Number of attempts at approach: 1  Assessment: lips, teeth, and gum same as pre-op and atraumatic intubation    Additional Comments  Negative epigastric sounds, Breath sound equal bilaterally with symmetric chest rise and fall

## 2023-11-01 NOTE — ANESTHESIA PREPROCEDURE EVALUATION
Anesthesia Evaluation     Patient summary reviewed and Nursing notes reviewed   history of anesthetic complications:  PONV  NPO Solid Status: > 8 hours  NPO Liquid Status: > 2 hours           Airway   Mallampati: II  TM distance: >3 FB  Neck ROM: full  No difficulty expected  Dental      Pulmonary    (+) ,shortness of breath (COUGH)  (-) asthma (does not hgave ashma PFTs proven  no MDI), recent URI, sleep apnea, not a smoker, no home oxygen    ROS comment: 98% RA  Cardiovascular     ECG reviewed    (+) hyperlipidemia  (-) past MI, dysrhythmias, angina, cardiac stents    ROS comment: ECG NSR   ECHO 2023  EF 60% no shunt PFO device in place     Neuro/Psych  (+) CVA (remote 2011 TIA no residual - PFO related), headaches, numbness  (-) seizures (remote- sepsis related -No current meds), no Parkinson's disease  GI/Hepatic/Renal/Endo    (+) GERD, thyroid problem hypothyroidism  (-) no renal disease, diabetes    ROS Comment: Barretts     Musculoskeletal     (+) neck pain  Abdominal    Substance History      OB/GYN          Other   arthritis,     ROS/Med Hx Other: Bronch for Cough                     Anesthesia Plan    ASA 3     general     (TOP PFL POM VS GA ETT )  intravenous induction     Anesthetic plan, risks, benefits, and alternatives have been provided, discussed and informed consent has been obtained with: patient.    Plan discussed with CRNA.        CODE STATUS:

## 2023-11-01 NOTE — ANESTHESIA POSTPROCEDURE EVALUATION
Patient: Astrid Sam    Procedure Summary       Date: 11/01/23 Room / Location:  [facility]    Anesthesia Start: 1100 Anesthesia Stop: 1138    Procedure: BRONCHOSCOPY W\ TBBX & BAL (Bronchus) Diagnosis:       Lung nodule      (Lung nodule [R91.1])    Surgeons: Levi Alfaro DO Provider: Saad Cedeno MD    Anesthesia Type: general ASA Status: 3            Anesthesia Type: general    Vitals  Vitals Value Taken Time   BP     Temp     Pulse 103 11/01/23 1136   Resp     SpO2 94 % 11/01/23 1136   Vitals shown include unfiled device data.        Post Anesthesia Care and Evaluation      Comments: 94/63 97.0 [id number]

## 2023-11-02 ENCOUNTER — TELEPHONE (OUTPATIENT)
Dept: PULMONOLOGY | Facility: CLINIC | Age: 58
End: 2023-11-02
Payer: COMMERCIAL

## 2023-11-02 LAB
CYTO UR: NORMAL
GIE STN SPEC: NORMAL
LAB AP CASE REPORT: NORMAL
LAB AP CLINICAL INFORMATION: NORMAL
PATH REPORT.FINAL DX SPEC: NORMAL
PATH REPORT.GROSS SPEC: NORMAL
REF LAB TEST METHOD: NORMAL

## 2023-11-02 NOTE — TELEPHONE ENCOUNTER
"Patient called this morning concerned after getting her results on MyChart from her XR Chest. She is wanting to know if she should be concerned that it says - \"New right basilar airspace disease\". Please advise. Thanks  "

## 2023-11-03 LAB
BACTERIA SPEC RESP CULT: NO GROWTH
GRAM STN SPEC: NORMAL

## 2023-11-04 LAB — VIRUS SPEC CULT: NORMAL

## 2023-11-06 ENCOUNTER — TELEPHONE (OUTPATIENT)
Dept: GASTROENTEROLOGY | Facility: CLINIC | Age: 58
End: 2023-11-06
Payer: COMMERCIAL

## 2023-11-06 NOTE — TELEPHONE ENCOUNTER
I spoke with Doris this afternoon.  She went to the urgent care center for evaluation and was told that the Dexilant may be causing hives.  Doris has been on the medication for over a month without any difficulty.  The patient was given prescription for a steroid Dosepak and is going to follow-up with her primary care physician.  I stated to Doris that hives is not a  common adverse effect with this medication.

## 2023-11-08 LAB
FUNGUS WND CULT: NORMAL
MYCOBACTERIUM SPEC CULT: NORMAL
NIGHT BLUE STAIN TISS: NORMAL

## 2023-11-10 LAB — VIRUS SPEC CULT: NORMAL

## 2023-11-13 RX ORDER — DICLOFENAC SODIUM 75 MG/1
75 TABLET, DELAYED RELEASE ORAL 2 TIMES DAILY PRN
Qty: 60 TABLET | Refills: 0 | Status: SHIPPED | OUTPATIENT
Start: 2023-11-13

## 2023-11-14 ENCOUNTER — TELEPHONE (OUTPATIENT)
Dept: PULMONOLOGY | Facility: CLINIC | Age: 58
End: 2023-11-14
Payer: COMMERCIAL

## 2023-11-14 NOTE — TELEPHONE ENCOUNTER
Pt called today requesting a call back @ [phone] with the recent Bronchoscopy and Lab results.

## 2023-11-15 ENCOUNTER — TELEPHONE (OUTPATIENT)
Dept: FAMILY MEDICINE CLINIC | Facility: CLINIC | Age: 58
End: 2023-11-15
Payer: COMMERCIAL

## 2023-11-15 LAB
FUNGUS WND CULT: NORMAL
MYCOBACTERIUM SPEC CULT: NORMAL
NIGHT BLUE STAIN TISS: NORMAL

## 2023-11-15 NOTE — TELEPHONE ENCOUNTER
Pt stated has tried the PM meds name brand and generic OTC didn't work-melatonin makes her have frequent urination-did take trazodone and stated it did work didn't remember why she doesn't take it now

## 2023-11-15 NOTE — TELEPHONE ENCOUNTER
Has she tried any OTC sleep aids or melatonin?   She was prescribed trazodone, but I don't see this on medication list.

## 2023-11-15 NOTE — TELEPHONE ENCOUNTER
----- Message from Astrid Sam sent at 11/13/2023 10:45 AM EST -----  Regarding: Sleeping   Contact: [phone]  Good  morning.     I'm sending this because I'm having issues with sleeping at night. Is there anything you can give me.    Doris sam

## 2023-11-15 NOTE — TELEPHONE ENCOUNTER
Pt states she is only getting 2-3 hrs a night of sleep , no stress or new medication . This has been happening for over a month. She does not take any naps either.

## 2023-11-16 RX ORDER — TRAZODONE HYDROCHLORIDE 100 MG/1
50-100 TABLET ORAL NIGHTLY
Qty: 30 TABLET | Refills: 1 | Status: SHIPPED | OUTPATIENT
Start: 2023-11-16

## 2023-11-17 DIAGNOSIS — K21.9 GASTROESOPHAGEAL REFLUX DISEASE, UNSPECIFIED WHETHER ESOPHAGITIS PRESENT: Primary | ICD-10-CM

## 2023-11-17 DIAGNOSIS — K22.70 BARRETT'S ESOPHAGUS WITHOUT DYSPLASIA: ICD-10-CM

## 2023-11-17 RX ORDER — RABEPRAZOLE SODIUM 20 MG/1
20 TABLET, DELAYED RELEASE ORAL DAILY
Qty: 30 TABLET | Refills: 5 | Status: SHIPPED | OUTPATIENT
Start: 2023-11-17

## 2023-11-22 LAB
FUNGUS WND CULT: NORMAL
MYCOBACTERIUM SPEC CULT: NORMAL
NIGHT BLUE STAIN TISS: NORMAL

## 2023-11-29 ENCOUNTER — HOSPITAL ENCOUNTER (OUTPATIENT)
Dept: CT IMAGING | Facility: HOSPITAL | Age: 58
Discharge: HOME | End: 2023-11-29
Admitting: INTERNAL MEDICINE
Payer: COMMERCIAL

## 2023-11-29 DIAGNOSIS — R05.3 CHRONIC COUGH: ICD-10-CM

## 2023-11-29 DIAGNOSIS — E78.2 MIXED HYPERLIPIDEMIA: ICD-10-CM

## 2023-11-29 DIAGNOSIS — E03.9 ACQUIRED HYPOTHYROIDISM: ICD-10-CM

## 2023-11-29 LAB
FUNGUS WND CULT: NORMAL
MYCOBACTERIUM SPEC CULT: NORMAL
NIGHT BLUE STAIN TISS: NORMAL

## 2023-11-29 PROCEDURE — 70486 CT MAXILLOFACIAL W/O DYE: CPT

## 2023-11-29 PROCEDURE — 71250 CT THORAX DX C-: CPT

## 2023-12-01 RX ORDER — LEVOTHYROXINE SODIUM 0.07 MG/1
TABLET ORAL
Qty: 30 TABLET | Refills: 2 | Status: SHIPPED | OUTPATIENT
Start: 2023-12-01

## 2023-12-01 RX ORDER — PRAVASTATIN SODIUM 40 MG
TABLET ORAL
Qty: 90 TABLET | Refills: 0 | Status: SHIPPED | OUTPATIENT
Start: 2023-12-01

## 2023-12-06 LAB
FUNGUS WND CULT: NORMAL
MYCOBACTERIUM SPEC CULT: NORMAL
NIGHT BLUE STAIN TISS: NORMAL

## 2023-12-07 ENCOUNTER — TELEPHONE (OUTPATIENT)
Dept: PULMONOLOGY | Facility: CLINIC | Age: 58
End: 2023-12-07
Payer: COMMERCIAL

## 2023-12-07 NOTE — TELEPHONE ENCOUNTER
Pt would like to know what the results mean on her XR of chest 1 view done 11/1/23. Please advise.

## 2023-12-13 LAB
FUNGUS WND CULT: NORMAL
MYCOBACTERIUM SPEC CULT: NORMAL
NIGHT BLUE STAIN TISS: NORMAL

## 2023-12-15 DIAGNOSIS — G62.9 POLYNEUROPATHY: ICD-10-CM

## 2023-12-15 RX ORDER — PREGABALIN 75 MG/1
75 CAPSULE ORAL NIGHTLY
Qty: 30 CAPSULE | Refills: 1 | Status: SHIPPED | OUTPATIENT
Start: 2023-12-15

## 2023-12-15 NOTE — TELEPHONE ENCOUNTER
Rx Refill Note  Requested Prescriptions     Pending Prescriptions Disp Refills    pregabalin (LYRICA) 75 MG capsule [Pharmacy Med Name: PREGABALIN 75 MG CAPSULE] 30 capsule      Sig: TAKE 1 CAPSULE BY MOUTH EVERY DAY AT NIGHT      Last filled:06/14/2023  Last office visit with prescribing clinician: 6/14/2023      Next office visit with prescribing clinician: 3/28/2024     Lissette Calloway MA  12/15/23, 11:35 EST

## 2023-12-22 DIAGNOSIS — E03.9 ACQUIRED HYPOTHYROIDISM: ICD-10-CM

## 2023-12-26 RX ORDER — PRAMIPEXOLE 2.25 MG/1
2.25 TABLET, EXTENDED RELEASE ORAL
Qty: 90 TABLET | Refills: 1 | OUTPATIENT
Start: 2023-12-26

## 2023-12-26 RX ORDER — LEVOTHYROXINE SODIUM 0.07 MG/1
TABLET ORAL
Qty: 90 TABLET | OUTPATIENT
Start: 2023-12-26

## 2023-12-26 NOTE — TELEPHONE ENCOUNTER
Rx Refill Note  Requested Prescriptions     Pending Prescriptions Disp Refills    Pramipexole Dihydrochloride ER 2.25 MG extended-release tablet [Pharmacy Med Name: PRAMIPEXOLE ER 2.25 MG TABLET] 90 tablet 1     Sig: TAKE 1 TABLET BY MOUTH EVERY DAY AT NIGHT      Last filled:06/14/2023  Last office visit with prescribing clinician: 6/14/2023      Next office visit with prescribing clinician: 3/28/2024     Lissette Calloway MA  12/26/23, 09:05 EST    Declined refill, refill strength not appropriate

## 2023-12-28 DIAGNOSIS — R45.4 IRRITABILITY: ICD-10-CM

## 2023-12-28 DIAGNOSIS — G43.119 INTRACTABLE MIGRAINE WITH AURA WITHOUT STATUS MIGRAINOSUS: ICD-10-CM

## 2023-12-29 RX ORDER — ESCITALOPRAM OXALATE 20 MG/1
TABLET ORAL
Qty: 90 TABLET | Refills: 0 | Status: SHIPPED | OUTPATIENT
Start: 2023-12-29

## 2023-12-29 RX ORDER — PRAMIPEXOLE 1.5 MG/1
1 TABLET, EXTENDED RELEASE ORAL NIGHTLY
Qty: 90 TABLET | Refills: 1 | OUTPATIENT
Start: 2023-12-29

## 2023-12-29 RX ORDER — PRAMIPEXOLE 2.25 MG/1
2.25 TABLET, EXTENDED RELEASE ORAL
Qty: 30 TABLET | Refills: 3 | Status: SHIPPED | OUTPATIENT
Start: 2023-12-29

## 2023-12-29 NOTE — TELEPHONE ENCOUNTER
Rx Refill Note  Requested Prescriptions     Pending Prescriptions Disp Refills    Pramipexole Dihydrochloride ER 1.5 MG tablet sustained-release 24 hour [Pharmacy Med Name: PRAMIPEXOLE ER 1.5 MG TABLET] 90 tablet 1     Sig: TAKE 1 CAPSULE BY MOUTH EVERY NIGHT. TAKE A FEW HOURS BEFORE BEDTIME      Last filled:  Last office visit with prescribing clinician: 6/14/2023      Next office visit with prescribing clinician: 3/28/2024     Leoncio Llanos MA  12/29/23, 09:20 EST

## 2023-12-29 NOTE — TELEPHONE ENCOUNTER
Spoke with pharmacy to confirm dosing due to the discrepancy between records and pt report.     Pharm stated she has been getting 2.25mg ER filled for 90 days in June & September 2023. This is consistent with her report of the fill in September.     We will send the 2.25mg and reevaluate further at her next appt.         Rx Refill Note  Requested Prescriptions     Pending Prescriptions Disp Refills    Pramipexole Dihydrochloride ER 2.25 MG extended-release tablet 90 tablet 2     Sig: Take 1 tablet by mouth every night at bedtime.     Refused Prescriptions Disp Refills    Pramipexole Dihydrochloride ER 1.5 MG tablet sustained-release 24 hour [Pharmacy Med Name: PRAMIPEXOLE ER 1.5 MG TABLET] 90 tablet 1     Sig: TAKE 1 CAPSULE BY MOUTH EVERY NIGHT. TAKE A FEW HOURS BEFORE BEDTIME     Refused By: LEONCIO ABDI     Reason for Refusal: Refill not appropriate      Last filled: 2.25mg-- 6/14/23. She should have meds until next appt but sending to avoid confusion.     Last office visit with prescribing clinician: 6/14/2023      Next office visit with prescribing clinician: 3/28/2024     Leoncio Abdi MA  12/29/23, 16:42 EST

## 2024-01-05 ENCOUNTER — TELEMEDICINE (OUTPATIENT)
Dept: FAMILY MEDICINE CLINIC | Facility: TELEHEALTH | Age: 59
End: 2024-01-05
Payer: COMMERCIAL

## 2024-01-05 DIAGNOSIS — B00.1 FEVER BLISTER: Primary | ICD-10-CM

## 2024-01-05 DIAGNOSIS — J02.0 STREP THROAT: Primary | ICD-10-CM

## 2024-01-05 RX ORDER — AZITHROMYCIN 250 MG/1
TABLET, FILM COATED ORAL
Qty: 6 TABLET | Refills: 0 | Status: SHIPPED | OUTPATIENT
Start: 2024-01-05

## 2024-01-05 RX ORDER — VALACYCLOVIR HYDROCHLORIDE 1 G/1
1000 TABLET, FILM COATED ORAL 2 TIMES DAILY
Qty: 10 TABLET | Refills: 0 | Status: SHIPPED | OUTPATIENT
Start: 2024-01-05 | End: 2024-01-10

## 2024-01-05 RX ORDER — PREDNISONE 10 MG/1
TABLET ORAL
Qty: 21 TABLET | Refills: 0 | Status: SHIPPED | OUTPATIENT
Start: 2024-01-05

## 2024-01-05 NOTE — PROGRESS NOTES
You have chosen to receive care through a telehealth visit.  Do you consent to use a video/audio connection for your medical care today? Yes     CHIEF COMPLAINT  No chief complaint on file.        HPI  Astrid Sam is a 58 y.o. female  presents with complaint of last night developed severe sore throat, laryngitis.  Very tender and swollen lymph nodes in front of neck. Fever.  Thinks she has strep throat.  She denies cough, congestion, shortness of breath or wheezing.     Review of Systems  See HPI    Past Medical History:   Diagnosis Date    Acquired hypothyroidism     Anemia     after hip replacement     Arthritis     hips and knees    Asthma, extrinsic 01/01/2021    Per lung doctor    CTS (carpal tunnel syndrome)     Had both wrist done    Difficulty walking     GERD (gastroesophageal reflux disease) 2021    History of sepsis 08/2021    hospitalized x1 week; [facility]    History of transfusion     2 units of autologus blood after 1st hip surgery-[facility]    Low back pain     BACK PAIN WERE I HAD HIP REPLANCED    Lung nodule 01/01/2021    Memory loss     Migraine     h/o    Mixed hyperlipidemia 07/20/2020    Moderate persistent asthma 12/02/2021    PONV (postoperative nausea and vomiting)     preprocedural meds help and pt will need them     Rheumatoid arthritis 05/2015    Seizure 11/04/2020    Spinal headache 2022    pt states after hip surgery march 2022-longer bedrest    Stroke 2011    no residual effects    Wears glasses        Family History   Problem Relation Age of Onset    Hypertension Mother     Migraines Mother     Thyroid disease Mother     Arthritis Mother     Hypertension Father     COPD Father     Asthma Father     Arthritis Father     Hearing loss Father     Breast cancer Maternal Cousin         unknown    Rectal cancer Sister     Ovarian cancer Neg Hx        Social History     Socioeconomic History    Marital status:    Tobacco Use    Smoking status: Never     Passive exposure: Never     Smokeless tobacco: Never   Vaping Use    Vaping Use: Never used   Substance and Sexual Activity    Alcohol use: Not Currently     Comment: rare    Drug use: Never    Sexual activity: Defer     Partners: Male     Birth control/protection: Post-menopausal, None     Comment: TUBES TIDE       Astrid Sam  reports that she has never smoked. She has never been exposed to tobacco smoke. She has never used smokeless tobacco..              LMP  (LMP Unknown)     PHYSICAL EXAM  Physical Exam   Constitutional: She is oriented to person, place, and time. She appears well-developed and well-nourished. She does not have a sickly appearance. She appears ill.   Very raspy voice   HENT:   Head: Normocephalic and atraumatic.   Pulmonary/Chest: Effort normal.  No respiratory distress.  Neurological: She is alert and oriented to person, place, and time.           Diagnoses and all orders for this visit:    1. Strep throat (Primary)  -     azithromycin (Zithromax Z-Sammy) 250 MG tablet; Take 2 tablets by mouth on day 1, then 1 tablet daily on days 2-5  Dispense: 6 tablet; Refill: 0  -     predniSONE (DELTASONE) 10 MG (21) dose pack; Use as directed on package  Dispense: 21 tablet; Refill: 0    --take medications as prescribed  --increase fluids, rest as needed, tylenol or ibuprofen for pain  --f/u in 5-7 days if no improvement        FOLLOW-UP  As discussed during visit with PCP/[facility] if no improvement or Urgent Care/Emergency Department if worsening of symptoms    Patient verbalizes understanding of medication dosage, comfort measures, instructions for treatment and follow-up.    Corinne Sanchez, JOHNATHAN  01/05/2024  07:44 EST    The use of a video visit has been reviewed with the patient and verbal informed consent has been obtained. Myself and Astrid Sam participated in this visit. The patient is located in [place] [id number].    I am located in [place], [place]. [employer] and [employer] were utilized. I spent 8  minutes in the patient's chart for this visit.

## 2024-01-08 ENCOUNTER — TELEPHONE (OUTPATIENT)
Dept: PULMONOLOGY | Facility: CLINIC | Age: 59
End: 2024-01-08
Payer: COMMERCIAL

## 2024-01-08 RX ORDER — TRAZODONE HYDROCHLORIDE 100 MG/1
50-100 TABLET ORAL NIGHTLY
Qty: 30 TABLET | Refills: 1 | Status: SHIPPED | OUTPATIENT
Start: 2024-01-08

## 2024-01-08 NOTE — TELEPHONE ENCOUNTER
Called pt and informed. Pt asked if we had any slots available for a sooner date. I checked and, unfortunately, we do not. Informed pt of this and she verbalized understanding.

## 2024-01-08 NOTE — TELEPHONE ENCOUNTER
Pt called asking for the results from her bronchoscopy from 11/1/23. Told pt that Dr. Alfaro has not yet went over them, but would get back to her with the results. Pt verbalized understanding. Pt also asked if there was something that could be sent in for her cough. Pt stated that she has tried multiple OTC medications, including Delsym, Nyquil/DayQuil, and Claritin. States that Tessalon pearls help some, but not completely. Pt's cough did sound very deep and hard over the phone. Please advise.

## 2024-01-19 ENCOUNTER — TELEMEDICINE (OUTPATIENT)
Dept: PULMONOLOGY | Facility: CLINIC | Age: 59
End: 2024-01-19
Payer: COMMERCIAL

## 2024-01-19 ENCOUNTER — TELEPHONE (OUTPATIENT)
Dept: PULMONOLOGY | Facility: CLINIC | Age: 59
End: 2024-01-19

## 2024-01-19 DIAGNOSIS — R05.3 CHRONIC COUGH: ICD-10-CM

## 2024-01-19 DIAGNOSIS — R05.3 CHRONIC COUGH: Primary | ICD-10-CM

## 2024-01-19 DIAGNOSIS — K22.719 BARRETT'S ESOPHAGUS WITH DYSPLASIA: ICD-10-CM

## 2024-01-19 DIAGNOSIS — J45.40 MODERATE PERSISTENT ASTHMA, UNSPECIFIED WHETHER COMPLICATED: ICD-10-CM

## 2024-01-19 PROBLEM — R91.1 LUNG NODULE: Status: RESOLVED | Noted: 2023-10-24 | Resolved: 2024-01-19

## 2024-01-19 RX ORDER — PROMETHAZINE HYDROCHLORIDE AND CODEINE PHOSPHATE 6.25; 1 MG/5ML; MG/5ML
5 SOLUTION ORAL EVERY 4 HOURS PRN
Qty: 180 ML | Refills: 0 | Status: SHIPPED | OUTPATIENT
Start: 2024-01-19 | End: 2024-01-19 | Stop reason: SDUPTHER

## 2024-01-19 RX ORDER — PROMETHAZINE HYDROCHLORIDE AND CODEINE PHOSPHATE 6.25; 1 MG/5ML; MG/5ML
5 SOLUTION ORAL EVERY 4 HOURS PRN
Qty: 180 ML | Refills: 0 | Status: SHIPPED | OUTPATIENT
Start: 2024-01-19

## 2024-01-19 NOTE — TELEPHONE ENCOUNTER
Please resend Rx for promethazine-codeine (PHENERGAN with CODEINE) 6.25-10 MG/5ML solution to [facility] in [place] per Pts request.

## 2024-01-19 NOTE — PROGRESS NOTES
Follow Up Office Note       Patient Name: Astrid Sam    Referring Physician: No ref. provider found    Chief Complaint: Chronic cough      History of Present Illness: Astrid Sam is a 58 y.o. female who is here today to follow-up care with Pulmonary.  Patient has a past medical history significant for hypothyroidism, allergic rhinitis, hyperlipidemia, PFO status post anterior atrial closure, CVA, Neil's esophagus and seizures.  Patient has had chronic cough now for 2 years.  We have tried every asthma therapy without any benefit.  The only positive testing we have ever received is the barium swallow which showed mild gastroesophageal reflux up to the thoracic inlet with subsequent EGD showing signs of Neil's esophagus.  She is optimized on her medical therapy with no improvement at this point.  Denies any chest pain, nausea, fever, chills.  Cough overall stable.    Review of Systems:   Review of Systems   Constitutional:  Negative for chills, fatigue and fever.   HENT:  Negative for congestion and voice change.    Eyes:  Negative for blurred vision.   Respiratory:  Positive for cough. Negative for shortness of breath and wheezing.    Cardiovascular:  Negative for chest pain.   Skin:  Negative for dry skin.   Hematological:  Negative for adenopathy.   Psychiatric/Behavioral:  Negative for agitation and depressed mood.        The following portions of the patient's history were reviewed and updated as appropriate: allergies, current medications, past family history, past medical history, past social history, past surgical history and problem list.    Physical Exam:    Physical Exam  Constitutional:       Appearance: She is obese.   Neurological:      Mental Status: She is alert and oriented to person, place, and time.         Immunization History   Administered Date(s) Administered    COVID-19 (PFIZER) Purple Cap Monovalent 04/05/2021, 05/03/2021    Fluzone (or Fluarix & Flulaval for VFC)  >6mos 12/10/2021       Results Review:   -I personally viewed the patient's CT scan of the chest from 11/29/2023 which shows no acute cardiopulmonary process, no nodules, masses, or infiltrates.  -I personally viewed the patient's CT sinus report from November 2023, which showed normal  - chest x-ray from 6/30/2023 which showed no acute cardiopulmonary process.  -CT scan from October 2021 which showed the nodules in the right lower lobe is now resolved, with no other acute infiltrates              - CT scan from May 2021 which showed significant improvement in the upper lobe infiltrates compared to August 2020, she was noted to have a small nodule in the right lower lobe.              - imaging from chest x-ray from November 4, 2020 shows no acute cardiopulmonary process, CT scan of the chest from August 22, 2020 showed some very mild groundglass interstitial changes in the upper lobes bilaterally, and otherwise unremarkable.  - Echo from 8/22/2020 showed an EF of 60%, with normal left ventricular systolic function, and no signs of a shunt.  - barium swallow report, which showed that the patient had mild gastroesophageal reflux to the thoracic inlet, with mild smooth luminal narrowing of the distal esophagus which may represent a stricture or spasm.  -I personally viewed the patient's bronchoscopy results from November 2023, with all cultures being negative, cytology was benign, transbronchial biopsies benign tissue, BAL within normal limits.  - PFTs from 10/24/2023 which showed no obstruction or restriction with a normal DLCO.  -spirometry from May 2023 which shows no obstruction.  - pulmonary function testing from 12/21/2020 which showed no obstruction or restriction with a normal DLCO, although notable for significant air trapping  - EGD pathology report from February 2021 which was negative for H. pylori, showed signs of chronic inflammation, most notably in the distal esophagus there was signs of reflux  esophagitis that was compatible with Neil's.    Assessment / Plan:   Diagnoses and all orders for this visit:    1. Chronic cough (Primary)  2. Neil's esophagus with dysplasia  -The patient continues to have a significant chronic cough despite maximal therapy from multiple different etiologies.  We tried postnasal drip regimen, CT sinuses, maximal asthma therapy, and empiric reflux regimen and as of yet she continues to have a significant cough without any improvement.  The only positive findings we have ever found was the findings of Neil's esophagus on EGD and a esophagram showing reflux up to the thoracic inlet.  Given that the empiric reflux regimen has not provided any benefit and the patient is elevating the head of the bed not eating drink 2 hours prior to going to sleep.  And this is her only positive finding I am going to go ahead and send her over to surgery for evaluation of esophageal pH probe study and consideration for a Nissen procedure.  No other findings at this point would cause the patient's cough other than a neurogenic cough.  I did go ahead and prescribe her promethazine with codeine as a cough suppressant to try to at least let the patient rest some.  But I have explained to her that there is is not a long-term solution and that it is only to help her with rest and trying to suppress the cough for short period of time we still need to figure out what the long-term outcome needs to be.  If there is no need for the Nissen procedure.  Then I would consider this to be a neurogenic cough and we could use gabapentin, but she had tried that in the past and did not find any benefit.    3. Moderate persistent asthma, unspecified whether complicated  -She was trialed on maximal inhaler therapy and Biologics.  Essentially has had normal PFTs throughout all of her testing.  She continues to have coughing despite this.  At this point I do not think she actually has asthma.  She can discontinue all  the inhalers as they have not provided any benefit.  We also previously stopped her Biologics.      Follow Up:   Return in about 6 months (around 7/19/2024).       IWONA Alfaro,   Pulmonary and Critical Care Medicine  Note Electronically Signed    Part of this note may be an electronic transcription/translation of spoken language to printed text using the Dragon Dictation System.

## 2024-02-13 RX ORDER — FLUCONAZOLE 150 MG/1
150 TABLET ORAL
Qty: 2 TABLET | Refills: 0 | Status: SHIPPED | OUTPATIENT
Start: 2024-02-13 | End: 2024-02-17

## 2024-02-21 RX ORDER — FLUCONAZOLE 150 MG/1
150 TABLET ORAL
Qty: 2 TABLET | Refills: 0 | OUTPATIENT
Start: 2024-02-21 | End: 2024-02-25

## 2024-02-21 RX ORDER — PRAMIPEXOLE 1.5 MG/1
1 TABLET, EXTENDED RELEASE ORAL NIGHTLY
Qty: 90 TABLET | Refills: 1 | OUTPATIENT
Start: 2024-02-21

## 2024-02-22 NOTE — PROGRESS NOTES
Subjective   Astrid Sam is a 54 y.o. female.   Chief Complaint   Patient presents with   • 6mo f/u hypothyroidism     pt is fasting      History of Present Illness   Hypothyroidism  Astrid Sam is a 54 y.o. female who presents for follow up of hypothyroidism. Current symptoms: none . Patient denies diarrhea, heat / cold intolerance and palpitations. Symptoms have stabilized.    She recently had cervical spine surgery. Also, osteomyelitis of right SC joint found during that operation.   She has completed IV antibiotics infusions and following with infectious disease. Now taking doxycycline x 1 month.     Still having restless legs at night, affecting her sleep.   Currently taking Requip 1 mg before bed without improvement of symptoms.     She often has a cough, worse after eating. Feels like something is in her throat, but unable to produce sputum with cough.   Doesn't take any treatment for reflux.     Answers for HPI/ROS submitted by the patient on 7/14/2020   What is the primary reason for your visit?: Other  Please describe your symptoms.: It's just a routine check up  Have you had these symptoms before?: Yes  How long have you been having these symptoms?: Greater than 2 weeks  Please list any medications you are currently taking for this condition.: N/a  Please describe any probable cause for these symptoms. : N/a    The following portions of the patient's history were reviewed and updated as appropriate: allergies, current medications, past family history, past medical history, past social history, past surgical history and problem list.    Review of Systems   Constitutional: Negative for chills, fatigue and fever.   HENT: Negative.    Eyes: Negative.    Respiratory: Positive for cough. Negative for chest tightness and shortness of breath.    Cardiovascular: Negative for chest pain and palpitations.   Gastrointestinal: Positive for GERD. Negative for nausea and vomiting.   Musculoskeletal:  Positive for arthralgias.        Restless legs   Skin: Negative for rash.   Neurological: Negative for light-headedness, headache and confusion.   Hematological: Negative for adenopathy. Does not bruise/bleed easily.   Psychiatric/Behavioral: Negative.        Objective   Physical Exam   Constitutional: She is oriented to person, place, and time. She appears well-developed and well-nourished.   HENT:   Head: Normocephalic and atraumatic.   Right Ear: External ear normal.   Left Ear: External ear normal.   Nose: Nose normal.   Eyes: Conjunctivae are normal.   Neck: Neck supple.   Cardiovascular: Normal rate, regular rhythm and normal heart sounds.   No murmur heard.  Pulmonary/Chest: Effort normal and breath sounds normal.   Musculoskeletal: She exhibits no deformity.   Lymphadenopathy:     She has no cervical adenopathy.   Neurological: She is alert and oriented to person, place, and time.   Skin: Skin is warm and dry.   Psychiatric: She has a normal mood and affect. Her behavior is normal. Thought content normal.   Nursing note and vitals reviewed.        Assessment/Plan   Astrid was seen today for 6mo f/u hypothyroidism.    Diagnoses and all orders for this visit:    Acquired hypothyroidism  -     CBC & Differential  -     Hepatitis C Antibody  -     TSH+Free T4    Iron deficiency anemia, unspecified iron deficiency anemia type  -     Iron Profile  -     Ferritin    RLS (restless legs syndrome)  -     pramipexole (MIRAPEX) 0.25 MG tablet; Take 1-2 tablets by mouth Every Night. Take 2-3 hours prior to bedtime    Gastroesophageal reflux disease, esophagitis presence not specified  -     famotidine (Pepcid) 20 MG tablet; Take 1 tablet by mouth 2 (Two) Times a Day.    Screening for hyperlipidemia  -     CBC & Differential  -     Hepatitis C Antibody  -     Lipid Panel    Encounter for hepatitis C screening test for low risk patient  -     CBC & Differential  -     Hepatitis C Antibody      Recovering well from recent  surgery.   Labs updated today.   Start famotidine to improve GERD, which is likely the etiology of her cough.   Change ropinirole to pramipexole to improve RLS.             Instructions: This plan will send the code FBSE to the PM system.  DO NOT or CHANGE the price. Price (Do Not Change): 0.00 Detail Level: Generalized

## 2024-03-01 ENCOUNTER — TRANSCRIBE ORDERS (OUTPATIENT)
Dept: ADMINISTRATIVE | Facility: HOSPITAL | Age: 59
End: 2024-03-01
Payer: COMMERCIAL

## 2024-03-01 DIAGNOSIS — K21.9 CHRONIC GERD: Primary | ICD-10-CM

## 2024-03-11 DIAGNOSIS — E03.9 ACQUIRED HYPOTHYROIDISM: ICD-10-CM

## 2024-03-11 NOTE — TELEPHONE ENCOUNTER
Caller: [facility]    Relationship:     Best call back number: [phone]     Requested Prescriptions:   Requested Prescriptions     Pending Prescriptions Disp Refills    levothyroxine (SYNTHROID, LEVOTHROID) 75 MCG tablet 30 tablet 2     Sig: Take 1 tablet by mouth Every Morning.    traZODone (DESYREL) 100 MG tablet 30 tablet 1     Sig: Take 0.5-1 tablets by mouth Every Night.        Pharmacy where request should be sent: [address] - [phone]  - [phone] FX     Last office visit with prescribing clinician: 7/21/2023   Last telemedicine visit with prescribing clinician: Visit date not found   Next office visit with prescribing clinician: 3/13/2024     Additional details provided by patient: PHARMACY SENT FAX FOR RX REQUEST ON 03-04-24.    Does the patient have less than a 3 day supply:  [x] Yes  [] No    Would you like a call back once the refill request has been completed: [] Yes [x] No    If the office needs to give you a call back, can they leave a voicemail: [] Yes [x] No    Matheus Hanson Rep   03/11/24 15:10 EDT

## 2024-03-11 NOTE — TELEPHONE ENCOUNTER
DELETE AFTER REVIEWING: Send the encounter HIGH priority, If patient has less than a 3 day supply. If the patient will run out of medication over the weekend add that information to the additional details line. Send this encounter to the clinical pool.    Caller: [facility]      Best call back number: [phone]    Requested Prescriptions:   Requested Prescriptions     Pending Prescriptions Disp Refills    Pramipexole Dihydrochloride ER 2.25 MG extended-release tablet 30 tablet 3     Sig: Take 1 tablet by mouth every night at bedtime.        Pharmacy where request should be sent:  [facility]    Last office visit with prescribing clinician: 6/14/2023   Last telemedicine visit with prescribing clinician: Visit date not found   Next office visit with prescribing clinician: 3/28/2024     Additional details provided by patient: PT WOULD LIKE FOR THIS MEDICATION TO GO TO [facility]    Does the patient have less than a 3 day supply:  [] Yes  [] No        Matheus Mayers Rep   03/11/24 15:19 EDT

## 2024-03-12 RX ORDER — TRAZODONE HYDROCHLORIDE 100 MG/1
50-100 TABLET ORAL NIGHTLY
Qty: 90 TABLET | Refills: 1 | Status: SHIPPED | OUTPATIENT
Start: 2024-03-12

## 2024-03-12 RX ORDER — LEVOTHYROXINE SODIUM 0.07 MG/1
75 TABLET ORAL EVERY MORNING
Qty: 90 TABLET | Refills: 1 | Status: SHIPPED | OUTPATIENT
Start: 2024-03-12

## 2024-03-13 RX ORDER — PRAMIPEXOLE 1.5 MG/1
1 TABLET, EXTENDED RELEASE ORAL NIGHTLY
Qty: 90 TABLET | Refills: 1 | OUTPATIENT
Start: 2024-03-13

## 2024-03-13 RX ORDER — PRAMIPEXOLE 2.25 MG/1
2.25 TABLET, EXTENDED RELEASE ORAL
Qty: 30 TABLET | Refills: 0 | Status: SHIPPED | OUTPATIENT
Start: 2024-03-13

## 2024-03-13 RX ORDER — PRAMIPEXOLE 2.25 MG/1
2.25 TABLET, EXTENDED RELEASE ORAL
Qty: 90 TABLET | Refills: 1 | OUTPATIENT
Start: 2024-03-13

## 2024-03-13 NOTE — TELEPHONE ENCOUNTER
Rx Refill Note  Requested Prescriptions     Pending Prescriptions Disp Refills    Pramipexole Dihydrochloride ER 2.25 MG extended-release tablet 30 tablet 3     Sig: Take 1 tablet by mouth every night at bedtime.      Last filled:12/29/23  Last office visit with prescribing clinician: 6/14/2023      Next office visit with prescribing clinician: 3/28/2024     Lissette Calloway MA  03/13/24, 17:37 EDT    Sent the Rx to patients pharmacy with 0 refills.

## 2024-03-13 NOTE — TELEPHONE ENCOUNTER
Rx Refill Note  Requested Prescriptions     Pending Prescriptions Disp Refills    Pramipexole Dihydrochloride ER 2.25 MG extended-release tablet [Pharmacy Med Name: PRAMIPEXOLE ER 2.25 MG TABLET] 90 tablet 1     Sig: TAKE 1 TABLET BY MOUTH EVERYDAY AT BEDTIME     Refused Prescriptions Disp Refills    Pramipexole Dihydrochloride ER 1.5 MG tablet sustained-release 24 hour [Pharmacy Med Name: PRAMIPEXOLE ER 1.5 MG TABLET] 90 tablet 1     Sig: Take 1 capsule by mouth Every Night. Take  A few hours before bedtime      Last filled:03/13/2024  Last office visit with prescribing clinician: 6/14/2023      Next office visit with prescribing clinician: 3/28/2024     Lissette Calloway MA  03/13/24, 18:09 EDT    Denied- duplicate order

## 2024-03-18 DIAGNOSIS — R45.4 IRRITABILITY: ICD-10-CM

## 2024-03-18 RX ORDER — ESCITALOPRAM OXALATE 20 MG/1
TABLET ORAL
Qty: 90 TABLET | Refills: 0 | Status: SHIPPED | OUTPATIENT
Start: 2024-03-18

## 2024-03-21 ENCOUNTER — HOSPITAL ENCOUNTER (OUTPATIENT)
Facility: HOSPITAL | Age: 59
Setting detail: HOSPITAL OUTPATIENT SURGERY
Discharge: HOME | End: 2024-03-21
Attending: SURGERY | Admitting: SURGERY
Payer: COMMERCIAL

## 2024-03-21 PROCEDURE — 91034 GASTROESOPHAGEAL REFLUX TEST: CPT | Performed by: SURGERY

## 2024-03-21 RX ORDER — LIDOCAINE HYDROCHLORIDE 20 MG/ML
SOLUTION OROPHARYNGEAL AS NEEDED
Status: DISCONTINUED | OUTPATIENT
Start: 2024-03-21 | End: 2024-03-21 | Stop reason: HOSPADM

## 2024-03-21 RX ORDER — LIDOCAINE HYDROCHLORIDE 20 MG/ML
15 SOLUTION OROPHARYNGEAL ONCE
Qty: 15 ML | Refills: 0 | Status: DISCONTINUED | OUTPATIENT
Start: 2024-03-21 | End: 2024-03-22 | Stop reason: HOSPADM

## 2024-03-25 DIAGNOSIS — E03.9 ACQUIRED HYPOTHYROIDISM: ICD-10-CM

## 2024-03-25 RX ORDER — TRAZODONE HYDROCHLORIDE 100 MG/1
TABLET ORAL
Qty: 30 TABLET | Refills: 1 | OUTPATIENT
Start: 2024-03-25

## 2024-03-25 RX ORDER — LEVOTHYROXINE SODIUM 0.07 MG/1
75 TABLET ORAL EVERY MORNING
Qty: 90 TABLET | Refills: 1 | OUTPATIENT
Start: 2024-03-25

## 2024-03-28 ENCOUNTER — OFFICE VISIT (OUTPATIENT)
Dept: NEUROLOGY | Facility: CLINIC | Age: 59
End: 2024-03-28
Payer: COMMERCIAL

## 2024-03-28 VITALS
SYSTOLIC BLOOD PRESSURE: 132 MMHG | HEART RATE: 74 BPM | BODY MASS INDEX: 31.41 KG/M2 | DIASTOLIC BLOOD PRESSURE: 86 MMHG | OXYGEN SATURATION: 95 % | WEIGHT: 160 LBS | HEIGHT: 60 IN

## 2024-03-28 DIAGNOSIS — G62.9 POLYNEUROPATHY: Primary | ICD-10-CM

## 2024-03-28 DIAGNOSIS — G25.81 RESTLESS LEGS SYNDROME (RLS): ICD-10-CM

## 2024-03-28 PROCEDURE — 99213 OFFICE O/P EST LOW 20 MIN: CPT | Performed by: PSYCHIATRY & NEUROLOGY

## 2024-03-28 RX ORDER — PREGABALIN 75 MG/1
75 CAPSULE ORAL NIGHTLY
Qty: 30 CAPSULE | Refills: 5 | Status: SHIPPED | OUTPATIENT
Start: 2024-03-28

## 2024-03-28 RX ORDER — PRAMIPEXOLE 2.25 MG/1
2.25 TABLET, EXTENDED RELEASE ORAL
Qty: 90 TABLET | Refills: 3 | Status: SHIPPED | OUTPATIENT
Start: 2024-03-28

## 2024-03-28 NOTE — PROGRESS NOTES
Subjective:    CC: Astrid Sam is seen today for RLS/neuropathy    HPI:  Current visit-patient states that her RLS symptoms remain well-controlled.  She did Mirapex XR 2.25 mg about 4 hours before bedtime.  When they act up occasionally she takes Lyrica 75 mg nightly as needed.  Has not had to take it every night because her neuropathy symptoms have improved.  Her neuropathy panel was within normal limits.  Also denies having any seizures since she last saw me.  Has had a chronic cough which was attributed to GERD.      Last visit-patient states that her RLS symptoms are well controlled most nights with Mirapex ER 2.25 mg but sometimes she has flareups where she has to move her legs and is unable to sleep.  Also switched from gabapentin to Lyrica 75 mg nightly which has helped with the burning in her feet.  She had her neuropathy panel at an outside facility but we never received the results.  Denies having any seizures.    Last visit-patient states that since she switched from regular pramipexole to Mirapex ER and increased her dose to 2.25 mg nightly her symptoms of RLS have improved.  She is also taking iron supplements.  She continues to take gabapentin 300 mg nightly that was started for her RLS but does not think it is helping much.  She also has skin breakdown with it.  Today she also complains of a new symptom of numbness in her fingertips as well as burning in her feet.  She has previously had carpal tunnel surgery in both wrists several years ago.  Denies any history of diabetes or heavy alcohol intake.    she denies any episodes of confusion or seizure-like activity since her last visit.  However she tells me today that her restless leg symptoms have been bothering her a lot as she has to get up and walk.  They were previously only occurring at night but have recently started to occur during the daytime as well.  She takes Mirapex 0.75 mg around 4:30 but for the past few weeks has also been  taking an additional dose at bedtime.  Her PCP restarted her back on gabapentin 300 mg nightly that helps some.  She was severely anemic while she was in the hospital in June during hip surgery but since then she has not been taking any iron supplements.    Last visit-patient states that she tapered and stopped Keppra after her last visit and has not had any more episodes of confusion, shaking or loss of awareness.  She occasionally has word finding problems.  With regards to her RLS she continues to take Mirapex 0.75 mg at night and baclofen 10 mg nightly for leg jerks that are now well controlled.    Last visit-patient had an EEG off medications which showed bifrontal slowing as well as excessive beta activity but no epileptiform activity.  She states she resumed her Keppra 500 mg twice a day a few days after stopping it as her  noted that patient was having leg jerks during sleep.  She does have RLS and takes Mirapex 0.75 mg at night.  Also takes trazodone for sleep in addition to Flexeril 10 mg 3 times a day for muscle spasms.  Denies having any episodes of staring or whole-body shaking.  On a few occasions her  has also mentioned to her that she speaks something but does not make sense although the patient herself does not realize she is doing it.    Initial [id number]-old female with a past medical history of migraine headaches, hyperlipidemia, TIA status post PFO closure in 2011, bilateral hip replacement presents as a hospital follow-up for encephalopathy/new onset seizure.  As per patient she had an ACDF procedure on C5-6 in June.  After that she also developed osteomyelitis of the right sternoclavicular joint for which he underwent debridement and was placed on IV antibiotics (Rocephin and daptomycin).  Then in August she was placed on p.o. doxycycline.  A few days before her hospitalization she went out in the sun and developed a diffuse rash.  Took Benadryl which made her slightly  confused.  2 days later she was found at home by her daughter extremely confused having thrown up all over herself.  She took her to [facility] initially where she had a CT head that was unremarkable.  After that she developed left arm jerking with blinking and staring ahead and space.  Patient states that she does not remember the next 3 days.  After she was transferred to [facility] her white count was elevated at 17.2.  Her CRP was also mildly elevated and she had a T-max of 99.7.  Subsequent blood cultures were negative.  Initially her EEG showed moderate generalized slowing with frequent generalized sharp/slow wave complexes with triphasic morphology.  She was given Ativan and IV Keppra.  CT head and CT perfusion were  unremarkable.  CT angiogram of the head showed narrowing of the right distal M2/M3.  CTA of the neck did not show any significant stenosis.  MRI of the brain also did not show any acute intracranial abnormalities or severe chronic ischemic changes.  She also had a lumbar puncture that failed to show any evidence of a CNS infection.  EEG was repeated in 2 days and showed just mild generalized slowing with no epileptiform activity.  It was presumed that patient could have had seizures/encephalopathy in the presence of sepsis versus due to high doses of Valtrex that she had been presumably taking at home however patient denies that as she states that she had not taken that medication in over 1 year.  She was discharged home on Keppra 500 mg twice a day.  Has not had any more episodes since.  She continues to have generalized weakness.  Had been getting PT up until last week.  She denies having any febrile seizures as a child, abnormal birth history or severe concussions.  There is a family history of seizures in her brother who is also a diabetic  Of note-I personally reviewed her MRI brain and Dr. Guerrier's notes from the hospital    The following portions of the patient's history were  reviewed today and updated as of 11/04/2020  : allergies, current medications, past family history, past medical history, past social history, past surgical history and problem list  These document will be scanned to patient's chart.      Current Outpatient Medications:     aspirin 81 MG EC tablet, Take 1 tablet by mouth Daily., Disp: , Rfl:     escitalopram (LEXAPRO) 20 MG tablet, TAKE 1 TABLET BY MOUTH EVERY DAY, Disp: 90 tablet, Rfl: 0    levothyroxine (SYNTHROID, LEVOTHROID) 75 MCG tablet, Take 1 tablet by mouth Every Morning., Disp: 90 tablet, Rfl: 1    oxybutynin XL (DITROPAN XL) 15 MG 24 hr tablet, Take 1 tablet by mouth Daily., Disp: , Rfl:     Pramipexole Dihydrochloride ER 2.25 MG extended-release tablet, Take 1 tablet by mouth every night at bedtime., Disp: 90 tablet, Rfl: 3    pravastatin (PRAVACHOL) 40 MG tablet, TAKE 1 TABLET BY MOUTH EVERYDAY AT BEDTIME, Disp: 90 tablet, Rfl: 0    pregabalin (LYRICA) 75 MG capsule, Take 1 capsule by mouth Every Night., Disp: 30 capsule, Rfl: 5    traZODone (DESYREL) 100 MG tablet, Take 0.5-1 tablets by mouth Every Night., Disp: 90 tablet, Rfl: 1    ubrogepant (Ubrelvy) 100 MG tablet, TAKE 1 TABLET BY MOUTH FOR MIGRAINE FOR UP TO ONE DOSE MAY REPEAT DOSE AFTER 2 HOURS IF HEADACHE NOT RESOLVED, Disp: 10 tablet, Rfl: 1   Past Medical History:   Diagnosis Date    Acquired hypothyroidism     Anemia     after hip replacement     Arthritis     hips and knees    Asthma, extrinsic 01/01/2021    Per lung doctor    Neil's esophagus with dysplasia 12/02/2021    CTS (carpal tunnel syndrome)     Had both wrist done    Difficulty walking     GERD (gastroesophageal reflux disease) 2021    History of sepsis 08/2021    hospitalized x1 week; [facility]    History of transfusion     2 units of autologus blood after 1st hip surgery-[facility]    Low back pain     BACK PAIN WERE I HAD HIP REPLANCED    Lung nodule 01/01/2021    Memory loss     Migraine     h/o    Mixed hyperlipidemia 07/20/2020     Moderate persistent asthma 2021    Obesity (BMI 30.0-34.9) 2022    Peripheral neuropathy     PONV (postoperative nausea and vomiting)     preprocedural meds help and pt will need them     Rheumatoid arthritis 2015    Seizure 2020    Spinal headache     pt states after hip surgery 2022-longer bedrest    Stroke     no residual effects    Wears glasses       Past Surgical History:   Procedure Laterality Date    BRONCHOSCOPY N/A 2023    Procedure: BRONCHOSCOPY WITH TRANSBRONCHIAL BIOPSY AND BRONCHOALVEOLAR LAVAGE;  Surgeon: Levi Alfaro DO;  Location:  [facility];  Service: Pulmonary;  Laterality: N/A;    CARDIAC CATHETERIZATION  2011    CARPAL TUNNEL RELEASE Bilateral 2017    CERVICAL FUSION  2020     SECTION      CHOLECYSTECTOMY      COLONOSCOPY      ENDOSCOPY      FRACTURE SURGERY      left leg- hardware in place     GASTRIC PH MONITORING 24HR N/A 2024    Procedure: GASTRIC PH MONITORING 24HR;  Surgeon: Gregory Herndon MD;  Location:  [facility];  Service: General;  Laterality: N/A;    HIP SURGERY      multiple as a child infant to age 4    JOINT REPLACEMENT      TOTAL HIP REPLACEMENT    KNEE ARTHROSCOPY Right     done twice    LUMBAR FUSION  2023    PATELLA SURGERY Left     removed left knee cap    PATENT FORAMEN OVALE CLOSURE      QUADRICEPS TENDON REPAIR Right 2018    REPLACEMENT TOTAL KNEE Right 2018    revision 2019    SPINE SURGERY  2020    TOTAL HIP ARTHROPLASTY Left     TOTAL HIP ARTHROPLASTY Right 2019    Procedure: DIRECT TOTAL HIP ARTHROPLASTY ANTERIOR RIGHT;  Surgeon: Mele Chatterjee MD;  Location:  [facility];  Service: Orthopedics    TOTAL HIP ARTHROPLASTY REVISION Left 2022    Procedure: TOTAL HIP REVISION LEFT;  Surgeon: Mele Chatterjee MD;  Location:  [facility];  Service: Orthopedics;  Laterality: Left;    TOTAL HIP ARTHROPLASTY REVISION Left 2022    Procedure: HIP  "REVISION LEFT, FEMORAL AND ACETABULAR COMPONENT;  Surgeon: Mele Chatterjee MD;  Location: [facility];  Service: Orthopedics;  Laterality: Left;    TUBAL ABDOMINAL LIGATION        Family History   Problem Relation Age of Onset    Hypertension Mother     Migraines Mother     Thyroid disease Mother     Arthritis Mother     Hypertension Father     COPD Father     Asthma Father     Arthritis Father     Hearing loss Father     Breast cancer Maternal Cousin         unknown    Rectal cancer Sister     Ovarian cancer Neg Hx       Social History     Socioeconomic History    Marital status:    Tobacco Use    Smoking status: Never     Passive exposure: Never    Smokeless tobacco: Never   Vaping Use    Vaping status: Never Used   Substance and Sexual Activity    Alcohol use: Not Currently     Comment: rare    Drug use: No    Sexual activity: Yes     Partners: Male     Birth control/protection: Post-menopausal, None     Comment: TUBES TIDE     Review of Systems   Musculoskeletal:  Positive for arthralgias and gait problem.   Neurological:  Positive for numbness.   All other systems reviewed and are negative.      Objective:    /86   Pulse 74   Ht 152.4 cm (60\")   Wt 72.6 kg (160 lb)   LMP  (LMP Unknown)   SpO2 95%   BMI 31.25 kg/m²     Neurology Exam: Reviewed and remains unchanged    General apperance: NAD.     Mental status: Alert, awake and oriented to time place and person.    Recent and Remote memory: Intact.    Attention span and Concentration: Normal.     Language and Speech: Intact- No dysarthria.    Fluency, Naming , Repitition and Comprehension:  Intact    Cranial Nerves:   CN II: Visual fields are full. Intact. Fundi - Normal, No papillederma, Pupils - KAYLYN  CN III, IV and VI: Extraocular movements are intact. Normal saccades.   CN V: Facial sensation is intact.   CN VII: Muscles of facial expression reveal no asymmetry. Intact.   CN VIII: Hearing is intact. Whispered voice intact.   CN IX and X: " Palate elevates symmetrically. Intact  CN XI: Shoulder shrug is intact.   CN XII: Tongue is midline without evidence of atrophy or fasciculation.       Motor:  Right UE muscle strength 5/5. Normal tone.     Left UE muscle strength 5/5. Normal tone.      Right LE muscle strength5-/5. Normal tone.     Left LE muscle strength 5-/5. Normal tone.      Sensory: Mildly reduced to pinprick in the first and last digit of the left hand as well as reduced to pinprick in feet to level of ankle with markedly reduced vibration    DTRs: 2+ bilaterally in upper and lower extremities.    Babinski: Negative bilaterally.    Co-ordination: Normal finger-to-nose, heel to shin B/L.    Rhomberg: Negative.    Gait: Walks with a limp due to limb length discrepancy.  Uses a cane or walker    Cardiovascular: Regular rate and rhythm without murmur, gallop or rub.      Assessment and Plan:    1. RLS  The symptoms that she is having during daytime could be due to augmentation.    She should continue Mirapex ER 2.25 mg nightly and iron supplements  Also on Lyrica  She should continue avoiding complex sugars/carbohydrates in the evening.    2. Peripheral neuropathy   Based on her symptoms and signs I feel she does have peripheral neuropathy.  Neuropathy panel was within normal limits  She should continue Lyrica 75 mg nightly  I had discussed getting an EMG/NCS but she wants to wait as it was very painful the last time she had it    3.  Seizure (CMS/HCC)  Patient could have had a seizure in the setting of sepsis.  Repeat EEG showed mild frontal slowing but no epileptiform activity.   She has now tapered and stopped Keppra.  Has had no more seizures      Return in about 1 year (around 3/28/2025).     Sarai Chirinos MD

## 2024-03-30 DIAGNOSIS — E78.2 MIXED HYPERLIPIDEMIA: ICD-10-CM

## 2024-03-30 DIAGNOSIS — G43.119 INTRACTABLE MIGRAINE WITH AURA WITHOUT STATUS MIGRAINOSUS: ICD-10-CM

## 2024-04-01 RX ORDER — PRAVASTATIN SODIUM 40 MG
TABLET ORAL
Qty: 90 TABLET | Refills: 0 | Status: SHIPPED | OUTPATIENT
Start: 2024-04-01

## 2024-04-15 ENCOUNTER — HOSPITAL ENCOUNTER (OUTPATIENT)
Dept: GENERAL RADIOLOGY | Facility: HOSPITAL | Age: 59
Discharge: HOME | End: 2024-04-15
Admitting: NURSE PRACTITIONER
Payer: COMMERCIAL

## 2024-04-15 ENCOUNTER — OFFICE VISIT (OUTPATIENT)
Dept: FAMILY MEDICINE CLINIC | Facility: CLINIC | Age: 59
End: 2024-04-15
Payer: COMMERCIAL

## 2024-04-15 VITALS
BODY MASS INDEX: 30.82 KG/M2 | WEIGHT: 157 LBS | HEIGHT: 60 IN | SYSTOLIC BLOOD PRESSURE: 140 MMHG | HEART RATE: 80 BPM | OXYGEN SATURATION: 98 % | RESPIRATION RATE: 16 BRPM | DIASTOLIC BLOOD PRESSURE: 76 MMHG | TEMPERATURE: 97.7 F

## 2024-04-15 DIAGNOSIS — M25.551 RIGHT HIP PAIN: ICD-10-CM

## 2024-04-15 DIAGNOSIS — K59.00 CONSTIPATION, UNSPECIFIED CONSTIPATION TYPE: ICD-10-CM

## 2024-04-15 DIAGNOSIS — M53.3 COCCYX PAIN: ICD-10-CM

## 2024-04-15 DIAGNOSIS — N39.0 ACUTE URINARY TRACT INFECTION: ICD-10-CM

## 2024-04-15 DIAGNOSIS — R30.9 PAINFUL URINATION: Primary | ICD-10-CM

## 2024-04-15 LAB
BILIRUB BLD-MCNC: NEGATIVE MG/DL
CLARITY, POC: ABNORMAL
COLOR UR: YELLOW
EXPIRATION DATE: ABNORMAL
GLUCOSE UR STRIP-MCNC: NEGATIVE MG/DL
KETONES UR QL: NEGATIVE
LEUKOCYTE EST, POC: NEGATIVE
Lab: ABNORMAL
NITRITE UR-MCNC: POSITIVE MG/ML
PH UR: 7 [PH] (ref 5–8)
PROT UR STRIP-MCNC: NEGATIVE MG/DL
RBC # UR STRIP: NEGATIVE /UL
SP GR UR: 1.01 (ref 1–1.03)
UROBILINOGEN UR QL: ABNORMAL

## 2024-04-15 PROCEDURE — 72220 X-RAY EXAM SACRUM TAILBONE: CPT

## 2024-04-15 PROCEDURE — 99214 OFFICE O/P EST MOD 30 MIN: CPT | Performed by: NURSE PRACTITIONER

## 2024-04-15 PROCEDURE — 81003 URINALYSIS AUTO W/O SCOPE: CPT | Performed by: NURSE PRACTITIONER

## 2024-04-15 PROCEDURE — 73502 X-RAY EXAM HIP UNI 2-3 VIEWS: CPT

## 2024-04-15 RX ORDER — NITROFURANTOIN 25; 75 MG/1; MG/1
100 CAPSULE ORAL 2 TIMES DAILY
Qty: 14 CAPSULE | Refills: 0 | Status: SHIPPED | OUTPATIENT
Start: 2024-04-15 | End: 2024-04-22

## 2024-04-15 RX ORDER — PANTOPRAZOLE SODIUM 40 MG/1
1 TABLET, DELAYED RELEASE ORAL EVERY 12 HOURS SCHEDULED
COMMUNITY
Start: 2024-03-25

## 2024-04-15 NOTE — PROGRESS NOTES
Date: 04/15/2024   Patient Name: Astrid Sam  : 1965   MRN: [id number]     Chief Complaint:    Chief Complaint   Patient presents with    Pain     Tailbone pain after fall. Possible UTI.       History of Present Illness: Astrid Sam is a 58 y.o. female who is here today for Fell on buttocks, fell overtop her grandson, 3/18/24, she is still having significant pain in the coccyx and in the right hip area. Hurts to sit, stand, walk. She is walking with a cane today. Taking tylenol without relief of symptoms. She has also been struggling with constipation, dysuria, and odor to urine. She denies any vaginal discharge, fevers, chills, suprapubic pain, flank pain, hematuria.    Pain  Associated symptoms include arthralgias (right hip and coccyx).            Review of Systems:   Review of Systems   Gastrointestinal:  Positive for constipation.   Genitourinary:  Positive for dysuria.   Musculoskeletal:  Positive for arthralgias (right hip and coccyx).       Past Medical History:   Past Medical History:   Diagnosis Date    Acquired hypothyroidism     Anemia     after hip replacement     Arthritis     hips and knees    Asthma, extrinsic 2021    Per lung doctor    Neil's esophagus with dysplasia 2021    CTS (carpal tunnel syndrome)     Had both wrist done    Difficulty walking     GERD (gastroesophageal reflux disease)     History of sepsis 2021    hospitalized x1 week; [facility]    History of transfusion     2 units of autologus blood after 1st hip surgery-[facility]    Low back pain     BACK PAIN WERE I HAD HIP REPLANCED    Lung nodule 2021    Memory loss     Migraine     h/o    Mixed hyperlipidemia 2020    Moderate persistent asthma 2021    Obesity (BMI 30.0-34.9) 2022    Peripheral neuropathy     PONV (postoperative nausea and vomiting)     preprocedural meds help and pt will need them     Rheumatoid arthritis 2015    Seizure 2020    Spinal  headache     pt states after hip surgery 2022-longer bedrest    Stroke     no residual effects    Wears glasses        Past Surgical History:   Past Surgical History:   Procedure Laterality Date    BRONCHOSCOPY N/A 2023    Procedure: BRONCHOSCOPY WITH TRANSBRONCHIAL BIOPSY AND BRONCHOALVEOLAR LAVAGE;  Surgeon: Levi Alfaro DO;  Location:  [facility];  Service: Pulmonary;  Laterality: N/A;    CARDIAC CATHETERIZATION      CARPAL TUNNEL RELEASE Bilateral 2017    CERVICAL FUSION  2020     SECTION  1993    CHOLECYSTECTOMY  2017    COLONOSCOPY      ENDOSCOPY      FRACTURE SURGERY  2015    left leg- hardware in place     GASTRIC PH MONITORING 24HR N/A 2024    Procedure: GASTRIC PH MONITORING 24HR;  Surgeon: Gregory Herndon MD;  Location: [facility];  Service: General;  Laterality: N/A;    HIP SURGERY      multiple as a child infant to age 4    JOINT REPLACEMENT      TOTAL HIP REPLACEMENT    KNEE ARTHROSCOPY Right     done twice    LUMBAR FUSION  2023    PATELLA SURGERY Left     removed left knee cap    PATENT FORAMEN OVALE CLOSURE      QUADRICEPS TENDON REPAIR Right     REPLACEMENT TOTAL KNEE Right     revision     SPINE SURGERY      TOTAL HIP ARTHROPLASTY Left     TOTAL HIP ARTHROPLASTY Right 2019    Procedure: DIRECT TOTAL HIP ARTHROPLASTY ANTERIOR RIGHT;  Surgeon: Mele Chatterjee MD;  Location:  [facility];  Service: Orthopedics    TOTAL HIP ARTHROPLASTY REVISION Left 2022    Procedure: TOTAL HIP REVISION LEFT;  Surgeon: Mele Chatterjee MD;  Location: [facility];  Service: Orthopedics;  Laterality: Left;    TOTAL HIP ARTHROPLASTY REVISION Left 2022    Procedure: HIP REVISION LEFT, FEMORAL AND ACETABULAR COMPONENT;  Surgeon: Mele Chatterjee MD;  Location:  [facility];  Service: Orthopedics;  Laterality: Left;    TUBAL ABDOMINAL LIGATION         Family History:   Family History   Problem Relation Age of  Onset    Hypertension Mother     Migraines Mother     Thyroid disease Mother     Arthritis Mother     Hypertension Father     COPD Father     Asthma Father     Arthritis Father     Hearing loss Father     Breast cancer Maternal Cousin         unknown    Rectal cancer Sister     Ovarian cancer Neg Hx        Social History:   Social History     Socioeconomic History    Marital status:    Tobacco Use    Smoking status: Never     Passive exposure: Never    Smokeless tobacco: Never   Vaping Use    Vaping status: Never Used   Substance and Sexual Activity    Alcohol use: Not Currently     Comment: rare    Drug use: No    Sexual activity: Yes     Partners: Male     Birth control/protection: Post-menopausal, None     Comment: TUBES TIDE       Medications:     Current Outpatient Medications:     aspirin 81 MG EC tablet, Take 1 tablet by mouth Daily., Disp: , Rfl:     escitalopram (LEXAPRO) 20 MG tablet, TAKE 1 TABLET BY MOUTH EVERY DAY, Disp: 90 tablet, Rfl: 0    levothyroxine (SYNTHROID, LEVOTHROID) 75 MCG tablet, Take 1 tablet by mouth Every Morning., Disp: 90 tablet, Rfl: 1    NON FORMULARY, ALPHALIPOIC ACID, Disp: , Rfl:     oxybutynin XL (DITROPAN XL) 15 MG 24 hr tablet, Take 1 tablet by mouth Daily., Disp: , Rfl:     pantoprazole (PROTONIX) 40 MG EC tablet, Take 1 tablet by mouth Every 12 (Twelve) Hours., Disp: , Rfl:     Pramipexole Dihydrochloride ER 2.25 MG extended-release tablet, Take 1 tablet by mouth every night at bedtime., Disp: 90 tablet, Rfl: 3    pravastatin (PRAVACHOL) 40 MG tablet, TAKE 1 TABLET BY MOUTH EVERYDAY AT BEDTIME, Disp: 90 tablet, Rfl: 0    pregabalin (LYRICA) 75 MG capsule, Take 1 capsule by mouth Every Night., Disp: 30 capsule, Rfl: 5    traZODone (DESYREL) 100 MG tablet, Take 0.5-1 tablets by mouth Every Night., Disp: 90 tablet, Rfl: 1    ubrogepant (Ubrelvy) 100 MG tablet, TAKE 1 TABLET BY MOUTH FOR MIGRAINE FOR UP TO ONE DOSE MAY REPEAT DOSE AFTER 2 HOURS IF HEADACHE NOT RESOLVED,  "Disp: 10 tablet, Rfl: 1    nitrofurantoin, macrocrystal-monohydrate, (MACROBID) 100 MG capsule, Take 1 capsule by mouth 2 (Two) Times a Day for 7 days., Disp: 14 capsule, Rfl: 0    Allergies:   Allergies   Allergen Reactions    Gabapentin Other (See Comments)     Pt states she had sepsis from taking.    Morphine Nausea And Vomiting         Physical Exam:  Vital Signs:   Vitals:    04/15/24 0918   BP: 140/76   Pulse: 80   Resp: 16   Temp: 97.7 °F (36.5 °C)   SpO2: 98%   Weight: 71.2 kg (157 lb)   Height: 152.4 cm (60\")     Body mass index is 30.66 kg/m².     Physical Exam  Vitals and nursing note reviewed.   Constitutional:       Appearance: Normal appearance.   HENT:      Head: Normocephalic and atraumatic.   Cardiovascular:      Rate and Rhythm: Normal rate and regular rhythm.   Pulmonary:      Effort: Pulmonary effort is normal. No respiratory distress.      Breath sounds: Normal breath sounds.   Abdominal:      General: Bowel sounds are normal.      Palpations: Abdomen is soft.      Tenderness: There is abdominal tenderness in the suprapubic area. There is no right CVA tenderness or left CVA tenderness.   Musculoskeletal:      Cervical back: Normal.      Thoracic back: Normal.      Lumbar back: Tenderness present. Normal range of motion.      Right hip: Tenderness present. Decreased range of motion.      Left hip: Normal.   Skin:     General: Skin is warm.   Neurological:      General: No focal deficit present.      Mental Status: She is alert and oriented to person, place, and time.   Psychiatric:         Mood and Affect: Mood normal.           Assessment/Plan:   Diagnoses and all orders for this visit:    1. Painful urination (Primary)  -     POCT urinalysis dipstick, automated  -     Urine Culture - Urine, Urine, Clean Catch; Future  -     Urine Culture - Urine, Urine, Clean Catch    2. Right hip pain  -     XR hip w or wo pelvis 2-3 view right; Future    3. Coccyx pain  -     XR sacrum and coccyx; " Future    4. Acute urinary tract infection  -     nitrofurantoin, macrocrystal-monohydrate, (MACROBID) 100 MG capsule; Take 1 capsule by mouth 2 (Two) Times a Day for 7 days.  Dispense: 14 capsule; Refill: 0    5. Constipation, unspecified constipation type    ORDERING xray of coccyx and right hip  Monitor for worsening symptoms  Tylenol PRN    UTI education  Take medications as prescribed within chart today  UA positive in clinic  Urine culture ordered  Monitor for worsening symptoms  Increase water intake  Wear cotton underwear  Go to the ER with temp of 103 or greater, severe abdominal pain, back pain, nausea and diarrhea.    Constipation  Increase fluid intake  Monitor for worsening symptoms   Take a stool softener due to not being as mobile due to pain    Follow Up:   Return if symptoms worsen or fail to improve.    Petra Wills. JOHNATHAN   [facility]

## 2024-04-18 LAB
BACTERIA UR CULT: ABNORMAL
BACTERIA UR CULT: ABNORMAL
OTHER ANTIBIOTIC SUSC ISLT: ABNORMAL

## 2024-04-26 PROCEDURE — 88305 TISSUE EXAM BY PATHOLOGIST: CPT | Performed by: SURGERY

## 2024-04-29 ENCOUNTER — LAB REQUISITION (OUTPATIENT)
Dept: LAB | Facility: HOSPITAL | Age: 59
End: 2024-04-29
Payer: COMMERCIAL

## 2024-04-29 DIAGNOSIS — K21.9 GASTRO-ESOPHAGEAL REFLUX DISEASE WITHOUT ESOPHAGITIS: ICD-10-CM

## 2024-04-30 LAB
CYTO UR: NORMAL
LAB AP CASE REPORT: NORMAL
LAB AP CLINICAL INFORMATION: NORMAL
LAB AP DIAGNOSIS COMMENT: NORMAL
PATH REPORT.FINAL DX SPEC: NORMAL
PATH REPORT.GROSS SPEC: NORMAL

## 2024-05-18 DIAGNOSIS — G43.119 INTRACTABLE MIGRAINE WITH AURA WITHOUT STATUS MIGRAINOSUS: ICD-10-CM

## 2024-05-21 RX ORDER — TRAZODONE HYDROCHLORIDE 100 MG/1
TABLET ORAL
Qty: 30 TABLET | Refills: 1 | Status: SHIPPED | OUTPATIENT
Start: 2024-05-21

## 2024-05-29 RX ORDER — PRAMIPEXOLE 2.25 MG/1
2.25 TABLET, EXTENDED RELEASE ORAL
Qty: 90 TABLET | Refills: 3 | Status: SHIPPED | OUTPATIENT
Start: 2024-05-29

## 2024-05-29 NOTE — TELEPHONE ENCOUNTER
PLEASE NOTE TWO RX ARE REQUESTED    Caller: [facility] - [place], [place] - [address] - [phone]  - [phone]    Relationship: Pharmacy / CLAUDIA    Best call back number: [phone]    Requested Prescriptions:   Requested Prescriptions     Pending Prescriptions Disp Refills    Pramipexole Dihydrochloride ER 2.25 MG extended-release tablet 90 tablet 3     Sig: Take 1 tablet by mouth every night at bedtime.      ALSO NEEDING:  Pramipexole Dihydrochloride 0.5 MG IMMEDIATE RELEASE    Pharmacy where request should be sent: [facility] - [place], [place] - [address] - [phone]  - [phone] FX     Last office visit with prescribing clinician: 3/28/2024   Last telemedicine visit with prescribing clinician: Visit date not found   Next office visit with prescribing clinician: 3/31/2025     Additional details provided by patient:   A FAX WAS SENT ON 5-21-24 WITH THIS SAME REQUEST.    Does the patient have less than a 3 day supply:  [] Yes  [] No    Would you like a call back once the refill request has been completed: [] Yes [] No    If the office needs to give you a call back, can they leave a voicemail: [] Yes [] No    Matheus Villanueva Rep   05/29/24 10:10 EDT

## 2024-05-29 NOTE — TELEPHONE ENCOUNTER
Rx Refill Note  Requested Prescriptions     Pending Prescriptions Disp Refills    Pramipexole Dihydrochloride ER 2.25 MG extended-release tablet 90 tablet 3     Sig: Take 1 tablet by mouth every night at bedtime.      Last filled:  Last office visit with prescribing clinician: 3/28/2024      Next office visit with prescribing clinician: 3/31/2025     Lissette Calloway MA  05/29/24, 15:58 EDT  I sent the Rx to the pharmacy.

## 2024-06-19 ENCOUNTER — OFFICE VISIT (OUTPATIENT)
Dept: FAMILY MEDICINE CLINIC | Facility: CLINIC | Age: 59
End: 2024-06-19
Payer: COMMERCIAL

## 2024-06-19 VITALS
RESPIRATION RATE: 18 BRPM | TEMPERATURE: 97.5 F | BODY MASS INDEX: 30.43 KG/M2 | OXYGEN SATURATION: 96 % | DIASTOLIC BLOOD PRESSURE: 62 MMHG | WEIGHT: 155 LBS | HEIGHT: 60 IN | SYSTOLIC BLOOD PRESSURE: 108 MMHG | HEART RATE: 80 BPM

## 2024-06-19 DIAGNOSIS — S32.050D COMPRESSION FRACTURE OF L5 VERTEBRA WITH ROUTINE HEALING, SUBSEQUENT ENCOUNTER: ICD-10-CM

## 2024-06-19 DIAGNOSIS — R45.4 IRRITABILITY: ICD-10-CM

## 2024-06-19 DIAGNOSIS — Z13.820 SCREENING FOR OSTEOPOROSIS: ICD-10-CM

## 2024-06-19 DIAGNOSIS — E66.9 OBESITY (BMI 30.0-34.9): ICD-10-CM

## 2024-06-19 DIAGNOSIS — E78.2 MIXED HYPERLIPIDEMIA: ICD-10-CM

## 2024-06-19 DIAGNOSIS — Z00.00 MEDICARE ANNUAL WELLNESS VISIT, SUBSEQUENT: Primary | ICD-10-CM

## 2024-06-19 DIAGNOSIS — Z12.31 ENCOUNTER FOR SCREENING MAMMOGRAM FOR MALIGNANT NEOPLASM OF BREAST: ICD-10-CM

## 2024-06-19 DIAGNOSIS — Z78.0 POSTMENOPAUSAL: ICD-10-CM

## 2024-06-19 RX ORDER — HYDROCODONE BITARTRATE AND ACETAMINOPHEN 7.5; 325 MG/1; MG/1
1 TABLET ORAL EVERY 6 HOURS PRN
Qty: 10 TABLET | Refills: 0 | Status: SHIPPED | OUTPATIENT
Start: 2024-06-19

## 2024-06-19 RX ORDER — ACETAMINOPHEN 325 MG/1
650 TABLET ORAL EVERY 6 HOURS PRN
COMMUNITY

## 2024-06-19 RX ORDER — METHOCARBAMOL 750 MG/1
750 TABLET, FILM COATED ORAL 4 TIMES DAILY
COMMUNITY
Start: 2024-05-29

## 2024-06-19 RX ORDER — PRAVASTATIN SODIUM 40 MG
40 TABLET ORAL
Qty: 90 TABLET | Refills: 1 | Status: SHIPPED | OUTPATIENT
Start: 2024-06-19

## 2024-06-19 RX ORDER — OXYCODONE HYDROCHLORIDE 5 MG/1
5 TABLET ORAL EVERY 4 HOURS PRN
COMMUNITY
Start: 2024-05-29

## 2024-06-19 RX ORDER — ESCITALOPRAM OXALATE 20 MG/1
20 TABLET ORAL DAILY
Qty: 90 TABLET | Refills: 1 | Status: SHIPPED | OUTPATIENT
Start: 2024-06-19

## 2024-06-19 NOTE — PATIENT INSTRUCTIONS
Medicare Wellness  Personal Prevention Plan of Service     Date of Office Visit:    Encounter Provider:  Beckie Murphy DO  Place of Service:  [facility]  Patient Name: Astrid Sam  :  1965    As part of the Medicare Wellness portion of your visit today, we are providing you with this personalized preventive plan of services (PPPS). This plan is based upon recommendations of the [place] Preventive Services Task Force (USPSTF) and the Advisory Committee on Immunization Practices (ACIP).    This lists the preventive care services that should be considered, and provides dates of when you are due. Items listed as completed are up-to-date and do not require any further intervention.    Health Maintenance   Topic Date Due    Pneumococcal Vaccine 0-64 (1 of 2 - PCV) Never done    TDAP/TD VACCINES (1 - Tdap) Never done    ZOSTER VACCINE (1 of 2) Never done    PAP SMEAR  Never done    BMI FOLLOWUP  2020    MAMMOGRAM  07/15/2023    COVID-19 Vaccine (3 - -24 season) 2024 (Originally 2023)    INFLUENZA VACCINE  2024    LIPID PANEL  2024    ANNUAL WELLNESS VISIT  2025    COLORECTAL CANCER SCREENING  2027    HEPATITIS C SCREENING  Completed       Orders Placed This Encounter   Procedures    Mammo Screening Digital Tomosynthesis Bilateral With CAD     Standing Status:   Future     Order Specific Question:   Reason for Exam:     Answer:   screening     Order Specific Question:   Release to patient     Answer:   Routine Release [[id number]]    DEXA Bone Density Axial     Standing Status:   Future     Order Specific Question:   Reason for Exam:     Answer:   screening     Order Specific Question:   Release to patient     Answer:   Routine Release [[id number]]     Order Specific Question:   Is patient taking or have taken long term Glucocorticoid (steroids)?     Answer:   No     Order Specific Question:   Does the patient have  rheumatoid arthritis?     Answer:   No     Order Specific Question:   Does the patient have secondary osteoporosis?     Answer:   No       Return in about 1 year (around 6/19/2025) for Medicare Wellness.

## 2024-06-19 NOTE — PROGRESS NOTES
The ABCs of the Annual Wellness Visit  Subsequent Medicare Wellness Visit    Subjective    Astrid Sam is a 58 y.o. female who presents for a Subsequent Medicare Wellness Visit.    The following portions of the patient's history were reviewed and   updated as appropriate: allergies, current medications, past family history, past medical history, past social history, past surgical history, and problem list.    Compared to one year ago, the patient feels her physical   health is the same.    Compared to one year ago, the patient feels her mental   health is better.    Recent Hospitalizations:  She was not admitted to the hospital during the last year.       Current Medical Providers:  Patient Care Team:  Beckie Murphy DO as PCP - General (Family Medicine)  Alec Davis MD as Consulting Physician (Gastroenterology)  Bettina Garvin OT as Occupational Therapist (Occupational Therapy)  Levi Alfaro DO as Consulting Physician (Pulmonary Disease)  Sherry Moya APRN as Nurse Practitioner (Pulmonary Disease)    Outpatient Medications Prior to Visit   Medication Sig Dispense Refill    acetaminophen (TYLENOL) 325 MG tablet Take 2 tablets by mouth Every 6 (Six) Hours As Needed for Mild Pain.      aspirin 81 MG EC tablet Take 1 tablet by mouth Daily.      levothyroxine (SYNTHROID, LEVOTHROID) 75 MCG tablet Take 1 tablet by mouth Every Morning. 90 tablet 1    methocarbamol (ROBAXIN) 750 MG tablet Take 1 tablet by mouth 4 (Four) Times a Day.      oxybutynin XL (DITROPAN XL) 15 MG 24 hr tablet Take 1 tablet by mouth Daily.      oxyCODONE (ROXICODONE) 5 MG immediate release tablet Take 1 tablet by mouth Every 4 (Four) Hours As Needed for Severe Pain or Moderate Pain.      pantoprazole (PROTONIX) 40 MG EC tablet Take 1 tablet by mouth Every 12 (Twelve) Hours.      Pramipexole Dihydrochloride ER 2.25 MG extended-release tablet Take 1 tablet by mouth every night at bedtime. 90 tablet 3     pregabalin (LYRICA) 75 MG capsule Take 1 capsule by mouth Every Night. 30 capsule 5    traZODone (DESYREL) 100 MG tablet TAKE 1/2-1 TABLET BY MOUTH EVERY NIGHT 30 tablet 1    ubrogepant (Ubrelvy) 100 MG tablet TAKE 1 TABLET BY MOUTH FOR MIGRAINE FOR UP TO ONE DOSE MAY REPEAT DOSE AFTER 2 HOURS IF HEADACHE NOT RESOLVED 10 tablet 1    escitalopram (LEXAPRO) 20 MG tablet TAKE 1 TABLET BY MOUTH EVERY DAY 90 tablet 0    pravastatin (PRAVACHOL) 40 MG tablet TAKE 1 TABLET BY MOUTH EVERYDAY AT BEDTIME 90 tablet 0    NON FORMULARY ALPHALIPOIC ACID       No facility-administered medications prior to visit.       Opioid medication/s are on active medication list.  and I have evaluated her active treatment plan and pain score trends (see table).  Vitals:    06/19/24 1103   PainSc:   9   PainLoc: Back     I have reviewed the chart for potential of high risk medication and harmful drug interactions in the elderly.          Aspirin is on active medication list. Aspirin use is indicated based on review of current medical condition/s. Pros and cons of this therapy have been discussed today. Benefits of this medication outweigh potential harm.  Patient has been encouraged to continue taking this medication.  .      Patient Active Problem List   Diagnosis    Migraine    Insomnia    Iron deficiency anemia    Cervical radicular pain    Diarrhea    Irritability    Acquired hypothyroidism    Fever blister    Arthritis of right hip    Urge incontinence of urine    Mixed hyperlipidemia    Hypokalemia    History of osteomyelitis    Seizure    Neil's esophagus with dysplasia    status post total replacement revision of left hip    S/P hip revision: Femoral and and acetabular component    Restless leg syndrome    Obesity (BMI 30.0-34.9)    Chronic cough    Bone pain    Lower back pain     Advance Care Planning   Advance Care Planning     Advance Directive is not on file.  ACP discussion was held with the patient during this visit.  "Patient does not have an advance directive, information provided.     Objective    Vitals:    24 1103   BP: 108/62   Pulse: 80   Resp: 18   Temp: 97.5 °F (36.4 °C)   SpO2: 96%   Weight: 70.3 kg (155 lb)   Height: 152.4 cm (60\")   PainSc:   9   PainLoc: Back     Estimated body mass index is 30.27 kg/m² as calculated from the following:    Height as of this encounter: 152.4 cm (60\").    Weight as of this encounter: 70.3 kg (155 lb).    BMI is >= 30 and <35. (Class 1 Obesity). The following options were offered after discussion;: information on healthy weight added to patient's after visit summary       Does the patient have evidence of cognitive impairment? No          HEALTH RISK ASSESSMENT    Smoking Status:  Social History     Tobacco Use   Smoking Status Never    Passive exposure: Never   Smokeless Tobacco Never     Alcohol Consumption:  Social History     Substance and Sexual Activity   Alcohol Use Not Currently    Comment: rare     Fall Risk Screen:    RADHA Fall Risk Assessment was completed, and patient is at HIGH risk for falls. Assessment completed on:2024    Depression Screenin/19/2024    11:09 AM   PHQ-2/PHQ-9 Depression Screening   Little Interest or Pleasure in Doing Things 0-->not at all   Feeling Down, Depressed or Hopeless 0-->not at all   PHQ-9: Brief Depression Severity Measure Score 0       Health Habits and Functional and Cognitive Screenin/19/2024    11:09 AM   Functional & Cognitive Status   Do you have difficulty preparing food and eating? No   Do you have difficulty bathing yourself, getting dressed or grooming yourself? No   Do you have difficulty using the toilet? No   Do you have difficulty moving around from place to place? No   Do you have trouble with steps or getting out of a bed or a chair? No   Current Diet Well Balanced Diet   Dental Exam Up to date   Eye Exam Up to date   Exercise (times per week) 0 times per week   Current Exercises Include No Regular " Exercise   Do you need help using the phone?  No   Are you deaf or do you have serious difficulty hearing?  No   Do you need help to go to places out of walking distance? No   Do you need help shopping? No   Do you need help preparing meals?  No   Do you need help with housework?  No   Do you need help with laundry? No   Do you need help taking your medications? No   Do you need help managing money? No   Do you ever drive or ride in a car without wearing a seat belt? No   Have you felt unusual stress, anger or loneliness in the last month? No   Who do you live with? Spouse   If you need help, do you have trouble finding someone available to you? No   Have you been bothered in the last four weeks by sexual problems? No   Do you have difficulty concentrating, remembering or making decisions? No       Age-appropriate Screening Schedule:  Refer to the list below for future screening recommendations based on patient's age, sex and/or medical conditions. Orders for these recommended tests are listed in the plan section. The patient has been provided with a written plan.    Health Maintenance   Topic Date Due    TDAP/TD VACCINES (1 - Tdap) Never done    ZOSTER VACCINE (1 of 2) Never done    PAP SMEAR  Never done    MAMMOGRAM  07/15/2023    COVID-19 Vaccine (3 - 2023-24 season) 07/05/2024 (Originally 9/1/2023)    Pneumococcal Vaccine 0-64 (1 of 2 - PCV) 06/19/2025 (Originally 11/1/1971)    INFLUENZA VACCINE  08/01/2024    LIPID PANEL  09/28/2024    ANNUAL WELLNESS VISIT  06/19/2025    BMI FOLLOWUP  06/19/2025    COLORECTAL CANCER SCREENING  12/27/2027    HEPATITIS C SCREENING  Completed                  CMS Preventative Services Quick Reference  Risk Factors Identified During Encounter  Fall Risk-High or Moderate: Information on Fall Prevention Shared in After Visit Summary  The above risks/problems have been discussed with the patient.  Pertinent information has been shared with the patient in the After Visit Summary.  An  "After Visit Summary and PPPS were made available to the patient.    Follow Up:   Next Medicare Wellness visit to be scheduled in 1 year.       Additional E&M Note during same encounter follows:  Patient has multiple medical problems which are significant and separately identifiable that require additional work above and beyond the Medicare Wellness Visit.      Chief Complaint  Medicare Wellness-subsequent    Subjective        History of Present Illness  The patient presents for Medicare wellness.    The patient utilizes a walker due to a fractured L2 vertebra. She is awaiting insurance approval for a kyphoplasty. She reports severe pain in the upper lumbar area following a fall on 05/22/2024, resulting in a wedge compression of the second lumbar vertebrae. She has previously consulted with Dr. Presley, who performed an examination on her spine. Currently, she is capable of ascending stairs, albeit with severe pain. Her pain management regimen includes Tylenol, methocarbamol, and oxycodone, which unfortunately, do not alleviate her pain.   She recounts an incident where she lost her footing while attempting to retrieve an empty box, resulting in a fall.   She is requesting pain medication to help with symptoms.   Dr. Chatterjee recommended osteoporosis medication post-incident. Her last bone density test was conducted when she fractured her fibula, tibia, and ankle. She is currently taking vitamin D and calcium supplements.          Objective   Vital Signs:  /62   Pulse 80   Temp 97.5 °F (36.4 °C)   Resp 18   Ht 152.4 cm (60\")   Wt 70.3 kg (155 lb)   SpO2 96%   BMI 30.27 kg/m²     Physical Exam  Vitals reviewed.   Cardiovascular:      Rate and Rhythm: Normal rate.      Heart sounds: Normal heart sounds.   Pulmonary:      Effort: Pulmonary effort is normal.      Breath sounds: Normal breath sounds.   Musculoskeletal:      Comments: Ambulates with walker   Neurological:      Mental Status: She is alert.      "   Physical Exam            Results               Assessment and Plan   Diagnoses and all orders for this visit:    1. Medicare annual wellness visit, subsequent (Primary)    2. Compression fracture of L5 vertebra with routine healing, subsequent encounter  -     HYDROcodone-acetaminophen (Norco) 7.5-325 MG per tablet; Take 1 tablet by mouth Every 6 (Six) Hours As Needed for Moderate Pain.  Dispense: 10 tablet; Refill: 0    3. Mixed hyperlipidemia  -     pravastatin (PRAVACHOL) 40 MG tablet; Take 1 tablet by mouth every night at bedtime.  Dispense: 90 tablet; Refill: 1    4. Irritability  -     escitalopram (LEXAPRO) 20 MG tablet; Take 1 tablet by mouth Daily.  Dispense: 90 tablet; Refill: 1    5. Obesity (BMI 30.0-34.9)    6. Postmenopausal  -     DEXA Bone Density Axial; Future    7. BMI 30.0-30.9,adult    8. Encounter for screening mammogram for malignant neoplasm of breast  -     Mammo Screening Digital Tomosynthesis Bilateral With CAD; Future    9. Screening for osteoporosis  -     DEXA Bone Density Axial; Future      Assessment & Plan  1. Medicare wellness.  A mammogram will be ordered for the patient. Additionally, a bone density test will be ordered. Laboratory tests will be conducted during her next visit. Prescriptions for Lexapro and pravastatin have been refilled. Hydrocodone will be prescribed for her back pain. YOUSIF query complete. Treatment plan to include limited course of prescribed  controlled substance. Risks including addiction, benefits, and alternatives presented to patient.            Follow Up   Return in about 6 months (around 12/19/2024) for Recheck.  Patient was given instructions and counseling regarding her condition or for health maintenance advice. Please see specific information pulled into the AVS if appropriate.     Patient or patient representative verbalized consent for the use of Ambient Listening during the visit with  Beckie Murphy DO for chart documentation. 6/20/2024   09:54 EDT

## 2024-07-18 RX ORDER — TRAZODONE HYDROCHLORIDE 100 MG/1
TABLET ORAL
Qty: 30 TABLET | Refills: 0 | Status: SHIPPED | OUTPATIENT
Start: 2024-07-18

## 2024-07-28 ENCOUNTER — TELEMEDICINE (OUTPATIENT)
Dept: FAMILY MEDICINE CLINIC | Facility: TELEHEALTH | Age: 59
End: 2024-07-28
Payer: COMMERCIAL

## 2024-07-28 DIAGNOSIS — R19.7 DIARRHEA, UNSPECIFIED TYPE: ICD-10-CM

## 2024-07-28 DIAGNOSIS — L08.9 BACTERIAL SKIN INFECTION OF UPPER EXTREMITY: Primary | ICD-10-CM

## 2024-07-28 PROBLEM — Z98.1 H/O SPINAL FUSION: Status: ACTIVE | Noted: 2024-05-29

## 2024-07-28 PROBLEM — E78.5 HLD (HYPERLIPIDEMIA): Status: ACTIVE | Noted: 2024-05-29

## 2024-07-28 PROBLEM — M79.602 LEFT UPPER LIMB PAIN: Status: ACTIVE | Noted: 2024-05-29

## 2024-07-28 PROBLEM — F41.9 ANXIETY AND DEPRESSION: Status: ACTIVE | Noted: 2024-05-29

## 2024-07-28 PROBLEM — E03.9 HYPOTHYROIDISM: Status: ACTIVE | Noted: 2024-05-29

## 2024-07-28 PROBLEM — J90 PLEURAL EFFUSION, BILATERAL: Status: ACTIVE | Noted: 2024-05-28

## 2024-07-28 PROBLEM — K21.9 GERD (GASTROESOPHAGEAL REFLUX DISEASE): Status: ACTIVE | Noted: 2024-05-29

## 2024-07-28 PROBLEM — G45.9 TIA (TRANSIENT ISCHEMIC ATTACK): Status: ACTIVE | Noted: 2024-05-29

## 2024-07-28 PROBLEM — F32.A ANXIETY AND DEPRESSION: Status: ACTIVE | Noted: 2024-05-29

## 2024-07-28 PROBLEM — S32.020A WEDGE COMPRESSION FRACTURE OF SECOND LUMBAR VERTEBRA, INITIAL ENCOUNTER FOR CLOSED FRACTURE: Status: ACTIVE | Noted: 2024-05-28
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
## 2024-07-28 RX ORDER — CEPHALEXIN 500 MG/1
500 CAPSULE ORAL 3 TIMES DAILY
Qty: 21 CAPSULE | Refills: 0 | Status: SHIPPED | OUTPATIENT
Start: 2024-07-28 | End: 2024-08-04

## 2024-07-28 NOTE — PROGRESS NOTES
You have chosen to receive care through a telehealth visit.  Do you consent to use a video/audio connection for your medical care today? Yes     CHIEF COMPLAINT  Chief Complaint   Patient presents with    sore on arm          HPI  Astrid Sam is a 58 y.o. female  presents with complaint of sore on arm after hitting it on a cabinet 3 weeks ago. Reports she has been picking at the area. Reports no fever. + chills. No nausea or vomiting. Reports she has not taken any medication for her symptoms. Reports she has never had staph infection. Reports she has used neosporin on the area without improvement. Reports she is UTD on her tetanus.     Review of Systems   Constitutional:  Positive for chills. Negative for fatigue and fever.   HENT:  Negative for congestion, ear discharge, ear pain, sinus pressure, sinus pain and sore throat.    Respiratory:  Negative for cough, chest tightness, shortness of breath and wheezing.    Cardiovascular:  Negative for chest pain.   Gastrointestinal:  Positive for diarrhea. Negative for abdominal pain, nausea and vomiting.   Musculoskeletal:  Negative for back pain and myalgias.   Skin:         Left arm with sore after she hit it on the cabinet. Reports she has been picking at it   Neurological:  Negative for dizziness and headaches.   Psychiatric/Behavioral: Negative.         Past Medical History:   Diagnosis Date    Acquired hypothyroidism     Anemia     after hip replacement     Anxiety and depression 5/29/2024    Arthritis     hips and knees    Asthma, extrinsic 01/01/2021    Per lung doctor    Neil's esophagus with dysplasia 12/02/2021    CTS (carpal tunnel syndrome)     Had both wrist done    Difficulty walking     GERD (gastroesophageal reflux disease) 2021    History of sepsis 08/2021    hospitalized x1 week; [facility]    History of transfusion     2 units of autologus blood after 1st hip surgery-[facility]    Low back pain     BACK PAIN WERE I HAD HIP REPLANCED    Lung nodule  01/01/2021    Memory loss     Migraine     h/o    Mixed hyperlipidemia 07/20/2020    Moderate persistent asthma 12/02/2021    Obesity (BMI 30.0-34.9) 11/21/2022    Peripheral neuropathy     Pleural effusion, bilateral 5/28/2024    PONV (postoperative nausea and vomiting)     preprocedural meds help and pt will need them     Rheumatoid arthritis 05/2015    Seizure 11/04/2020    Spinal headache 2022    pt states after hip surgery march 2022-longer bedrest    Stroke 2011    no residual effects    TIA (transient ischemic attack) 5/29/2024    Wears glasses        Family History   Problem Relation Age of Onset    Hypertension Mother     Migraines Mother     Thyroid disease Mother     Arthritis Mother     Hypertension Father     COPD Father     Asthma Father     Arthritis Father     Hearing loss Father     Breast cancer Maternal Cousin         unknown    Rectal cancer Sister     Diabetes Brother     Ovarian cancer Neg Hx        Social History     Socioeconomic History    Marital status:    Tobacco Use    Smoking status: Never     Passive exposure: Never    Smokeless tobacco: Never   Vaping Use    Vaping status: Never Used   Substance and Sexual Activity    Alcohol use: Not Currently     Comment: rare    Drug use: No    Sexual activity: Yes     Partners: Male     Birth control/protection: Post-menopausal, None     Comment: GRACE Diazтатьяна Sam  reports that she has never smoked. She has never been exposed to tobacco smoke. She has never used smokeless tobacco. I have educated her on the risk of diseases from using tobacco products such as cancer, COPD, and heart disease.         I spent  1  minutes counseling the patient.              LMP  (LMP Unknown)     PHYSICAL EXAM  Physical Exam   Constitutional: She is oriented to person, place, and time. She appears well-developed and well-nourished. No distress.   HENT:   Head: Normocephalic and atraumatic.   Right Ear: Hearing normal.   Left Ear: Hearing  normal.   Nose: No congestion.   Mouth/Throat: Mouth/Lips are normal.  Eyes: Conjunctivae and lids are normal.   Pulmonary/Chest: Effort normal.  No respiratory distress.  Neurological: She is alert and oriented to person, place, and time.   Skin:        Left upper arm with appx dime sized open area. Mild redness. Mild warmth. Note at times bleeds when she picks at the area. Note area at times looks like a pimple. No foul drainage.    Psychiatric: She has a normal mood and affect. Her speech is normal and behavior is normal.           Diagnoses and all orders for this visit:    1. Bacterial skin infection of upper extremity (Primary)    2. Diarrhea, unspecified type    Other orders  -     cephalexin (Keflex) 500 MG capsule; Take 1 capsule by mouth 3 (Three) Times a Day for 7 days.  Dispense: 21 capsule; Refill: 0  -     mupirocin (BACTROBAN) 2 % ointment; Apply 1 Application topically to the appropriate area as directed 3 (Three) Times a Day for 7 days.  Dispense: 21 g; Refill: 0    Rest  Increase fluids  May take imodium as needed   Probiotic   PCP if symptoms persist   ER for any worsening symptoms such as high fever, abdominal pain, increased redness and warmth in arm, foul drainage      FOLLOW-UP  As discussed during visit with PCP/[facility] if no improvement or Urgent Care/Emergency Department if worsening of symptoms    Patient verbalizes understanding of medication dosage, comfort measures, instructions for treatment and follow-up.    JOHNATHAN Robledo  07/28/2024  06:45 EDT    The use of a video visit has been reviewed with the patient and verbal informed consent has been obtained. Myself and Astrid Sam participated in this visit. The patient is located in [place] [id number].    I am located in [place], [place]. Mychart and Twilio were utilized. I spent 5 minutes in the patient's chart for this visit.      Note Disclaimer: At [facility], we believe that sharing information  builds trust and better   relationships. You are receiving this note because you recently visited [facility]. It is possible you   will see health information before a provider has talked with you about it. This kind of information can   be easy to misunderstand. To help you fully understand what it means for your health, we urge you to   discuss this note with your provider.

## 2024-07-31 ENCOUNTER — HOSPITAL ENCOUNTER (OUTPATIENT)
Dept: NUCLEAR MEDICINE | Facility: HOSPITAL | Age: 59
Discharge: HOME | End: 2024-07-31
Payer: COMMERCIAL

## 2024-07-31 ENCOUNTER — HOSPITAL ENCOUNTER (OUTPATIENT)
Dept: GENERAL RADIOLOGY | Facility: HOSPITAL | Age: 59
Discharge: HOME | End: 2024-07-31
Admitting: SURGERY
Payer: COMMERCIAL

## 2024-07-31 DIAGNOSIS — K21.9 CHRONIC GERD: ICD-10-CM

## 2024-07-31 PROCEDURE — A9541 TC99M SULFUR COLLOID: HCPCS | Performed by: SURGERY

## 2024-07-31 PROCEDURE — 74220 X-RAY XM ESOPHAGUS 1CNTRST: CPT

## 2024-07-31 PROCEDURE — 0 TECHNETIUM SULFUR COLLOID: Performed by: SURGERY

## 2024-07-31 PROCEDURE — 78264 GASTRIC EMPTYING IMG STUDY: CPT

## 2024-07-31 RX ADMIN — BARIUM SULFATE 183 ML: 960 POWDER, FOR SUSPENSION ORAL at 12:47

## 2024-07-31 RX ADMIN — TECHNETIUM TC 99M SULFUR COLLOID 1 DOSE: KIT at 10:38

## 2024-08-01 ENCOUNTER — OFFICE VISIT (OUTPATIENT)
Dept: FAMILY MEDICINE CLINIC | Facility: CLINIC | Age: 59
End: 2024-08-01
Payer: COMMERCIAL

## 2024-08-01 ENCOUNTER — TELEPHONE (OUTPATIENT)
Dept: FAMILY MEDICINE CLINIC | Facility: CLINIC | Age: 59
End: 2024-08-01

## 2024-08-01 VITALS
BODY MASS INDEX: 30.31 KG/M2 | OXYGEN SATURATION: 98 % | RESPIRATION RATE: 18 BRPM | HEART RATE: 80 BPM | WEIGHT: 154.4 LBS | DIASTOLIC BLOOD PRESSURE: 74 MMHG | TEMPERATURE: 97.1 F | HEIGHT: 60 IN | SYSTOLIC BLOOD PRESSURE: 110 MMHG

## 2024-08-01 DIAGNOSIS — L08.9 BACTERIAL SKIN INFECTION OF UPPER EXTREMITY: Primary | ICD-10-CM

## 2024-08-01 PROCEDURE — 99213 OFFICE O/P EST LOW 20 MIN: CPT | Performed by: FAMILY MEDICINE

## 2024-08-01 RX ORDER — FLUCONAZOLE 150 MG/1
150 TABLET ORAL
Qty: 2 TABLET | Refills: 0 | Status: SHIPPED | OUTPATIENT
Start: 2024-08-01 | End: 2024-08-05

## 2024-08-01 NOTE — PROGRESS NOTES
Chief Complaint  Spot on Arm (Left forearm, x4 wks, scraped arm on counter top and it's healing very slowly.)    Subjective          Astrid Sam presents to [facility]  History of Present Illness    She has a wound on left forearm, present for 4 weeks and healing slowly. Wound occurred after she scraped her arm on her counter top.   She admits to picking skin often, which she knows is prolonging the healing  She completed a virtual visit and prescribed a topical and oral antibiotic. Keflex and mupirocin, started treatment on 7/28/24    The following portions of the patient's history were reviewed and updated as appropriate: allergies, current medications, past family history, past medical history, past social history, past surgical history, and problem list.    Objective      Physical Exam  Vitals reviewed.   Pulmonary:      Effort: Pulmonary effort is normal. No respiratory distress.   Skin:     Comments: Shallow wounds on left forearm dorsal surface, minimal surrounding erythema. No discharge. See picture.    Neurological:      Mental Status: She is alert.        Result Review :                Assessment and Plan    Diagnoses and all orders for this visit:    1. Bacterial skin infection of upper extremity (Primary)    Other orders  -     fluconazole (Diflucan) 150 MG tablet; Take 1 tablet by mouth Every 72 (Seventy-Two) Hours for 2 doses.  Dispense: 2 tablet; Refill: 0    Complete Keflex and mupirocin   Advised to stop picking wounds, ok to keep covered during daytime while she is awake.   Diflucan prescribed due to yeast infection secondary to abx use.       Follow Up   Return if symptoms worsen or fail to improve.  Patient was given instructions and counseling regarding her condition or for health maintenance advice. Please see specific information pulled into the AVS if appropriate.

## 2024-08-09 DIAGNOSIS — E03.9 ACQUIRED HYPOTHYROIDISM: ICD-10-CM

## 2024-08-09 RX ORDER — LEVOTHYROXINE SODIUM 0.07 MG/1
75 TABLET ORAL EVERY MORNING
Qty: 90 TABLET | Refills: 1 | Status: SHIPPED | OUTPATIENT
Start: 2024-08-09

## 2024-08-29 RX ORDER — TRAZODONE HYDROCHLORIDE 100 MG/1
TABLET ORAL
Qty: 30 TABLET | Refills: 0 | Status: SHIPPED | OUTPATIENT
Start: 2024-08-29

## 2024-09-04 ENCOUNTER — OFFICE VISIT (OUTPATIENT)
Dept: FAMILY MEDICINE CLINIC | Facility: CLINIC | Age: 59
End: 2024-09-04
Payer: COMMERCIAL

## 2024-09-04 VITALS
HEART RATE: 82 BPM | HEIGHT: 60 IN | WEIGHT: 159.4 LBS | BODY MASS INDEX: 31.29 KG/M2 | TEMPERATURE: 97.7 F | SYSTOLIC BLOOD PRESSURE: 112 MMHG | RESPIRATION RATE: 16 BRPM | OXYGEN SATURATION: 96 % | DIASTOLIC BLOOD PRESSURE: 62 MMHG

## 2024-09-04 DIAGNOSIS — R05.3 CHRONIC COUGH: Primary | ICD-10-CM

## 2024-09-04 DIAGNOSIS — F51.01 PRIMARY INSOMNIA: ICD-10-CM

## 2024-09-04 DIAGNOSIS — L28.1 PICKER'S NODULE: ICD-10-CM

## 2024-09-04 PROCEDURE — 99214 OFFICE O/P EST MOD 30 MIN: CPT | Performed by: FAMILY MEDICINE

## 2024-09-04 RX ORDER — BROMPHENIRAMINE MALEATE, PSEUDOEPHEDRINE HYDROCHLORIDE, AND DEXTROMETHORPHAN HYDROBROMIDE 2; 30; 10 MG/5ML; MG/5ML; MG/5ML
5 SYRUP ORAL 4 TIMES DAILY PRN
Qty: 180 ML | Refills: 0 | Status: SHIPPED | OUTPATIENT
Start: 2024-09-04

## 2024-09-04 NOTE — PROGRESS NOTES
Chief Complaint  Cough (Ongoing cough), Insomnia (Requesting switch from trazodone to amitriptyline. ), and Rash (Left arm, dry patch that isn't healing.)    Subjective          Astrid Sam presents to [facility]    History of Present Illness  The patient presents for evaluation of multiple medical concerns.    She reports a persistent cough for years that has been evaluated by a pulmonology and general surgery. Despite undergoing tests, the cause remains undetermined. Her coughing episodes are severe, often waking her from sleep and causing breathing difficulties. She has tried Tessalon, which provided minimal relief. Her sleep is disrupted, averaging only 2 to 3 hours per night due to the cough.    She was prescribed medication for constipation, but discontinued it due to its adverse effect on her restless legs. Currently, she is managing her symptoms with probiotics. This was prescribed by Dr. Herndon, however unsure of medication name. She continues to take reflux medication. Over the past month, she has experienced dry mouth and a burning sensation on her tongue, which she attributes to Xyzal, an allergy medication she has since stopped taking. She consulted Dr. Herndon regarding potential Nissen fundoplication surgery for reflux treatment, which was suggested by her pulmonologist. She has not yet consulted an allergist. She has an upcoming appointment with Dr. Alfaro in 11/2024.    She is scheduled to have a spine stimulator implanted and is awaiting an MRI. She has previously received steroid injections. Her psychiatrist suggested switching from trazodone to nortriptyline, which may aid in sleep and pain management. She is currently taking pregabalin and Mirapex for restless legs.    Additionally, she reports a dry patch on her left arm that becomes inflamed when picked at.     FAMILY HISTORY  Her father has COPD.      The following portions of the patient's history  were reviewed and updated as appropriate: allergies, current medications, past family history, past medical history, past social history, past surgical history, and problem list.    Objective      Physical Exam  Vitals reviewed.   Cardiovascular:      Rate and Rhythm: Normal rate.      Heart sounds: Normal heart sounds.   Pulmonary:      Effort: Pulmonary effort is normal.      Breath sounds: Normal breath sounds.   Skin:            Comments: Aldair's nodule left dorsal forearm    Neurological:      Mental Status: She is alert.        Physical Exam      Result Review :       Results  Imaging  CT of sinuses was conducted. Esophagram showed reflux up to the thoracic inlet.    Testing  Pulmonary function test and spirometry with diffusion capacities and lung volume were conducted in October of last year.             Assessment and Plan    Diagnoses and all orders for this visit:    1. Chronic cough (Primary)  -     Ambulatory Referral to Allergy  -     brompheniramine-pseudoephedrine-DM 30-2-10 MG/5ML syrup; Take 5 mL by mouth 4 (Four) Times a Day As Needed for Cough.  Dispense: 180 mL; Refill: 0    2. Primary insomnia  -     amitriptyline (ELAVIL) 25 MG tablet; Take 1 tablet by mouth Every Night.  Dispense: 90 tablet; Refill: 1    3. 's nodule      Assessment & Plan  1. Chronic cough.  The chronic cough has persisted despite treatments for postnasal drip, asthma, and reflux. An EGD revealed Neil's esophagus, and an esophagram indicated reflux up to the thoracic inlet. A pulmonary function test was performed in October of last year. She has also undergone a bronchoscopy.   Bromfed will be prescribed for use as needed, particularly before bedtime. A referral to an allergist will be made for further evaluation.    2. Insomnia  A switch from trazodone to nortriptyline will be made to potentially aid in sleep and pain management. She will continue her current regimen of pregabalin and Mirapex.    3. 's  nodule.  She has been advised to refrain from picking at the nodule to prevent scarring and further nodule formation.     4. Health Maintenance.  A bone density test and a mammogram are scheduled for September 25th.          Follow Up   No follow-ups on file.  Patient or patient representative verbalized consent for the use of Ambient Listening during the visit with  Beckie Murphy DO for chart documentation. 9/4/2024  14:11 EDT    Patient was given instructions and counseling regarding her condition or for health maintenance advice. Please see specific information pulled into the AVS if appropriate.

## 2024-09-17 LAB
NCCN CRITERIA FLAG: NORMAL
TYRER CUZICK SCORE: 6.4

## 2024-09-25 RX ORDER — PREDNISONE 10 MG/1
TABLET ORAL
Qty: 21 EACH | Refills: 0 | Status: SHIPPED | OUTPATIENT
Start: 2024-09-25

## 2024-09-30 DIAGNOSIS — K21.00 GASTRO-ESOPHAGEAL REFLUX DISEASE WITH ESOPHAGITIS, WITHOUT BLEEDING: ICD-10-CM

## 2024-10-02 RX ORDER — PANTOPRAZOLE SODIUM 40 MG/1
40 TABLET, DELAYED RELEASE ORAL 2 TIMES DAILY
Qty: 180 TABLET | Refills: 3 | Status: SHIPPED | OUTPATIENT
Start: 2024-10-02

## 2024-10-03 ENCOUNTER — TELEPHONE (OUTPATIENT)
Dept: FAMILY MEDICINE CLINIC | Facility: CLINIC | Age: 59
End: 2024-10-03
Payer: COMMERCIAL

## 2024-10-03 DIAGNOSIS — Z78.0 POSTMENOPAUSAL: Primary | ICD-10-CM

## 2024-10-03 DIAGNOSIS — Z12.31 ENCOUNTER FOR SCREENING MAMMOGRAM FOR MALIGNANT NEOPLASM OF BREAST: ICD-10-CM

## 2024-10-03 DIAGNOSIS — Z13.820 SCREENING FOR OSTEOPOROSIS: ICD-10-CM

## 2024-10-03 NOTE — TELEPHONE ENCOUNTER
"  Caller: Astrid Sam \"Doris\"    Relationship: Self    Best call back number:      What is the medical concern/diagnosis: MAMMOGRAM DEXA SCAN    What specialty or service is being requested: RADIOLOGY    What is the provider, practice or medical service name: [facility]    Any additional details: THEY ARE NEEDING NEW ORDERS AS SHE HAS TO CANCEL HER APPTS        "

## 2024-10-10 ENCOUNTER — OFFICE VISIT (OUTPATIENT)
Dept: FAMILY MEDICINE CLINIC | Facility: CLINIC | Age: 59
End: 2024-10-10
Payer: COMMERCIAL

## 2024-10-10 VITALS
HEIGHT: 60 IN | DIASTOLIC BLOOD PRESSURE: 72 MMHG | SYSTOLIC BLOOD PRESSURE: 114 MMHG | WEIGHT: 159.8 LBS | RESPIRATION RATE: 18 BRPM | TEMPERATURE: 97.5 F | HEART RATE: 114 BPM | BODY MASS INDEX: 31.37 KG/M2 | OXYGEN SATURATION: 95 %

## 2024-10-10 DIAGNOSIS — H61.23 BILATERAL IMPACTED CERUMEN: Primary | ICD-10-CM

## 2024-10-10 PROCEDURE — 99213 OFFICE O/P EST LOW 20 MIN: CPT | Performed by: FAMILY MEDICINE

## 2024-10-10 PROCEDURE — 69209 REMOVE IMPACTED EAR WAX UNI: CPT | Performed by: FAMILY MEDICINE

## 2024-10-10 NOTE — PROGRESS NOTES
Chief Complaint  Ear Fullness (Both ears, 4-5 days)    Subjective          Astrid Sam presents to [facility]  Ear Fullness     Bilateral ear fullness x 4-5 days, along with muffled hearing.  Debrox, warm water and salt in ears, vinegar without improvement of fullness   Yesterday, left ear hurt, but not today    The following portions of the patient's history were reviewed and updated as appropriate: allergies, current medications, past family history, past medical history, past social history, past surgical history, and problem list.    Objective      Physical Exam  Vitals reviewed.   HENT:      Right Ear: There is impacted cerumen.      Left Ear: There is impacted cerumen.   Pulmonary:      Effort: Pulmonary effort is normal. No respiratory distress.   Neurological:      Mental Status: She is alert.        Result Review :         Ear Cerumen Removal    Date/Time: 10/10/2024 3:12 PM    Performed by: Wendi Santos MA  Authorized by: Beckie Murphy DO  Consent: Verbal consent obtained.  Risks and benefits: risks, benefits and alternatives were discussed  Consent given by: patient  Patient understanding: patient states understanding of the procedure being performed  Patient consent: the patient's understanding of the procedure matches consent given  Procedure consent: procedure consent matches procedure scheduled  Patient identity confirmed: verbally with patient  Location details: left ear and right ear  Patient tolerance: patient tolerated the procedure well with no immediate complications  Procedure type: irrigation           Assessment and Plan    Diagnoses and all orders for this visit:    1. Bilateral impacted cerumen (Primary)  -     Ear Cerumen Removal    Successful ear lavage, pt reported immediate relief of symptoms.       Follow Up   No follow-ups on file.  Patient was given instructions and counseling regarding her condition or for health maintenance  advice. Please see specific information pulled into the AVS if appropriate.   Answers submitted by the patient for this visit:  Primary Reason for Visit (Submitted on 10/8/2024)  What is the primary reason for your visit?: Ear Pain  Ear Pain Questionnaire (Submitted on 10/8/2024)  Chief Complaint: Ear pain  dizziness: Yes  hoarse voice: No  congestion: No  jaw pain: No  adenopathy: No  tinnitus: Yes

## 2024-10-15 DIAGNOSIS — G62.9 POLYNEUROPATHY: ICD-10-CM

## 2024-10-17 DIAGNOSIS — G62.9 POLYNEUROPATHY: ICD-10-CM

## 2024-10-17 RX ORDER — PREGABALIN 75 MG/1
75 CAPSULE ORAL NIGHTLY
Qty: 30 CAPSULE | Refills: 5 | Status: SHIPPED | OUTPATIENT
Start: 2024-10-17

## 2024-10-17 RX ORDER — PREGABALIN 75 MG/1
75 CAPSULE ORAL NIGHTLY
Qty: 30 CAPSULE | Refills: 5 | OUTPATIENT
Start: 2024-10-17

## 2024-10-17 NOTE — TELEPHONE ENCOUNTER
Rx Refill Note  Requested Prescriptions     Pending Prescriptions Disp Refills    pregabalin (LYRICA) 75 MG capsule 30 capsule 5     Sig: Take 1 capsule by mouth Every Night.      Last filled:10/17/2024 with 5 refills  Last office visit with prescribing clinician: 3/28/2024      Next office visit with prescribing clinician: 3/31/2025     Lissette Calloway MA  10/17/24, 15:48 EDT    Denied-Last filled:10/17/2024 with 5 refills  Please contact your pharmacy for refills before contacting our office to ensure multiple requests are not sent into pharmacy.  Once you run out of refills then let us know within 4/5 days of running out of medication and we will send in a new script.

## 2024-10-17 NOTE — TELEPHONE ENCOUNTER
Rx Refill Note  Requested Prescriptions     Pending Prescriptions Disp Refills    pregabalin (LYRICA) 75 MG capsule [Pharmacy Med Name: PREGABALIN 75 MG CAPSULE] 30 capsule      Sig: TAKE 1 CAPSULE BY MOUTH EVERY DAY AT NIGHT      Last filled:03/28/2024  Last office visit with prescribing clinician: 3/28/2024      Next office visit with prescribing clinician: 10/17/2024     Lissette Calloway MA  10/17/24, 12:25 EDT    Pending pregabalin refill

## 2024-10-30 ENCOUNTER — OFFICE VISIT (OUTPATIENT)
Dept: PULMONOLOGY | Facility: CLINIC | Age: 59
End: 2024-10-30
Payer: COMMERCIAL

## 2024-10-30 VITALS
SYSTOLIC BLOOD PRESSURE: 114 MMHG | HEART RATE: 98 BPM | WEIGHT: 158.9 LBS | BODY MASS INDEX: 31.2 KG/M2 | DIASTOLIC BLOOD PRESSURE: 70 MMHG | OXYGEN SATURATION: 95 % | HEIGHT: 60 IN | TEMPERATURE: 97.8 F

## 2024-10-30 DIAGNOSIS — K22.719 BARRETT'S ESOPHAGUS WITH DYSPLASIA: ICD-10-CM

## 2024-10-30 DIAGNOSIS — R05.3 CHRONIC COUGH: ICD-10-CM

## 2024-10-30 DIAGNOSIS — R05.3 CHRONIC COUGH: Primary | ICD-10-CM

## 2024-10-30 RX ORDER — PROMETHAZINE HYDROCHLORIDE AND CODEINE PHOSPHATE 6.25; 1 MG/5ML; MG/5ML
5 SOLUTION ORAL EVERY 4 HOURS PRN
Qty: 180 ML | Refills: 2 | Status: CANCELLED | OUTPATIENT
Start: 2024-10-30

## 2024-10-30 RX ORDER — PREGABALIN 75 MG/1
75 CAPSULE ORAL 2 TIMES DAILY
Qty: 30 CAPSULE | Refills: 5 | Status: SHIPPED | OUTPATIENT
Start: 2024-10-30

## 2024-10-30 RX ORDER — PROMETHAZINE HYDROCHLORIDE AND CODEINE PHOSPHATE 6.25; 1 MG/5ML; MG/5ML
5 SOLUTION ORAL EVERY 4 HOURS PRN
Qty: 180 ML | Refills: 2 | Status: SHIPPED | OUTPATIENT
Start: 2024-10-30 | End: 2024-10-31 | Stop reason: SDUPTHER

## 2024-10-30 NOTE — PROGRESS NOTES
Follow Up Office Note       Patient Name: Astrid Sam    Referring Physician: No ref. provider found    Chief Complaint: Chronic cough      History of Present Illness: Astrid Sam is a 58 y.o. female who is here today to follow-up care with Pulmonary.  Patient has a past medical history significant for hypothyroidism, allergic rhinitis, hyperlipidemia, PFO status post anterior atrial closure, CVA, Neil's esophagus and seizures.  Patient still coughing.  No significant change.  Was seen by general surgery does not recommend any surgery at this time.  Was given Reglan.    Review of Systems:   Review of Systems   Constitutional:  Negative for chills, fatigue and fever.   HENT:  Negative for congestion and voice change.    Eyes:  Negative for blurred vision.   Respiratory:  Positive for cough. Negative for shortness of breath and wheezing.    Cardiovascular:  Negative for chest pain.   Skin:  Negative for dry skin.   Hematological:  Negative for adenopathy.   Psychiatric/Behavioral:  Negative for agitation and depressed mood.        The following portions of the patient's history were reviewed and updated as appropriate: allergies, current medications, past family history, past medical history, past social history, past surgical history and problem list.    Physical Exam:    Physical Exam  Vitals and nursing note reviewed.   Constitutional:       General: She is not in acute distress.     Appearance: She is well-developed and normal weight. She is obese. She is not ill-appearing or toxic-appearing.   HENT:      Head: Normocephalic and atraumatic.   Cardiovascular:      Rate and Rhythm: Normal rate and regular rhythm.      Pulses: Normal pulses.      Heart sounds: Normal heart sounds. No murmur heard.     No friction rub. No gallop.   Pulmonary:      Effort: Pulmonary effort is normal. No respiratory distress.      Breath sounds: Normal breath sounds. No wheezing, rhonchi or rales.   Musculoskeletal:       Right lower leg: No edema.      Left lower leg: No edema.   Skin:     General: Skin is warm and dry.   Neurological:      Mental Status: She is alert and oriented to person, place, and time.         Immunization History   Administered Date(s) Administered    COVID-19 (PFIZER) Purple Cap Monovalent 04/05/2021, 05/03/2021    Fluzone (or Fluarix & Flulaval for VFC) >6mos 12/10/2021       Results Review:   - CT scan of the chest from 11/29/2023 which shows no acute cardiopulmonary process, no nodules, masses, or infiltrates.  -CT sinus report from November 2023, which showed normal  - chest x-ray from 6/30/2023 which showed no acute cardiopulmonary process.  -CT scan from October 2021 which showed the nodules in the right lower lobe is now resolved, with no other acute infiltrates              - CT scan from May 2021 which showed significant improvement in the upper lobe infiltrates compared to August 2020, she was noted to have a small nodule in the right lower lobe.              - imaging from chest x-ray from November 4, 2020 shows no acute cardiopulmonary process, CT scan of the chest from August 22, 2020 showed some very mild groundglass interstitial changes in the upper lobes bilaterally, and otherwise unremarkable.  - Echo from 8/22/2020 showed an EF of 60%, with normal left ventricular systolic function, and no signs of a shunt.  - barium swallow report, which showed that the patient had mild gastroesophageal reflux to the thoracic inlet, with mild smooth luminal narrowing of the distal esophagus which may represent a stricture or spasm.  - bronchoscopy results from November 2023, with all cultures being negative, cytology was benign, transbronchial biopsies benign tissue, BAL within normal limits.  - PFTs from 10/24/2023 which showed no obstruction or restriction with a normal DLCO.  -spirometry from May 2023 which shows no obstruction.  - pulmonary function testing from 12/21/2020 which showed no  obstruction or restriction with a normal DLCO, although notable for significant air trapping  - EGD pathology report from February 2021 which was negative for H. pylori, showed signs of chronic inflammation, most notably in the distal esophagus there was signs of reflux esophagitis that was compatible with Neil's.  -I personally viewed the patient's evaluation by general surgery.    Assessment / Plan:   Diagnoses and all orders for this visit:    1. Chronic cough (Primary)  2. Neil's esophagus with dysplasia  -The patient's cough is either neurogenic or from reflux.  She definitely has esophageal dysmotility issues.  But so far been unable to entirely prove this.  We tried esophageal pH probe previously but she had difficulty keeping the tube in place.  We done a postnasal drip regimen, and there was also done full therapy for asthma with no benefit.  I do not think she has asthma and then also we have not seen benefit with her drainage regimen.  Therefore we will go back to the reflux component and then also treat for neurogenic cough.  I have ordered another esophageal pH probe study to see if she would be able to complete this 1 compared to the last one.  Hopefully that can either rule in or rule out reflux has been a component of the problem.  -At the same time I am going to increase her Lyrica to 75 mg twice daily in addition to giving her a codeine cough syrup to use at night to help with sleep.    Follow Up:   Return in about 2 months (around 12/30/2024).       IWONA Alfaro,   Pulmonary and Critical Care Medicine  Note Electronically Signed    Part of this note may be an electronic transcription/translation of spoken language to printed text using the Dragon Dictation System.

## 2024-10-30 NOTE — TELEPHONE ENCOUNTER
Pt called requesting for Rx Promethazine Cough Syrup needing to be sent to [facility] due to [facility] not having in stock.

## 2024-10-31 DIAGNOSIS — R05.3 CHRONIC COUGH: ICD-10-CM

## 2024-10-31 RX ORDER — PROMETHAZINE HYDROCHLORIDE AND CODEINE PHOSPHATE 6.25; 1 MG/5ML; MG/5ML
5 SOLUTION ORAL EVERY 4 HOURS PRN
Qty: 180 ML | Refills: 2 | Status: SHIPPED | OUTPATIENT
Start: 2024-10-31

## 2024-11-04 ENCOUNTER — TRANSCRIBE ORDERS (OUTPATIENT)
Dept: ADMINISTRATIVE | Facility: HOSPITAL | Age: 59
End: 2024-11-04
Payer: COMMERCIAL

## 2024-11-04 DIAGNOSIS — Z12.31 VISIT FOR SCREENING MAMMOGRAM: Primary | ICD-10-CM

## 2024-11-08 DIAGNOSIS — G43.119 INTRACTABLE MIGRAINE WITH AURA WITHOUT STATUS MIGRAINOSUS: ICD-10-CM

## 2024-11-16 DIAGNOSIS — R45.4 IRRITABILITY: ICD-10-CM

## 2024-11-16 DIAGNOSIS — E78.2 MIXED HYPERLIPIDEMIA: ICD-10-CM

## 2024-11-18 RX ORDER — ESCITALOPRAM OXALATE 20 MG/1
20 TABLET ORAL DAILY
Qty: 90 TABLET | Refills: 1 | Status: SHIPPED | OUTPATIENT
Start: 2024-11-18

## 2024-11-18 RX ORDER — PRAVASTATIN SODIUM 40 MG
40 TABLET ORAL
Qty: 90 TABLET | Refills: 1 | Status: SHIPPED | OUTPATIENT
Start: 2024-11-18

## 2024-11-26 ENCOUNTER — HOSPITAL ENCOUNTER (OUTPATIENT)
Facility: HOSPITAL | Age: 59
Setting detail: HOSPITAL OUTPATIENT SURGERY
Discharge: HOME | End: 2024-11-26
Attending: SURGERY | Admitting: SURGERY
Payer: COMMERCIAL

## 2024-11-26 DIAGNOSIS — F51.01 PRIMARY INSOMNIA: ICD-10-CM

## 2024-11-26 PROCEDURE — 91034 GASTROESOPHAGEAL REFLUX TEST: CPT | Performed by: SURGERY

## 2024-11-26 PROCEDURE — 91010 ESOPHAGUS MOTILITY STUDY: CPT | Performed by: SURGERY

## 2024-11-26 RX ORDER — LIDOCAINE HYDROCHLORIDE 20 MG/ML
SOLUTION OROPHARYNGEAL AS NEEDED
Status: DISCONTINUED | OUTPATIENT
Start: 2024-11-26 | End: 2024-11-26 | Stop reason: HOSPADM

## 2024-11-26 NOTE — H&P
Patient Name:  Astrid Sam  YOB: 1965  [id number]    Date of Service: 11/26/2024       Patient Care Team:  Beckie Murphy DO as PCP - General (Family Medicine)  Alec Davis MD as Consulting Physician (Gastroenterology)  Bettina Garvin OT as Occupational Therapist (Occupational Therapy)  Levi Alfaro DO as Consulting Physician (Pulmonary Disease)  Sherry Moya APRN as Nurse Practitioner (Pulmonary Disease)      General Surgery History and Physical    Date: 11/26/24    Chief Complaint : Chronic cough    Subjective      Patient is a 59 y.o. female who presents with chronic cough.  She now presents for evaluation with manometry and pH monitoring.      Allergy:   Allergies   Allergen Reactions    Gabapentin Other (See Comments)     Pt states she had sepsis from taking.    Morphine Nausea And Vomiting       Medications:    No current facility-administered medications for this encounter.    No current facility-administered medications on file prior to encounter.     Current Outpatient Medications on File Prior to Encounter   Medication Sig    amitriptyline (ELAVIL) 25 MG tablet Take 1 tablet by mouth Every Night.    aspirin 81 MG EC tablet Take 1 tablet by mouth Daily.    levothyroxine (SYNTHROID, LEVOTHROID) 75 MCG tablet Take 1 tablet by mouth Every Morning.    oxybutynin XL (DITROPAN XL) 15 MG 24 hr tablet Take 1 tablet by mouth Daily.    pantoprazole (PROTONIX) 40 MG EC tablet TAKE 1 TABLET BY MOUTH TWICE A DAY    Pramipexole Dihydrochloride ER 2.25 MG extended-release tablet Take 1 tablet by mouth every night at bedtime.    pregabalin (LYRICA) 75 MG capsule Take 1 capsule by mouth 2 (Two) Times a Day.    promethazine-codeine (PHENERGAN with CODEINE) 6.25-10 MG/5ML solution Take 5 mL by mouth Every 4 (Four) Hours As Needed for Cough.       PMHx:   Past Medical History:   Diagnosis Date    Acquired hypothyroidism     Anemia     after hip replacement      Anxiety and depression 2024    Arthritis     hips and knees    Asthma, extrinsic 2021    Per lung doctor    Neil's esophagus with dysplasia 2021    CTS (carpal tunnel syndrome)     Had both wrist done    Difficulty walking     GERD (gastroesophageal reflux disease)     History of sepsis 2021    hospitalized x1 week; [facility]    History of transfusion     2 units of autologus blood after 1st hip surgery-[facility]    Low back pain     BACK PAIN WERE I HAD HIP REPLANCED    Lung nodule 2021    Memory loss     Migraine     h/o    Mixed hyperlipidemia 2020    Moderate persistent asthma 2021    Neuromuscular disorder     Obesity (BMI 30.0-34.9) 2022    Peripheral neuropathy     Pleural effusion, bilateral 2024    PONV (postoperative nausea and vomiting)     preprocedural meds help and pt will need them     Rheumatoid arthritis 2015    Seizure 2020    Spinal headache     pt states after hip surgery 2022-longer bedrest    Stroke     no residual effects    TIA (transient ischemic attack) 2024    Wears glasses          Past Surgical History:   Past Surgical History:   Procedure Laterality Date    BRONCHOSCOPY N/A 2023    Procedure: BRONCHOSCOPY WITH TRANSBRONCHIAL BIOPSY AND BRONCHOALVEOLAR LAVAGE;  Surgeon: Levi Alfaro DO;  Location:  [facility];  Service: Pulmonary;  Laterality: N/A;    CARDIAC CATHETERIZATION      CARPAL TUNNEL RELEASE Bilateral 2017    CERVICAL FUSION  2020     SECTION  1993    CHOLECYSTECTOMY  2017    COLONOSCOPY      ENDOSCOPY      FRACTURE SURGERY  2015    left leg- hardware in place     GASTRIC PH MONITORING 24HR N/A 2024    Procedure: GASTRIC PH MONITORING 24HR;  Surgeon: Gregory Herndon MD;  Location:  [facility];  Service: General;  Laterality: N/A;    HIP SURGERY      multiple as a child infant to age 4    JOINT REPLACEMENT      TOTAL HIP REPLACEMENT    KNEE  ARTHROSCOPY Right     done twice    LUMBAR FUSION  05/2023    PATELLA SURGERY Left 1995    removed left knee cap    PATENT FORAMEN OVALE CLOSURE  2021    QUADRICEPS TENDON REPAIR Right 2018    REPLACEMENT TOTAL KNEE Right 2018    revision 2019    SPINE SURGERY  2020    TOTAL HIP ARTHROPLASTY Left 2000    TOTAL HIP ARTHROPLASTY Right 12/12/2019    Procedure: DIRECT TOTAL HIP ARTHROPLASTY ANTERIOR RIGHT;  Surgeon: Mele Chatterjee MD;  Location:  [facility];  Service: Orthopedics    TOTAL HIP ARTHROPLASTY REVISION Left 03/30/2022    Procedure: TOTAL HIP REVISION LEFT;  Surgeon: Mele Chatterjee MD;  Location:  [facility];  Service: Orthopedics;  Laterality: Left;    TOTAL HIP ARTHROPLASTY REVISION Left 06/23/2022    Procedure: HIP REVISION LEFT, FEMORAL AND ACETABULAR COMPONENT;  Surgeon: Mele Chatterjee MD;  Location:  [facility];  Service: Orthopedics;  Laterality: Left;    TUBAL ABDOMINAL LIGATION           Family History: Noncontributory     Social History:     Tobacco use: Denies     EtOH use : Denies    Illicit drug use: Denies      Review of Systems        Constitutional: No fevers, chills or malaise   Eyes: Denies visual changes    Cardiovascular: Denies chest pain, palpitations   Pulmonary: Denies cough or shortness of breath   Abdominal/ GI: See HPI    Genitourinary: Denies dysuria or hematuria   Musculoskeletal: Denies any but chronic joint aches, pains or deformities   Psychiatric: No recent mood changes   Neurologic: No paresthesias or loss of function          Objective     Physical Exam:      Vital Signs  LMP  (LMP Unknown)   No intake or output data in the 24 hours ending 11/26/24 1008      Physical Exam:    Head: Normocephalic, atraumatic.   Eyes: Pupils equal, round, react to light and accommodation.   Mouth: Oral mucosa without lesions,   Neck: No masses, lymphadenopathy or carotid bruits bilaterally   CV: Rhythm  regular and rate regular  , no  murmurs, rubs or gallops  Lungs: Clear   to auscultation  bilaterally   Abdomen: Bowel sounds positive  , soft,   Groin : No obvious hernias bilaterally   Extremities:  No cyanosis, clubbing or edema bilaterally   Lymphatics: No abnormal lymphadenopathy appreciated   Neurologic: No gross deficits         Results Review:   I have personally reviewed all of the recent lab and imaging results available at this time            Assessment and Plan:    Chronic cough - For manometry +/- pH testing today.          I discussed the patient's findings and my recommendations with the patient and/or family, as well as the primary team     Gregory Herndon MD  11/26/24  10:08 EST

## 2025-01-08 RX ORDER — VALACYCLOVIR HYDROCHLORIDE 1 G/1
1000 TABLET, FILM COATED ORAL 2 TIMES DAILY
Qty: 10 TABLET | Refills: 0 | Status: SHIPPED | OUTPATIENT
Start: 2025-01-08 | End: 2025-01-13

## 2025-01-08 RX ORDER — FLUCONAZOLE 150 MG/1
150 TABLET ORAL
Qty: 2 TABLET | Refills: 0 | Status: SHIPPED | OUTPATIENT
Start: 2025-01-08 | End: 2025-01-12

## 2025-02-08 DIAGNOSIS — F51.01 PRIMARY INSOMNIA: ICD-10-CM

## 2025-02-10 ENCOUNTER — TELEPHONE (OUTPATIENT)
Dept: FAMILY MEDICINE CLINIC | Facility: CLINIC | Age: 60
End: 2025-02-10
Payer: COMMERCIAL

## 2025-02-10 NOTE — TELEPHONE ENCOUNTER
Caller: MAYI    Relationship: Other    Best call back number: [phone]     Which medication are you concerned about: Pramipexole Dihydrochloride ER 2.25 MG extended-release tablet     What are your concerns: MAYI IS CALLING AND STATES THIS MEDICATION IS NEEDING A PRIOR AUTHORIZATION.

## 2025-02-15 DIAGNOSIS — E03.9 ACQUIRED HYPOTHYROIDISM: ICD-10-CM

## 2025-02-18 DIAGNOSIS — E03.9 ACQUIRED HYPOTHYROIDISM: Primary | ICD-10-CM

## 2025-02-18 RX ORDER — LEVOTHYROXINE SODIUM 75 UG/1
75 TABLET ORAL EVERY MORNING
Qty: 30 TABLET | Refills: 0 | Status: SHIPPED | OUTPATIENT
Start: 2025-02-18

## 2025-03-07 ENCOUNTER — TRANSCRIBE ORDERS (OUTPATIENT)
Dept: ADMINISTRATIVE | Facility: HOSPITAL | Age: 60
End: 2025-03-07
Payer: COMMERCIAL

## 2025-03-07 DIAGNOSIS — Z12.31 VISIT FOR SCREENING MAMMOGRAM: Primary | ICD-10-CM

## 2025-03-16 DIAGNOSIS — E03.9 ACQUIRED HYPOTHYROIDISM: ICD-10-CM

## 2025-03-17 DIAGNOSIS — G43.119 INTRACTABLE MIGRAINE WITH AURA WITHOUT STATUS MIGRAINOSUS: ICD-10-CM

## 2025-03-20 RX ORDER — LEVOTHYROXINE SODIUM 75 UG/1
75 TABLET ORAL EVERY MORNING
Qty: 30 TABLET | Refills: 0 | Status: SHIPPED | OUTPATIENT
Start: 2025-03-20

## 2025-03-31 ENCOUNTER — TELEPHONE (OUTPATIENT)
Dept: PULMONOLOGY | Facility: CLINIC | Age: 60
End: 2025-03-31
Payer: COMMERCIAL

## 2025-03-31 NOTE — TELEPHONE ENCOUNTER
Patient called stating she is having increased nerve pain and is requesting a refill of pregabalin. Please advise.

## 2025-04-01 ENCOUNTER — TELEPHONE (OUTPATIENT)
Dept: NEUROLOGY | Facility: CLINIC | Age: 60
End: 2025-04-01
Payer: COMMERCIAL

## 2025-04-01 RX ORDER — PRAMIPEXOLE 2.25 MG/1
2.25 TABLET, EXTENDED RELEASE ORAL
Qty: 90 TABLET | Refills: 3 | OUTPATIENT
Start: 2025-04-01

## 2025-04-01 NOTE — TELEPHONE ENCOUNTER
Rx Refill Note  Requested Prescriptions     Pending Prescriptions Disp Refills    Pramipexole Dihydrochloride ER 2.25 MG extended-release tablet [Pharmacy Med Name: PRAMIPEXOLE ER 2.25 MG TABLET] 90 tablet 3     Sig: TAKE 1 TABLET BY MOUTH EVERYDAY AT BEDTIME      Last filled:5/29/24 90ds 3 ref  Last office visit with prescribing clinician: 3/28/2024      Next office visit with prescribing clinician: 4/3/2025     Janina Mcclure MA  04/01/25, 08:40 EDT    Too soon for refill-knot due til @5/25-5/29

## 2025-04-01 NOTE — TELEPHONE ENCOUNTER
"Provider: BASSAM    Caller: Astrid Sam \"Doris\"    Relationship to Patient: Self    Pharmacy: [facility]    Phone Number: [phone]    Reason for Call: PT WAS TOLD BY PHARMACY HER REFILL FOR PRAMIPEXOLE HAD BEEN DENIED BY THE PROVIDER FOR MORE REFILLS.  SHE ONLY HAS 2 DAYS LEFT.  PLEASE CALL PT TO ADVISE     THANK YOU     "

## 2025-04-03 ENCOUNTER — TELEPHONE (OUTPATIENT)
Dept: PULMONOLOGY | Facility: CLINIC | Age: 60
End: 2025-04-03
Payer: COMMERCIAL

## 2025-04-03 NOTE — TELEPHONE ENCOUNTER
Reached out  to patient regarding NO CALL/NO SHOW for their appointment today. Offered to reschedule patient, and advised of No Call No Show Policy.    PT VOICED UNDERSTANDING, RESCHED AT THIS TIME - MW 4/3

## 2025-04-03 NOTE — TELEPHONE ENCOUNTER
Spoke with patient and explained that script was sent in 5/29/24 90 day supply with 3 refills and should have been enough to last her until May 2025.  She said she will double check.  She is also scheduled to see  tomorrow and we can discuss more then.  She was in good understanding.

## 2025-04-04 ENCOUNTER — TELEPHONE (OUTPATIENT)
Dept: NEUROLOGY | Facility: CLINIC | Age: 60
End: 2025-04-04

## 2025-04-04 RX ORDER — PRAMIPEXOLE DIHYDROCHLORIDE 1 MG/1
TABLET ORAL
Qty: 90 TABLET | Refills: 5 | Status: SHIPPED | OUTPATIENT
Start: 2025-04-04

## 2025-04-04 NOTE — TELEPHONE ENCOUNTER
"Caller: Astrid Sam \"Doris\"    Relationship: Self    Best call back number: [phone]    Preferred pharmacy: [id number]  [place] AT [facility] - [phone] [facility] [phone]     What was the call regarding: PT STATES SHE SPOKE / [facility] AND WAS INFORMED THAT HER INSURANCE NO LONGER COVERS THE MIRAPEX MEDICATION. PT HAS CALLED TO ASK IF DR. BANEGAS WOULD RECOMMEND SWITCHING TO A DIFFERENT MEDICATION, OR DOES OFFICE HAVE ANY COPAY/COUPON CARDS, OR SAMPLES AVAILABLE TO PROVIDE HER?    PT REPORTS SHE HAS JUST 1 TABLET LEFT OF THE MIRAPEX.    Do you require a callback: YES, PLEASE.    PLEASE REVIEW AND ADVISE.  "

## 2025-04-17 DIAGNOSIS — E03.9 ACQUIRED HYPOTHYROIDISM: ICD-10-CM

## 2025-04-17 RX ORDER — LEVOTHYROXINE SODIUM 75 UG/1
75 TABLET ORAL EVERY MORNING
Qty: 30 TABLET | Refills: 0 | Status: SHIPPED | OUTPATIENT
Start: 2025-04-17

## 2025-05-08 ENCOUNTER — OFFICE VISIT (OUTPATIENT)
Dept: PULMONOLOGY | Facility: CLINIC | Age: 60
End: 2025-05-08
Payer: COMMERCIAL

## 2025-05-08 VITALS
RESPIRATION RATE: 22 BRPM | DIASTOLIC BLOOD PRESSURE: 102 MMHG | OXYGEN SATURATION: 98 % | HEIGHT: 60 IN | HEART RATE: 106 BPM | BODY MASS INDEX: 29.84 KG/M2 | SYSTOLIC BLOOD PRESSURE: 140 MMHG | WEIGHT: 152 LBS | TEMPERATURE: 97.8 F

## 2025-05-08 DIAGNOSIS — K22.719 BARRETT'S ESOPHAGUS WITH DYSPLASIA: ICD-10-CM

## 2025-05-08 DIAGNOSIS — R05.3 CHRONIC COUGH: Primary | ICD-10-CM

## 2025-05-08 PROCEDURE — 99214 OFFICE O/P EST MOD 30 MIN: CPT | Performed by: INTERNAL MEDICINE

## 2025-05-08 RX ORDER — HYDROCODONE BITARTRATE AND ACETAMINOPHEN 5; 325 MG/1; MG/1
1 TABLET ORAL 3 TIMES DAILY PRN
COMMUNITY
Start: 2025-03-13

## 2025-05-08 NOTE — PROGRESS NOTES
Follow Up Office Note       Patient Name: Astrid Sam    Referring Physician: No ref. provider found    Chief Complaint: Chronic cough      History of Present Illness: Astrid Sam is a 59 y.o. female who is here today to follow-up care with Pulmonary.  Patient has a past medical history significant for hypothyroidism, allergic rhinitis, hyperlipidemia, PFO status post anterior atrial closure, CVA, Neil's esophagus and seizures.  Patient continues to have significant coughing.  Taking the reflux medications that we prescribed metoprolol.  She tried Reglan but it made her restless leg worse so she had to stop it.  Continues to cough no change.    Review of Systems:   Review of Systems   Constitutional:  Negative for chills, fatigue and fever.   HENT:  Negative for congestion and voice change.    Eyes:  Negative for blurred vision.   Respiratory:  Negative for cough, shortness of breath and wheezing.    Cardiovascular:  Negative for chest pain.   Skin:  Negative for dry skin.   Hematological:  Negative for adenopathy.   Psychiatric/Behavioral:  Negative for agitation and depressed mood.        The following portions of the patient's history were reviewed and updated as appropriate: allergies, current medications, past family history, past medical history, past social history, past surgical history and problem list.    Physical Exam:    Physical Exam  Vitals and nursing note reviewed.   Constitutional:       General: She is not in acute distress.     Appearance: She is well-developed and normal weight. She is obese. She is not ill-appearing or toxic-appearing.   HENT:      Head: Normocephalic and atraumatic.   Cardiovascular:      Rate and Rhythm: Normal rate and regular rhythm.      Pulses: Normal pulses.      Heart sounds: Normal heart sounds. No murmur heard.     No friction rub. No gallop.   Pulmonary:      Effort: Pulmonary effort is normal. No respiratory distress.      Breath sounds: Normal  breath sounds. No wheezing, rhonchi or rales.   Musculoskeletal:      Right lower leg: No edema.      Left lower leg: No edema.   Skin:     General: Skin is warm and dry.   Neurological:      Mental Status: She is alert and oriented to person, place, and time.         Immunization History   Administered Date(s) Administered    COVID-19 (PFIZER) Purple Cap Monovalent 04/05/2021, 05/03/2021    Fluzone (or Fluarix & Flulaval for VFC) >6mos 12/10/2021       Results Review:   - CT scan of the chest from 11/29/2023 which shows no acute cardiopulmonary process, no nodules, masses, or infiltrates.  -CT sinus report from November 2023, which showed normal  - chest x-ray from 6/30/2023 which showed no acute cardiopulmonary process.  -CT scan from October 2021 which showed the nodules in the right lower lobe is now resolved, with no other acute infiltrates              - CT scan from May 2021 which showed significant improvement in the upper lobe infiltrates compared to August 2020, she was noted to have a small nodule in the right lower lobe.              - imaging from chest x-ray from November 4, 2020 shows no acute cardiopulmonary process, CT scan of the chest from August 22, 2020 showed some very mild groundglass interstitial changes in the upper lobes bilaterally, and otherwise unremarkable.  - Echo from 8/22/2020 showed an EF of 60%, with normal left ventricular systolic function, and no signs of a shunt.  -I personally viewed the patient's esophageal pH probe monitoring study, which showed that there was significant amount of reflux on the study, but not always correlating with her cough.  The esophageal manometry study did show abnormality with abnormal peristalsis.  - barium swallow report, which showed that the patient had mild gastroesophageal reflux to the thoracic inlet, with mild smooth luminal narrowing of the distal esophagus which may represent a stricture or spasm.  - bronchoscopy results from November 2023,  with all cultures being negative, cytology was benign, transbronchial biopsies benign tissue, BAL within normal limits.  -I personally viewed the patient's PFTs from 5/8/2025 showing no obstruction or restriction with a normal DLCO.  - PFTs from 10/24/2023 which showed no obstruction or restriction with a normal DLCO.  -spirometry from May 2023 which shows no obstruction.  - pulmonary function testing from 12/21/2020 which showed no obstruction or restriction with a normal DLCO, although notable for significant air trapping  - EGD pathology report from February 2021 which was negative for H. pylori, showed signs of chronic inflammation, most notably in the distal esophagus there was signs of reflux esophagitis that was compatible with Neil's.    Assessment / Plan:   Diagnoses and all orders for this visit:    1. Chronic cough (Primary)  2. Neil's esophagus with dysplasia  -At this point we have had extensive workups for the patient the only clear finding we can have is that she has manometry issues and reflux seen on her esophageal pH Prodium study with manometry and her barium swallow.  She has no signs of asthma no drainage issues.  We have tried empiric regimens for everything.  She has been evaluated by surgery at least once I am going to get a second opinion at the [facility].  I have placed a referral to Dr. Ceron.  At this point from a reflux standpoint we have tried her on PPI twice daily and Reglan to try to help she cannot take the Reglan and continues to have her coughing.  She verbalized understanding agree with the plan.  Will follow back up after [facility] consultation.    Follow Up:   Return in about 4 months (around 9/8/2025).       IWONA Alfaro,   Pulmonary and Critical Care Medicine  Note Electronically Signed    Part of this note may be an electronic transcription/translation of spoken language to printed text using the Dragon Dictation System.

## 2025-05-09 DIAGNOSIS — K22.719 BARRETT'S ESOPHAGUS WITH DYSPLASIA: Primary | ICD-10-CM

## 2025-05-13 DIAGNOSIS — E03.9 ACQUIRED HYPOTHYROIDISM: ICD-10-CM

## 2025-05-13 RX ORDER — LEVOTHYROXINE SODIUM 75 UG/1
75 TABLET ORAL EVERY MORNING
Qty: 30 TABLET | Refills: 0 | Status: SHIPPED | OUTPATIENT
Start: 2025-05-13

## 2025-06-14 DIAGNOSIS — R45.4 IRRITABILITY: ICD-10-CM

## 2025-06-16 DIAGNOSIS — E03.9 ACQUIRED HYPOTHYROIDISM: ICD-10-CM

## 2025-06-16 RX ORDER — LEVOTHYROXINE SODIUM 75 UG/1
75 TABLET ORAL EVERY MORNING
Qty: 90 TABLET | Refills: 0 | Status: SHIPPED | OUTPATIENT
Start: 2025-06-16

## 2025-06-16 RX ORDER — ESCITALOPRAM OXALATE 20 MG/1
20 TABLET ORAL DAILY
Qty: 90 TABLET | Refills: 1 | Status: SHIPPED | OUTPATIENT
Start: 2025-06-16

## 2025-06-16 NOTE — TELEPHONE ENCOUNTER
"Caller: Astrid Sam \"Doris\"    Relationship: Self    Best call back number: [phone]     Requested Prescriptions:   Requested Prescriptions     Pending Prescriptions Disp Refills    levothyroxine (SYNTHROID, LEVOTHROID) 75 MCG tablet 30 tablet 0     Sig: Take 1 tablet by mouth Every Morning.        Pharmacy where request should be sent: [facility]ARMACY #[id number] - [address] AT [address] [phone] [facility] [phone] [facility]     Last office visit with prescribing clinician: 10/10/2024   Last telemedicine visit with prescribing clinician: Visit date not found   Next office visit with prescribing clinician: 10/31/2025     Additional details provided by patient: PATIENT'S APPOINTMENT IS SCHEDULED FOR 10/31/25 AND SHE WILL RUN OUT OF MEDICATION BEFORE THEN.  PLEASE SEND IN HER SCRIPT FOR THIS MEDICATION.     Does the patient have less than a 3 day supply:  [x] Yes  [] No    Would you like a call back once the refill request has been completed: [] Yes [x] No    If the office needs to give you a call back, can they leave a voicemail: [] Yes [x] No    Matheus Brown Rep   06/16/25 10:58 EDT         "

## 2025-06-23 ENCOUNTER — TELEPHONE (OUTPATIENT)
Dept: FAMILY MEDICINE CLINIC | Facility: CLINIC | Age: 60
End: 2025-06-23

## 2025-06-23 DIAGNOSIS — Z78.0 POSTMENOPAUSAL: Primary | ICD-10-CM

## 2025-06-23 DIAGNOSIS — Z13.820 SCREENING FOR OSTEOPOROSIS: ICD-10-CM

## 2025-06-23 NOTE — TELEPHONE ENCOUNTER
"  Caller: Astrid Sam \"Doris\"    Relationship: Self    Best call back number: [phone]     What orders are you requesting (i.e. lab or imaging): BONE DENSITY SCAN    In what timeframe would the patient need to come in: ASAP    Where will you receive your lab/imaging services: [facility]    Additional notes: PT BROKE HER ANKLE AND WAS UNABLE TO MAKE LAST APPT. ORDERS HAVE .    "

## 2025-07-03 ENCOUNTER — OFFICE VISIT (OUTPATIENT)
Dept: FAMILY MEDICINE CLINIC | Facility: CLINIC | Age: 60
End: 2025-07-03
Payer: COMMERCIAL

## 2025-07-03 VITALS
WEIGHT: 151 LBS | HEART RATE: 114 BPM | BODY MASS INDEX: 29.64 KG/M2 | OXYGEN SATURATION: 98 % | RESPIRATION RATE: 18 BRPM | SYSTOLIC BLOOD PRESSURE: 104 MMHG | DIASTOLIC BLOOD PRESSURE: 62 MMHG | HEIGHT: 60 IN | TEMPERATURE: 98.9 F

## 2025-07-03 DIAGNOSIS — J20.9 ACUTE BRONCHITIS, UNSPECIFIED ORGANISM: Primary | ICD-10-CM

## 2025-07-03 DIAGNOSIS — G43.119 INTRACTABLE MIGRAINE WITH AURA WITHOUT STATUS MIGRAINOSUS: ICD-10-CM

## 2025-07-03 DIAGNOSIS — F51.01 PRIMARY INSOMNIA: ICD-10-CM

## 2025-07-03 PROCEDURE — 99214 OFFICE O/P EST MOD 30 MIN: CPT | Performed by: FAMILY MEDICINE

## 2025-07-03 RX ORDER — DOXYCYCLINE 100 MG/1
100 CAPSULE ORAL EVERY 12 HOURS SCHEDULED
Qty: 20 CAPSULE | Refills: 0 | Status: SHIPPED | OUTPATIENT
Start: 2025-07-03 | End: 2025-07-13

## 2025-07-03 RX ORDER — HYDROCODONE BITARTRATE AND HOMATROPINE METHYLBROMIDE ORAL SOLUTION 5; 1.5 MG/5ML; MG/5ML
5 LIQUID ORAL EVERY 6 HOURS PRN
Qty: 120 ML | Refills: 0 | Status: SHIPPED | OUTPATIENT
Start: 2025-07-03

## 2025-07-03 RX ORDER — TRAZODONE HYDROCHLORIDE 100 MG/1
100 TABLET ORAL NIGHTLY
Qty: 90 TABLET | Refills: 0 | Status: SHIPPED | OUTPATIENT
Start: 2025-07-03

## 2025-07-03 RX ORDER — PREDNISONE 10 MG/1
TABLET ORAL
Qty: 21 TABLET | Refills: 0 | Status: SHIPPED | OUTPATIENT
Start: 2025-07-03

## 2025-07-03 NOTE — PROGRESS NOTES
Chief Complaint  Cough and Headache (Cough, headache, congestion, started yesterday)    Subjective          Astrid Sam presents to [facility]  Cough  Chronicity:  New  Onset:  Yesterday  Associated symptoms: headaches, nasal congestion, postnasal drip, sore throat and wheezing    Associated symptoms: no ear congestion, no ear pain and no shortness of breath    PMH includes: no asthma    Headache in frontal area, with head congestion     She is following with Dr. Alfaro for pulmonology for evaluation of chronic cough.   States that current cough is unlike her chronic cough. She feels chest congestion.     Amitriptyline is not effective, not helping with sleep.     The following portions of the patient's history were reviewed and updated as appropriate: allergies, current medications, past family history, past medical history, past social history, past surgical history, and problem list.    Objective      Physical Exam  Vitals and nursing note reviewed.   HENT:      Right Ear: Tympanic membrane, ear canal and external ear normal.      Left Ear: Tympanic membrane, ear canal and external ear normal.   Cardiovascular:      Rate and Rhythm: Normal rate and regular rhythm.      Heart sounds: Normal heart sounds.   Pulmonary:      Effort: Pulmonary effort is normal. No respiratory distress.      Breath sounds: Normal breath sounds.      Comments: Frequent dry cough  Neurological:      Mental Status: She is alert.   Psychiatric:         Mood and Affect: Mood normal.        Result Review :                Assessment and Plan    Diagnoses and all orders for this visit:    1. Acute bronchitis, unspecified organism (Primary)  -     HYDROcodone Bit-Homatrop MBr (HYCODAN) 5-1.5 MG/5ML solution; Take 5 mL by mouth Every 6 (Six) Hours As Needed for Cough.  Dispense: 120 mL; Refill: 0  -     doxycycline (MONODOX) 100 MG capsule; Take 1 capsule by mouth Every 12 (Twelve) Hours for 10 days.   Dispense: 20 capsule; Refill: 0  -     predniSONE (DELTASONE) 10 MG (21) dose pack; Use as directed on package  Dispense: 21 tablet; Refill: 0    2. Intractable migraine with aura without status migrainosus  -     ubrogepant (Ubrelvy) 100 MG tablet; TAKE 1 TABLET BY MOUTH FOR MIGRAINE FOR UP TO ONE DOSE MAY REPEAT DOSE AFTER 2 HOURS IF HEADACHE NOT RESOLVED  Dispense: 10 tablet; Refill: 1    3. Primary insomnia  -     traZODone (DESYREL) 100 MG tablet; Take 1 tablet by mouth Every Night.  Dispense: 90 tablet; Refill: 0    Cough syrup, doxycycline, and prednisone to treat acute bronchitis. YOUSIF query complete. Treatment plan to include limited course of prescribed controlled substance. Risks including addiction, benefits, and alternatives presented to patient.   Follow up if no improvement.   Amitriptyline changed to Trazodone.       Follow Up   No follow-ups on file.  Patient was given instructions and counseling regarding her condition or for health maintenance advice. Please see specific information pulled into the AVS if appropriate.

## 2025-07-10 DIAGNOSIS — J20.9 ACUTE BRONCHITIS, UNSPECIFIED ORGANISM: ICD-10-CM

## 2025-07-10 RX ORDER — DEXTROMETHORPHAN HYDROBROMIDE AND PROMETHAZINE HYDROCHLORIDE 15; 6.25 MG/5ML; MG/5ML
5 SYRUP ORAL 4 TIMES DAILY PRN
Qty: 180 ML | Refills: 0 | Status: SHIPPED | OUTPATIENT
Start: 2025-07-10

## 2025-07-11 RX ORDER — HYDROCODONE BITARTRATE AND HOMATROPINE METHYLBROMIDE ORAL SOLUTION 5; 1.5 MG/5ML; MG/5ML
5 LIQUID ORAL EVERY 6 HOURS PRN
Qty: 120 ML | Refills: 0 | OUTPATIENT
Start: 2025-07-11

## 2025-07-18 DIAGNOSIS — B37.9 ANTIBIOTIC-INDUCED YEAST INFECTION: Primary | ICD-10-CM

## 2025-07-18 DIAGNOSIS — T36.95XA ANTIBIOTIC-INDUCED YEAST INFECTION: Primary | ICD-10-CM

## 2025-07-18 RX ORDER — FLUCONAZOLE 150 MG/1
150 TABLET ORAL ONCE
Qty: 1 TABLET | Refills: 0 | Status: SHIPPED | OUTPATIENT
Start: 2025-07-18 | End: 2025-07-18

## 2025-08-06 ENCOUNTER — OFFICE VISIT (OUTPATIENT)
Dept: NEUROLOGY | Facility: CLINIC | Age: 60
End: 2025-08-06
Payer: COMMERCIAL

## 2025-08-06 VITALS
HEART RATE: 105 BPM | WEIGHT: 152.8 LBS | BODY MASS INDEX: 30 KG/M2 | OXYGEN SATURATION: 96 % | SYSTOLIC BLOOD PRESSURE: 90 MMHG | DIASTOLIC BLOOD PRESSURE: 72 MMHG | HEIGHT: 60 IN

## 2025-08-06 DIAGNOSIS — G62.9 POLYNEUROPATHY: ICD-10-CM

## 2025-08-06 DIAGNOSIS — G25.81 RESTLESS LEGS SYNDROME (RLS): Primary | ICD-10-CM

## 2025-08-06 PROCEDURE — 99214 OFFICE O/P EST MOD 30 MIN: CPT | Performed by: PSYCHIATRY & NEUROLOGY

## 2025-08-06 RX ORDER — PREGABALIN 75 MG/1
75 CAPSULE ORAL NIGHTLY
Qty: 90 CAPSULE | Refills: 0 | Status: SHIPPED | OUTPATIENT
Start: 2025-08-06 | End: 2026-08-06

## 2025-08-06 RX ORDER — PRAMIPEXOLE DIHYDROCHLORIDE 1 MG/1
TABLET ORAL
Qty: 90 TABLET | Refills: 11 | Status: SHIPPED | OUTPATIENT
Start: 2025-08-06

## 2025-08-20 DIAGNOSIS — E78.2 MIXED HYPERLIPIDEMIA: ICD-10-CM

## 2025-08-20 RX ORDER — PRAVASTATIN SODIUM 40 MG
40 TABLET ORAL
Qty: 90 TABLET | Refills: 0 | Status: SHIPPED | OUTPATIENT
Start: 2025-08-20

## (undated) DEVICE — GLV SURG PREMIERPRO MIC LTX PF SZ8.5 BRN

## (undated) DEVICE — PREVENA PLUS INCISION MANAGEMENT SYSTEM: Brand: PREVENA PLUS™

## (undated) DEVICE — GLV SURG SENSICARE MICRO PF LF 7 STRL

## (undated) DEVICE — MARKER,SKIN,W/RULER,DUAL,STOP: Brand: MEDLINE

## (undated) DEVICE — SYR CONTRL PRESS/LO FIX/M/LL W/THMB/RNG 10ML

## (undated) DEVICE — UNDERGLV SURG BIOGEL INDICAT PI SZ8.5 BLU

## (undated) DEVICE — SYR SLP TP 10ML DISP

## (undated) DEVICE — BLANKT WARM UPPR/BDY ARM/OUT 57X196CM

## (undated) DEVICE — INTENDED FOR TISSUE SEPARATION, AND OTHER PROCEDURES THAT REQUIRE A SHARP SURGICAL BLADE TO PUNCTURE OR CUT.: Brand: BARD-PARKER ® CARBON RIB-BACK BLADES

## (undated) DEVICE — STCKNT IMPERV 12IN STRL

## (undated) DEVICE — T4 PULLOVER TOGA, LARGE

## (undated) DEVICE — STRYKER PERFORMANCE SERIES SAGITTAL BLADE: Brand: STRYKER PERFORMANCE SERIES

## (undated) DEVICE — SYR LUER SLPTP 50ML

## (undated) DEVICE — TBG PENCL TELESCP MEGADYNE SMOKE EVAC 10FT

## (undated) DEVICE — LUBE GEL ENDOGLIDE 1.1OZ

## (undated) DEVICE — TRY SKINPREP DRYPREP

## (undated) DEVICE — SWABSTK SKINPREP PVPI PRE/SAT 8IN STRL

## (undated) DEVICE — GLV SURG PREMIERPRO MIC LTX PF SZ6.5 BRN

## (undated) DEVICE — NDL HYPO ECLPS SFTY 18G 1 1/2IN

## (undated) DEVICE — SHEET,DRAPE,53X77,STERILE: Brand: MEDLINE

## (undated) DEVICE — PK HIP TOTL UNIV 10

## (undated) DEVICE — PEG PAD FOR SURG DISP

## (undated) DEVICE — SOL ISO/ALC RUB 70PCT 4OZ

## (undated) DEVICE — SST TWIST DRILL, STANDARD, 2MM DIA. X 127MM: Brand: MICROAIRE®

## (undated) DEVICE — PATIENT RETURN ELECTRODE, SINGLE-USE, CONTACT QUALITY MONITORING, ADULT, WITH 9FT CORD, FOR PATIENTS WEIGING OVER 33LBS. (15KG): Brand: MEGADYNE

## (undated) DEVICE — GLV SURG DERMASSURE GRN LF PF 7.0

## (undated) DEVICE — GLV SURG PREMIERPRO ORTHO LTX PF SZ6.5 BRN

## (undated) DEVICE — GLV SURG PREMIERPRO MIC LTX PF SZ7 BRN

## (undated) DEVICE — SKIN AFFIX SURG ADHESIVE 72/CS 0.55ML: Brand: MEDLINE

## (undated) DEVICE — ANTIBACTERIAL UNDYED BRAIDED (POLYGLACTIN 910), SYNTHETIC ABSORBABLE SUTURE: Brand: COATED VICRYL

## (undated) DEVICE — GLV SURG SENSICARE MICRO PF LF 6.5 STRL

## (undated) DEVICE — SUT MNCRYL PLS ANTIB UD 3/0 PS2 27IN

## (undated) DEVICE — PROB ESOPH COMFORTEC IMPEDANCE NON/INF 6F 2.3 18CM

## (undated) DEVICE — PK SOL VISC ESOPH 80ML

## (undated) DEVICE — PK ATS CUST W CARDIOTOMY RESEVOIR

## (undated) DEVICE — TRAP,MUCUS SPECIMEN,40CC: Brand: MEDLINE

## (undated) DEVICE — 6617 IOBAN II PATIENT ISOLATION DRAPE 5/BX,4BX/CS: Brand: STERI-DRAPE™ IOBAN™ 2

## (undated) DEVICE — SCRB SURG BACTOSHIELD CHG 4PCT 4OZ

## (undated) DEVICE — GLV SURG SENSICARE PI LF PF 6.5

## (undated) DEVICE — Device

## (undated) DEVICE — GLV SURG SENSICARE PI LF PF 7.0

## (undated) DEVICE — HANDPIECE SET WITH HIGH FLOW TIP AND SUCTION TUBE: Brand: INTERPULSE

## (undated) DEVICE — GLV SURG SENSICARE MICRO PF LF 9 STRL

## (undated) DEVICE — 4.0MM OVAL CARBIDE BUR

## (undated) DEVICE — GLV SURG PREMIERPRO MIC LTX PF SZ7.5 BRN

## (undated) DEVICE — DRAPE,U/ SHT,SPLIT,PLAS,STERIL: Brand: MEDLINE

## (undated) DEVICE — GLV SURG PREMIERPRO MIC LTX PF SZ8 BRN

## (undated) DEVICE — GLV SURG SENSICARE PI ORTHO SZ8.5 LF STRL

## (undated) DEVICE — CONTAINER,SPECIMEN,OR STERILE,4OZ: Brand: MEDLINE

## (undated) DEVICE — SYR CONTRL LUERLOK 10CC

## (undated) DEVICE — HEWSON SUTURE RETRIEVER: Brand: HEWSON SUTURE RETRIEVER

## (undated) DEVICE — DRSNG WND STRIP OPTIFOAM AG SUPRABS A/MIC 4X8IN STRL

## (undated) DEVICE — BNDG COBAN S/ADHR WRP 4IN 5YD TN

## (undated) DEVICE — SOL ISO/ALC 70PCT 16OZ

## (undated) DEVICE — SAFELINER SUCTION CANISTER 1500CC: Brand: DEROYAL

## (undated) DEVICE — PK MAJ SHLDR SPLT 10

## (undated) DEVICE — GLV SURG SENSICARE PI LF PF 7.5

## (undated) DEVICE — 1010 S-DRAPE TOWEL DRAPE 10/BX: Brand: STERI-DRAPE™

## (undated) DEVICE — BOWL UTIL STRL 32OZ

## (undated) DEVICE — MEDI-VAC YANKAUER SUCTION HANDLE W/BULBOUS TIP: Brand: CARDINAL HEALTH

## (undated) DEVICE — FRCP BX RADJAW4 PULM WO NDL STD1.8X2 100

## (undated) DEVICE — DISPOSABLE ORTHOPAEDIC PROTECTOR: Brand: ALEXIS ® ORTHOPAEDIC PROTECTOR

## (undated) DEVICE — PREMIUM DRY TRAY LF: Brand: MEDLINE INDUSTRIES, INC.

## (undated) DEVICE — SINGLE USE SUCTION VALVE MAJ-209: Brand: SINGLE USE SUCTION VALVE (STERILE)

## (undated) DEVICE — GLV SURG SENSICARE MICRO PF LF 8.5 STRL

## (undated) DEVICE — PROXIMATE RH ROTATING HEAD SKIN STAPLERS (35 WIDE) CONTAINS 35 STAINLESS STEEL STAPLES: Brand: PROXIMATE

## (undated) DEVICE — CVR HNDL LT SURG ACCSSRY BLU STRL

## (undated) DEVICE — SPK10295 ORTHOPEDIC FRACTURE KIT: Brand: SPK10295 ORTHOPEDIC FRACTURE KIT

## (undated) DEVICE — SYR LL W/SCALE/MARK 3ML STRL

## (undated) DEVICE — HLDR TBG NG

## (undated) DEVICE — SYR LUERLOK 50ML

## (undated) DEVICE — GLV SURG TRIUMPH CLASSIC PF LTX 8.5 STRL

## (undated) DEVICE — TRY EPID SFTY 18G 3.5IN 1T7680

## (undated) DEVICE — SYR LL 3CC

## (undated) DEVICE — TOTAL TRAY, 16FR 10ML SIL FOLEY, URN: Brand: MEDLINE

## (undated) DEVICE — SOL IRR NACL 0.9PCT BT 1000ML

## (undated) DEVICE — SUT VIC 2/0 CT2 27IN J269H

## (undated) DEVICE — NDL HYPO ECLPS SFTY 22G 1 1/2IN

## (undated) DEVICE — SYS PUMP PREVENA125 INCSN MNGT PP/DRSNG 45ML 1P/U

## (undated) DEVICE — SYR SFTY ECLPS LL 1CC 27G 1/2IN

## (undated) DEVICE — PREP SOL POVIDONE/IODINE BT 4OZ

## (undated) DEVICE — TUBING, SUCTION, 1/4" X 10', STRAIGHT: Brand: MEDLINE

## (undated) DEVICE — PILLW ABD MD

## (undated) DEVICE — GLV SURG PREMIERPRO ORTHO LTX PF SZ7 BRN

## (undated) DEVICE — SYR SLPTP 20CC

## (undated) DEVICE — C-ARM DRAPE: Brand: DEROYAL